# Patient Record
Sex: MALE | Race: WHITE | NOT HISPANIC OR LATINO | Employment: FULL TIME | URBAN - METROPOLITAN AREA
[De-identification: names, ages, dates, MRNs, and addresses within clinical notes are randomized per-mention and may not be internally consistent; named-entity substitution may affect disease eponyms.]

---

## 2017-01-27 RX ORDER — METHYLPHENIDATE HYDROCHLORIDE 20 MG/1
20 TABLET ORAL
Qty: 90 TABLET | Refills: 0 | Status: SHIPPED | OUTPATIENT
Start: 2017-01-27 | End: 2017-03-31 | Stop reason: SDUPTHER

## 2017-01-27 RX ORDER — METHYLPHENIDATE HYDROCHLORIDE 20 MG/1
20 TABLET ORAL
Qty: 90 TABLET | Refills: 0 | Status: SHIPPED | OUTPATIENT
Start: 2017-02-26 | End: 2018-02-07

## 2017-03-27 ENCOUNTER — PATIENT MESSAGE (OUTPATIENT)
Dept: FAMILY MEDICINE | Facility: CLINIC | Age: 37
End: 2017-03-27

## 2017-03-27 DIAGNOSIS — R19.7 DIARRHEA, UNSPECIFIED TYPE: Primary | ICD-10-CM

## 2017-03-27 RX ORDER — CLARITHROMYCIN 500 MG/1
500 TABLET, FILM COATED ORAL 2 TIMES DAILY
Qty: 28 TABLET | Refills: 0 | Status: SHIPPED | OUTPATIENT
Start: 2017-03-27 | End: 2017-04-06

## 2017-03-27 RX ORDER — OMEPRAZOLE 20 MG/1
20 CAPSULE, DELAYED RELEASE ORAL 2 TIMES DAILY
Qty: 28 CAPSULE | Refills: 0 | Status: SHIPPED | OUTPATIENT
Start: 2017-03-27 | End: 2017-04-25 | Stop reason: ALTCHOICE

## 2017-03-27 RX ORDER — AMOXICILLIN 500 MG/1
1000 CAPSULE ORAL EVERY 12 HOURS
Qty: 28 CAPSULE | Refills: 0 | Status: SHIPPED | OUTPATIENT
Start: 2017-03-27 | End: 2017-03-31 | Stop reason: SDUPTHER

## 2017-03-30 ENCOUNTER — TELEPHONE (OUTPATIENT)
Dept: FAMILY MEDICINE | Facility: CLINIC | Age: 37
End: 2017-03-30

## 2017-03-31 ENCOUNTER — OFFICE VISIT (OUTPATIENT)
Dept: FAMILY MEDICINE | Facility: CLINIC | Age: 37
End: 2017-03-31
Payer: COMMERCIAL

## 2017-03-31 VITALS
DIASTOLIC BLOOD PRESSURE: 80 MMHG | HEIGHT: 75 IN | BODY MASS INDEX: 35.91 KG/M2 | HEART RATE: 72 BPM | WEIGHT: 288.81 LBS | RESPIRATION RATE: 17 BRPM | SYSTOLIC BLOOD PRESSURE: 130 MMHG | TEMPERATURE: 98 F | OXYGEN SATURATION: 98 %

## 2017-03-31 DIAGNOSIS — Z00.00 ANNUAL PHYSICAL EXAM: Primary | ICD-10-CM

## 2017-03-31 DIAGNOSIS — R19.7 DIARRHEA, UNSPECIFIED TYPE: ICD-10-CM

## 2017-03-31 PROCEDURE — 99999 PR PBB SHADOW E&M-EST. PATIENT-LVL V: CPT | Mod: PBBFAC,,, | Performed by: NURSE PRACTITIONER

## 2017-03-31 PROCEDURE — 99395 PREV VISIT EST AGE 18-39: CPT | Mod: S$GLB,,, | Performed by: NURSE PRACTITIONER

## 2017-03-31 RX ORDER — AMOXICILLIN 500 MG/1
1000 CAPSULE ORAL EVERY 12 HOURS
Qty: 28 CAPSULE | Refills: 0 | Status: SHIPPED | OUTPATIENT
Start: 2017-03-31 | End: 2017-04-14

## 2017-03-31 RX ORDER — AMOXICILLIN 500 MG/1
1000 CAPSULE ORAL EVERY 12 HOURS
Qty: 28 CAPSULE | Refills: 0 | Status: SHIPPED | OUTPATIENT
Start: 2017-03-31 | End: 2017-03-31 | Stop reason: SDUPTHER

## 2017-03-31 NOTE — PROGRESS NOTES
Subjective:       Patient ID: Jatinder Moreno III is a 36 y.o. male.    Chief Complaint: Abdominal Cramping and Diarrhea    HPI Mr Moreno is here for his annual exam.  He feels well today, he eats well, stays active, and reports med compliance.  He recently had some problems with diarrhea, but that is resolving. His health maintenance is up to date.  Review of Systems   Constitutional: Negative for fever.   Respiratory: Negative.    Cardiovascular: Negative.        Objective:      Physical Exam   Constitutional: He is oriented to person, place, and time. He appears well-developed and well-nourished. He does not appear ill. No distress.   obese   Cardiovascular: Normal rate, regular rhythm and normal heart sounds.  Exam reveals no friction rub.    No murmur heard.  Pulmonary/Chest: Effort normal and breath sounds normal. No respiratory distress. He has no decreased breath sounds. He has no wheezes. He has no rhonchi. He has no rales.   Musculoskeletal: Normal range of motion. He exhibits no edema.   Neurological: He is alert and oriented to person, place, and time.   Skin: Skin is warm and dry. No erythema.   Psychiatric: He has a normal mood and affect. His behavior is normal.   Vitals reviewed.      Assessment:       1. Annual physical exam    2. Diarrhea, unspecified type        Plan:       Annual physical exam  -     CBC auto differential; Future; Expected date: 3/31/17  -     Comprehensive metabolic panel; Future; Expected date: 3/31/17  -     Vitamin D; Future; Expected date: 3/31/17  -     Lipid panel; Future; Expected date: 3/31/17  Will get fasting labs, otherwise all else is UTD    Diarrhea, unspecified type  -     amoxicillin (AMOXIL) 500 MG capsule; Take 2 capsules (1,000 mg total) by mouth every 12 (twelve) hours.  Dispense: 28 capsule; Refill: 0      F/u in 1 year, sooner if necessary    Verbalized understanding.

## 2017-03-31 NOTE — PATIENT INSTRUCTIONS
"Follow up in 1 year, sooner if necessary  Go to ER for new worse or concerning symptoms    Healthy Eating on the Go    Wherever your family goes, healthy eating can still be easy for you and fun for your kids. Pack sliced vegetables, fruits, and nonfat or low-fat dips in plastic bags or containers. Make fun and easy snacks like celery with peanut butter and raisins. Bring plenty of bottled water. Anywhere you go, you'll be ready when you and your child feel hungry.  At a fast-food restaurant  Eating healthy at fast-food restaurants means choosing the right foods.  · Instead of fried foods, try grilled meats like chicken and fish. Look for baked potatoes topped with vegetables or salads. Order low-fat or nonfat milk instead of soda. Get fruit or yogurt instead of milkshakes or cookies.  · If your child isn't ready for you to stop ordering French fries, get one serving for everyone to share. This gives everyone a taste without making French fries the center of the meal.  · Lead by example. Make healthy choices for yourself. Kids watch how and what you eat. They'll be more likely to eat healthy foods that you eat, too.  At the store  Do you shop at corner markets or convenience stores? You can still find healthy foods for your family. Look for:  · Canned vegetables and fruits (packed in water, not heavy syrup). If you have to buy fruit packed in syrup, rinse the fruit with water and throw away the syrup.  · Whole grain products like brown rice, corn tortillas, and whole-wheat breads. Look for the words "whole grain" on the package, not just "wheat."  · Good sources of protein like canned beans, tuna canned in water, eggs, low-fat or nonfat milk, and low-fat or nonfat cheese and yogurt.  · Avoid the snack foods! Don't be drawn in by all the chips, candy, soda, and sugar-filled cereals.  Date Last Reviewed: 6/4/2015  © 7033-5251 [employer]. [address] [place] [address], [place] [address]. All rights reserved. " This information is not intended as a substitute for professional medical care. Always follow your healthcare professional's instructions.

## 2017-03-31 NOTE — MR AVS SNAPSHOT
[facility]  [address]  [place] [address]  Phone: [phone]                  Jatinder Moreno III   3/31/2017 7:20 AM   Office Visit    Description:  Male : 1980   Provider:  Rosetta Zaldivar, NP-C   Department:  [facility]           Reason for Visit     Abdominal Cramping     Diarrhea           Diagnoses this Visit        Comments    Annual physical exam    -  Primary     Diarrhea, unspecified type                To Do List           Goals (5 Years of Data)     None       These Medications        Disp Refills Start End    amoxicillin (AMOXIL) 500 MG capsule 28 capsule 0 3/31/2017 2017    Take 2 capsules (1,000 mg total) by mouth every 12 (twelve) hours. - Oral    Pharmacy: [facility]/pharmacy #[id number] [place], [place]  [address] [facility] #: [phone]         [facility] On Call     [facility] On Call Nurse Care Line -  Assistance  Unless otherwise directed by your provider, please contact [facility], our nurse care line that is available for  assistance.     Registered nurses in the [facility] On Call Center provide: appointment scheduling, clinical advisement, health education, and other advisory services.  Call: [phone] (toll free)               Medications           Message regarding Medications     Verify the changes and/or additions to your medication regime listed below are the same as discussed with your clinician today.  If any of these changes or additions are incorrect, please notify your healthcare provider.        STOP taking these medications     butalbital-acetaminophen-caffeine -40 mg (FIORICET, ESGIC) -40 mg per tablet Take 1 tablet by mouth every 4 (four) hours as needed for Pain.           Verify that the below list of medications is an accurate representation of the medications you are currently taking.  If none reported, the list may be blank. If incorrect, please contact your healthcare provider. Carry this  list with you in case of emergency.           Current Medications     albuterol 90 mcg/actuation inhaler Inhale 2 puffs into the lungs every 6 (six) hours as needed for Wheezing.    amoxicillin (AMOXIL) 500 MG capsule Take 2 capsules (1,000 mg total) by mouth every 12 (twelve) hours.    azelastine-fluticasone 137-50 mcg/spray [place] nassal spray USE 1 SPRAY INTO EACH NOSTRIL TWICE A DAY    busPIRone (BUSPAR) 10 MG tablet Take 1 tablet (10 mg total) by mouth 3 (three) times daily.    clarithromycin (BIAXIN) 500 MG tablet Take 1 tablet (500 mg total) by mouth 2 (two) times daily.    clindamycin (CLEOCIN T) 1 % external solution     diazepam (VALIUM) 5 MG tablet Take 1 tablet (5 mg total) by mouth daily as needed for Anxiety.    fluocinonide (LIDEX) 0.05 % external solution     levocetirizine (XYZAL) 5 MG tablet TAKE 1 TABLET (5 MG TOTAL) BY MOUTH EVERY EVENING.    methylphenidate (RITALIN) 20 MG tablet Take 1 tablet (20 mg total) by mouth 3 (three) times daily with meals.    montelukast (SINGULAIR) 10 mg tablet TAKE 1 TABLET (10 MG TOTAL) BY MOUTH EVERY EVENING.    multivitamin (ONE DAILY MULTIVITAMIN) per tablet Take 1 tablet by mouth once daily.    naratriptan (AMERGE) 2.5 MG tablet 2.5 mg at onset of headache, may repeat in 4 hours if needed    omeprazole (PRILOSEC) 20 MG capsule Take 1 capsule (20 mg total) by mouth 2 (two) times daily.    promethazine (PHENERGAN) 12.5 MG Tab Take 1 tablet (12.5 mg total) by mouth every 6 (six) hours as needed.    saw palmetto 500 MG capsule Take 500 mg by mouth once daily.    tramadol (ULTRAM) 50 mg tablet Take 1 tablet (50 mg total) by mouth every 6 (six) hours as needed (breakthrough pain).    vit A & D3 in cod liver oil (COD LIVER OIL) 4,000 unit-400 unit/5 mL Liqd Take 5 mLs by mouth every evening.    zolmitriptan (ZOMIG) 2.5 mg tablet Take 1 tablet (2.5 mg total) by mouth every 2 (two) hours as needed.    clindamycin-tretinoin (ZIANA) gel Apply topically every evening.     "diclofenac potassium (CAMBIA) 50 mg PwPk Take 1 packet by mouth every 6 (six) hours.    propranolol (INDERAL LA) 120 MG 24 hr capsule Take 1 capsule (120 mg total) by mouth once daily.           Clinical Reference Information           Your Vitals Were     BP Pulse Temp Resp Height Weight    130/80 (BP Location: Right arm, Patient Position: Sitting, BP Method: Manual) 72 98.2 °F (36.8 °C) (Oral) 17 6' 3" (1.905 m) 131 kg (288 lb 12.8 oz)    SpO2 BMI             98% 36.1 kg/m2         Blood Pressure          Most Recent Value    BP  130/80      Allergies as of 3/31/2017     No Known Allergies      Immunizations Administered on Date of Encounter - 3/31/2017     None      Orders Placed During Today's Visit     Future Labs/Procedures Expected by Expires    CBC auto differential  3/31/2017 3/31/2018    Comprehensive metabolic panel  3/31/2017 3/31/2018    Lipid panel  3/31/2017 5/30/2018    Vitamin D  3/31/2017 3/31/2018      Instructions    Follow up in 1 year, sooner if necessary  Go to ER for new worse or concerning symptoms    Healthy Eating on the Go    Wherever your family goes, healthy eating can still be easy for you and fun for your kids. Pack sliced vegetables, fruits, and nonfat or low-fat dips in plastic bags or containers. Make fun and easy snacks like celery with peanut butter and raisins. Bring plenty of bottled water. Anywhere you go, you'll be ready when you and your child feel hungry.  At a fast-food restaurant  Eating healthy at fast-food restaurants means choosing the right foods.  · Instead of fried foods, try grilled meats like chicken and fish. Look for baked potatoes topped with vegetables or salads. Order low-fat or nonfat milk instead of soda. Get fruit or yogurt instead of milkshakes or cookies.  · If your child isn't ready for you to stop ordering French fries, get one serving for everyone to share. This gives everyone a taste without making French fries the center of the meal.  · Lead by " "example. Make healthy choices for yourself. Kids watch how and what you eat. They'll be more likely to eat healthy foods that you eat, too.  At the store  Do you shop at corner markets or convenience stores? You can still find healthy foods for your family. Look for:  · Canned vegetables and fruits (packed in water, not heavy syrup). If you have to buy fruit packed in syrup, rinse the fruit with water and throw away the syrup.  · Whole grain products like brown rice, corn tortillas, and whole-wheat breads. Look for the words "whole grain" on the package, not just "wheat."  · Good sources of protein like canned beans, tuna canned in water, eggs, low-fat or nonfat milk, and low-fat or nonfat cheese and yogurt.  · Avoid the snack foods! Don't be drawn in by all the chips, candy, soda, and sugar-filled cereals.  Date Last Reviewed: 6/4/2015  © 9163-7331 [employer]. [address] [place] [address]. All rights reserved. This information is not intended as a substitute for professional medical care. Always follow your healthcare professional's instructions.             Language Assistance Services     ATTENTION: Language assistance services are available, free of charge. Please call [phone].      ATENCIÓN: Si arcelia francisca, tiene a winchester disposición servicios gratuitos de asistencia lingüística. Llame al [phone].     CARLOS Ý: N?u b?n nói Ti?ng Vi?t, có các d?ch v? h? tr? ngôn ng? mi?n phí dành cho b?n. G?i s? [phone].         [facility] complies with applicable Federal civil rights laws and does not discriminate on the basis of race, color, national origin, age, disability, or sex.        "

## 2017-04-25 ENCOUNTER — OFFICE VISIT (OUTPATIENT)
Dept: FAMILY MEDICINE | Facility: CLINIC | Age: 37
End: 2017-04-25
Payer: COMMERCIAL

## 2017-04-25 VITALS
RESPIRATION RATE: 18 BRPM | HEIGHT: 75 IN | HEART RATE: 78 BPM | BODY MASS INDEX: 36.24 KG/M2 | OXYGEN SATURATION: 98 % | TEMPERATURE: 98 F | DIASTOLIC BLOOD PRESSURE: 72 MMHG | SYSTOLIC BLOOD PRESSURE: 122 MMHG | WEIGHT: 291.44 LBS

## 2017-04-25 DIAGNOSIS — J40 BRONCHITIS: ICD-10-CM

## 2017-04-25 DIAGNOSIS — H65.113 ACUTE ALLERGIC OTITIS MEDIA OF BOTH EARS, RECURRENCE NOT SPECIFIED: ICD-10-CM

## 2017-04-25 DIAGNOSIS — J45.20 REACTIVE AIRWAY DISEASE, MILD INTERMITTENT, UNCOMPLICATED: Primary | ICD-10-CM

## 2017-04-25 PROCEDURE — 96372 THER/PROPH/DIAG INJ SC/IM: CPT | Mod: 59,S$GLB,, | Performed by: NURSE PRACTITIONER

## 2017-04-25 PROCEDURE — 99214 OFFICE O/P EST MOD 30 MIN: CPT | Mod: 25,S$GLB,, | Performed by: NURSE PRACTITIONER

## 2017-04-25 PROCEDURE — 94640 AIRWAY INHALATION TREATMENT: CPT | Mod: 59,S$GLB,, | Performed by: NURSE PRACTITIONER

## 2017-04-25 PROCEDURE — 99999 PR PBB SHADOW E&M-EST. PATIENT-LVL III: CPT | Mod: PBBFAC,,, | Performed by: NURSE PRACTITIONER

## 2017-04-25 RX ORDER — ALBUTEROL SULFATE 90 UG/1
2 AEROSOL, METERED RESPIRATORY (INHALATION) EVERY 6 HOURS PRN
Qty: 18 G | Refills: 0 | Status: SHIPPED | OUTPATIENT
Start: 2017-04-25 | End: 2017-11-22 | Stop reason: SDUPTHER

## 2017-04-25 RX ORDER — AMOXICILLIN AND CLAVULANATE POTASSIUM 875; 125 MG/1; MG/1
1 TABLET, FILM COATED ORAL EVERY 12 HOURS
Qty: 14 TABLET | Refills: 0 | Status: SHIPPED | OUTPATIENT
Start: 2017-04-25 | End: 2017-06-13

## 2017-04-25 RX ORDER — METHYLPREDNISOLONE 4 MG/1
TABLET ORAL
Qty: 21 TABLET | Refills: 0 | Status: SHIPPED | OUTPATIENT
Start: 2017-04-25 | End: 2017-06-13

## 2017-04-25 RX ORDER — HYDROCODONE BITARTRATE AND HOMATROPINE METHYLBROMIDE ORAL SOLUTION 5; 1.5 MG/5ML; MG/5ML
5 LIQUID ORAL EVERY 4 HOURS PRN
Qty: 120 ML | Refills: 0 | Status: SHIPPED | OUTPATIENT
Start: 2017-04-25 | End: 2017-06-13

## 2017-04-25 RX ORDER — ALBUTEROL SULFATE 2.5 MG/.5ML
2.5 SOLUTION RESPIRATORY (INHALATION)
Status: COMPLETED | OUTPATIENT
Start: 2017-04-25 | End: 2017-04-25

## 2017-04-25 RX ADMIN — ALBUTEROL SULFATE 2.5 MG: 2.5 SOLUTION RESPIRATORY (INHALATION) at 12:04

## 2017-04-25 NOTE — MR AVS SNAPSHOT
[facility]  [address] [place] [address]  Phone: [phone]                  Jatinder Moreno III   2017 11:00 AM   Office Visit    Description:  Male : 1980   Provider:  Rosetta Zaldivar, NP-C   Department:  [facility]           Reason for Visit     URI     Conjunctivitis           Diagnoses this Visit        Comments    Reactive airway disease, mild intermittent, uncomplicated    -  Primary     Bronchitis                To Do List           Future Appointments        Provider Department Dept Phone    2017 4:00 PM Flako Reyes MD [facility] - Otorhinolaryngology [phone]      Goals (5 Years of Data)     None       These Medications        Disp Refills Start End    methylPREDNISolone (MEDROL, ANGEL,) 4 mg tablet 21 tablet 0 2017     use as directed    Pharmacy: [facility]/pharmacy #[id number] - [place], [place] [address] [address] #: [phone]       hydrocodone-homatropine 5-1.5 mg/5 ml (HYCODAN) 5-1.5 mg/5 mL Syrp 120 mL 0 2017     Take 5 mLs by mouth every 4 (four) hours as needed. - Oral    Pharmacy: [facility]/pharmacy #[id number] - [place] [address] [address] #: [phone]       amoxicillin-clavulanate 875-125mg (AUGMENTIN) 875-125 mg per tablet 14 tablet 0 2017     Take 1 tablet by mouth every 12 (twelve) hours. - Oral    Pharmacy: [facility]/pharmacy #[id number] - [place] [address] [address] [facility] Ph #: [phone]       albuterol 90 mcg/actuation inhaler 18 g 0 2017    Inhale 2 puffs into the lungs every 6 (six) hours as needed for Wheezing. - Inhalation    Pharmacy: [facility]/pharmacy #[id number] - [place] [address] [address] [facility] Ph #: [phone]         [facility] On Call     [facility] On Call Nurse Care Line - 24/7 Assistance  Unless otherwise directed by your provider, please contact [facility], our nurse care line that is available for 24/7 assistance.     Registered nurses in the [facility] Center provide: appointment  scheduling, clinical advisement, health education, and other advisory services.  Call: [phone] (toll free)               Medications           Message regarding Medications     Verify the changes and/or additions to your medication regime listed below are the same as discussed with your clinician today.  If any of these changes or additions are incorrect, please notify your healthcare provider.        START taking these NEW medications        Refills    methylPREDNISolone (MEDROL, ANGEL,) 4 mg tablet 0    Sig: use as directed    Class: Normal    hydrocodone-homatropine 5-1.5 mg/5 ml (HYCODAN) 5-1.5 mg/5 mL Syrp 0    Sig: Take 5 mLs by mouth every 4 (four) hours as needed.    Class: Normal    Route: Oral    amoxicillin-clavulanate 875-125mg (AUGMENTIN) 875-125 mg per tablet 0    Sig: Take 1 tablet by mouth every 12 (twelve) hours.    Class: Normal    Route: Oral      These medications were administered today        Dose Freq    methylPREDNISolone sod suc(PF) 125 mg/2 mL injection 125 mg 125 mg Clinic/HOD 1 time    Sig: Inject 125 mg into the muscle one time.    Class: Normal    Route: Intramuscular    albuterol sulfate nebulizer solution 2.5 mg 2.5 mg Clinic/HOD 1 time    Sig: Take 2.5 mg by nebulization one time.    Class: Normal    Route: Nebulization      STOP taking these medications     clindamycin-tretinoin (ZIANA) gel Apply topically every evening.    omeprazole (PRILOSEC) 20 MG capsule Take 1 capsule (20 mg total) by mouth 2 (two) times daily.           Verify that the below list of medications is an accurate representation of the medications you are currently taking.  If none reported, the list may be blank. If incorrect, please contact your healthcare provider. Carry this list with you in case of emergency.           Current Medications     albuterol 90 mcg/actuation inhaler Inhale 2 puffs into the lungs every 6 (six) hours as needed for Wheezing.    azelastine-fluticasone 137-50 mcg/spray [place] nassal  spray USE 1 SPRAY INTO EACH NOSTRIL TWICE A DAY    busPIRone (BUSPAR) 10 MG tablet Take 1 tablet (10 mg total) by mouth 3 (three) times daily.    clindamycin (CLEOCIN T) 1 % external solution     diazepam (VALIUM) 5 MG tablet Take 1 tablet (5 mg total) by mouth daily as needed for Anxiety.    diclofenac potassium (CAMBIA) 50 mg PwPk Take 1 packet by mouth every 6 (six) hours.    fluocinonide (LIDEX) 0.05 % external solution     levocetirizine (XYZAL) 5 MG tablet TAKE 1 TABLET (5 MG TOTAL) BY MOUTH EVERY EVENING.    methylphenidate (RITALIN) 20 MG tablet Take 1 tablet (20 mg total) by mouth 3 (three) times daily with meals.    montelukast (SINGULAIR) 10 mg tablet TAKE 1 TABLET (10 MG TOTAL) BY MOUTH EVERY EVENING.    multivitamin (ONE DAILY MULTIVITAMIN) per tablet Take 1 tablet by mouth once daily.    naratriptan (AMERGE) 2.5 MG tablet 2.5 mg at onset of headache, may repeat in 4 hours if needed    promethazine (PHENERGAN) 12.5 MG Tab Take 1 tablet (12.5 mg total) by mouth every 6 (six) hours as needed.    saw palmetto 500 MG capsule Take 500 mg by mouth once daily.    tramadol (ULTRAM) 50 mg tablet Take 1 tablet (50 mg total) by mouth every 6 (six) hours as needed (breakthrough pain).    vit A & D3 in cod liver oil (COD LIVER OIL) 4,000 unit-400 unit/5 mL Liqd Take 5 mLs by mouth every evening.    zolmitriptan (ZOMIG) 2.5 mg tablet Take 1 tablet (2.5 mg total) by mouth every 2 (two) hours as needed.    amoxicillin-clavulanate 875-125mg (AUGMENTIN) 875-125 mg per tablet Take 1 tablet by mouth every 12 (twelve) hours.    hydrocodone-homatropine 5-1.5 mg/5 ml (HYCODAN) 5-1.5 mg/5 mL Syrp Take 5 mLs by mouth every 4 (four) hours as needed.    methylPREDNISolone (MEDROL, ANGEL,) 4 mg tablet use as directed    propranolol (INDERAL LA) 120 MG 24 hr capsule Take 1 capsule (120 mg total) by mouth once daily.           Clinical Reference Information           Your Vitals Were     BP Pulse Temp Resp Height Weight    122/72 (BP  "Location: Left arm, Patient Position: Sitting, BP Method: Manual) 78 98.2 °F (36.8 °C) (Oral) 18 6' 3" (1.905 m) 132.2 kg (291 lb 7.2 oz)    SpO2 BMI             98% 36.43 kg/m2         Blood Pressure          Most Recent Value    BP  122/72      Allergies as of 4/25/2017     No Known Allergies      Immunizations Administered on Date of Encounter - 4/25/2017     None      Instructions    Follow up if not improved  Go to ER for new worse or concerning symptoms  Drink plenty of fluids  Tylenol as needed for fever or pain    What Is Acute Bronchitis?  Acute or short-term bronchitis last for days or weeks. It occurs when the bronchial tubes (airways in the lungs) are irritated by a virus, bacteria, or allergen. This causes a cough that produces yellow or greenish mucus.  Inside healthy lungs    Air travels in and out of the lungs through the airways. The linings of these airways produce sticky mucus. This mucus traps particles that enter the lungs. Tiny structures called cilia then sweep the particles out of the airways.     Healthy airway: Airways are normally open. Air moves in and out easily.      Healthy cilia: Tiny, hairlike cilia sweep mucus and particles up and out of the airways.   Lungs with bronchitis  Bronchitis often occurs with a cold or the flu virus. The airways become inflamed (red and swollen). There is a deep hacking cough from the extra mucus. Other symptoms may include:  · Wheezing  · Chest discomfort  · Shortness of breath  · Mild fever  A second infection, this time due to bacteria, may then occur. And airways irritated by allergens or smoke are more likely to get infected.        Inflamed airway: Inflammation and extra mucus narrow the airway, causing shortness of breath.      Impaired cilia: Extra mucus impairs cilia, causing congestion and wheezing. Smoking makes the problem worse.   Making a diagnosis  A physical exam, health history, and certain tests help your healthcare provider make the " diagnosis.  Health history  Your healthcare provider will ask you about your symptoms.  The exam  Your provider listens to your chest for signs of congestion. He or she may also check your ears, nose, and throat.  Possible tests  · A sputum test for bacteria. This requires a sample of mucus from the lungs.  · A nasal or throat swab for bacterial infection.  · A chest X-ray if your healthcare provider thinks you have pneumonia.  · Tests to check for an underlying condition, such as allergies, asthma, or COPD. You may need to see a specialist for more lung function testing.  Treating a cough  The main treatment for bronchitis is easing symptoms. Avoiding smoke, allergens, and other things that trigger coughing can often help. If the infection is bacterial, you may be given antibiotics. During the illness, it's important to get plenty of sleep. To ease symptoms:  · Dont smoke, and avoid secondhand smoke.  · Use a humidifier, or breathe in steam from a hot shower. This may help loosen mucus.  · Drink a lot of water and juice. They can soothe the throat and may help thin mucus.  · Sit up or use extra pillows when in bed to help lessen coughing and congestion.  · Ask your provider about using cough medicine, pain and fever medicine, or a decongestant.  Antibiotics  Most cases of bronchitis are caused by cold or flu viruses. Antibiotics dont treat viral illness. Taking antibiotics when they are not needed increases your risk of getting an infection later that is antibiotic-resistant. Your provider will prescribe antibiotics if the infection is caused by bacteria. If they are prescribed:  · Take the medicine until it is used up, even if symptoms have improved. If you dont, the bronchitis may come back.  · Take them as directed. For instance, some medicines should be taken with food.  · Ask your provider or pharmacist what side effects are common, and what to do about them.  Follow-up care  You should see your provider  again in 2 to 3 weeks. By this time, symptoms should have improved. An infection that lasts longer may mean you have a more serious problem.  Prevention  · Avoid tobacco smoke. If you smoke, quit. Stay away from smoky places. Ask friends and family not to smoke around you, or in your home or car.  · Get checked for allergies.  · Ask your provider about getting a yearly flu shot, and pneumococcal or pneumonia shots.  · Wash your hands often. This helps reduce the chance of picking up viruses that cause colds and flu.  Call your healthcare provider if:  · Symptoms worsen, or new symptoms develop.  · Breathing problems worsen or  become severe.  · Symptoms dont get better within a week, or within 3 days of taking antibiotics.   Date Last Reviewed: 6/18/2014  © 9611-8472 [employer]. [address] [place] [address], [place] [address]. All rights reserved. This information is not intended as a substitute for professional medical care. Always follow your healthcare professional's instructions.             Language Assistance Services     ATTENTION: Language assistance services are available, free of charge. Please call [phone].      ATENCIÓN: Si habla francisca, tiene a winchester disposición servicios gratuitos de asistencia lingüística. Llame al [phone].     CARLOS Ý: N?u b?n nói Ti?ng Vi?t, có các d?ch v? h? tr? ngôn ng? mi?n phí dành cho b?n. G?i s? [phone].         [facility] complies with applicable Federal civil rights laws and does not discriminate on the basis of race, color, national origin, age, disability, or sex.

## 2017-04-25 NOTE — PATIENT INSTRUCTIONS
Follow up if not improved  Go to ER for new worse or concerning symptoms  Drink plenty of fluids  Tylenol as needed for fever or pain    What Is Acute Bronchitis?  Acute or short-term bronchitis last for days or weeks. It occurs when the bronchial tubes (airways in the lungs) are irritated by a virus, bacteria, or allergen. This causes a cough that produces yellow or greenish mucus.  Inside healthy lungs    Air travels in and out of the lungs through the airways. The linings of these airways produce sticky mucus. This mucus traps particles that enter the lungs. Tiny structures called cilia then sweep the particles out of the airways.     Healthy airway: Airways are normally open. Air moves in and out easily.      Healthy cilia: Tiny, hairlike cilia sweep mucus and particles up and out of the airways.   Lungs with bronchitis  Bronchitis often occurs with a cold or the flu virus. The airways become inflamed (red and swollen). There is a deep hacking cough from the extra mucus. Other symptoms may include:  · Wheezing  · Chest discomfort  · Shortness of breath  · Mild fever  A second infection, this time due to bacteria, may then occur. And airways irritated by allergens or smoke are more likely to get infected.        Inflamed airway: Inflammation and extra mucus narrow the airway, causing shortness of breath.      Impaired cilia: Extra mucus impairs cilia, causing congestion and wheezing. Smoking makes the problem worse.   Making a diagnosis  A physical exam, health history, and certain tests help your healthcare provider make the diagnosis.  Health history  Your healthcare provider will ask you about your symptoms.  The exam  Your provider listens to your chest for signs of congestion. He or she may also check your ears, nose, and throat.  Possible tests  · A sputum test for bacteria. This requires a sample of mucus from the lungs.  · A nasal or throat swab for bacterial infection.  · A chest X-ray if your  healthcare provider thinks you have pneumonia.  · Tests to check for an underlying condition, such as allergies, asthma, or COPD. You may need to see a specialist for more lung function testing.  Treating a cough  The main treatment for bronchitis is easing symptoms. Avoiding smoke, allergens, and other things that trigger coughing can often help. If the infection is bacterial, you may be given antibiotics. During the illness, it's important to get plenty of sleep. To ease symptoms:  · Dont smoke, and avoid secondhand smoke.  · Use a humidifier, or breathe in steam from a hot shower. This may help loosen mucus.  · Drink a lot of water and juice. They can soothe the throat and may help thin mucus.  · Sit up or use extra pillows when in bed to help lessen coughing and congestion.  · Ask your provider about using cough medicine, pain and fever medicine, or a decongestant.  Antibiotics  Most cases of bronchitis are caused by cold or flu viruses. Antibiotics dont treat viral illness. Taking antibiotics when they are not needed increases your risk of getting an infection later that is antibiotic-resistant. Your provider will prescribe antibiotics if the infection is caused by bacteria. If they are prescribed:  · Take the medicine until it is used up, even if symptoms have improved. If you dont, the bronchitis may come back.  · Take them as directed. For instance, some medicines should be taken with food.  · Ask your provider or pharmacist what side effects are common, and what to do about them.  Follow-up care  You should see your provider again in 2 to 3 weeks. By this time, symptoms should have improved. An infection that lasts longer may mean you have a more serious problem.  Prevention  · Avoid tobacco smoke. If you smoke, quit. Stay away from smoky places. Ask friends and family not to smoke around you, or in your home or car.  · Get checked for allergies.  · Ask your provider about getting a yearly flu shot, and  pneumococcal or pneumonia shots.  · Wash your hands often. This helps reduce the chance of picking up viruses that cause colds and flu.  Call your healthcare provider if:  · Symptoms worsen, or new symptoms develop.  · Breathing problems worsen or  become severe.  · Symptoms dont get better within a week, or within 3 days of taking antibiotics.   Date Last Reviewed: 6/18/2014  © 1742-9800 [employer]. [address] [place] [address], [place] [address]. All rights reserved. This information is not intended as a substitute for professional medical care. Always follow your healthcare professional's instructions.

## 2017-04-25 NOTE — PROGRESS NOTES
Upper Respiratory Infection  Patient complains of a 7day history of some URI complaints. Associated symptoms include chest tightness, productive cough, congestion, rhinorrrhea, headache no fever.  He has attempted mucinex OTC.  Sick contacts include none.  He has had a flu shot this season.  He takes xyzal, singulair and dymista every day.    Subjective:       Patient ID: Jatinder Moreno III is a 36 y.o. male.      Review of Systems   HENT: Positive for postnasal drip, rhinorrhea and sore throat.    Respiratory: Positive for cough.    Neurological: Positive for headaches.       Objective:      Physical Exam   Constitutional: He is oriented to person, place, and time. He appears well-developed and well-nourished. He appears ill. No distress.   HENT:   Head: Normocephalic and atraumatic.   Right Ear: Tympanic membrane is erythematous. A middle ear effusion is present.   Left Ear: Tympanic membrane is erythematous. A middle ear effusion is present.   Nose: Mucosal edema and rhinorrhea present. Right sinus exhibits no maxillary sinus tenderness and no frontal sinus tenderness. Left sinus exhibits no maxillary sinus tenderness and no frontal sinus tenderness.   Mouth/Throat: Uvula is midline and mucous membranes are normal. Posterior oropharyngeal edema and posterior oropharyngeal erythema present. No oropharyngeal exudate.   Cardiovascular: Normal rate, regular rhythm and normal heart sounds.  Exam reveals no friction rub.    No murmur heard.  Musculoskeletal: Normal range of motion. He exhibits no edema.   Lymphadenopathy:        Head (right side): No submental, no submandibular and no tonsillar adenopathy present.        Head (left side): No submental, no submandibular and no tonsillar adenopathy present.     He has no cervical adenopathy.   Neurological: He is alert and oriented to person, place, and time.   Skin: Skin is warm and dry. No erythema.   Psychiatric: He has a normal mood and affect. His behavior is  normal.   Vitals reviewed.      Assessment:       1. Reactive airway disease, mild intermittent, uncomplicated    2. Bronchitis        Plan:       Reactive airway disease, mild intermittent, uncomplicated  -     methylPREDNISolone sod suc(PF) 125 mg/2 mL injection 125 mg; Inject 125 mg into the muscle one time.  -     methylPREDNISolone (MEDROL, ANGEL,) 4 mg tablet; use as directed  Dispense: 21 tablet; Refill: 0  -     hydrocodone-homatropine 5-1.5 mg/5 ml (HYCODAN) 5-1.5 mg/5 mL Syrp; Take 5 mLs by mouth every 4 (four) hours as needed.  Dispense: 120 mL; Refill: 0  -     amoxicillin-clavulanate 875-125mg (AUGMENTIN) 875-125 mg per tablet; Take 1 tablet by mouth every 12 (twelve) hours.  Dispense: 14 tablet; Refill: 0  -     albuterol 90 mcg/actuation inhaler; Inhale 2 puffs into the lungs every 6 (six) hours as needed for Wheezing.  Dispense: 18 g; Refill: 0  -     albuterol sulfate nebulizer solution 2.5 mg; Take 2.5 mg by nebulization one time.      Acute allergic otitis media of both ears, recurrence not specified    No red flags    Follow up if not improved  Go to ER for new worse or concerning symptoms  Drink plenty of fluids  Tylenol as needed for fever or pain    Verbalized understanding  Answers for HPI/ROS submitted by the patient on 4/25/2017   Cough  Chronicity: recurrent  Onset: in the past 7 days  Progression since onset: gradually worsening  Frequency: every few minutes  Cough characteristics: productive of sputum  ear congestion: Yes  nasal congestion: Yes  sweats: Yes  Aggravated by: cold air  Risk factors for lung disease: animal exposure  asthma: Yes  bronchitis: Yes  Treatments tried: OTC cough suppressant  Improvement on treatment: no relief

## 2017-04-30 ENCOUNTER — TELEPHONE (OUTPATIENT)
Dept: FAMILY MEDICINE | Facility: CLINIC | Age: 37
End: 2017-04-30

## 2017-05-03 ENCOUNTER — PATIENT MESSAGE (OUTPATIENT)
Dept: NEUROLOGY | Facility: CLINIC | Age: 37
End: 2017-05-03

## 2017-05-04 RX ORDER — LEVOCETIRIZINE DIHYDROCHLORIDE 5 MG/1
5 TABLET, FILM COATED ORAL NIGHTLY
Qty: 90 TABLET | Refills: 1 | Status: SHIPPED | OUTPATIENT
Start: 2017-05-04 | End: 2017-10-16 | Stop reason: SDUPTHER

## 2017-05-04 RX ORDER — MONTELUKAST SODIUM 10 MG/1
10 TABLET ORAL NIGHTLY
Qty: 90 TABLET | Refills: 1 | Status: SHIPPED | OUTPATIENT
Start: 2017-05-04 | End: 2017-10-16 | Stop reason: SDUPTHER

## 2017-05-04 NOTE — TELEPHONE ENCOUNTER
----- Message from Lauren Velazquez sent at 5/4/2017 10:28 AM CDT -----  Contact: Pharmacy  levocetirizine (XYZAL) 5 MG tablet  montelukast (SINGULAIR) 10 mg tablet    Pharmacy calling to request a refill of the above. Please send to [facility]. Thanks

## 2017-05-18 DIAGNOSIS — G43.009 MIGRAINE WITHOUT AURA, WITHOUT MENTION OF INTRACTABLE MIGRAINE WITHOUT MENTION OF STATUS MIGRAINOSUS: ICD-10-CM

## 2017-05-18 RX ORDER — PROPRANOLOL HYDROCHLORIDE 120 MG/1
CAPSULE, EXTENDED RELEASE ORAL
Qty: 30 CAPSULE | Refills: 2 | Status: SHIPPED | OUTPATIENT
Start: 2017-05-18 | End: 2017-08-14 | Stop reason: SDUPTHER

## 2017-06-13 ENCOUNTER — OFFICE VISIT (OUTPATIENT)
Dept: OTOLARYNGOLOGY | Facility: CLINIC | Age: 37
End: 2017-06-13
Payer: COMMERCIAL

## 2017-06-13 ENCOUNTER — PATIENT MESSAGE (OUTPATIENT)
Dept: OPTOMETRY | Facility: CLINIC | Age: 37
End: 2017-06-13

## 2017-06-13 VITALS
BODY MASS INDEX: 36.9 KG/M2 | SYSTOLIC BLOOD PRESSURE: 120 MMHG | HEIGHT: 75 IN | DIASTOLIC BLOOD PRESSURE: 79 MMHG | WEIGHT: 296.75 LBS | HEART RATE: 73 BPM

## 2017-06-13 DIAGNOSIS — J31.0 CHRONIC RHINITIS: Primary | ICD-10-CM

## 2017-06-13 DIAGNOSIS — G47.33 OSA ON CPAP: ICD-10-CM

## 2017-06-13 PROCEDURE — 87075 CULTR BACTERIA EXCEPT BLOOD: CPT

## 2017-06-13 PROCEDURE — 99999 PR PBB SHADOW E&M-EST. PATIENT-LVL IV: CPT | Mod: PBBFAC,,, | Performed by: OTOLARYNGOLOGY

## 2017-06-13 PROCEDURE — 99204 OFFICE O/P NEW MOD 45 MIN: CPT | Mod: 25,S$GLB,, | Performed by: OTOLARYNGOLOGY

## 2017-06-13 PROCEDURE — 87076 CULTURE ANAEROBE IDENT EACH: CPT

## 2017-06-13 PROCEDURE — 31231 NASAL ENDOSCOPY DX: CPT | Mod: S$GLB,,, | Performed by: OTOLARYNGOLOGY

## 2017-06-13 NOTE — PROCEDURES
Nasal/sinus endoscopy  Date/Time: 6/13/2017 4:59 PM  Performed by: RENEE HIGGINS  Authorized by: RENEE HIGGINS     Consent Done?:  Yes (Verbal)  Anesthesia:     Local anesthetic:  Lidocaine 4% and Hasmukh-Synephrine 1/2%    Patient tolerance:  Patient tolerated the procedure well with no immediate complications  Nose:     Procedure Performed:  Nasal Endoscopy  External:      No external nasal deformity  Intranasal:      Mucosa no polyps     Mucosa ulcers not present     No mucosa lesions present     Enlarged turbinates     Septum gross deformity  Nasopharynx:      No mucosa lesions     Adenoids not present     Posterior choanae patent     Eustachian tube patent     Thick mucoid exudate on both middle turbinates.  Generally dry appearing slightly red mucosa.  No polyps.  Mild rightward septal deviation.  Moderate inferior turbinate hypertrophy.

## 2017-06-13 NOTE — PROGRESS NOTES
Subjective:      Jatinder Moreno III is a 37 y.o. male who was self-referred for nasal congestion.    He relates a long history of nasal congestion bilaterally, which is perennial and moderately severe.  He notes associated mucoid discharge, hyposmia and postnasal drip, and rare aural fullness and facial pressure.  He uses CPAP at night but feels this has limited effectiveness due to nasal blockage.  He recalls having 3 sets of allergy tests, all of which were negative.  Nonetheless, he continues to use Xyzal and Dymista, as well as singulair for asthma.  He denies prior nasal trauma or surgery.    SNOT-22 score = 50, NOSE score = 65%    Global QOL = 85%    Days missed = Less than 5    PTC = deferred      Past Medical History  He has a past medical history of ADD (attention deficit disorder with hyperactivity); ADHD (attention deficit hyperactivity disorder); Asthma, well controlled; Bronchitis; Headache; History of migraine headaches; History of psychiatric care; Psychiatric exam; and Therapy.    Past Surgical History  He has no past surgical history on file.    Family History  His family history includes Anxiety disorder in his mother; Bipolar disorder in his brother and cousin; Drug abuse in his brother; Schizophrenia in his brother.    Social History  He reports that he has never smoked. He does not have any smokeless tobacco history on file. He reports that he drinks alcohol. He reports that he does not use drugs.    Allergies  He has No Known Allergies.    Medications   He has a current medication list which includes the following prescription(s): albuterol, azelastine-fluticasone, buspirone, diazepam, diclofenac potassium, levocetirizine, methylphenidate, montelukast, multivitamin, naratriptan, promethazine, propranolol, saw palmetto, tramadol, vit a and d3 in cod liver oil, zolmitriptan, and mirtazapine.    Review of Systems  Review of Systems   Constitutional: Negative for fatigue, fever and unexpected  "weight change.   HENT: Positive for sinus pressure and tinnitus. Negative for congestion, dental problem, ear discharge, ear pain, facial swelling, hearing loss, hoarse voice, nosebleeds, postnasal drip, rhinorrhea, sore throat, trouble swallowing and voice change.    Eyes: Positive for itching. Negative for photophobia, discharge and visual disturbance.   Respiratory: Positive for apnea. Negative for cough, shortness of breath and wheezing.    Cardiovascular: Negative for chest pain and palpitations.   Gastrointestinal: Negative for abdominal pain, nausea and vomiting.   Endocrine: Positive for heat intolerance. Negative for cold intolerance.   Genitourinary: Negative for difficulty urinating.   Musculoskeletal: Negative for arthralgias, back pain, myalgias and neck pain.   Skin: Negative for rash.   Allergic/Immunologic: Negative for environmental allergies and food allergies.   Neurological: Positive for headaches. Negative for dizziness, seizures, syncope and weakness.   Hematological: Negative for adenopathy. Does not bruise/bleed easily.   Psychiatric/Behavioral: Positive for sleep disturbance. Negative for decreased concentration and dysphoric mood. The patient is nervous/anxious.           Objective:     /79 (BP Location: Left arm)   Pulse 73   Ht 6' 3" (1.905 m)   Wt 134.6 kg (296 lb 11.8 oz)   BMI 37.09 kg/m²        Constitutional:   He appears well-developed. He is cooperative. Normal speech.  No hoarse voice.      Head:  Normocephalic. Salivary glands normal.  Facial strength is normal.      Ears:    Right Ear: No drainage or tenderness. Tympanic membrane is not perforated. Tympanic membrane mobility is normal. No middle ear effusion. No decreased hearing is noted.   Left Ear: No drainage or tenderness. Tympanic membrane is not perforated. Tympanic membrane mobility is normal.  No middle ear effusion. No decreased hearing is noted.     Nose:  Mucosal edema present. No rhinorrhea, septal " deviation or polyps. No epistaxis. Turbinates normal, no turbinate masses and no turbinate hypertrophy.  Right sinus exhibits no maxillary sinus tenderness and no frontal sinus tenderness. Left sinus exhibits no maxillary sinus tenderness and no frontal sinus tenderness.     Mouth/Throat  Oropharynx clear and moist without lesions or asymmetry and normal uvula midline. He does not have dentures. Normal dentition. No oral lesions or mucous membrane lesions. No oropharyngeal exudate or posterior oropharyngeal erythema. Tonsils present, +1.  Mirror exam not performed due to patient tolerance.  Mirror exam not performed due to patient tolerance.    MMP 1+      Neck:  Neck normal without thyromegaly masses, asymmetry, normal tracheal structure, crepitus, and tenderness, thyroid normal, trachea normal and no adenopathy. Normal range of motion present.     He has no cervical adenopathy.     Cardiovascular:   Regular rhythm.      Pulmonary/Chest:   Effort normal.     Psychiatric:   He has a normal mood and affect. His speech is normal and behavior is normal.     Neurological:   No cranial nerve deficit.     Skin:   No rash noted.       Procedure    Nasal endoscopy performed.  See procedure note.     Left nasal valve     Left MT with exudate, swabbed for culture     Left septal spur     Right nasal valve     Right MT with exudate     Right nasopharynx        Data Reviewed    WBC (K/uL)   Date Value   04/30/2015 5.70     Eosinophil% (%)   Date Value   04/30/2015 1.9     Eos # (K/uL)   Date Value   04/30/2015 0.1     Platelets (K/uL)   Date Value   04/30/2015 193     Glucose (mg/dL)   Date Value   04/30/2015 99     IgE (IU/ml)   Date Value   04/12/2011 <35 (A)       No sinus imaging available.       Assessment:     1. Chronic rhinitis    2. ALEXX on CPAP         Plan:     I had a long discussion with the patient regarding his condition and the further workup and management options.  I swabbed his nasal exudate for culture and  will use the results to direct antibiotic therapy as indicated.  I suggested that the nasal antihistamines may be overly-drying and should be weaned.  I also suggested that we would consider topical antimicrobials to treat a possible cause of nasal inflammation.  Surgical management with septoplasty and turbinate reduction would be a later consideration.    Return for test results.

## 2017-06-15 ENCOUNTER — OFFICE VISIT (OUTPATIENT)
Dept: OPTOMETRY | Facility: CLINIC | Age: 37
End: 2017-06-15
Payer: COMMERCIAL

## 2017-06-15 DIAGNOSIS — Z46.0 FITTING AND ADJUSTMENT OF SPECTACLES AND CONTACT LENSES: ICD-10-CM

## 2017-06-15 DIAGNOSIS — Z46.0 FITTING AND ADJUSTMENT OF SPECTACLES AND CONTACT LENSES: Primary | ICD-10-CM

## 2017-06-15 DIAGNOSIS — H52.13 MYOPIA, BILATERAL: Primary | ICD-10-CM

## 2017-06-15 DIAGNOSIS — H35.363 MACULAR DRUSEN, BILATERAL: ICD-10-CM

## 2017-06-15 PROCEDURE — 92014 COMPRE OPH EXAM EST PT 1/>: CPT | Mod: S$GLB,,, | Performed by: OPTOMETRIST

## 2017-06-15 PROCEDURE — 99999 PR PBB SHADOW E&M-EST. PATIENT-LVL II: CPT | Mod: PBBFAC,,, | Performed by: OPTOMETRIST

## 2017-06-15 PROCEDURE — 92015 DETERMINE REFRACTIVE STATE: CPT | Mod: S$GLB,,, | Performed by: OPTOMETRIST

## 2017-06-15 PROCEDURE — 92310 CONTACT LENS FITTING OU: CPT | Mod: ,,, | Performed by: OPTOMETRIST

## 2017-06-15 NOTE — PROGRESS NOTES
HPI     Patient's age: 37 y.o.    Approximate date of last eye examination:  2/11/16  Name of last eye doctor seen: Dr. Renae    Pt states that he is here for annual eye exam and contacts, not having any   trouble with eyes    Wears glasses? yes      Wears CLs?:  yes                        Wears full-time or part-time:  Full-time               Sleeps with contact lenses:  no                Headaches?  no  Eye pain/discomfort?  no                                                                                     Flashes?  no  Floaters?  no  Diplopia/Double vision?  no    Patient's Ocular History:         Any eye surgeries? no       Family history of eye disease?  no    Significant patient medical history:         1. Diabetes?  on       If yes, IDDM or NIDDM? no   2. HBP?  no                ! OTC eyedrops currently using:  none   ! Prescription eye meds currently using:  none                          Last edited by Dede Renae, OD on 6/15/2017  8:48 AM. (History)            Assessment /Plan     For exam results, see Encounter Report.    Myopia, bilateral  Fitting and adjustment of spectacles and contact lenses   Rx specs   Dispensed trials today   Remove nightly, replace monthly   OK to order if happy    Macular drusen, bilateral   Stable, compared to 2014 photos   OCT next visit    RTC 1 year DFE, annual, OCT mac

## 2017-06-19 LAB — BACTERIA SPEC ANAEROBE CULT: NORMAL

## 2017-06-20 ENCOUNTER — TELEPHONE (OUTPATIENT)
Dept: OTOLARYNGOLOGY | Facility: CLINIC | Age: 37
End: 2017-06-20

## 2017-06-20 ENCOUNTER — OFFICE VISIT (OUTPATIENT)
Dept: NEUROLOGY | Facility: CLINIC | Age: 37
End: 2017-06-20
Payer: COMMERCIAL

## 2017-06-20 VITALS
SYSTOLIC BLOOD PRESSURE: 132 MMHG | DIASTOLIC BLOOD PRESSURE: 83 MMHG | HEART RATE: 77 BPM | BODY MASS INDEX: 37.28 KG/M2 | HEIGHT: 75 IN | WEIGHT: 299.81 LBS

## 2017-06-20 DIAGNOSIS — G43.009 MIGRAINE WITHOUT AURA, WITHOUT MENTION OF INTRACTABLE MIGRAINE WITHOUT MENTION OF STATUS MIGRAINOSUS: ICD-10-CM

## 2017-06-20 DIAGNOSIS — G43.719 CHRONIC MIGRAINE WITHOUT AURA, WITH INTRACTABLE MIGRAINE, SO STATED, WITHOUT MENTION OF STATUS MIGRAINOSUS: ICD-10-CM

## 2017-06-20 DIAGNOSIS — G47.33 OBSTRUCTIVE SLEEP APNEA: Primary | ICD-10-CM

## 2017-06-20 PROCEDURE — 99214 OFFICE O/P EST MOD 30 MIN: CPT | Mod: S$GLB,,, | Performed by: NURSE PRACTITIONER

## 2017-06-20 PROCEDURE — 99999 PR PBB SHADOW E&M-EST. PATIENT-LVL III: CPT | Mod: PBBFAC,,, | Performed by: NURSE PRACTITIONER

## 2017-06-20 RX ORDER — ZOLMITRIPTAN 5 MG/1
1 SPRAY, METERED NASAL
Qty: 6 EACH | Refills: 3 | Status: SHIPPED | OUTPATIENT
Start: 2017-06-20 | End: 2017-07-20

## 2017-06-20 RX ORDER — NARATRIPTAN 2.5 MG/1
TABLET ORAL
Qty: 9 TABLET | Refills: 5 | Status: SHIPPED | OUTPATIENT
Start: 2017-06-20 | End: 2017-12-29 | Stop reason: SDUPTHER

## 2017-06-20 RX ORDER — DICLOFENAC POTASSIUM 50 MG/1
1 POWDER, FOR SOLUTION ORAL EVERY 6 HOURS PRN
Qty: 9 EACH | Refills: 5 | Status: SHIPPED | OUTPATIENT
Start: 2017-06-20 | End: 2017-12-29 | Stop reason: SDUPTHER

## 2017-06-20 RX ORDER — TRAMADOL HYDROCHLORIDE 50 MG/1
50 TABLET ORAL EVERY 6 HOURS PRN
Qty: 30 TABLET | Refills: 3 | Status: SHIPPED | OUTPATIENT
Start: 2017-06-20 | End: 2017-12-29 | Stop reason: SDUPTHER

## 2017-06-20 NOTE — TELEPHONE ENCOUNTER
The sinus culture showed propionibacterium, so I'm prescribing clindamycin sinus rinse from [facility].  Please call and let him know.

## 2017-06-20 NOTE — PROGRESS NOTES
Mr. Moreno is here today for management of obstructive sleep apnea and headaches. Last seen 12/7/16.     Since last seen, he continues to use cpap 13-16cm nightly. Would like to trial alternative nasal mask. His straps get worn, current one needs replacing. No chin strap.Denies oral drying. Saw Dr. Reyes recently (ENT). HIt-6= 61. Inderal LA helping reduce intensity of headaches,having less severe episodes in interim. Had a bad one/lingering recently. zomig PO costly, using more amerge + cambia. Still taking up to 2hr notice complete relief (feels parasthesias then intense flush/sweat). Having ~ 15 headaches/month, but overall less frequent since last seen monthly. Inquiring about botox. Headache remains same location/nature.     Interrogation- AHI 1.4, avg 7:30h/n. Nasal pillow mask. 0% leak. 90% tile 13.4-14. 30/30d>4h.         HISTORY:  12/11/2013:  He has since been setup (10/14) with aPAP 9-16cm, tolerating pressures well. He uses a nasal mask with a chin strap. He denies mask air leaks, nasal drying, or oral drying. His morning headaches and overall frequency of migraines has significantly improved. He is using therapy nightly, even brings machine to his girlfriend's house. He feels more refreshed in the mornings, has less nocturia, no more snoring,  and is no longer bothered by urge to move his legs during sleep initiation. He no longer awakens with feeling of heart racing.     He reports having total of ~7 migraines since last seen 3-mos ago, 2-3 of them severe. NO missed work in interim due to HA. Tramadol or Cambia/Amerge or Zomig remain effective.  HA starts from the R frontal/retroorbital area, at times radiates to the ipsilateral occipito-cervical area, pressure in quality,  with associated symptoms of R ptosis, nausea, photo/phonophobia, generalized weakness with R eyelid twitching, chills/sweating (prodrome). Triggers remain weather changes, sexual activity and stress while movement can worsen  attacks. He prefers to rest in a dark quiet cold room to feel better.     Focus improved on Daytrana patch ADHD.    Interrogation: New machine condition, heat at 4, 30d avg 5:48h/night, 1d 7:48hr. 90% pressure 30d 10.8cm, 7d 11.8cm, 1d 14cm. 21/30 days>4h. 20min/6cm ramp setup today. Using nasal pillows mask.     ESS= 8   HIT-6 score= 57    INTERVAL HISTORY:  12/7/16: He continues to use cpap 13cm nightly. He has gained <10% wgt since study. Feels horrible if he falls asleep on couch w/o his mask on. Getting regular replacement supplies from [facility]. No chin strap use. Denies oral drying. Not using water system anymore, already congested. Continues to use dymista NS twice daily and take xyzal. ESS=9  Interrogation: 0% leak. AHI 1.9, avg 30d= 6:39h/n. Nuance mask. 0% periodic      Reports continued fairly frequent, just about daily mild headaches a lot on way home from work or after high stress situations. He has begun verapamil 120mg La in interim which he tolerates well w/o s/e. Reports severe headaches ~ 3/month. Still gets same prodromal symptoms prior to headaches and occasional nausea and photophobia. He is out of cambia, amerge and zomig which all remain effective. Unable to take triptan at work due to sleepiness.  Fioricet in interim prescribed was also helpful at times, but prefers prn tramadol 1/2 50mg mod episodes. He has been taking OTC analgesics frequently for his mild headaches. Remains very anxious/worrier. He has gotten  and is expecting 1st baby in summer '17!! 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         PSG : (273#) 7/27/13: AHI was 18.4 with an oxygen magaly of 85.0%. The supine AHI was 22.3 and the REM AHI was 28.3. The patient did not qualify for a split night study due to an insufficient number of events in the first half of the study. Setup with APAP    ROS: Mood stable. Occasional am postural hypotension, +sinus congestion (just saw ENT). 17# weight gain. Otherwise a  "balance review of 10-systems is negative.     Tried(verapamil, lexapro, topamax, gabapentin, metoprolol, treximet, relpax, maxalt, imitrex ?dose)    Employer/Position: [employer]  FH: HA, brother with presumed ALEXX, witnessed apneic pauses     EXAM:   General: W/D,obese, well-groomed  /83 (BP Location: Left arm, Patient Position: Sitting, BP Method: Automatic)   Pulse 77   Ht 6' 3" (1.905 m)   Wt 136 kg (299 lb 13.2 oz)   BMI 37.48 kg/m²         IMPRESSION:   Chronic migraine with and without aura, intractable 15d/month, consider botox  ALEXX, moderate, continued excellent aPAP adherence, with improvement of symptoms. Needs new supplies    PLAN     Preventative therapy:  Continue Inderal LA 120mg qd    Abortive therapy: Continue Zomig 5 mg or Amerge 2.5 mg PO q 2h, up to maximum of 5mg/24h , do not delay treatment. And trial Zomig NS 5mg q2h prn, max 10mg/24h, demo provided (FABRIZIO with coupon).     Continue Phenergan 12.5-25 mg PO q6-8h prn for HA/nausea     Continue Tramadol 50 mg 1/2-1 tabs PO q6-8 h prn for headache     Cambia 50mg powder q6h prn, taken with triptan prn improved effectiveness    Continue to regulate sleep, mealtimes, try to reduce stress and to avoid known headache triggers/aggravating factors. Emphasized importance of keeping headache diaries Provided today     Continue autoPAP 13-16cm. Continue excellent usage. [facility] DME prn supplies, wants to trial alternative nasal mask. Discussed health benefits of continued use, potential ramifications of untreated ALEXX.      RTC 6 months, sooner if needed or if considers Botox. He is an ideal candidate. Discussed adm areas/purpose    "

## 2017-06-21 ENCOUNTER — PATIENT MESSAGE (OUTPATIENT)
Dept: OTOLARYNGOLOGY | Facility: CLINIC | Age: 37
End: 2017-06-21

## 2017-07-18 ENCOUNTER — PATIENT MESSAGE (OUTPATIENT)
Dept: FAMILY MEDICINE | Facility: CLINIC | Age: 37
End: 2017-07-18

## 2017-07-18 DIAGNOSIS — L66.2 FOLLICULITIS DECALVANS: Primary | ICD-10-CM

## 2017-07-18 RX ORDER — FLUOCINONIDE TOPICAL SOLUTION USP, 0.05% 0.5 MG/ML
SOLUTION TOPICAL 2 TIMES DAILY
Qty: 20 ML | Refills: 2 | Status: SHIPPED | OUTPATIENT
Start: 2017-07-18 | End: 2018-02-01 | Stop reason: SDUPTHER

## 2017-07-18 RX ORDER — CLINDAMYCIN PHOSPHATE 11.9 MG/ML
SOLUTION TOPICAL 2 TIMES DAILY
Qty: 30 ML | Refills: 2 | Status: SHIPPED | OUTPATIENT
Start: 2017-07-18 | End: 2018-05-02

## 2017-07-18 RX ORDER — DOXYCYCLINE HYCLATE 100 MG
100 TABLET ORAL 2 TIMES DAILY
Qty: 60 TABLET | Refills: 1 | Status: SHIPPED | OUTPATIENT
Start: 2017-07-18 | End: 2017-07-28

## 2017-08-14 DIAGNOSIS — G43.009 MIGRAINE WITHOUT AURA, WITHOUT MENTION OF INTRACTABLE MIGRAINE WITHOUT MENTION OF STATUS MIGRAINOSUS: ICD-10-CM

## 2017-08-14 RX ORDER — PROPRANOLOL HYDROCHLORIDE 120 MG/1
CAPSULE, EXTENDED RELEASE ORAL
Qty: 30 CAPSULE | Refills: 2 | Status: SHIPPED | OUTPATIENT
Start: 2017-08-14 | End: 2017-11-13 | Stop reason: SDUPTHER

## 2017-10-16 DIAGNOSIS — J40 BRONCHITIS: ICD-10-CM

## 2017-10-17 RX ORDER — MONTELUKAST SODIUM 10 MG/1
10 TABLET ORAL NIGHTLY
Qty: 90 TABLET | Refills: 1 | Status: SHIPPED | OUTPATIENT
Start: 2017-10-17 | End: 2018-04-11 | Stop reason: SDUPTHER

## 2017-10-17 RX ORDER — AZELASTINE HYDROCHLORIDE, FLUTICASONE PROPIONATE 137; 50 UG/1; UG/1
SPRAY, METERED NASAL
Qty: 1 BOTTLE | Refills: 3 | Status: SHIPPED | OUTPATIENT
Start: 2017-10-17 | End: 2019-01-21 | Stop reason: SDUPTHER

## 2017-10-17 RX ORDER — LEVOCETIRIZINE DIHYDROCHLORIDE 5 MG/1
5 TABLET, FILM COATED ORAL NIGHTLY
Qty: 90 TABLET | Refills: 1 | Status: SHIPPED | OUTPATIENT
Start: 2017-10-17 | End: 2019-01-21 | Stop reason: SDUPTHER

## 2017-11-13 DIAGNOSIS — G43.009 MIGRAINE WITHOUT AURA: ICD-10-CM

## 2017-11-13 RX ORDER — PROPRANOLOL HYDROCHLORIDE 120 MG/1
CAPSULE, EXTENDED RELEASE ORAL
Qty: 30 CAPSULE | Refills: 2 | Status: SHIPPED | OUTPATIENT
Start: 2017-11-13 | End: 2017-12-29 | Stop reason: SDUPTHER

## 2017-11-16 ENCOUNTER — PATIENT MESSAGE (OUTPATIENT)
Dept: FAMILY MEDICINE | Facility: CLINIC | Age: 37
End: 2017-11-16

## 2017-11-16 DIAGNOSIS — J45.901 REACTIVE AIRWAY DISEASE WITH ACUTE EXACERBATION, UNSPECIFIED ASTHMA SEVERITY, UNSPECIFIED WHETHER PERSISTENT: Primary | ICD-10-CM

## 2017-11-16 RX ORDER — HYDROCODONE BITARTRATE AND HOMATROPINE METHYLBROMIDE ORAL SOLUTION 5; 1.5 MG/5ML; MG/5ML
5 LIQUID ORAL EVERY 4 HOURS PRN
Qty: 120 ML | Refills: 0 | Status: SHIPPED | OUTPATIENT
Start: 2017-11-16 | End: 2017-11-22 | Stop reason: SDUPTHER

## 2017-11-16 RX ORDER — METHYLPREDNISOLONE 4 MG/1
TABLET ORAL
Qty: 1 PACKAGE | Refills: 0 | Status: SHIPPED | OUTPATIENT
Start: 2017-11-16 | End: 2017-12-07

## 2017-11-20 ENCOUNTER — OFFICE VISIT (OUTPATIENT)
Dept: FAMILY MEDICINE | Facility: CLINIC | Age: 37
End: 2017-11-20
Payer: COMMERCIAL

## 2017-11-20 VITALS
SYSTOLIC BLOOD PRESSURE: 128 MMHG | BODY MASS INDEX: 36.73 KG/M2 | HEIGHT: 75 IN | HEART RATE: 74 BPM | TEMPERATURE: 98 F | WEIGHT: 295.44 LBS | OXYGEN SATURATION: 96 % | DIASTOLIC BLOOD PRESSURE: 88 MMHG | RESPIRATION RATE: 17 BRPM

## 2017-11-20 DIAGNOSIS — J32.0 SINUSITIS, MAXILLARY, CHRONIC: Primary | ICD-10-CM

## 2017-11-20 PROCEDURE — 99999 PR PBB SHADOW E&M-EST. PATIENT-LVL V: CPT | Mod: PBBFAC,,, | Performed by: NURSE PRACTITIONER

## 2017-11-20 PROCEDURE — 99214 OFFICE O/P EST MOD 30 MIN: CPT | Mod: S$GLB,,, | Performed by: NURSE PRACTITIONER

## 2017-11-20 RX ORDER — AZITHROMYCIN 250 MG/1
TABLET, FILM COATED ORAL
Qty: 6 TABLET | Refills: 0 | Status: SHIPPED | OUTPATIENT
Start: 2017-11-20 | End: 2017-12-29

## 2017-11-20 NOTE — LETTER
November 20, 2017      Jatinder Moreno   [address] [facility] [place] [address]             [facility]  [address]  [facility] [address]  Phone: [phone] Jatinder Moreno    Was treated here on 11/20/2017    May Return to work on 11/21/2017                FLORINDA LaguerreC

## 2017-11-20 NOTE — PATIENT INSTRUCTIONS
Follow up if not improved  Go to ER for new, worse or concerning symptoms    Self-Care for Sinusitis     Drinking plenty of water can help sinuses drain.   Sinusitis can often be managed with self-care. Self-care can keep sinuses moist and make you feel more comfortable. Remember to follow your doctor's instructions closely. This can make a big difference in getting your sinus problem under control.  Drink fluids  Drinking extra fluids helps thin your mucus. This lets it drain from your sinuses more easily. Have a glass of water every hour or two. A humidifier helps in much the same way. Fluids can also offset the drying effects of certain medicines. If you use a humidifier, follow the product maker's instructions on how to use it. Clean it on a regular schedule.  Use saltwater rinses  Rinses help keep your sinuses and nose moist. Mix a teaspoon of salt in 8 ounces of fresh, warm water. Use a bulb syringe to gently squirt the water into your nose a few times a day. You can also buy ready-made saline nasal sprays.  Apply hot or cold packs  Applying heat to the area surrounding your sinuses may make you feel more comfortable. Use a hot water bottle or a hand towel dipped in hot water. Some people also find ice packs effective for relieving pain.  Medicines  Your doctor may prescribe medications to help treat your sinusitis. If you have an infection, antibiotics can help clear it up. If you are prescribed antibiotics, take all pills on schedule until they are gone, even if you feel better. Decongestants help relieve swelling. Use decongestant sprays for short periods only under the direction of your doctor. If you have allergies, your doctor may prescribe medications to help relieve them.   Date Last Reviewed: 10/1/2016  © 5135-2337 [employer]. [address] [place] [address], [place] [address]. All rights reserved. This information is not intended as a substitute for professional medical care. Always follow your  healthcare professional's instructions.

## 2017-11-20 NOTE — PROGRESS NOTES
Upper Respiratory Infection  Patient complains of a 6day history of some URI complaints. Associated symptoms include productive cough, rhinorrhea, ear pain congestion.  He has attempted mucinex OTC.  Sick contacts include none.  He has had a flu shot this season.    Subjective:       Patient ID: Jatinder Moreno III is a 37 y.o. male.      Review of Systems   Constitutional: Negative for fever.   HENT: Positive for rhinorrhea.    Respiratory: Positive for cough.    Cardiovascular: Negative.        Objective:      Physical Exam   Constitutional: He is oriented to person, place, and time. He appears well-developed and well-nourished. He does not appear ill. No distress.   HENT:   Head: Normocephalic and atraumatic.   Right Ear: A middle ear effusion is present.   Left Ear: A middle ear effusion is present.   Nose: Mucosal edema and rhinorrhea present. Right sinus exhibits maxillary sinus tenderness. Right sinus exhibits no frontal sinus tenderness. Left sinus exhibits maxillary sinus tenderness. Left sinus exhibits no frontal sinus tenderness.   Mouth/Throat: Uvula is midline and mucous membranes are normal. Posterior oropharyngeal erythema present. No oropharyngeal exudate or posterior oropharyngeal edema.   Cardiovascular: Normal rate, regular rhythm and normal heart sounds.  Exam reveals no friction rub.    No murmur heard.  Pulmonary/Chest: Effort normal and breath sounds normal. No respiratory distress. He has no wheezes.   Lymphadenopathy:        Head (right side): No submental, no submandibular and no tonsillar adenopathy present.        Head (left side): No submental, no submandibular and no tonsillar adenopathy present.   Neurological: He is alert and oriented to person, place, and time.   Skin: Skin is warm and dry.   Vitals reviewed.      Assessment:       1. Sinusitis, maxillary, chronic        Plan:       Sinusitis, maxillary, chronic  -     azithromycin (Z-ANGEL) 250 MG tablet; Use as directed  Dispense: 6  tablet; Refill: 0    Will treat with abx, he's already on dymista, xyzal, singulair, prednisone and hycodan.    Follow up if not improved  Go to ER for new, worse or concerning symptoms  Drink plenty of fluids  Tylenol or ibuprofen as needed for fever or pain

## 2017-11-22 ENCOUNTER — PATIENT MESSAGE (OUTPATIENT)
Dept: FAMILY MEDICINE | Facility: CLINIC | Age: 37
End: 2017-11-22

## 2017-11-22 DIAGNOSIS — J45.20 REACTIVE AIRWAY DISEASE, MILD INTERMITTENT, UNCOMPLICATED: ICD-10-CM

## 2017-11-22 DIAGNOSIS — J45.901 REACTIVE AIRWAY DISEASE WITH ACUTE EXACERBATION, UNSPECIFIED ASTHMA SEVERITY, UNSPECIFIED WHETHER PERSISTENT: ICD-10-CM

## 2017-11-22 RX ORDER — HYDROCODONE BITARTRATE AND HOMATROPINE METHYLBROMIDE ORAL SOLUTION 5; 1.5 MG/5ML; MG/5ML
5 LIQUID ORAL EVERY 4 HOURS PRN
Qty: 120 ML | Refills: 0 | Status: SHIPPED | OUTPATIENT
Start: 2017-11-22 | End: 2017-12-29

## 2017-11-22 RX ORDER — ALBUTEROL SULFATE 90 UG/1
2 AEROSOL, METERED RESPIRATORY (INHALATION) EVERY 6 HOURS PRN
Qty: 18 G | Refills: 0 | Status: SHIPPED | OUTPATIENT
Start: 2017-11-22 | End: 2018-04-03 | Stop reason: SDUPTHER

## 2017-12-27 ENCOUNTER — TELEPHONE (OUTPATIENT)
Dept: SLEEP MEDICINE | Facility: CLINIC | Age: 37
End: 2017-12-27

## 2017-12-27 DIAGNOSIS — G43.009 MIGRAINE WITHOUT AURA AND WITHOUT STATUS MIGRAINOSUS, NOT INTRACTABLE: Primary | ICD-10-CM

## 2017-12-27 RX ORDER — ZOLMITRIPTAN 5 MG/1
1 SPRAY NASAL
Qty: 6 EACH | Refills: 3 | Status: SHIPPED | OUTPATIENT
Start: 2017-12-27 | End: 2017-12-29

## 2017-12-29 ENCOUNTER — OFFICE VISIT (OUTPATIENT)
Dept: NEUROLOGY | Facility: CLINIC | Age: 37
End: 2017-12-29
Payer: COMMERCIAL

## 2017-12-29 VITALS
HEIGHT: 75 IN | WEIGHT: 297.63 LBS | SYSTOLIC BLOOD PRESSURE: 132 MMHG | HEART RATE: 78 BPM | BODY MASS INDEX: 37.01 KG/M2 | DIASTOLIC BLOOD PRESSURE: 92 MMHG

## 2017-12-29 DIAGNOSIS — G47.33 OBSTRUCTIVE SLEEP APNEA: Primary | ICD-10-CM

## 2017-12-29 DIAGNOSIS — G43.711 INTRACTABLE CHRONIC MIGRAINE WITHOUT AURA AND WITH STATUS MIGRAINOSUS: ICD-10-CM

## 2017-12-29 DIAGNOSIS — F41.9 ANXIETY: ICD-10-CM

## 2017-12-29 DIAGNOSIS — G43.719 INTRACTABLE CHRONIC MIGRAINE WITHOUT AURA AND WITHOUT STATUS MIGRAINOSUS: ICD-10-CM

## 2017-12-29 PROCEDURE — 99999 PR PBB SHADOW E&M-EST. PATIENT-LVL IV: CPT | Mod: PBBFAC,,, | Performed by: NURSE PRACTITIONER

## 2017-12-29 PROCEDURE — 99214 OFFICE O/P EST MOD 30 MIN: CPT | Mod: S$GLB,,, | Performed by: NURSE PRACTITIONER

## 2017-12-29 RX ORDER — ZOLMITRIPTAN 5 MG/1
1 SPRAY NASAL
Qty: 6 EACH | Refills: 3 | Status: SHIPPED | OUTPATIENT
Start: 2017-12-29 | End: 2018-11-14

## 2017-12-29 RX ORDER — ZOLMITRIPTAN 2.5 MG/1
2.5 TABLET, FILM COATED ORAL
Qty: 9 TABLET | Refills: 5 | Status: SHIPPED | OUTPATIENT
Start: 2017-12-29 | End: 2019-05-15 | Stop reason: SDUPTHER

## 2017-12-29 RX ORDER — DICLOFENAC POTASSIUM 50 MG/1
1 POWDER, FOR SOLUTION ORAL EVERY 6 HOURS PRN
Qty: 9 EACH | Refills: 5 | Status: SHIPPED | OUTPATIENT
Start: 2017-12-29 | End: 2018-06-07

## 2017-12-29 RX ORDER — SUMATRIPTAN SUCCINATE 11 MG/1
22 CAPSULE NASAL
Qty: 16 EACH | Refills: 3 | Status: SHIPPED | OUTPATIENT
Start: 2017-12-29 | End: 2018-01-28

## 2017-12-29 RX ORDER — PROPRANOLOL HYDROCHLORIDE 120 MG/1
CAPSULE, EXTENDED RELEASE ORAL
Qty: 30 CAPSULE | Refills: 5 | Status: SHIPPED | OUTPATIENT
Start: 2017-12-29 | End: 2018-09-14 | Stop reason: SDUPTHER

## 2017-12-29 RX ORDER — TRAMADOL HYDROCHLORIDE 50 MG/1
50 TABLET ORAL EVERY 6 HOURS PRN
Qty: 30 TABLET | Refills: 3 | Status: SHIPPED | OUTPATIENT
Start: 2017-12-29 | End: 2018-05-02 | Stop reason: SDUPTHER

## 2017-12-29 RX ORDER — NARATRIPTAN 2.5 MG/1
TABLET ORAL
Qty: 9 TABLET | Refills: 5 | Status: SHIPPED | OUTPATIENT
Start: 2017-12-29 | End: 2020-05-20 | Stop reason: SDUPTHER

## 2017-12-29 NOTE — PROGRESS NOTES
Mr. Moreno is here today for management of obstructive sleep apnea and headaches.    Since last seen he continues to have very frequent headaches, >15day/month. AMerge/cambia typically helps but having frequent abrupt onset episodes and pills take longer to work. Zomig NS was not covered. Lost 2#. Had new baby girl. Interested in trialing botox. Had first aura recently, peripheral bilateral kaleidoscope. HA remains same location and nature and triggers.     Continues to use apap nightly, faithfully.   Interrogation- 90% tile 14cm, avg 7:18h/n. AHI 2.1        HISTORY:  12/11/2013:  He has since been setup (10/14) with aPAP 9-16cm, tolerating pressures well. He uses a nasal mask with a chin strap. He denies mask air leaks, nasal drying, or oral drying. His morning headaches and overall frequency of migraines has significantly improved. He is using therapy nightly, even brings machine to his girlfriend's house. He feels more refreshed in the mornings, has less nocturia, no more snoring,  and is no longer bothered by urge to move his legs during sleep initiation. He no longer awakens with feeling of heart racing.     He reports having total of ~7 migraines since last seen 3-mos ago, 2-3 of them severe. NO missed work in interim due to HA. Tramadol or Cambia/Amerge or Zomig remain effective.  HA starts from the R frontal/retroorbital area, at times radiates to the ipsilateral occipito-cervical area, pressure in quality,  with associated symptoms of R ptosis, nausea, photo/phonophobia, generalized weakness with R eyelid twitching, chills/sweating (prodrome). Triggers remain weather changes, sexual activity and stress while movement can worsen attacks. He prefers to rest in a dark quiet cold room to feel better.     Focus improved on Daytrana patch ADHD.    Interrogation: New machine condition, heat at 4, 30d avg 5:48h/night, 1d 7:48hr. 90% pressure 30d 10.8cm, 7d 11.8cm, 1d 14cm. 21/30 days>4h. 20min/6cm ramp setup  today. Using nasal pillows mask.     ESS= 8   HIT-6 score= 57    INTERVAL HISTORY:  12/7/16: He continues to use cpap 13cm nightly. He has gained <10% wgt since study. Feels horrible if he falls asleep on couch w/o his mask on. Getting regular replacement supplies from [facility]. No chin strap use. Denies oral drying. Not using water system anymore, already congested. Continues to use dymista NS twice daily and take xyzal. ESS=9  Interrogation: 0% leak. AHI 1.9, avg 30d= 6:39h/n. Nuance mask. 0% periodic      Reports continued fairly frequent, just about daily mild headaches a lot on way home from work or after high stress situations. He has begun verapamil 120mg La in interim which he tolerates well w/o s/e. Reports severe headaches ~ 3/month. Still gets same prodromal symptoms prior to headaches and occasional nausea and photophobia. He is out of cambia, amerge and zomig which all remain effective. Unable to take triptan at work due to sleepiness.  Fioricet in interim prescribed was also helpful at times, but prefers prn tramadol 1/2 50mg mod episodes. He has been taking OTC analgesics frequently for his mild headaches. Remains very anxious/worrier. He has gotten  and is expecting 1st baby in summer '17!! 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     6/20/17:  Since last seen, he continues to use cpap 13-16cm nightly. Would like to trial alternative nasal mask. His straps get worn, current one needs replacing. No chin strap.Denies oral drying. Saw Dr. Reyes recently (ENT). HIt-6= 61. Inderal LA helping reduce intensity of headaches,having less severe episodes in interim. Had a bad one/lingering recently. zomig PO costly, using more amerge + cambia. Still taking up to 2hr notice complete relief (feels parasthesias then intense flush/sweat). Having ~ 15 headaches/month, but overall less frequent since last seen monthly. Inquiring about botox. Headache remains same location/nature.  "    Interrogation- AHI 1.4, avg 7:30h/n. Nasal pillow mask. 0% leak. 90% tile 13.4-14. 30/30d>4h.       PSG : (273#) 7/27/13: AHI was 18.4 with an oxygen magaly of 85.0%. The supine AHI was 22.3 and the REM AHI was 28.3. The patient did not qualify for a split night study due to an insufficient number of events in the first half of the study. Setup with APAP    ROS: Mood stable. Occasional am postural hypotension, +sinus congestion. Otherwise a balance review of 10-systems is negative.     Tried(verapamil, lexapro, topamax, gabapentin, metoprolol, treximet, relpax, maxalt, imitrex ?dose)    Employer/Position: [employer]  FH: HA, brother with presumed ALEXX, witnessed apneic pauses     EXAM:   General: W/D,obese, well-groomed  BP (!) 132/92   Pulse 78   Ht 6' 3" (1.905 m)   Wt 135 kg (297 lb 9.9 oz)   BMI 37.20 kg/m²         IMPRESSION:   Chronic migraine with and without aura, intractable 15d/month,request botox aurh  ALEXX, moderate, continued excellent aPAP adherence, with improvement of symptoms. AHI<5  Anxiety    PLAN     Preventative therapy:  Continue Inderal LA 120mg qd    Abortive therapy: Continue Zomig 5 mg or Amerge 2.5 mg PO q 2h, up to maximum of 5mg/24h , do not delay treatment. And trial Zomig NS 5mg q2h prn (sent to [facility] for [place]), max 10mg/24h, demo provided/coupon also for onzetra 22mg q2h prn abrupt o nset    Continue Phenergan 12.5-25 mg PO q6-8h prn for HA/nausea     Continue Tramadol 50 mg 1/2-1 tabs PO q6-8 h prn for headache     Cambia 50mg powder q6h prn, taken with triptan prn improved effectiveness    Continue to regulate sleep, mealtimes, try to reduce stress and to avoid known headache triggers/aggravating factors. Emphasized importance of keeping headache diaries    Continue autoPAP 13-16cm. Continue excellent usage. [facility] prn supplies. Discussed health benefits of continued use, potential ramifications of untreated ALEXX.      RTC 2 wks botox. Continue buspar tid for anxiety " and reduce stressors.

## 2018-01-12 ENCOUNTER — TELEPHONE (OUTPATIENT)
Dept: PHARMACY | Facility: CLINIC | Age: 38
End: 2018-01-12

## 2018-01-12 NOTE — TELEPHONE ENCOUNTER
Good Morning.    The prior authorization for Mr. Moreno's Zomig prescription has been approved through 1/10/2019. The patient has been notified and is aware.    If there are any additional questions, please contact the pharmacy at, [phone].    Thank You.    Julia Macdonald CpT.  Patient Care Advocate  [facility]  [email or web address]

## 2018-01-15 ENCOUNTER — PATIENT MESSAGE (OUTPATIENT)
Dept: FAMILY MEDICINE | Facility: CLINIC | Age: 38
End: 2018-01-15

## 2018-01-15 DIAGNOSIS — J34.89 NASAL SORE: Primary | ICD-10-CM

## 2018-01-15 RX ORDER — MUPIROCIN 20 MG/G
OINTMENT TOPICAL 3 TIMES DAILY
Qty: 15 G | Refills: 1 | Status: SHIPPED | OUTPATIENT
Start: 2018-01-15 | End: 2018-05-02

## 2018-01-19 ENCOUNTER — CLINICAL SUPPORT (OUTPATIENT)
Dept: NEUROLOGY | Facility: CLINIC | Age: 38
End: 2018-01-19
Payer: COMMERCIAL

## 2018-01-19 VITALS
WEIGHT: 295.44 LBS | SYSTOLIC BLOOD PRESSURE: 140 MMHG | HEART RATE: 76 BPM | BODY MASS INDEX: 36.73 KG/M2 | HEIGHT: 75 IN | DIASTOLIC BLOOD PRESSURE: 70 MMHG

## 2018-01-19 DIAGNOSIS — G43.719 INTRACTABLE CHRONIC MIGRAINE WITHOUT AURA AND WITHOUT STATUS MIGRAINOSUS: Primary | ICD-10-CM

## 2018-01-19 PROCEDURE — 99999 PR PBB SHADOW E&M-EST. PATIENT-LVL IV: CPT | Mod: PBBFAC,,, | Performed by: NURSE PRACTITIONER

## 2018-01-19 PROCEDURE — 64615 CHEMODENERV MUSC MIGRAINE: CPT | Mod: S$GLB,,, | Performed by: NURSE PRACTITIONER

## 2018-01-19 PROCEDURE — 99499 UNLISTED E&M SERVICE: CPT | Mod: S$GLB,,, | Performed by: NURSE PRACTITIONER

## 2018-01-19 NOTE — PROGRESS NOTES
PROCEDURE NOTE:     After informed consent signed and dated, and risks and benefits of procedure was discussed, BOTOX injection was performed     Frontalis- 15 units on each side   - 5 units on each side   Procerus- 10 units   Occipitalis- 20 units on each side   Temporalis - 25 units on each side   Trapezius- 15 units on each side   Cervical paraspinal muscles- 15 units on each side     Patient tolerated procedure well w/o any complications.

## 2018-02-01 DIAGNOSIS — L66.2 FOLLICULITIS DECALVANS: ICD-10-CM

## 2018-02-01 RX ORDER — FLUOCINONIDE TOPICAL SOLUTION USP, 0.05% 0.5 MG/ML
SOLUTION TOPICAL 2 TIMES DAILY
Qty: 20 ML | Refills: 2 | Status: SHIPPED | OUTPATIENT
Start: 2018-02-01 | End: 2018-05-02

## 2018-02-01 NOTE — TELEPHONE ENCOUNTER
----- Message from Ruby Mcgee sent at 2018  3:44 PM CST -----  Contact: Self  Refill : fluocinonide (LIDEX) 0.05 % external solution ()      Pharmacy     [id number] [place] - [id number]

## 2018-02-03 ENCOUNTER — OFFICE VISIT (OUTPATIENT)
Dept: URGENT CARE | Facility: CLINIC | Age: 38
End: 2018-02-03
Payer: COMMERCIAL

## 2018-02-03 VITALS
WEIGHT: 295 LBS | RESPIRATION RATE: 18 BRPM | HEIGHT: 75 IN | BODY MASS INDEX: 36.68 KG/M2 | OXYGEN SATURATION: 100 % | TEMPERATURE: 98 F | HEART RATE: 77 BPM | DIASTOLIC BLOOD PRESSURE: 92 MMHG | SYSTOLIC BLOOD PRESSURE: 139 MMHG

## 2018-02-03 DIAGNOSIS — L73.9 FOLLICULITIS: Primary | ICD-10-CM

## 2018-02-03 PROCEDURE — 99214 OFFICE O/P EST MOD 30 MIN: CPT | Mod: 25,S$GLB,, | Performed by: PHYSICIAN ASSISTANT

## 2018-02-03 PROCEDURE — 3008F BODY MASS INDEX DOCD: CPT | Mod: S$GLB,,, | Performed by: PHYSICIAN ASSISTANT

## 2018-02-03 PROCEDURE — 96372 THER/PROPH/DIAG INJ SC/IM: CPT | Mod: S$GLB,,, | Performed by: FAMILY MEDICINE

## 2018-02-03 RX ORDER — SULFAMETHOXAZOLE AND TRIMETHOPRIM 800; 160 MG/1; MG/1
1 TABLET ORAL 2 TIMES DAILY
Qty: 20 TABLET | Refills: 0 | Status: SHIPPED | OUTPATIENT
Start: 2018-02-03 | End: 2018-02-13

## 2018-02-03 RX ORDER — MUPIROCIN 20 MG/G
OINTMENT TOPICAL
Qty: 22 G | Refills: 0 | Status: SHIPPED | OUTPATIENT
Start: 2018-02-03 | End: 2018-05-02

## 2018-02-03 RX ORDER — BETAMETHASONE SODIUM PHOSPHATE AND BETAMETHASONE ACETATE 3; 3 MG/ML; MG/ML
6 INJECTION, SUSPENSION INTRA-ARTICULAR; INTRALESIONAL; INTRAMUSCULAR; SOFT TISSUE
Status: COMPLETED | OUTPATIENT
Start: 2018-02-03 | End: 2018-02-03

## 2018-02-03 RX ADMIN — BETAMETHASONE SODIUM PHOSPHATE AND BETAMETHASONE ACETATE 6 MG: 3; 3 INJECTION, SUSPENSION INTRA-ARTICULAR; INTRALESIONAL; INTRAMUSCULAR; SOFT TISSUE at 11:02

## 2018-02-03 NOTE — PATIENT INSTRUCTIONS
Please return here or go to the Emergency Department for any concerns or worsening of condition.  If you were prescribed antibiotics, please take them to completion.  If you were prescribed a narcotic medication, do not drive or operate heavy equipment or machinery while taking these medications.  Please follow up with your primary care doctor or specialist as needed.    If you  smoke, please stop smoking.      Folliculitis  Folliculitis is an inflammation of a hair follicle. A hair follicle is the little pocket where a hair grows out of the skin. Bacteria normally live on the skin. But sometimes bacteria can get trapped in a follicle and cause infection. This causes a bumpy rash. The area over the follicles is red and raised. It may itch or be painful. The bumps may have fluid (pus) inside. The pus may leak and then form crusts. Sores can spread to other areas of the body. Once it goes away, folliculitis can come back at any time. Severe cases may cause permanent hair loss and scarring.  Folliculitis can happen anywhere on the body where hair grows. It can be caused by rubbing from tight clothing. Ingrown hairs can cause it. Soaking in a hot tub or swimming pool that has bacteria in the water can cause it. It may also occur if a hair follicle is blocked by a bandage.  Sores often go away in a few days with no treatment. In some cases, medicine may be given. A small piece of skin or pus may be taken to find the type of bacteria causing the infection.  Home care  The healthcare provider may prescribe an antibiotic cream or ointment.  Oral antibiotics may also be prescribed. Or you may be told to use an over-the-counter antibiotic cream. Follow all instructions when using any of these medicines.  General care:  · Apply warm, moist compresses to the sores for 20 minutes up to 3 times a day. You can make a compress by soaking a cloth in warm water. Squeeze out excess water.  · Dont cut, poke, or squeeze the sores. This  can be painful and spread infection.  · Dont scratch the affected area. Scratching can delay healing.  · Dont shave the areas affected by folliculitis.  · If the sores leak fluid, cover the area with a nonstick gauze bandage. Use as little tape as possible. Carefully discard all soiled bandages.  · Dress in loose cotton clothing.  · Change sheets and blankets if they are soiled by pus. Wash all clothes, towels, sheets, and cloth diapers in soap and hot water. Do not share clothes, towels, or sheets with other family members.  · Do not soak the sores in bath water. This can spread infection. Instead, keep the area clean by gently washing sores with soap and warm water.  · Wash your hands or use antibacterial gels often to prevent spreading the bacteria.  Follow-up care  Follow up with your healthcare provider, or as advised.  When to seek medical advice  Call your healthcare provider right away if any of these occur:  · Fever of 100.4°F (38°C) or higher  · Spreading of the rash  · Rash does not get better with treatment  · Redness or swelling that gets worse  · Rash becomes more painful  · Foul-smelling fluid leaking from the skin  · Rash improves, but then comes back   Date Last Reviewed: 11/1/2016  © 2031-4747 [employer]. [address] [place] [address], [place] [address]. All rights reserved. This information is not intended as a substitute for professional medical care. Always follow your healthcare professional's instructions.

## 2018-02-03 NOTE — PROGRESS NOTES
"Subjective:       Patient ID: Jatinder Moreno III is a 37 y.o. male.    Vitals:  height is 6' 3" (1.905 m) and weight is 133.8 kg (295 lb). His temperature is 98.3 °F (36.8 °C). His blood pressure is 139/92 (abnormal) and his pulse is 77. His respiration is 18 and oxygen saturation is 100%.     Chief Complaint: Folliculitis    Pt has a history of of folliculitis, states that he is having a flare up on his scalpe that started last week       Rash   This is a new problem. The current episode started in the past 7 days. The problem has been gradually worsening since onset. The affected locations include the scalp. The rash is characterized by itchiness, pain, redness and scaling. He was exposed to nothing. Pertinent negatives include no diarrhea, fever, joint pain, shortness of breath, sore throat or vomiting.     Review of Systems   Constitution: Negative for chills and fever.   HENT: Negative for sore throat.    Eyes: Negative for blurred vision.   Cardiovascular: Negative for chest pain.   Respiratory: Negative for shortness of breath.    Skin: Positive for itching and rash.   Musculoskeletal: Negative for back pain and joint pain.   Gastrointestinal: Negative for abdominal pain, diarrhea, nausea and vomiting.   Neurological: Negative for headaches.   Psychiatric/Behavioral: The patient is not nervous/anxious.        Objective:      Physical Exam   Constitutional: He is oriented to person, place, and time. He appears well-developed and well-nourished.   HENT:   Head: Normocephalic and atraumatic. Head is without abrasion, without contusion and without laceration.   Right Ear: External ear normal.   Left Ear: External ear normal.   Nose: Nose normal.   Mouth/Throat: Oropharynx is clear and moist.   Eyes: Conjunctivae, EOM and lids are normal. Pupils are equal, round, and reactive to light.   Neck: Trachea normal, full passive range of motion without pain and phonation normal. Neck supple.   Cardiovascular: Normal " rate, regular rhythm and normal heart sounds.    Pulmonary/Chest: Effort normal and breath sounds normal. No stridor. No respiratory distress.   Musculoskeletal: Normal range of motion.   Neurological: He is alert and oriented to person, place, and time.   Skin: Skin is warm, dry and intact. Capillary refill takes less than 2 seconds. Rash noted. No abrasion, no bruising, no burn, no ecchymosis, no laceration and no lesion noted. Rash is pustular. No erythema.        Psychiatric: He has a normal mood and affect. His speech is normal and behavior is normal. Judgment and thought content normal. Cognition and memory are normal.   Nursing note and vitals reviewed.      Assessment:       1. Folliculitis        Plan:         Folliculitis  -     sulfamethoxazole-trimethoprim 800-160mg (BACTRIM DS) 800-160 mg Tab; Take 1 tablet by mouth 2 (two) times daily.  Dispense: 20 tablet; Refill: 0  -     mupirocin (BACTROBAN) 2 % ointment; Apply to affected area 3 times daily  Dispense: 22 g; Refill: 0  -     betamethasone acetate-betamethasone sodium phosphate injection 6 mg; Inject 1 mL (6 mg total) into the muscle one time.          Patient Instructions   Please return here or go to the Emergency Department for any concerns or worsening of condition.  If you were prescribed antibiotics, please take them to completion.  If you were prescribed a narcotic medication, do not drive or operate heavy equipment or machinery while taking these medications.  Please follow up with your primary care doctor or specialist as needed.    If you  smoke, please stop smoking.      Folliculitis  Folliculitis is an inflammation of a hair follicle. A hair follicle is the little pocket where a hair grows out of the skin. Bacteria normally live on the skin. But sometimes bacteria can get trapped in a follicle and cause infection. This causes a bumpy rash. The area over the follicles is red and raised. It may itch or be painful. The bumps may have fluid  (pus) inside. The pus may leak and then form crusts. Sores can spread to other areas of the body. Once it goes away, folliculitis can come back at any time. Severe cases may cause permanent hair loss and scarring.  Folliculitis can happen anywhere on the body where hair grows. It can be caused by rubbing from tight clothing. Ingrown hairs can cause it. Soaking in a hot tub or swimming pool that has bacteria in the water can cause it. It may also occur if a hair follicle is blocked by a bandage.  Sores often go away in a few days with no treatment. In some cases, medicine may be given. A small piece of skin or pus may be taken to find the type of bacteria causing the infection.  Home care  The healthcare provider may prescribe an antibiotic cream or ointment.  Oral antibiotics may also be prescribed. Or you may be told to use an over-the-counter antibiotic cream. Follow all instructions when using any of these medicines.  General care:  · Apply warm, moist compresses to the sores for 20 minutes up to 3 times a day. You can make a compress by soaking a cloth in warm water. Squeeze out excess water.  · Dont cut, poke, or squeeze the sores. This can be painful and spread infection.  · Dont scratch the affected area. Scratching can delay healing.  · Dont shave the areas affected by folliculitis.  · If the sores leak fluid, cover the area with a nonstick gauze bandage. Use as little tape as possible. Carefully discard all soiled bandages.  · Dress in loose cotton clothing.  · Change sheets and blankets if they are soiled by pus. Wash all clothes, towels, sheets, and cloth diapers in soap and hot water. Do not share clothes, towels, or sheets with other family members.  · Do not soak the sores in bath water. This can spread infection. Instead, keep the area clean by gently washing sores with soap and warm water.  · Wash your hands or use antibacterial gels often to prevent spreading the bacteria.  Follow-up care  Follow  up with your healthcare provider, or as advised.  When to seek medical advice  Call your healthcare provider right away if any of these occur:  · Fever of 100.4°F (38°C) or higher  · Spreading of the rash  · Rash does not get better with treatment  · Redness or swelling that gets worse  · Rash becomes more painful  · Foul-smelling fluid leaking from the skin  · Rash improves, but then comes back   Date Last Reviewed: 11/1/2016  © 2448-1863 [employer]. [address] [place] [address], [place] [address]. All rights reserved. This information is not intended as a substitute for professional medical care. Always follow your healthcare professional's instructions.

## 2018-02-07 ENCOUNTER — OFFICE VISIT (OUTPATIENT)
Dept: PSYCHIATRY | Facility: CLINIC | Age: 38
End: 2018-02-07
Payer: COMMERCIAL

## 2018-02-07 VITALS
HEART RATE: 76 BPM | DIASTOLIC BLOOD PRESSURE: 80 MMHG | HEIGHT: 75 IN | BODY MASS INDEX: 36.63 KG/M2 | WEIGHT: 294.63 LBS | SYSTOLIC BLOOD PRESSURE: 124 MMHG

## 2018-02-07 DIAGNOSIS — F98.8 ADULT ATTENTION DEFICIT DISORDER: Primary | ICD-10-CM

## 2018-02-07 DIAGNOSIS — F41.1 GENERALIZED ANXIETY DISORDER: ICD-10-CM

## 2018-02-07 PROCEDURE — 3008F BODY MASS INDEX DOCD: CPT | Mod: S$GLB,,, | Performed by: PSYCHIATRY & NEUROLOGY

## 2018-02-07 PROCEDURE — 99214 OFFICE O/P EST MOD 30 MIN: CPT | Mod: S$GLB,,, | Performed by: PSYCHIATRY & NEUROLOGY

## 2018-02-07 PROCEDURE — 99999 PR PBB SHADOW E&M-EST. PATIENT-LVL III: CPT | Mod: PBBFAC,,, | Performed by: PSYCHIATRY & NEUROLOGY

## 2018-02-07 RX ORDER — METHYLPHENIDATE HYDROCHLORIDE 20 MG/1
20 TABLET ORAL
Qty: 90 TABLET | Refills: 0 | Status: SHIPPED | OUTPATIENT
Start: 2018-03-09 | End: 2018-05-02 | Stop reason: SDUPTHER

## 2018-02-07 RX ORDER — METHYLPHENIDATE HYDROCHLORIDE 20 MG/1
20 TABLET ORAL
Qty: 90 TABLET | Refills: 0 | Status: SHIPPED | OUTPATIENT
Start: 2018-02-07 | End: 2018-05-02 | Stop reason: SDUPTHER

## 2018-02-07 RX ORDER — METHYLPHENIDATE HYDROCHLORIDE 20 MG/1
20 TABLET ORAL
Qty: 90 TABLET | Refills: 0 | Status: SHIPPED | OUTPATIENT
Start: 2018-02-07 | End: 2018-02-07 | Stop reason: SDUPTHER

## 2018-02-07 RX ORDER — BUSPIRONE HYDROCHLORIDE 10 MG/1
10 TABLET ORAL 3 TIMES DAILY
Qty: 270 TABLET | Refills: 3 | Status: SHIPPED | OUTPATIENT
Start: 2018-02-07 | End: 2019-03-27 | Stop reason: SDUPTHER

## 2018-02-07 RX ORDER — DIAZEPAM 5 MG/1
5 TABLET ORAL EVERY 6 HOURS PRN
Qty: 60 TABLET | Refills: 0 | Status: SHIPPED | OUTPATIENT
Start: 2018-02-07 | End: 2019-03-27 | Stop reason: SDUPTHER

## 2018-02-07 RX ORDER — METHYLPHENIDATE HYDROCHLORIDE 20 MG/1
20 TABLET ORAL
Qty: 90 TABLET | Refills: 0 | Status: SHIPPED | OUTPATIENT
Start: 2018-04-08 | End: 2019-01-21

## 2018-02-07 NOTE — PATIENT INSTRUCTIONS
"        You have been provided with a certain amount of medication with a specified number of refills.  Please follow up within an adequate time before you run out of medications.    REFILLS FOR CONTROLLED SUBSTANCES WILL NOT BE GIVEN WITHOUT AN APPOINTMENT.  I will not honor or fill automated refill requests from pharmacies.  You must come in for an appointment to get refills.    Please book your next appointment for myself or therapist by phone: [phone].        PLEASE BE AT LEAST 15 MINUTES EARLY FOR YOUR NEXT APPOINTMENT.  PLEASE, DO NOT BE LATE OR YOU WILL BE TURNED AWAY AND ASKED TO RESCHEDULE.  YOU MUST ALLOW TIME FOR CHECK-IN AS WELL AS GET YOUR VITAL SIGNS AND GO OVER YOUR MEDICATIONS.  Tardiness can affect and is not fair to the patients who present after you and are on time for their appointments.  It causes a delay in the appointments for patients and staff.  THERE IS A POSSIBILITY THAT YOU WILL BE CHARGED FOR THE APPOINTMENT TIME PERSONALLY AND IT WILL NOT GO TO YOUR INSURANCE.  YOU MAY ALSO BE DISCHARGED FROM CLINIC with multiple "No Show" appointments.       -----------------------------------------------------------------------------------------------------------------  IF YOU FEEL SUICIDAL OR HAVING THOUGHTS OR PLANS TO HURT YOURSELF OR OTHERS, CALL 911 OR REPORT TO THE NEAREST EMERGENCY ROOM.  YOU CAN ALSO ACCESS ONE OF THE FOLLOWING HOTLINES:    [facility]  [facility]  [phone]    [place]   [facility]   [phone]     [facility]  24 hours / 7 days   [phone]       "

## 2018-02-16 ENCOUNTER — OFFICE VISIT (OUTPATIENT)
Dept: FAMILY MEDICINE | Facility: CLINIC | Age: 38
End: 2018-02-16
Payer: COMMERCIAL

## 2018-02-16 VITALS
TEMPERATURE: 98 F | SYSTOLIC BLOOD PRESSURE: 142 MMHG | HEIGHT: 75 IN | OXYGEN SATURATION: 96 % | WEIGHT: 295.19 LBS | BODY MASS INDEX: 36.7 KG/M2 | DIASTOLIC BLOOD PRESSURE: 78 MMHG | HEART RATE: 86 BPM | RESPIRATION RATE: 19 BRPM

## 2018-02-16 DIAGNOSIS — L20.89 OTHER ATOPIC DERMATITIS: Primary | ICD-10-CM

## 2018-02-16 PROCEDURE — 99999 PR PBB SHADOW E&M-EST. PATIENT-LVL V: CPT | Mod: PBBFAC,,, | Performed by: NURSE PRACTITIONER

## 2018-02-16 PROCEDURE — 99214 OFFICE O/P EST MOD 30 MIN: CPT | Mod: S$GLB,,, | Performed by: NURSE PRACTITIONER

## 2018-02-16 PROCEDURE — 3008F BODY MASS INDEX DOCD: CPT | Mod: S$GLB,,, | Performed by: NURSE PRACTITIONER

## 2018-02-16 RX ORDER — KETOCONAZOLE 20 MG/ML
SHAMPOO, SUSPENSION TOPICAL DAILY
Qty: 120 ML | Refills: 2 | Status: SHIPPED | OUTPATIENT
Start: 2018-02-16 | End: 2018-06-07

## 2018-02-16 RX ORDER — PREDNISONE 20 MG/1
40 TABLET ORAL DAILY
Qty: 8 TABLET | Refills: 0 | Status: SHIPPED | OUTPATIENT
Start: 2018-02-16 | End: 2018-02-20

## 2018-02-16 NOTE — PATIENT INSTRUCTIONS
Follow up with primary care provider if not improved  Go to ER for new, worse or concerning symptoms    Nonspecific Dermatitis (Child)  Dermatitis is a skin rash caused by something that makes the skin irritated and inflamed. Your childs skin may be red, swollen, dry, and cracked. Blisters may form and ooze. The rash will itch.  Dermatitis can form on the face and neck, backs of hands, forearms, genitals, and lower legs. Dermatitis is not passed from person to person.  Talk with your healthcare provider about what may be causing your childs rash. This will help to rule out any serious causes of a skin rash. In some cases, the cause of the dermatitis may not be found.  Treatment is done to relieve itching and prevent the rash from coming back. The rash should go away in a few days to a few weeks.  Home care  The healthcare provider may prescribe medicines to relieve swelling and itching. Follow all instructions when using these medicines on your child.  · Follow your healthcare providers instructions on how to care for your childs rash.  · Bathe your child in warm, but not hot, water with mild soap. Ask your childs healthcare provider if you should apply petroleum jelly or cream after bathing.  · Expose the affected skin to the air so that it dries completely. Don't use a hair dryer on the skin.  · Dress your child in loose cotton clothing.  · Make sure your child does not scratch the affected area. This can delay healing. It can also cause a bacterial infection. You may need to use soft scratch mittens that cover your childs hands.  · Apply cold compresses to your childs sores to help soothe symptoms. Do this for 30 minutes 3 to 4 times a day. You can make a cold compress by soaking a cloth in cold water. Squeeze out excess water. You can add colloidal oatmeal to the water to help reduce itching.  · You can also help relieve large areas of itching by giving your child a lukewarm bath with colloidal oatmeal  added to the water.  Follow-up care  Follow up with your childs healthcare provider, or as advised. Call your childs healthcare provider if the rash is not better in 2 weeks.  Special note to parents  Wash your hands well with soap and warm water before and after caring for your child.  When to seek medical advice  Call your child's healthcare provider right away if any of these occur:  · Fever of 100.4°F (38°C) or higher, or as directed by your child's healthcare provider  · Redness or swelling that gets worse  · Pain that gets worse. Babies may show pain with fussiness that cant be soothed.  · Foul-smelling fluid leaking from the skin  · Blisters  Date Last Reviewed: 1/1/2017  © 2465-1003 [employer] [address] [place] [address], [place] [address]. All rights reserved. This information is not intended as a substitute for professional medical care. Always follow your healthcare professional's instructions.

## 2018-02-19 NOTE — PROGRESS NOTES
Subjective:       Patient ID: Jatinder Moreno III is a 37 y.o. male.    Chief Complaint: No chief complaint on file.    HPI Mr Moreno is here for a rash to his head.  It's been there for several weeks.  He has been to  and treated with Bactrim, he is still having the rash.  It itches, he still takes antihistamines every day.  He's been seen by derm in the past, treated with keflex and rifampin.  Review of Systems   Constitutional: Negative for fever.   Respiratory: Negative.    Cardiovascular: Negative.    Skin: Positive for rash.       Objective:      Physical Exam   Constitutional: He is oriented to person, place, and time. He appears well-developed and well-nourished. No distress.   HENT:   Head: Normocephalic and atraumatic.       Cardiovascular: Normal rate.    No murmur heard.  Pulmonary/Chest: Effort normal. No respiratory distress.   Musculoskeletal: Normal range of motion.   Neurological: He is alert and oriented to person, place, and time.   Skin: Skin is warm and dry. Rash noted.   Vitals reviewed.      Assessment:       1. Other atopic dermatitis        Plan:       Other atopic dermatitis  -     ketoconazole (NIZORAL) 2 % shampoo; Apply topically once daily.  Dispense: 120 mL; Refill: 2  -     predniSONE (DELTASONE) 20 MG tablet; Take 2 tablets (40 mg total) by mouth once daily.  Dispense: 8 tablet; Refill: 0    Will treat as atopic, consider sending back to derm if no improvement.    Follow up with primary care provider if not improved  Go to ER for new, worse or concerning symptoms

## 2018-02-27 ENCOUNTER — PATIENT MESSAGE (OUTPATIENT)
Dept: FAMILY MEDICINE | Facility: CLINIC | Age: 38
End: 2018-02-27

## 2018-02-27 DIAGNOSIS — J45.20 MILD INTERMITTENT REACTIVE AIRWAY DISEASE WITHOUT COMPLICATION: Primary | ICD-10-CM

## 2018-02-28 ENCOUNTER — PATIENT MESSAGE (OUTPATIENT)
Dept: FAMILY MEDICINE | Facility: CLINIC | Age: 38
End: 2018-02-28

## 2018-02-28 DIAGNOSIS — J01.40 ACUTE PANSINUSITIS, RECURRENCE NOT SPECIFIED: Primary | ICD-10-CM

## 2018-02-28 RX ORDER — HYDROCODONE BITARTRATE AND HOMATROPINE METHYLBROMIDE ORAL SOLUTION 5; 1.5 MG/5ML; MG/5ML
5 LIQUID ORAL EVERY 4 HOURS PRN
Qty: 120 ML | Refills: 0 | Status: SHIPPED | OUTPATIENT
Start: 2018-02-28 | End: 2018-03-16 | Stop reason: SDUPTHER

## 2018-02-28 RX ORDER — AMOXICILLIN AND CLAVULANATE POTASSIUM 875; 125 MG/1; MG/1
1 TABLET, FILM COATED ORAL EVERY 12 HOURS
Qty: 14 TABLET | Refills: 0 | Status: SHIPPED | OUTPATIENT
Start: 2018-02-28 | End: 2018-05-02

## 2018-02-28 RX ORDER — FLUTICASONE PROPIONATE AND SALMETEROL 250; 50 UG/1; UG/1
1 POWDER RESPIRATORY (INHALATION) 2 TIMES DAILY
Qty: 180 EACH | Refills: 1 | Status: SHIPPED | OUTPATIENT
Start: 2018-02-28 | End: 2018-06-07

## 2018-03-14 ENCOUNTER — PATIENT MESSAGE (OUTPATIENT)
Dept: FAMILY MEDICINE | Facility: CLINIC | Age: 38
End: 2018-03-14

## 2018-03-14 DIAGNOSIS — J45.20 MILD INTERMITTENT REACTIVE AIRWAY DISEASE WITHOUT COMPLICATION: ICD-10-CM

## 2018-03-15 ENCOUNTER — PATIENT MESSAGE (OUTPATIENT)
Dept: SLEEP MEDICINE | Facility: CLINIC | Age: 38
End: 2018-03-15

## 2018-03-16 RX ORDER — BENZONATATE 200 MG/1
200 CAPSULE ORAL 3 TIMES DAILY PRN
Qty: 30 CAPSULE | Refills: 0 | Status: SHIPPED | OUTPATIENT
Start: 2018-03-16 | End: 2018-03-26

## 2018-03-16 RX ORDER — HYDROCODONE BITARTRATE AND HOMATROPINE METHYLBROMIDE ORAL SOLUTION 5; 1.5 MG/5ML; MG/5ML
5 LIQUID ORAL EVERY 4 HOURS PRN
Qty: 120 ML | Refills: 0 | Status: SHIPPED | OUTPATIENT
Start: 2018-03-16 | End: 2018-04-03 | Stop reason: SDUPTHER

## 2018-04-03 ENCOUNTER — OFFICE VISIT (OUTPATIENT)
Dept: FAMILY MEDICINE | Facility: CLINIC | Age: 38
End: 2018-04-03
Payer: COMMERCIAL

## 2018-04-03 VITALS
HEART RATE: 85 BPM | BODY MASS INDEX: 36.57 KG/M2 | SYSTOLIC BLOOD PRESSURE: 128 MMHG | OXYGEN SATURATION: 98 % | WEIGHT: 294.13 LBS | DIASTOLIC BLOOD PRESSURE: 72 MMHG | RESPIRATION RATE: 20 BRPM | HEIGHT: 75 IN | TEMPERATURE: 98 F

## 2018-04-03 DIAGNOSIS — R05.9 COUGH: Primary | ICD-10-CM

## 2018-04-03 DIAGNOSIS — J45.20 REACTIVE AIRWAY DISEASE, MILD INTERMITTENT, UNCOMPLICATED: ICD-10-CM

## 2018-04-03 DIAGNOSIS — J45.20 MILD INTERMITTENT REACTIVE AIRWAY DISEASE WITHOUT COMPLICATION: ICD-10-CM

## 2018-04-03 PROCEDURE — 96372 THER/PROPH/DIAG INJ SC/IM: CPT | Mod: S$GLB,,, | Performed by: INTERNAL MEDICINE

## 2018-04-03 PROCEDURE — 3078F DIAST BP <80 MM HG: CPT | Mod: CPTII,S$GLB,, | Performed by: NURSE PRACTITIONER

## 2018-04-03 PROCEDURE — 99999 PR PBB SHADOW E&M-EST. PATIENT-LVL V: CPT | Mod: PBBFAC,,, | Performed by: NURSE PRACTITIONER

## 2018-04-03 PROCEDURE — 99214 OFFICE O/P EST MOD 30 MIN: CPT | Mod: 25,S$GLB,, | Performed by: NURSE PRACTITIONER

## 2018-04-03 PROCEDURE — 3074F SYST BP LT 130 MM HG: CPT | Mod: CPTII,S$GLB,, | Performed by: NURSE PRACTITIONER

## 2018-04-03 RX ORDER — HYDROCODONE BITARTRATE AND HOMATROPINE METHYLBROMIDE ORAL SOLUTION 5; 1.5 MG/5ML; MG/5ML
5 LIQUID ORAL EVERY 4 HOURS PRN
Qty: 120 ML | Refills: 0 | Status: SHIPPED | OUTPATIENT
Start: 2018-04-03 | End: 2018-05-02

## 2018-04-03 RX ORDER — ALBUTEROL SULFATE 90 UG/1
2 AEROSOL, METERED RESPIRATORY (INHALATION) EVERY 6 HOURS PRN
Qty: 18 G | Refills: 0 | Status: SHIPPED | OUTPATIENT
Start: 2018-04-03 | End: 2019-05-15

## 2018-04-03 RX ORDER — PREDNISONE 20 MG/1
TABLET ORAL
Qty: 30 TABLET | Refills: 0 | Status: SHIPPED | OUTPATIENT
Start: 2018-04-03 | End: 2018-05-02

## 2018-04-03 RX ORDER — PANTOPRAZOLE SODIUM 40 MG/1
40 TABLET, DELAYED RELEASE ORAL DAILY
Qty: 30 TABLET | Refills: 11 | Status: SHIPPED | OUTPATIENT
Start: 2018-04-03 | End: 2018-06-07

## 2018-04-03 RX ORDER — TRIAMCINOLONE ACETONIDE 40 MG/ML
80 INJECTION, SUSPENSION INTRA-ARTICULAR; INTRAMUSCULAR
Status: COMPLETED | OUTPATIENT
Start: 2018-04-03 | End: 2018-04-03

## 2018-04-03 RX ADMIN — TRIAMCINOLONE ACETONIDE 80 MG: 40 INJECTION, SUSPENSION INTRA-ARTICULAR; INTRAMUSCULAR at 04:04

## 2018-04-03 NOTE — PROGRESS NOTES
Subjective:       Patient ID: Jatinder Moreno III is a 37 y.o. male.    Chief Complaint: Cough (worsened in 1 week)    HPI Mr Moreno is here for a 1.5 week h/o cough, mostly unproductive.  This has been a chronic problem, he's been treated with steroids, nasal sprays, antihistamines, leukotriene inhibitors, bronchodilators, cough syrup.  He's been seen by pulmonary and allergy, his PFT and skin testing has been WNL.  He's also had a round of antibiotics about 1 month ago.  Review of Systems   Constitutional: Negative for fever.   Respiratory: Positive for cough.    Cardiovascular: Negative.        Objective:      Physical Exam   Constitutional: He is oriented to person, place, and time. He appears well-developed and well-nourished. He does not appear ill. No distress.   HENT:   Head: Normocephalic and atraumatic.   Right Ear: A middle ear effusion is present.   Left Ear: A middle ear effusion is present.   Nose: Nose normal. Right sinus exhibits no maxillary sinus tenderness and no frontal sinus tenderness. Left sinus exhibits no maxillary sinus tenderness and no frontal sinus tenderness.   Mouth/Throat: Uvula is midline and mucous membranes are normal. Posterior oropharyngeal erythema present. No oropharyngeal exudate or posterior oropharyngeal edema.   Cardiovascular: Normal rate, regular rhythm and normal heart sounds.  Exam reveals no friction rub.    No murmur heard.  Pulmonary/Chest: Effort normal and breath sounds normal. No respiratory distress. He has no decreased breath sounds. He has no wheezes. He has no rhonchi. He has no rales.   Musculoskeletal: Normal range of motion.   Neurological: He is alert and oriented to person, place, and time.   Skin: Skin is warm and dry. He is not diaphoretic.   Vitals reviewed.      Assessment:       1. Cough    2. Reactive airway disease, mild intermittent, uncomplicated    3. Mild intermittent reactive airway disease without complication        Plan:       Cough  -      pantoprazole (PROTONIX) 40 MG tablet; Take 1 tablet (40 mg total) by mouth once daily.  Dispense: 30 tablet; Refill: 11    Reactive airway disease, mild intermittent, uncomplicated  -     albuterol 90 mcg/actuation inhaler; Inhale 2 puffs into the lungs every 6 (six) hours as needed for Wheezing.  Dispense: 18 g; Refill: 0  -     predniSONE (DELTASONE) 20 MG tablet; Take 3 tablets daily for 5 days, then 2 tablets daily for 5 days, then 1 tablet daily for 5 days.  Dispense: 30 tablet; Refill: 0  -     triamcinolone acetonide injection 80 mg; Inject 2 mLs (80 mg total) into the muscle one time.    Mild intermittent reactive airway disease without complication  -     hydrocodone-homatropine 5-1.5 mg/5 ml (HYCODAN) 5-1.5 mg/5 mL Syrp; Take 5 mLs by mouth every 4 (four) hours as needed.  Dispense: 120 mL; Refill: 0    I will treat for GERD, he's already been treated for all other causes of chronic cough, f/u prn    Follow up with primary care provider if not improved  Go to ER for new, worse or concerning symptoms

## 2018-04-03 NOTE — MEDICAL/APP STUDENT
"Jatinder Moreno III is a 37 y.o. male patient of Roberto Newberry MD who presents to the clinic today for   Chief Complaint   Patient presents with    Cough     worsened in 1 week   .    HPI    Pt, who is not known to me, reports having chronic cough starting in February.  States the cough usually happens twice a year and has been occurring for "awhile."  Patient was seen in February for same problem and received prescription cough medicine, antibiotics, and an inhaler.  Reports symptoms improved but started worsening approximately one and half weeks ago.  Patient also reports having intermittent nose bleeds and states he is using saline nasal spray.  Cough is productive with small amount of white sputum.  Reports difficulty sleeping at night due to coughing.    Aggravating factors include: worse at night and with laying down.    Relieving factors include none .    OTC Medications tried are none.    Smoking status:  none    ROS    Constitutional:   Denies  fever,  fatigue, or change in appetite.    Head:   Reports headache with coughing episodes.   Ears:   Denies pain.  Eyes:  Reports mild clear drainage from eyes.  Nose:   Mild sinus pain, congestion.  Yellow rhinorrhea with post nasal drip.  Throat:  Denies ST pain, exudate, difficulty swallowing.    Heart:  No palpitations, chest pain.    Lungs:  Intermittent difficulty breathing with coughing episodes. Reports coughing with white sputum production and wheezing.              Symptoms are community acquired.    GI/:  No sxs    MS:  No new bone, joint or muscle problems.    Skin:  No rashes, itching.      PAST MEDICAL HISTORY:  Past Medical History:   Diagnosis Date    ADD (attention deficit disorder with hyperactivity) 7/5/2012    ADHD (attention deficit hyperactivity disorder)     Asthma, well controlled     Bronchitis     Headache(784.0)     History of migraine headaches     History of psychiatric care     Psychiatric exam     Therapy        PAST " SURGICAL HISTORY:  History reviewed. No pertinent surgical history.    SOCIAL HISTORY:  Social History     Social History    Marital status: Single     Spouse name: N/A    Number of children: 0    Years of education: N/A     Occupational History    ICU Nurse [employer]     Social History Main Topics    Smoking status: Never Smoker    Smokeless tobacco: Never Used    Alcohol use 0.0 oz/week     1 Standard drinks or equivalent per week      Comment: rare.    Drug use: No    Sexual activity: Yes     Partners: Female     Other Topics Concern    Caffeine Use Yes    Legal: Involved In Criminal Litigation No    Financial Status: Employed Yes    Leisure: Exercise Yes    Childhood History: Raised By Parents Yes    Home Situation: Lives Alone No    Education: High School Graduate Yes    Home Situation: Lives With Family Yes    Spirituality: Active Participation No    Spirituality: Private Participation Yes    Patient Feels They Ought To Cut Down On Drinking/Drug Use No    Education: Bachelor's Degree Yes    Legal Consequences Of Chemical Use No    Patient Annoyed By Others Criticizing Their Drinking/Drug Use No    Legal: Arrest History No    Patient Has Felt Bad Or Guilty About Drinking/Drug Use No    Legal: Involved In Civil Litigation No    Patient Has Had A Drink/Used Drugs As An Eye Opener In The [place] No     Social History Narrative    No narrative on file       FAMILY HISTORY:  Family History   Problem Relation Age of Onset    Anxiety disorder Mother     Bipolar disorder Brother     Schizophrenia Brother     Drug abuse Brother     Bipolar disorder Cousin        ALLERGIES AND MEDICATIONS: updated and reviewed.  Review of patient's allergies indicates:  No Known Allergies  Current Outpatient Prescriptions   Medication Sig Dispense Refill    albuterol 90 mcg/actuation inhaler Inhale 2 puffs into the lungs every 6 (six) hours as needed for Wheezing. 18 g 0     azelastine-fluticasone 137-50 mcg/spray [place] nassal spray USE 1 SPRAY INTO EACH NOSTRIL TWICE A DAY 1 Bottle 3    busPIRone (BUSPAR) 10 MG tablet Take 1 tablet (10 mg total) by mouth 3 (three) times daily. 270 tablet 3    diazePAM (VALIUM) 5 MG tablet Take 1 tablet (5 mg total) by mouth every 6 (six) hours as needed for Anxiety. 60 tablet 0    diclofenac potassium (CAMBIA) 50 mg PwPk Take 1 packet by mouth every 6 (six) hours as needed. 9 each 5    fluocinonide (LIDEX) 0.05 % external solution Apply topically 2 (two) times daily. 20 mL 2    fluticasone-salmeterol 250-50 mcg/dose (ADVAIR) 250-50 mcg/dose diskus inhaler Inhale 1 puff into the lungs 2 (two) times daily. Controller 180 each 1    hydrocodone-homatropine 5-1.5 mg/5 ml (HYCODAN) 5-1.5 mg/5 mL Syrp Take 5 mLs by mouth every 4 (four) hours as needed. 120 mL 0    ketoconazole (NIZORAL) 2 % shampoo Apply topically once daily. 120 mL 2    levocetirizine (XYZAL) 5 MG tablet Take 1 tablet (5 mg total) by mouth every evening. 90 tablet 1    methylphenidate HCl (RITALIN) 20 MG tablet Take 1 tablet (20 mg total) by mouth 3 (three) times daily with meals. 90 tablet 0    methylphenidate HCl (RITALIN) 20 MG tablet Take 1 tablet (20 mg total) by mouth 3 (three) times daily with meals. 90 tablet 0    [START ON 4/8/2018] methylphenidate HCl (RITALIN) 20 MG tablet Take 1 tablet (20 mg total) by mouth 3 (three) times daily with meals. 90 tablet 0    montelukast (SINGULAIR) 10 mg tablet Take 1 tablet (10 mg total) by mouth every evening. 90 tablet 1    multivitamin (ONE DAILY MULTIVITAMIN) per tablet Take 1 tablet by mouth once daily.      mupirocin (BACTROBAN) 2 % ointment Apply topically 3 (three) times daily. 15 g 1    mupirocin (BACTROBAN) 2 % ointment Apply to affected area 3 times daily 22 g 0    naratriptan (AMERGE) 2.5 MG tablet 2.5 mg at onset of headache, may repeat in 4 hours if needed 9 tablet 5    promethazine (PHENERGAN) 12.5 MG Tab Take 1  tablet (12.5 mg total) by mouth every 6 (six) hours as needed. 30 tablet 3    propranolol (INDERAL LA) 120 MG 24 hr capsule TAKE 1 CAPSULE (120 MG TOTAL) BY MOUTH ONCE DAILY. 30 capsule 5    saw palmetto 500 MG capsule Take 500 mg by mouth once daily.      traMADol (ULTRAM) 50 mg tablet Take 1 tablet (50 mg total) by mouth every 6 (six) hours as needed (breakthrough pain). 30 tablet 3    vit A & D3 in cod liver oil (COD LIVER OIL) 4,000 unit-400 unit/5 mL Liqd Take 5 mLs by mouth every evening.      ZOLMitriptan (ZOMIG) 2.5 mg tablet Take 1 tablet (2.5 mg total) by mouth every 2 (two) hours as needed. 9 tablet 5    ZOLMitriptan (ZOMIG) 5 mg Spry 1 spray by Nasal route every 2 (two) hours as needed. Needs a PA, already taking zomig and amerge PO, tried in past imitrex/relpax and maxalt ineffective 6 each 3    amoxicillin-clavulanate 875-125mg (AUGMENTIN) 875-125 mg per tablet Take 1 tablet by mouth every 12 (twelve) hours. 14 tablet 0    clindamycin (CLEOCIN T) 1 % external solution Apply topically 2 (two) times daily. 30 mL 2     No current facility-administered medications for this visit.              PHYSICAL EXAM    Alert, coop 37 y.o. male patient in no acute distress but had several coughing spells during exam.  He is dressed appropriately, friendly, and easily engaged.    Vitals:    04/03/18 1454   BP: 128/72   Pulse: 85   Resp: 20   Temp: 98.3 °F (36.8 °C)     VS reviewed.  VS WNL.  CC, nursing note, medications & PMH all reviewed today.    Head:  Normocephalic, atraumatic.    EENT:  TMs without erythema, Left and Right TM without effusions.  No TM perforation noted.                              Eye lids normal, no discharge present.       No Sinus tenderness noted with palp.               Nares--Mild edema, without d/c present.    Pharynx not injected.                Tonsils present absent, non erythematous , not enlarged, and without exudate present.    No anterior, posterior cervical lymph nodes  palpable.    No submental, submandibular or supraclavicular lymph nodes palp.             Resp:  Respirations even and unlabored.   Lungs clear to auscultation bilat.  No wheezing or crackles noted.  Good air movement noted to bases bilat.    Heart:  RRR, no MRG.              No pedal edema.    MS:  Ambulates well without assistance.             NEURO:  Alert and oriented x 4.  Responds appropriately during interaction.    Skin:  Warm, dry, color good.    Assessment  1. Cough  2. Reactive airway disease, mild intermittent, uncomplicated  3. Mild intermittent reactive airway disease without complication.    Plan  Cough  -     pantoprazole (PROTONIX) 40 MG tablet; Take 1 tablet (40 mg total) by mouth once daily.  Dispense: 30 tablet; Refill: 11    Reactive airway disease, mild intermittent, uncomplicated  -     albuterol 90 mcg/actuation inhaler; Inhale 2 puffs into the lungs every 6 (six) hours as needed for Wheezing.  Dispense: 18 g; Refill: 0  -     predniSONE (DELTASONE) 20 MG tablet; Take 3 tablets daily for 5 days, then 2 tablets daily for 5 days, then 1 tablet daily for 5 days.  Dispense: 30 tablet; Refill: 0  -     triamcinolone acetonide injection 80 mg; Inject 2 mLs (80 mg total) into the muscle one time.    Mild intermittent reactive airway disease without complication  -     hydrocodone-homatropine 5-1.5 mg/5 ml (HYCODAN) 5-1.5 mg/5 mL Syrp; Take 5 mLs by mouth every 4 (four) hours as needed.  Dispense: 120 mL; Refill: 0    Follow with PCP as needed or if symptoms worsen

## 2018-04-03 NOTE — PATIENT INSTRUCTIONS
Follow up with primary care provider if not improved  Go to ER for new, worse or concerning symptoms    Tips to Control Acid Reflux    To control acid reflux, youll need to make some basic diet and lifestyle changes. The simple steps outlined below may be all youll need to ease discomfort.  Watch what you eat  · Avoid fatty foods and spicy foods.  · Eat fewer acidic foods, such as citrus and tomato-based foods. These can increase symptoms.  · Limit drinking alcohol, caffeine, and fizzy beverages. All increase acid reflux.  · Try limiting chocolate, peppermint, and spearmint. These can worsen acid reflux in some people.  Watch when you eat  · Avoid lying down for 3 hours after eating.  · Do not snack before going to bed.  Raise your head  Raising your head and upper body by 4 to 6 inches helps limit reflux when youre lying down. Put blocks under the head of your bed frame to raise it.  Other changes  · Lose weight, if you need to  · Dont exercise near bedtime  · Avoid tight-fitting clothes  · Limit aspirin and ibuprofen  · Stop smoking   Date Last Reviewed: 7/1/2016  © 4114-2434 [employer]. [address] [place] [address], [place] [address]. All rights reserved. This information is not intended as a substitute for professional medical care. Always follow your healthcare professional's instructions.

## 2018-04-03 NOTE — PROGRESS NOTES
Kenalog was given IM in the left gluteus yrn. Tolerated well. Pt was instructed to remain in the clinic for 15 mins after injection

## 2018-04-11 RX ORDER — MONTELUKAST SODIUM 10 MG/1
10 TABLET ORAL NIGHTLY
Qty: 90 TABLET | Refills: 1 | Status: SHIPPED | OUTPATIENT
Start: 2018-04-11 | End: 2019-01-21 | Stop reason: SDUPTHER

## 2018-04-19 ENCOUNTER — PATIENT MESSAGE (OUTPATIENT)
Dept: FAMILY MEDICINE | Facility: CLINIC | Age: 38
End: 2018-04-19

## 2018-04-19 RX ORDER — DOXYCYCLINE HYCLATE 100 MG
100 TABLET ORAL 2 TIMES DAILY
Qty: 20 TABLET | Refills: 0 | Status: SHIPPED | OUTPATIENT
Start: 2018-04-19 | End: 2018-04-29

## 2018-04-19 NOTE — TELEPHONE ENCOUNTER
MyChart message from patient. Asking if need for antibiotics and if should schedule office visit.  Please advise.

## 2018-05-02 ENCOUNTER — OFFICE VISIT (OUTPATIENT)
Dept: NEUROLOGY | Facility: CLINIC | Age: 38
End: 2018-05-02
Payer: COMMERCIAL

## 2018-05-02 VITALS
SYSTOLIC BLOOD PRESSURE: 130 MMHG | HEART RATE: 76 BPM | HEIGHT: 75 IN | DIASTOLIC BLOOD PRESSURE: 84 MMHG | WEIGHT: 289.88 LBS | BODY MASS INDEX: 36.04 KG/M2

## 2018-05-02 DIAGNOSIS — G43.719 INTRACTABLE CHRONIC MIGRAINE WITHOUT AURA AND WITHOUT STATUS MIGRAINOSUS: ICD-10-CM

## 2018-05-02 DIAGNOSIS — R11.0 NAUSEA: ICD-10-CM

## 2018-05-02 DIAGNOSIS — G43.711 INTRACTABLE CHRONIC MIGRAINE WITHOUT AURA AND WITH STATUS MIGRAINOSUS: ICD-10-CM

## 2018-05-02 PROCEDURE — 99999 PR PBB SHADOW E&M-EST. PATIENT-LVL III: CPT | Mod: PBBFAC,,, | Performed by: NURSE PRACTITIONER

## 2018-05-02 PROCEDURE — 99499 UNLISTED E&M SERVICE: CPT | Mod: S$GLB,,, | Performed by: NURSE PRACTITIONER

## 2018-05-02 PROCEDURE — 64615 CHEMODENERV MUSC MIGRAINE: CPT | Mod: S$GLB,,, | Performed by: NURSE PRACTITIONER

## 2018-05-02 RX ORDER — CEFUROXIME AXETIL 250 MG/1
6 TABLET ORAL
Qty: 3 ML | Refills: 3 | Status: SHIPPED | OUTPATIENT
Start: 2018-05-02 | End: 2020-05-20 | Stop reason: CLARIF

## 2018-05-02 RX ORDER — DIHYDROERGOTAMINE MESYLATE 4 MG/ML
1 SPRAY NASAL
Qty: 2 ML | Refills: 2 | Status: SHIPPED | OUTPATIENT
Start: 2018-05-02 | End: 2018-11-14

## 2018-05-02 RX ORDER — TRAMADOL HYDROCHLORIDE 50 MG/1
50 TABLET ORAL EVERY 6 HOURS PRN
Qty: 30 TABLET | Refills: 3 | Status: SHIPPED | OUTPATIENT
Start: 2018-05-02 | End: 2018-11-14 | Stop reason: SDUPTHER

## 2018-05-02 RX ORDER — PROMETHAZINE HYDROCHLORIDE 12.5 MG/1
12.5 TABLET ORAL EVERY 6 HOURS PRN
Qty: 30 TABLET | Refills: 3 | Status: SHIPPED | OUTPATIENT
Start: 2018-05-02 | End: 2020-12-23 | Stop reason: SDUPTHER

## 2018-05-02 NOTE — PROGRESS NOTES
"Mr. Moreno is here today for management of  headaches.    Since last seen he denies s/e from his 1stBotox (1/19th) and reports overall"better", less headaches Feb and April, bit more recently since overdue for botox. HA diary reveals ~ total 14 severe ones in interim, others mild-mod. Zomig costly and Cambia costly.  Had new baby girl. HX recent aura,  peripheral bilateral kaleidoscope. HA remains same location and nature and triggers. HA today    Continues to use apap nightly, faithfully. AHI<5        HISTORY:  12/11/2013:  He has since been setup (10/14) with aPAP 9-16cm, tolerating pressures well. He uses a nasal mask with a chin strap. He denies mask air leaks, nasal drying, or oral drying. His morning headaches and overall frequency of migraines has significantly improved. He is using therapy nightly, even brings machine to his girlfriend's house. He feels more refreshed in the mornings, has less nocturia, no more snoring,  and is no longer bothered by urge to move his legs during sleep initiation. He no longer awakens with feeling of heart racing.     He reports having total of ~7 migraines since last seen 3-mos ago, 2-3 of them severe. NO missed work in interim due to HA. Tramadol or Cambia/Amerge or Zomig remain effective.  HA starts from the R frontal/retroorbital area, at times radiates to the ipsilateral occipito-cervical area, pressure in quality,  with associated symptoms of R ptosis, nausea, photo/phonophobia, generalized weakness with R eyelid twitching, chills/sweating (prodrome). Triggers remain weather changes, sexual activity and stress while movement can worsen attacks. He prefers to rest in a dark quiet cold room to feel better.     Focus improved on Daytrana patch ADHD.    Interrogation: New machine condition, heat at 4, 30d avg 5:48h/night, 1d 7:48hr. 90% pressure 30d 10.8cm, 7d 11.8cm, 1d 14cm. 21/30 days>4h. 20min/6cm ramp setup today. Using nasal pillows mask.     ESS= 8   HIT-6 score= " 57    INTERVAL HISTORY:  12/7/16: He continues to use cpap 13cm nightly. He has gained <10% wgt since study. Feels horrible if he falls asleep on couch w/o his mask on. Getting regular replacement supplies from [facility]. No chin strap use. Denies oral drying. Not using water system anymore, already congested. Continues to use dymista NS twice daily and take xyzal. ESS=9  Interrogation: 0% leak. AHI 1.9, avg 30d= 6:39h/n. Nuance mask. 0% periodic      Reports continued fairly frequent, just about daily mild headaches a lot on way home from work or after high stress situations. He has begun verapamil 120mg La in interim which he tolerates well w/o s/e. Reports severe headaches ~ 3/month. Still gets same prodromal symptoms prior to headaches and occasional nausea and photophobia. He is out of cambia, amerge and zomig which all remain effective. Unable to take triptan at work due to sleepiness.  Fioricet in interim prescribed was also helpful at times, but prefers prn tramadol 1/2 50mg mod episodes. He has been taking OTC analgesics frequently for his mild headaches. Remains very anxious/worrier. He has gotten  and is expecting 1st baby in summer '17!! 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     6/20/17:  Since last seen, he continues to use cpap 13-16cm nightly. Would like to trial alternative nasal mask. His straps get worn, current one needs replacing. No chin strap.Denies oral drying. Saw Dr. Reyes recently (ENT). HIt-6= 61. Inderal LA helping reduce intensity of headaches,having less severe episodes in interim. Had a bad one/lingering recently. zomig PO costly, using more amerge + cambia. Still taking up to 2hr notice complete relief (feels parasthesias then intense flush/sweat). Having ~ 15 headaches/month, but overall less frequent since last seen monthly. Inquiring about botox. Headache remains same location/nature.     Interrogation- AHI 1.4, avg 7:30h/n. Nasal pillow mask. 0% leak. 90%  "tile 13.4-14. 30/30d>4h.     12/29/17:   Since last seen he continues to have very frequent headaches, >15day/month. AMerge/cambia typically helps but having frequent abrupt onset episodes and pills take longer to work. Zomig NS was not covered. Lost 2#. Had new baby girl. Interested in trialing botox. Had first aura recently, peripheral bilateral kaleidoscope. HA remains same location and nature and triggers.     Continues to use apap nightly, faithfully.   Interrogation- 90% tile 14cm, avg 7:18h/n. AHI 2.1        PSG : (273#) 7/27/13: AHI was 18.4 with an oxygen magaly of 85.0%. The supine AHI was 22.3 and the REM AHI was 28.3. The patient did not qualify for a split night study due to an insufficient number of events in the first half of the study. Setup with APAP    ROS: Mood stable, otherwise a balance review of 10-systems is negative.     Tried(verapamil, lexapro, topamax, gabapentin, metoprolol, treximet, relpax, maxalt, imitrex ?dose)    Employer/Position: [employer]  FH: HA, brother with presumed ALEXX, witnessed apneic pauses     EXAM:   General: W/D,obese, well-groomed  /84   Pulse 76   Ht 6' 3" (1.905 m)   Wt 131.5 kg (289 lb 14.5 oz)   BMI 36.24 kg/m²         IMPRESSION:   Chronic migraine with and without aura, intractable 15d/month,request botox auth 5/2/18: Improvement in frequency and severity of headaches  ALEXX, moderate, continued excellent aPAP adherence, with improvement of symptoms. AHI<5  Anxiety    PLAN     Preventative therapy:  Continue Inderal LA 120mg qd    Abortive therapy: Continue Zomig 5 mg or Amerge 2.5 mg PO q 2h, up to maximum of 5mg/24h , do not delay treatment. If affordable continue Zomig NS 5mg q2h prn, max 10mg/24h OR migranal NS prn (coupon generic). OR onzetra, he will call special pharmacy again about this one OR imitrex 6mg SC q2h prn    Continue Phenergan 12.5-25 mg PO q6-8h prn for HA/nausea     Continue Tramadol 50 mg 1/2-1 tabs PO q6-8 h prn for headache     Stop " cambia(cost). Begin toradol 10mg PO q8hr prn + triptan    Continue to regulate sleep, mealtimes, try to reduce stress and to avoid known headache triggers/aggravating factors. Emphasized importance of keeping headache diaries    Continue autoPAP 13-16cm. Continue excellent usage. [facility] D[facility] prn supplies    RTC 3-mos botox

## 2018-06-07 ENCOUNTER — OFFICE VISIT (OUTPATIENT)
Dept: INTERNAL MEDICINE | Facility: CLINIC | Age: 38
End: 2018-06-07
Payer: COMMERCIAL

## 2018-06-07 VITALS
BODY MASS INDEX: 35.47 KG/M2 | WEIGHT: 285.25 LBS | SYSTOLIC BLOOD PRESSURE: 132 MMHG | HEIGHT: 75 IN | HEART RATE: 86 BPM | DIASTOLIC BLOOD PRESSURE: 80 MMHG

## 2018-06-07 DIAGNOSIS — G43.719 INTRACTABLE CHRONIC MIGRAINE WITHOUT AURA AND WITHOUT STATUS MIGRAINOSUS: ICD-10-CM

## 2018-06-07 DIAGNOSIS — F90.0 ATTENTION DEFICIT HYPERACTIVITY DISORDER (ADHD), PREDOMINANTLY INATTENTIVE TYPE: ICD-10-CM

## 2018-06-07 DIAGNOSIS — R21 RASH: ICD-10-CM

## 2018-06-07 DIAGNOSIS — R68.82 DECREASED LIBIDO: ICD-10-CM

## 2018-06-07 DIAGNOSIS — G47.33 OBSTRUCTIVE SLEEP APNEA (ADULT) (PEDIATRIC): ICD-10-CM

## 2018-06-07 DIAGNOSIS — Z00.00 WELLNESS EXAMINATION: Primary | ICD-10-CM

## 2018-06-07 DIAGNOSIS — F41.1 GENERALIZED ANXIETY DISORDER: ICD-10-CM

## 2018-06-07 PROCEDURE — 3075F SYST BP GE 130 - 139MM HG: CPT | Mod: CPTII,S$GLB,, | Performed by: INTERNAL MEDICINE

## 2018-06-07 PROCEDURE — 99395 PREV VISIT EST AGE 18-39: CPT | Mod: S$GLB,,, | Performed by: INTERNAL MEDICINE

## 2018-06-07 PROCEDURE — 99999 PR PBB SHADOW E&M-EST. PATIENT-LVL III: CPT | Mod: PBBFAC,,, | Performed by: INTERNAL MEDICINE

## 2018-06-07 PROCEDURE — 3079F DIAST BP 80-89 MM HG: CPT | Mod: CPTII,S$GLB,, | Performed by: INTERNAL MEDICINE

## 2018-06-07 RX ORDER — CLINDAMYCIN PHOSPHATE 11.9 MG/ML
SOLUTION TOPICAL 2 TIMES DAILY
Qty: 60 ML | Refills: 0 | Status: SHIPPED | OUTPATIENT
Start: 2018-06-07 | End: 2019-05-15

## 2018-06-07 RX ORDER — CLINDAMYCIN PHOSPHATE 11.9 MG/ML
SOLUTION TOPICAL 2 TIMES DAILY
COMMUNITY
End: 2018-06-07 | Stop reason: SDUPTHER

## 2018-06-07 RX ORDER — FLUOCINONIDE TOPICAL SOLUTION USP, 0.05% 0.5 MG/ML
SOLUTION TOPICAL 2 TIMES DAILY
COMMUNITY
End: 2018-06-07 | Stop reason: SDUPTHER

## 2018-06-07 RX ORDER — FLUOCINONIDE TOPICAL SOLUTION USP, 0.05% 0.5 MG/ML
SOLUTION TOPICAL 2 TIMES DAILY
Qty: 60 ML | Refills: 0 | Status: SHIPPED | OUTPATIENT
Start: 2018-06-07 | End: 2019-07-19

## 2018-06-07 NOTE — PROGRESS NOTES
Subjective:       Patient ID: Jatinder Moreno III is a 37 y.o. male.    Chief Complaint: Annual Exam (Est care)    Pt here for annual exam. Counseled on exercise goals.     He has ALEXX and uses cpap successfully. However, he does have fatigue still during day. He has had regular f/u with sleep med. Libido is also decreased. No ED but does feel erections are not as potent.     He has chronic migraines and sees neuro regularly. Gets botox which helps. Also has host of meds for abortive purposes.     He sees psychiatrist for ADHD and anxiety. These are currently well controlled on meds. No SI/HI.    He would like referral to derm for chronic and recurrent rash on scalp. It has responded to topical clindamycin and steroid solutions. Told it was folliculitis but diagnosis has been unclear.       Review of Systems   Constitutional: Negative for fatigue, fever and unexpected weight change.   HENT: Negative for congestion, rhinorrhea and sore throat.    Eyes: Negative for visual disturbance.   Respiratory: Negative for cough and shortness of breath.    Cardiovascular: Negative for chest pain, palpitations and leg swelling.   Gastrointestinal: Negative for abdominal pain, blood in stool, constipation and diarrhea.   Genitourinary: Negative for difficulty urinating and dysuria.   Skin: Negative for color change and rash.   Neurological: Negative for dizziness and syncope.   Hematological: Negative for adenopathy.   Psychiatric/Behavioral: Negative for dysphoric mood.       Objective:      Physical Exam   Constitutional: He is oriented to person, place, and time. He appears well-developed and well-nourished.   HENT:   Head: Normocephalic and atraumatic.   Right Ear: External ear normal.   Left Ear: External ear normal.   Mouth/Throat: Oropharynx is clear and moist. No oropharyngeal exudate.   Eyes: Conjunctivae and EOM are normal. Pupils are equal, round, and reactive to light.   Neck: Neck supple. Carotid bruit is not present.  No thyromegaly present.   Cardiovascular: Normal rate, regular rhythm, S1 normal, S2 normal, normal heart sounds and intact distal pulses.    Pulmonary/Chest: Effort normal and breath sounds normal.   Abdominal: Soft. Bowel sounds are normal. He exhibits no mass. There is no hepatosplenomegaly. There is no tenderness.   Musculoskeletal: He exhibits no edema.   Lymphadenopathy:     He has no cervical adenopathy.   Neurological: He is alert and oriented to person, place, and time. No cranial nerve deficit.   Skin:   Pustular lesions scattered throughout scalp   Psychiatric: He has a normal mood and affect. His behavior is normal.       Assessment:       1. Wellness examination    2. Obstructive sleep apnea (adult) (pediatric)    3. Generalized anxiety disorder    4. Attention deficit hyperactivity disorder (ADHD), predominantly inattentive type    5. Intractable chronic migraine without aura and without status migrainosus    6. Rash    7. Decreased libido        Plan:       1. Appropriate labs  2. Derm referral

## 2018-06-09 ENCOUNTER — LAB VISIT (OUTPATIENT)
Dept: LAB | Facility: HOSPITAL | Age: 38
End: 2018-06-09
Attending: INTERNAL MEDICINE
Payer: COMMERCIAL

## 2018-06-09 DIAGNOSIS — Z00.00 WELLNESS EXAMINATION: ICD-10-CM

## 2018-06-09 DIAGNOSIS — R68.82 DECREASED LIBIDO: ICD-10-CM

## 2018-06-09 LAB
ALBUMIN SERPL BCP-MCNC: 4.3 G/DL
ALP SERPL-CCNC: 67 U/L
ALT SERPL W/O P-5'-P-CCNC: 23 U/L
ANION GAP SERPL CALC-SCNC: 10 MMOL/L
AST SERPL-CCNC: 24 U/L
BASOPHILS # BLD AUTO: 0.02 K/UL
BASOPHILS NFR BLD: 0.4 %
BILIRUB SERPL-MCNC: 1.4 MG/DL
BUN SERPL-MCNC: 12 MG/DL
CALCIUM SERPL-MCNC: 9.2 MG/DL
CHLORIDE SERPL-SCNC: 100 MMOL/L
CHOLEST SERPL-MCNC: 164 MG/DL
CHOLEST/HDLC SERPL: 9.1 {RATIO}
CO2 SERPL-SCNC: 25 MMOL/L
CREAT SERPL-MCNC: 1.2 MG/DL
DIFFERENTIAL METHOD: ABNORMAL
EOSINOPHIL # BLD AUTO: 0.2 K/UL
EOSINOPHIL NFR BLD: 2.7 %
ERYTHROCYTE [DISTWIDTH] IN BLOOD BY AUTOMATED COUNT: 14.6 %
EST. GFR  (AFRICAN AMERICAN): >60 ML/MIN/1.73 M^2
EST. GFR  (NON AFRICAN AMERICAN): >60 ML/MIN/1.73 M^2
GLUCOSE SERPL-MCNC: 91 MG/DL
HCT VFR BLD AUTO: 37.4 %
HDLC SERPL-MCNC: 18 MG/DL
HDLC SERPL: 11 %
HGB BLD-MCNC: 13 G/DL
IMM GRANULOCYTES # BLD AUTO: 0.03 K/UL
IMM GRANULOCYTES NFR BLD AUTO: 0.5 %
LDLC SERPL CALC-MCNC: ABNORMAL MG/DL
LYMPHOCYTES # BLD AUTO: 2.2 K/UL
LYMPHOCYTES NFR BLD: 40.8 %
MCH RBC QN AUTO: 30.1 PG
MCHC RBC AUTO-ENTMCNC: 34.8 G/DL
MCV RBC AUTO: 87 FL
MONOCYTES # BLD AUTO: 0.6 K/UL
MONOCYTES NFR BLD: 11.5 %
NEUTROPHILS # BLD AUTO: 2.4 K/UL
NEUTROPHILS NFR BLD: 44.1 %
NONHDLC SERPL-MCNC: 146 MG/DL
NRBC BLD-RTO: 0 /100 WBC
PLATELET # BLD AUTO: 207 K/UL
PMV BLD AUTO: 9.2 FL
POTASSIUM SERPL-SCNC: 3.6 MMOL/L
PROT SERPL-MCNC: 8 G/DL
RBC # BLD AUTO: 4.32 M/UL
SODIUM SERPL-SCNC: 135 MMOL/L
TESTOST SERPL-MCNC: 419 NG/DL
TRIGL SERPL-MCNC: 773 MG/DL
TSH SERPL DL<=0.005 MIU/L-ACNC: 2.54 UIU/ML
WBC # BLD AUTO: 5.47 K/UL

## 2018-06-09 PROCEDURE — 80053 COMPREHEN METABOLIC PANEL: CPT

## 2018-06-09 PROCEDURE — 84403 ASSAY OF TOTAL TESTOSTERONE: CPT

## 2018-06-09 PROCEDURE — 84443 ASSAY THYROID STIM HORMONE: CPT

## 2018-06-09 PROCEDURE — 83540 ASSAY OF IRON: CPT

## 2018-06-09 PROCEDURE — 85025 COMPLETE CBC W/AUTO DIFF WBC: CPT

## 2018-06-09 PROCEDURE — 36415 COLL VENOUS BLD VENIPUNCTURE: CPT | Mod: PO

## 2018-06-09 PROCEDURE — 82728 ASSAY OF FERRITIN: CPT

## 2018-06-09 PROCEDURE — 80061 LIPID PANEL: CPT

## 2018-06-10 ENCOUNTER — PATIENT MESSAGE (OUTPATIENT)
Dept: INTERNAL MEDICINE | Facility: CLINIC | Age: 38
End: 2018-06-10

## 2018-06-10 DIAGNOSIS — E78.1 HYPERTRIGLYCERIDEMIA: ICD-10-CM

## 2018-06-10 DIAGNOSIS — D64.9 NORMOCYTIC ANEMIA: Primary | ICD-10-CM

## 2018-06-10 DIAGNOSIS — Z51.81 MEDICATION MONITORING ENCOUNTER: Primary | ICD-10-CM

## 2018-06-10 DIAGNOSIS — R68.82 DECREASED LIBIDO: ICD-10-CM

## 2018-06-10 RX ORDER — FENOFIBRATE 160 MG/1
160 TABLET ORAL DAILY
Qty: 90 TABLET | Refills: 3 | Status: SHIPPED | OUTPATIENT
Start: 2018-06-10 | End: 2019-07-19

## 2018-06-11 LAB
FERRITIN SERPL-MCNC: 56 NG/ML
IRON SERPL-MCNC: 86 UG/DL
SATURATED IRON: 28 %
TOTAL IRON BINDING CAPACITY: 311 UG/DL
TRANSFERRIN SERPL-MCNC: 210 MG/DL

## 2018-06-11 NOTE — TELEPHONE ENCOUNTER
Adri in lab services states the request lab order CAN BE added. Adri states she will call the office if there are any questions or concerns.

## 2018-06-12 ENCOUNTER — APPOINTMENT (RX ONLY)
Dept: URBAN - METROPOLITAN AREA CLINIC 98 | Facility: CLINIC | Age: 38
Setting detail: DERMATOLOGY
End: 2018-06-12

## 2018-06-12 DIAGNOSIS — D485 NEOPLASM OF UNCERTAIN BEHAVIOR OF SKIN: ICD-10-CM

## 2018-06-12 DIAGNOSIS — L21.8 OTHER SEBORRHEIC DERMATITIS: ICD-10-CM

## 2018-06-12 DIAGNOSIS — D18.0 HEMANGIOMA: ICD-10-CM

## 2018-06-12 PROBLEM — J45.909 UNSPECIFIED ASTHMA, UNCOMPLICATED: Status: ACTIVE | Noted: 2018-06-12

## 2018-06-12 PROBLEM — F41.9 ANXIETY DISORDER, UNSPECIFIED: Status: ACTIVE | Noted: 2018-06-12

## 2018-06-12 PROBLEM — L85.3 XEROSIS CUTIS: Status: ACTIVE | Noted: 2018-06-12

## 2018-06-12 PROBLEM — D48.5 NEOPLASM OF UNCERTAIN BEHAVIOR OF SKIN: Status: ACTIVE | Noted: 2018-06-12

## 2018-06-12 PROBLEM — D18.01 HEMANGIOMA OF SKIN AND SUBCUTANEOUS TISSUE: Status: ACTIVE | Noted: 2018-06-12

## 2018-06-12 PROCEDURE — ? COUNSELING

## 2018-06-12 PROCEDURE — ? PRESCRIPTION

## 2018-06-12 PROCEDURE — ? DEFER

## 2018-06-12 PROCEDURE — ? TREATMENT REGIMEN

## 2018-06-12 PROCEDURE — 99203 OFFICE O/P NEW LOW 30 MIN: CPT

## 2018-06-12 RX ORDER — MOMETASONE FUROATE 1 MG/ML
SOLUTION TOPICAL
Qty: 1 | Refills: 3 | Status: ERX | COMMUNITY
Start: 2018-06-12

## 2018-06-12 RX ORDER — MINOCYCLINE HYDROCHLORIDE 100 MG/1
CAPSULE ORAL
Qty: 28 | Refills: 0 | Status: ERX | COMMUNITY
Start: 2018-06-12

## 2018-06-12 RX ORDER — KETOCONAZOLE 20.5 MG/ML
SHAMPOO, SUSPENSION TOPICAL
Qty: 1 | Refills: 5 | Status: ERX | COMMUNITY
Start: 2018-06-12

## 2018-06-12 RX ORDER — HYDROCORTISONE 25 MG/G
CREAM TOPICAL
Qty: 1 | Refills: 3 | Status: ERX | COMMUNITY
Start: 2018-06-12

## 2018-06-12 RX ADMIN — MINOCYCLINE HYDROCHLORIDE: 100 CAPSULE ORAL at 13:48

## 2018-06-12 RX ADMIN — KETOCONAZOLE: 20.5 SHAMPOO, SUSPENSION TOPICAL at 13:48

## 2018-06-12 RX ADMIN — HYDROCORTISONE: 25 CREAM TOPICAL at 13:51

## 2018-06-12 RX ADMIN — MOMETASONE FUROATE: 1 SOLUTION TOPICAL at 13:47

## 2018-06-12 ASSESSMENT — LOCATION SIMPLE DESCRIPTION DERM
LOCATION SIMPLE: RIGHT EYEBROW
LOCATION SIMPLE: LEFT EYEBROW
LOCATION SIMPLE: LEFT UPPER BACK
LOCATION SIMPLE: HAIR
LOCATION SIMPLE: NOSE
LOCATION SIMPLE: LEFT CHEEK
LOCATION SIMPLE: CHEST
LOCATION SIMPLE: RIGHT CHEEK

## 2018-06-12 ASSESSMENT — LOCATION ZONE DERM
LOCATION ZONE: TRUNK
LOCATION ZONE: FACE
LOCATION ZONE: SCALP
LOCATION ZONE: NOSE

## 2018-06-12 ASSESSMENT — LOCATION DETAILED DESCRIPTION DERM
LOCATION DETAILED: LEFT MEDIAL SUPERIOR CHEST
LOCATION DETAILED: RIGHT INFERIOR MEDIAL MALAR CHEEK
LOCATION DETAILED: NASAL DORSUM
LOCATION DETAILED: LEFT CENTRAL EYEBROW
LOCATION DETAILED: STERNUM
LOCATION DETAILED: LEFT MEDIAL INFERIOR CHEST
LOCATION DETAILED: LEFT MEDIAL MALAR CHEEK
LOCATION DETAILED: LEFT MEDIAL UPPER BACK
LOCATION DETAILED: LEFT MEDIAL EYEBROW
LOCATION DETAILED: RIGHT LATERAL SUPERIOR CHEST
LOCATION DETAILED: RIGHT MEDIAL SUPERIOR CHEST
LOCATION DETAILED: LEFT LATERAL SUPERIOR CHEST
LOCATION DETAILED: HAIR
LOCATION DETAILED: RIGHT CENTRAL EYEBROW

## 2018-06-12 NOTE — PROCEDURE: TREATMENT REGIMEN
Discontinue Regimen: Clindamycin sol -for now
Initiate Treatment: Mometasone solution QD to flares on affected scalp/ chest, avoid face\\nAlternate QD OTC TGel shampoo and Ketoconazole 2% shampoo, leave shampoo on >5min\\nMinocycline 100mg BID x2wks\\nHydrocortisone 2.5% cream BID to affected areas of face
Detail Level: Zone

## 2018-06-12 NOTE — PROCEDURE: REASSURANCE
Additional Note: Pt quoted $50 for elective cosmetic destruction with hyfrecator.
Detail Level: Zone
Include Location In Plan?: No

## 2018-07-03 ENCOUNTER — PATIENT MESSAGE (OUTPATIENT)
Dept: INTERNAL MEDICINE | Facility: CLINIC | Age: 38
End: 2018-07-03

## 2018-07-06 ENCOUNTER — TELEPHONE (OUTPATIENT)
Dept: INTERNAL MEDICINE | Facility: CLINIC | Age: 38
End: 2018-07-06

## 2018-07-06 NOTE — TELEPHONE ENCOUNTER
----- Message from Jaydapalak Golden sent at 7/6/2018  1:11 PM CDT -----  Contact: SKYLA NORTON III [[id number]]            Name of Who is Calling: SKYLA NORTON III [[id number]]    What is the request in detail: Patient called about his labs and the date he would like it to be scheduled, please call him.       Can the clinic reply by [facility]: no      What Number to Call Back if not in [facility]: [phone]

## 2018-07-11 ENCOUNTER — OFFICE VISIT (OUTPATIENT)
Dept: URGENT CARE | Facility: CLINIC | Age: 38
End: 2018-07-11
Payer: COMMERCIAL

## 2018-07-11 VITALS
OXYGEN SATURATION: 99 % | TEMPERATURE: 99 F | WEIGHT: 285 LBS | HEART RATE: 85 BPM | SYSTOLIC BLOOD PRESSURE: 136 MMHG | BODY MASS INDEX: 35.43 KG/M2 | HEIGHT: 75 IN | DIASTOLIC BLOOD PRESSURE: 96 MMHG

## 2018-07-11 DIAGNOSIS — L01.00 IMPETIGO: Primary | ICD-10-CM

## 2018-07-11 DIAGNOSIS — T65.91XA ALLERGIC REACTION TO CHEMICAL SUBSTANCE, ACCIDENTAL OR UNINTENTIONAL, INITIAL ENCOUNTER: ICD-10-CM

## 2018-07-11 DIAGNOSIS — L03.811 CELLULITIS OF SCALP: ICD-10-CM

## 2018-07-11 PROCEDURE — 3080F DIAST BP >= 90 MM HG: CPT | Mod: CPTII,S$GLB,, | Performed by: INTERNAL MEDICINE

## 2018-07-11 PROCEDURE — 99214 OFFICE O/P EST MOD 30 MIN: CPT | Mod: 25,S$GLB,, | Performed by: INTERNAL MEDICINE

## 2018-07-11 PROCEDURE — 3008F BODY MASS INDEX DOCD: CPT | Mod: CPTII,S$GLB,, | Performed by: INTERNAL MEDICINE

## 2018-07-11 PROCEDURE — 96372 THER/PROPH/DIAG INJ SC/IM: CPT | Mod: S$GLB,,, | Performed by: INTERNAL MEDICINE

## 2018-07-11 PROCEDURE — 3075F SYST BP GE 130 - 139MM HG: CPT | Mod: CPTII,S$GLB,, | Performed by: INTERNAL MEDICINE

## 2018-07-11 RX ORDER — SULFAMETHOXAZOLE AND TRIMETHOPRIM 800; 160 MG/1; MG/1
1 TABLET ORAL 2 TIMES DAILY
Qty: 14 TABLET | Refills: 0 | Status: SHIPPED | OUTPATIENT
Start: 2018-07-11 | End: 2019-01-21

## 2018-07-11 RX ORDER — MOMETASONE FUROATE 1 MG/ML
SOLUTION TOPICAL DAILY
COMMUNITY
End: 2019-01-21

## 2018-07-11 RX ORDER — BETAMETHASONE SODIUM PHOSPHATE AND BETAMETHASONE ACETATE 3; 3 MG/ML; MG/ML
9 INJECTION, SUSPENSION INTRA-ARTICULAR; INTRALESIONAL; INTRAMUSCULAR; SOFT TISSUE ONCE
Status: COMPLETED | OUTPATIENT
Start: 2018-07-11 | End: 2018-07-11

## 2018-07-11 RX ORDER — HYDROCORTISONE 25 MG/G
CREAM TOPICAL 2 TIMES DAILY
COMMUNITY
End: 2019-01-21

## 2018-07-11 RX ORDER — MUPIROCIN 20 MG/G
OINTMENT TOPICAL
Qty: 22 G | Refills: 1 | Status: SHIPPED | OUTPATIENT
Start: 2018-07-11 | End: 2019-01-21

## 2018-07-11 RX ORDER — KETOCONAZOLE 20 MG/ML
SHAMPOO, SUSPENSION TOPICAL
COMMUNITY
End: 2019-01-21

## 2018-07-11 RX ADMIN — BETAMETHASONE SODIUM PHOSPHATE AND BETAMETHASONE ACETATE 9 MG: 3; 3 INJECTION, SUSPENSION INTRA-ARTICULAR; INTRALESIONAL; INTRAMUSCULAR; SOFT TISSUE at 06:07

## 2018-07-11 NOTE — PROGRESS NOTES
"Subjective:       Patient ID: Jatinder Moreno III is a 38 y.o. male.    Vitals:  height is 6' 3" (1.905 m) and weight is 129.3 kg (285 lb). His temperature is 98.5 °F (36.9 °C). His blood pressure is 136/96 (abnormal) and his pulse is 85. His oxygen saturation is 99%.     Chief Complaint: Seborrheic Dermatitis (Pt. is having a flare up, face and scalp is inflamed, painful and itching)    Pt. is having a flare up, face and scalp is inflamed, painful and itching. Flare up has been going on for 5 days and getting progressively worse.  PCP isn't giving him any other options, All he has is topical meds that are not effective.      Rash   This is a recurrent problem. The current episode started in the past 7 days. The problem has been gradually worsening since onset. The affected locations include the scalp, head and face. The rash is characterized by burning, dryness, itchiness, pain and redness. He was exposed to nothing. Pertinent negatives include no diarrhea, fever, joint pain, shortness of breath, sore throat or vomiting. Past treatments include antihistamine, anti-itch cream, antibiotic cream and topical steroids. The treatment provided no relief.     Review of Systems   Constitution: Negative for chills and fever.   HENT: Negative for sore throat.    Eyes: Negative for blurred vision.   Cardiovascular: Negative for chest pain.   Respiratory: Negative for shortness of breath.    Skin: Positive for color change, flushing, itching and rash.   Musculoskeletal: Negative for back pain and joint pain.   Gastrointestinal: Negative for abdominal pain, diarrhea, nausea and vomiting.   Neurological: Negative for headaches.   Psychiatric/Behavioral: The patient is not nervous/anxious.        Objective:      Physical Exam   Constitutional: He appears well-developed and well-nourished.   HENT:   Head: Normocephalic and atraumatic.   Eyes: Conjunctivae and EOM are normal. Pupils are equal, round, and reactive to light.   Neck: " Normal range of motion.   Cardiovascular: Normal rate and regular rhythm.    Pulmonary/Chest: Effort normal and breath sounds normal.   Skin:   Crusting infalmed skin around nares; swollen erythematous bilat malar areas; tender 4-5 cm area R scalp with erythema and warmth   Nursing note and vitals reviewed.      Assessment:       1. Impetigo    2. Cellulitis of scalp    3. Allergic reaction to chemical substance, accidental or unintentional, initial encounter        Plan:         Impetigo  -     mupirocin (BACTROBAN) 2 % ointment; Apply to affected area 2 times daily  Dispense: 22 g; Refill: 1    Cellulitis of scalp  -     sulfamethoxazole-trimethoprim 800-160mg (BACTRIM DS) 800-160 mg Tab; Take 1 tablet by mouth 2 (two) times daily.  Dispense: 14 tablet; Refill: 0    Allergic reaction to chemical substance, accidental or unintentional, initial encounter  -     betamethasone acetate-betamethasone sodium phosphate injection 9 mg; Inject 1.5 mLs (9 mg total) into the muscle once.

## 2018-07-27 ENCOUNTER — APPOINTMENT (RX ONLY)
Dept: URBAN - METROPOLITAN AREA CLINIC 98 | Facility: CLINIC | Age: 38
Setting detail: DERMATOLOGY
End: 2018-07-27

## 2018-07-27 DIAGNOSIS — D485 NEOPLASM OF UNCERTAIN BEHAVIOR OF SKIN: ICD-10-CM

## 2018-07-27 PROBLEM — D48.5 NEOPLASM OF UNCERTAIN BEHAVIOR OF SKIN: Status: ACTIVE | Noted: 2018-07-27

## 2018-07-27 PROCEDURE — 11406 EXC TR-EXT B9+MARG >4.0 CM: CPT

## 2018-07-27 PROCEDURE — ? EXCISION

## 2018-07-27 PROCEDURE — ? PRESCRIPTION

## 2018-07-27 PROCEDURE — 12032 INTMD RPR S/A/T/EXT 2.6-7.5: CPT

## 2018-07-27 RX ORDER — MINOCYCLINE HYDROCHLORIDE 100 MG/1
CAPSULE ORAL BID
Qty: 20 | Refills: 0 | Status: ERX

## 2018-07-27 ASSESSMENT — LOCATION ZONE DERM: LOCATION ZONE: TRUNK

## 2018-07-27 ASSESSMENT — LOCATION DETAILED DESCRIPTION DERM: LOCATION DETAILED: LEFT SUPERIOR UPPER BACK

## 2018-07-27 ASSESSMENT — LOCATION SIMPLE DESCRIPTION DERM: LOCATION SIMPLE: LEFT UPPER BACK

## 2018-07-27 NOTE — PROCEDURE: EXCISION
Bill For Surgical Tray: no
Cartilage Graft Text: The defect edges were debeveled with a #15 scalpel blade.  Given the location of the defect, shape of the defect, the fact the defect involved a full thickness cartilage defect a cartilage graft was deemed most appropriate.  An appropriate donor site was identified, cleansed, and anesthetized. The cartilage graft was then harvested and transferred to the recipient site, oriented appropriately and then sutured into place.  The secondary defect was then repaired using a primary closure.
Excision Method: Slit
Estimated Blood Loss (Cc): minimal
Muscle Hinge Flap Text: The defect edges were debeveled with a #15 scalpel blade.  Given the size, depth and location of the defect and the proximity to free margins a muscle hinge flap was deemed most appropriate.  Using a sterile surgical marker, an appropriate hinge flap was drawn incorporating the defect. The area thus outlined was incised with a #15 scalpel blade.  The skin margins were undermined to an appropriate distance in all directions utilizing iris scissors.
Helical Rim Advancement Flap Text: The defect edges were debeveled with a #15 blade scalpel.  Given the location of the defect and the proximity to free margins (helical rim) a double helical rim advancement flap was deemed most appropriate.  Using a sterile surgical marker, the appropriate advancement flaps were drawn incorporating the defect and placing the expected incisions between the helical rim and antihelix where possible.  The area thus outlined was incised through and through with a #15 scalpel blade.  With a skin hook and iris scissors, the flaps were gently and sharply undermined and freed up.
Cheek-To-Nose Interpolation Flap Text: A decision was made to reconstruct the defect utilizing an interpolation axial flap and a staged reconstruction.  A telfa template was made of the defect.  This telfa template was then used to outline the Cheek-To-Nose Interpolation flap.  The donor area for the pedicle flap was then injected with anesthesia.  The flap was excised through the skin and subcutaneous tissue down to the layer of the underlying musculature.  The interpolation flap was carefully excised within this deep plane to maintain its blood supply.  The edges of the donor site were undermined.   The donor site was closed in a primary fashion.  The pedicle was then rotated into position and sutured.  Once the tube was sutured into place, adequate blood supply was confirmed with blanching and refill.  The pedicle was then wrapped with xeroform gauze and dressed appropriately with a telfa and gauze bandage to ensure continued blood supply and protect the attached pedicle.
Skin Substitute Units (Will Override Primary Defect Units If Greater Than 0): 0
Complex Repair And Rhombic Flap Text: The defect edges were debeveled with a #15 scalpel blade.  The primary defect was closed partially with a complex linear closure.  Given the location of the remaining defect, shape of the defect and the proximity to free margins a rhombic flap was deemed most appropriate for complete closure of the defect.  Using a sterile surgical marker, an appropriate advancement flap was drawn incorporating the defect and placing the expected incisions within the relaxed skin tension lines where possible.    The area thus outlined was incised deep to adipose tissue with a #15 scalpel blade.  The skin margins were undermined to an appropriate distance in all directions utilizing iris scissors.
Deep Sutures: 2-0 Vicryl
Saucerization Excision Additional Text (Leave Blank If You Do Not Want): The margin was drawn around the clinically apparent lesion.  Incisions were then made along these lines, in a tangential fashion, to the appropriate tissue plane and the lesion was extirpated.
Dorsal Nasal Flap Text: The defect edges were debeveled with a #15 scalpel blade.  Given the location of the defect and the proximity to free margins a dorsal nasal flap was deemed most appropriate.  Using a sterile surgical marker, an appropriate dorsal nasal flap was drawn around the defect.    The area thus outlined was incised deep to adipose tissue with a #15 scalpel blade.  The skin margins were undermined to an appropriate distance in all directions utilizing iris scissors.
Complex Repair And W Plasty Text: The defect edges were debeveled with a #15 scalpel blade.  The primary defect was closed partially with a complex linear closure.  Given the location of the remaining defect, shape of the defect and the proximity to free margins a W plasty was deemed most appropriate for complete closure of the defect.  Using a sterile surgical marker, an appropriate advancement flap was drawn incorporating the defect and placing the expected incisions within the relaxed skin tension lines where possible.    The area thus outlined was incised deep to adipose tissue with a #15 scalpel blade.  The skin margins were undermined to an appropriate distance in all directions utilizing iris scissors.
Mercedes Flap Text: The defect edges were debeveled with a #15 scalpel blade.  Given the location of the defect, shape of the defect and the proximity to free margins a Mercedes flap was deemed most appropriate.  Using a sterile surgical marker, an appropriate advancement flap was drawn incorporating the defect and placing the expected incisions within the relaxed skin tension lines where possible. The area thus outlined was incised deep to adipose tissue with a #15 scalpel blade.  The skin margins were undermined to an appropriate distance in all directions utilizing iris scissors.
O-T Plasty Text: The defect edges were debeveled with a #15 scalpel blade.  Given the location of the defect, shape of the defect and the proximity to free margins an O-T plasty was deemed most appropriate.  Using a sterile surgical marker, an appropriate O-T plasty was drawn incorporating the defect and placing the expected incisions within the relaxed skin tension lines where possible.    The area thus outlined was incised deep to adipose tissue with a #15 scalpel blade.  The skin margins were undermined to an appropriate distance in all directions utilizing iris scissors.
Complex Repair And O-L Flap Text: The defect edges were debeveled with a #15 scalpel blade.  The primary defect was closed partially with a complex linear closure.  Given the location of the remaining defect, shape of the defect and the proximity to free margins an O-L flap was deemed most appropriate for complete closure of the defect.  Using a sterile surgical marker, an appropriate flap was drawn incorporating the defect and placing the expected incisions within the relaxed skin tension lines where possible.    The area thus outlined was incised deep to adipose tissue with a #15 scalpel blade.  The skin margins were undermined to an appropriate distance in all directions utilizing iris scissors.
Mastoid Interpolation Flap Text: A decision was made to reconstruct the defect utilizing an interpolation axial flap and a staged reconstruction.  A telfa template was made of the defect.  This telfa template was then used to outline the mastoid interpolation flap.  The donor area for the pedicle flap was then injected with anesthesia.  The flap was excised through the skin and subcutaneous tissue down to the layer of the underlying musculature.  The pedicle flap was carefully excised within this deep plane to maintain its blood supply.  The edges of the donor site were undermined.   The donor site was closed in a primary fashion.  The pedicle was then rotated into position and sutured.  Once the tube was sutured into place, adequate blood supply was confirmed with blanching and refill.  The pedicle was then wrapped with xeroform gauze and dressed appropriately with a telfa and gauze bandage to ensure continued blood supply and protect the attached pedicle.
Bilobed Transposition Flap Text: The defect edges were debeveled with a #15 scalpel blade.  Given the location of the defect and the proximity to free margins a bilobed transposition flap was deemed most appropriate.  Using a sterile surgical marker, an appropriate bilobe flap drawn around the defect.    The area thus outlined was incised deep to adipose tissue with a #15 scalpel blade.  The skin margins were undermined to an appropriate distance in all directions utilizing iris scissors.
Complex Repair And Double M Plasty Text: The defect edges were debeveled with a #15 scalpel blade.  The primary defect was closed partially with a complex linear closure.  Given the location of the remaining defect, shape of the defect and the proximity to free margins a double M plasty was deemed most appropriate for complete closure of the defect.  Using a sterile surgical marker, an appropriate advancement flap was drawn incorporating the defect and placing the expected incisions within the relaxed skin tension lines where possible.    The area thus outlined was incised deep to adipose tissue with a #15 scalpel blade.  The skin margins were undermined to an appropriate distance in all directions utilizing iris scissors.
H Plasty Text: Given the location of the defect, shape of the defect and the proximity to free margins a H-plasty was deemed most appropriate for repair.  Using a sterile surgical marker, the appropriate advancement arms of the H-plasty were drawn incorporating the defect and placing the expected incisions within the relaxed skin tension lines where possible. The area thus outlined was incised deep to adipose tissue with a #15 scalpel blade. The skin margins were undermined to an appropriate distance in all directions utilizing iris scissors.  The opposing advancement arms were then advanced into place in opposite direction and anchored with interrupted buried subcutaneous sutures.
Complex Repair And Skin Substitute Graft Text: The defect edges were debeveled with a #15 scalpel blade.  The primary defect was closed partially with a complex linear closure.  Given the location of the remaining defect, shape of the defect and the proximity to free margins a skin substitute graft was deemed most appropriate to repair the remaining defect.  The graft was trimmed to fit the size of the remaining defect.  The graft was then placed in the primary defect, oriented appropriately, and sutured into place.
Anesthesia Volume In Cc: 18
Show Primary And Secondary Defect Sizes Variable (Do Not Hide If You Perform Flaps Or Graft Closures): Yes
Billing Type: Third-Party Bill
Excision Depth: muscle
Split-Thickness Skin Graft Text: The defect edges were debeveled with a #15 scalpel blade.  Given the location of the defect, shape of the defect and the proximity to free margins a split thickness skin graft was deemed most appropriate.  Using a sterile surgical marker, the primary defect shape was transferred to the donor site. The split thickness graft was then harvested.  The skin graft was then placed in the primary defect and oriented appropriately.
Trilobed Flap Text: The defect edges were debeveled with a #15 scalpel blade.  Given the location of the defect and the proximity to free margins a trilobed flap was deemed most appropriate.  Using a sterile surgical marker, an appropriate trilobed flap drawn around the defect.    The area thus outlined was incised deep to adipose tissue with a #15 scalpel blade.  The skin margins were undermined to an appropriate distance in all directions utilizing iris scissors.
Complex Repair And Double Advancement Flap Text: The defect edges were debeveled with a #15 scalpel blade.  The primary defect was closed partially with a complex linear closure.  Given the location of the remaining defect, shape of the defect and the proximity to free margins a double advancement flap was deemed most appropriate for complete closure of the defect.  Using a sterile surgical marker, an appropriate advancement flap was drawn incorporating the defect and placing the expected incisions within the relaxed skin tension lines where possible.    The area thus outlined was incised deep to adipose tissue with a #15 scalpel blade.  The skin margins were undermined to an appropriate distance in all directions utilizing iris scissors.
Wound Check: 14 days
Skin Substitute Text: The defect edges were debeveled with a #15 scalpel blade.  Given the location of the defect, shape of the defect and the proximity to free margins a skin substitute graft was deemed most appropriate.  The graft material was trimmed to fit the size of the defect. The graft was then placed in the primary defect and oriented appropriately.
O-Z Plasty Text: The defect edges were debeveled with a #15 scalpel blade.  Given the location of the defect, shape of the defect and the proximity to free margins an O-Z plasty (double transposition flap) was deemed most appropriate.  Using a sterile surgical marker, the appropriate transposition flaps were drawn incorporating the defect and placing the expected incisions within the relaxed skin tension lines where possible.    The area thus outlined was incised deep to adipose tissue with a #15 scalpel blade.  The skin margins were undermined to an appropriate distance in all directions utilizing iris scissors.  Hemostasis was achieved with electrocautery.  The flaps were then transposed into place, one clockwise and the other counterclockwise, and anchored with interrupted buried subcutaneous sutures.
Epidermal Closure: vertical mattress
Keystone Flap Text: The defect edges were debeveled with a #15 scalpel blade.  Given the location of the defect, shape of the defect a keystone flap was deemed most appropriate.  Using a sterile surgical marker, an appropriate keystone flap was drawn incorporating the defect, outlining the appropriate donor tissue and placing the expected incisions within the relaxed skin tension lines where possible. The area thus outlined was incised deep to adipose tissue with a #15 scalpel blade.  The skin margins were undermined to an appropriate distance in all directions around the primary defect and laterally outward around the flap utilizing iris scissors.
Graft Donor Site Bandage (Optional-Leave Blank If You Don't Want In Note): Steri-strips and a pressure bandage were applied to the donor site.
Advancement Flap (Double) Text: The defect edges were debeveled with a #15 scalpel blade.  Given the location of the defect and the proximity to free margins a double advancement flap was deemed most appropriate.  Using a sterile surgical marker, the appropriate advancement flaps were drawn incorporating the defect and placing the expected incisions within the relaxed skin tension lines where possible.    The area thus outlined was incised deep to adipose tissue with a #15 scalpel blade.  The skin margins were undermined to an appropriate distance in all directions utilizing iris scissors.
Intermediate / Complex Repair - Final Wound Length In Cm: 3
Lazy S Intermediate Repair Preamble Text (Leave Blank If You Do Not Want): Undermining was performed with blunt dissection.
Modified Advancement Flap Text: The defect edges were debeveled with a #15 scalpel blade.  Given the location of the defect, shape of the defect and the proximity to free margins a modified advancement flap was deemed most appropriate.  Using a sterile surgical marker, an appropriate advancement flap was drawn incorporating the defect and placing the expected incisions within the relaxed skin tension lines where possible.    The area thus outlined was incised deep to adipose tissue with a #15 scalpel blade.  The skin margins were undermined to an appropriate distance in all directions utilizing iris scissors.
O-T Advancement Flap Text: The defect edges were debeveled with a #15 scalpel blade.  Given the location of the defect, shape of the defect and the proximity to free margins an O-T advancement flap was deemed most appropriate.  Using a sterile surgical marker, an appropriate advancement flap was drawn incorporating the defect and placing the expected incisions within the relaxed skin tension lines where possible.    The area thus outlined was incised deep to adipose tissue with a #15 scalpel blade.  The skin margins were undermined to an appropriate distance in all directions utilizing iris scissors.
Mucosal Advancement Flap Text: Given the location of the defect, shape of the defect and the proximity to free margins a mucosal advancement flap was deemed most appropriate. Incisions were made with a 15 blade scalpel in the appropriate fashion along the cutaneous vermillion border and the mucosal lip. The remaining actinically damaged mucosal tissue was excised.  The mucosal advancement flap was then elevated to the gingival sulcus with care taken to preserve the neurovascular structures and advanced into the primary defect. Care was taken to ensure that precise realignment of the vermilion border was achieved.
Rhombic Flap Text: The defect edges were debeveled with a #15 scalpel blade.  Given the location of the defect and the proximity to free margins a rhombic flap was deemed most appropriate.  Using a sterile surgical marker, an appropriate rhombic flap was drawn incorporating the defect.    The area thus outlined was incised deep to adipose tissue with a #15 scalpel blade.  The skin margins were undermined to an appropriate distance in all directions utilizing iris scissors.
Bi-Rhombic Flap Text: The defect edges were debeveled with a #15 scalpel blade.  Given the location of the defect and the proximity to free margins a bi-rhombic flap was deemed most appropriate.  Using a sterile surgical marker, an appropriate rhombic flap was drawn incorporating the defect. The area thus outlined was incised deep to adipose tissue with a #15 scalpel blade.  The skin margins were undermined to an appropriate distance in all directions utilizing iris scissors.
Post-Care Instructions: I reviewed with the patient in detail post-care instructions. Patient is not to engage in any heavy lifting, exercise, or swimming for the next 14 days. Should the patient develop any fevers, chills, bleeding, severe pain patient will contact the office immediately.
Eliptical Excision Additional Text (Leave Blank If You Do Not Want): The margin was drawn around the clinically apparent lesion.  An elliptical shape was then drawn on the skin incorporating the lesion and margins.  Incisions were then made along these lines to the appropriate tissue plane and the lesion was extirpated.
Slit Excision Additional Text (Leave Blank If You Do Not Want): A linear line was drawn on the skin overlying the lesion. An incision was made slowly until the lesion was visualized.  Once visualized, the lesion was removed with blunt dissection.
Melolabial Interpolation Flap Text: A decision was made to reconstruct the defect utilizing an interpolation axial flap and a staged reconstruction.  A telfa template was made of the defect.  This telfa template was then used to outline the melolabial interpolation flap.  The donor area for the pedicle flap was then injected with anesthesia.  The flap was excised through the skin and subcutaneous tissue down to the layer of the underlying musculature.  The pedicle flap was carefully excised within this deep plane to maintain its blood supply.  The edges of the donor site were undermined.   The donor site was closed in a primary fashion.  The pedicle was then rotated into position and sutured.  Once the tube was sutured into place, adequate blood supply was confirmed with blanching and refill.  The pedicle was then wrapped with xeroform gauze and dressed appropriately with a telfa and gauze bandage to ensure continued blood supply and protect the attached pedicle.
V-Y Plasty Text: The defect edges were debeveled with a #15 scalpel blade.  Given the location of the defect, shape of the defect and the proximity to free margins an V-Y advancement flap was deemed most appropriate.  Using a sterile surgical marker, an appropriate advancement flap was drawn incorporating the defect and placing the expected incisions within the relaxed skin tension lines where possible.    The area thus outlined was incised deep to adipose tissue with a #15 scalpel blade.  The skin margins were undermined to an appropriate distance in all directions utilizing iris scissors.
Purse String (Simple) Text: Given the location of the defect and the characteristics of the surrounding skin a purse string simple closure was deemed most appropriate.  Undermining was performed circumferentially around the surgical defect.  A purse string suture was then placed and tightened.
Island Pedicle Flap Text: The defect edges were debeveled with a #15 scalpel blade.  Given the location of the defect, shape of the defect and the proximity to free margins an island pedicle advancement flap was deemed most appropriate.  Using a sterile surgical marker, an appropriate advancement flap was drawn incorporating the defect, outlining the appropriate donor tissue and placing the expected incisions within the relaxed skin tension lines where possible.    The area thus outlined was incised deep to adipose tissue with a #15 scalpel blade.  The skin margins were undermined to an appropriate distance in all directions around the primary defect and laterally outward around the island pedicle utilizing iris scissors.  There was minimal undermining beneath the pedicle flap.
Detail Level: Detailed
Complex Repair And Single Advancement Flap Text: The defect edges were debeveled with a #15 scalpel blade.  The primary defect was closed partially with a complex linear closure.  Given the location of the remaining defect, shape of the defect and the proximity to free margins a single advancement flap was deemed most appropriate for complete closure of the defect.  Using a sterile surgical marker, an appropriate advancement flap was drawn incorporating the defect and placing the expected incisions within the relaxed skin tension lines where possible.    The area thus outlined was incised deep to adipose tissue with a #15 scalpel blade.  The skin margins were undermined to an appropriate distance in all directions utilizing iris scissors.
Number Of Deep Sutures (Optional): 4
O-L Flap Text: The defect edges were debeveled with a #15 scalpel blade.  Given the location of the defect, shape of the defect and the proximity to free margins an O-L flap was deemed most appropriate.  Using a sterile surgical marker, an appropriate advancement flap was drawn incorporating the defect and placing the expected incisions within the relaxed skin tension lines where possible.    The area thus outlined was incised deep to adipose tissue with a #15 scalpel blade.  The skin margins were undermined to an appropriate distance in all directions utilizing iris scissors.
Lab Facility: [id number]
Path Notes (To The Dermatopathologist): Please check margins.
Posterior Auricular Interpolation Flap Text: A decision was made to reconstruct the defect utilizing an interpolation axial flap and a staged reconstruction.  A telfa template was made of the defect.  This telfa template was then used to outline the posterior auricular interpolation flap.  The donor area for the pedicle flap was then injected with anesthesia.  The flap was excised through the skin and subcutaneous tissue down to the layer of the underlying musculature.  The pedicle flap was carefully excised within this deep plane to maintain its blood supply.  The edges of the donor site were undermined.   The donor site was closed in a primary fashion.  The pedicle was then rotated into position and sutured.  Once the tube was sutured into place, adequate blood supply was confirmed with blanching and refill.  The pedicle was then wrapped with xeroform gauze and dressed appropriately with a telfa and gauze bandage to ensure continued blood supply and protect the attached pedicle.
Home Suture Removal Text: Patient was provided a home suture removal kit and will remove their sutures at home.  If they have any questions or difficulties they will call the office.
Interpolation Flap Text: A decision was made to reconstruct the defect utilizing an interpolation axial flap and a staged reconstruction.  A telfa template was made of the defect.  This telfa template was then used to outline the interpolation flap.  The donor area for the pedicle flap was then injected with anesthesia.  The flap was excised through the skin and subcutaneous tissue down to the layer of the underlying musculature.  The interpolation flap was carefully excised within this deep plane to maintain its blood supply.  The edges of the donor site were undermined.   The donor site was closed in a primary fashion.  The pedicle was then rotated into position and sutured.  Once the tube was sutured into place, adequate blood supply was confirmed with blanching and refill.  The pedicle was then wrapped with xeroform gauze and dressed appropriately with a telfa and gauze bandage to ensure continued blood supply and protect the attached pedicle.
Fusiform Excision Additional Text (Leave Blank If You Do Not Want): The margin was drawn around the clinically apparent lesion.  A fusiform shape was then drawn on the skin incorporating the lesion and margins.  Incisions were then made along these lines to the appropriate tissue plane and the lesion was extirpated.
Anesthesia Type: 1% lidocaine with epinephrine
Burow's Advancement Flap Text: The defect edges were debeveled with a #15 scalpel blade.  Given the location of the defect and the proximity to free margins a Burow's advancement flap was deemed most appropriate.  Using a sterile surgical marker, the appropriate advancement flap was drawn incorporating the defect and placing the expected incisions within the relaxed skin tension lines where possible.    The area thus outlined was incised deep to adipose tissue with a #15 scalpel blade.  The skin margins were undermined to an appropriate distance in all directions utilizing iris scissors.
Dermal Autograft Text: The defect edges were debeveled with a #15 scalpel blade.  Given the location of the defect, shape of the defect and the proximity to free margins a dermal autograft was deemed most appropriate.  Using a sterile surgical marker, the primary defect shape was transferred to the donor site. The area thus outlined was incised deep to adipose tissue with a #15 scalpel blade.  The harvested graft was then trimmed of adipose and epidermal tissue until only dermis was left.  The skin graft was then placed in the primary defect and oriented appropriately.
Hemostasis: Pressure
Primary Defect Length (In Cm): 4.3
Advancement Flap (Single) Text: The defect edges were debeveled with a #15 scalpel blade.  Given the location of the defect and the proximity to free margins a single advancement flap was deemed most appropriate.  Using a sterile surgical marker, an appropriate advancement flap was drawn incorporating the defect and placing the expected incisions within the relaxed skin tension lines where possible.    The area thus outlined was incised deep to adipose tissue with a #15 scalpel blade.  The skin margins were undermined to an appropriate distance in all directions utilizing iris scissors.
Tissue Cultured Epidermal Autograft Text: The defect edges were debeveled with a #15 scalpel blade.  Given the location of the defect, shape of the defect and the proximity to free margins a tissue cultured epidermal autograft was deemed most appropriate.  The graft was then trimmed to fit the size of the defect.  The graft was then placed in the primary defect and oriented appropriately.
Bilobed Flap Text: The defect edges were debeveled with a #15 scalpel blade.  Given the location of the defect and the proximity to free margins a bilobe flap was deemed most appropriate.  Using a sterile surgical marker, an appropriate bilobe flap drawn around the defect.    The area thus outlined was incised deep to adipose tissue with a #15 scalpel blade.  The skin margins were undermined to an appropriate distance in all directions utilizing iris scissors.
Complex Repair And Bilobe Flap Text: The defect edges were debeveled with a #15 scalpel blade.  The primary defect was closed partially with a complex linear closure.  Given the location of the remaining defect, shape of the defect and the proximity to free margins a bilobe flap was deemed most appropriate for complete closure of the defect.  Using a sterile surgical marker, an appropriate advancement flap was drawn incorporating the defect and placing the expected incisions within the relaxed skin tension lines where possible.    The area thus outlined was incised deep to adipose tissue with a #15 scalpel blade.  The skin margins were undermined to an appropriate distance in all directions utilizing iris scissors.
Island Pedicle Flap-Requiring Vessel Identification Text: The defect edges were debeveled with a #15 scalpel blade.  Given the location of the defect, shape of the defect and the proximity to free margins an island pedicle advancement flap was deemed most appropriate.  Using a sterile surgical marker, an appropriate advancement flap was drawn, based on the axial vessel mentioned above, incorporating the defect, outlining the appropriate donor tissue and placing the expected incisions within the relaxed skin tension lines where possible.    The area thus outlined was incised deep to adipose tissue with a #15 scalpel blade.  The skin margins were undermined to an appropriate distance in all directions around the primary defect and laterally outward around the island pedicle utilizing iris scissors.  There was minimal undermining beneath the pedicle flap.
Crescentic Complex Repair Preamble Text (Leave Blank If You Do Not Want): Extensive wide undermining was performed.
S Plasty Text: Given the location and shape of the defect, and the orientation of relaxed skin tension lines, an S-plasty was deemed most appropriate for repair.  Using a sterile surgical marker, the appropriate outline of the S-plasty was drawn, incorporating the defect and placing the expected incisions within the relaxed skin tension lines where possible.  The area thus outlined was incised deep to adipose tissue with a #15 scalpel blade.  The skin margins were undermined to an appropriate distance in all directions utilizing iris scissors. The skin flaps were advanced over the defect.  The opposing margins were then approximated with interrupted buried subcutaneous sutures.
Medical Necessity Clause: This procedure was medically necessary because the lesion that was treated was:
Complex Repair And Melolabial Flap Text: The defect edges were debeveled with a #15 scalpel blade.  The primary defect was closed partially with a complex linear closure.  Given the location of the remaining defect, shape of the defect and the proximity to free margins a melolabial flap was deemed most appropriate for complete closure of the defect.  Using a sterile surgical marker, an appropriate advancement flap was drawn incorporating the defect and placing the expected incisions within the relaxed skin tension lines where possible.    The area thus outlined was incised deep to adipose tissue with a #15 scalpel blade.  The skin margins were undermined to an appropriate distance in all directions utilizing iris scissors.
Complex Repair And Xenograft Text: The defect edges were debeveled with a #15 scalpel blade.  The primary defect was closed partially with a complex linear closure.  Given the location of the defect, shape of the defect and the proximity to free margins a xenograft was deemed most appropriate to repair the remaining defect.  The graft was trimmed to fit the size of the remaining defect.  The graft was then placed in the primary defect, oriented appropriately, and sutured into place.
Complex Repair And Split-Thickness Skin Graft Text: The defect edges were debeveled with a #15 scalpel blade.  The primary defect was closed partially with a complex linear closure.  Given the location of the defect, shape of the defect and the proximity to free margins a split thickness skin graft was deemed most appropriate to repair the remaining defect.  The graft was trimmed to fit the size of the remaining defect.  The graft was then placed in the primary defect, oriented appropriately, and sutured into place.
Ftsg Text: The defect edges were debeveled with a #15 scalpel blade.  Given the location of the defect, shape of the defect and the proximity to free margins a full thickness skin graft was deemed most appropriate.  Using a sterile surgical marker, the primary defect shape was transferred to the donor site. The area thus outlined was incised deep to adipose tissue with a #15 scalpel blade.  The harvested graft was then trimmed of adipose tissue until only dermis and epidermis was left.  The skin margins of the secondary defect were undermined to an appropriate distance in all directions utilizing iris scissors.  The secondary defect was closed with interrupted buried subcutaneous sutures.  The skin edges were then re-apposed with running  sutures.  The skin graft was then placed in the primary defect and oriented appropriately.
Paramedian Forehead Flap Text: A decision was made to reconstruct the defect utilizing an interpolation axial flap and a staged reconstruction.  A telfa template was made of the defect.  This telfa template was then used to outline the paramedian forehead pedicle flap.  The donor area for the pedicle flap was then injected with anesthesia.  The flap was excised through the skin and subcutaneous tissue down to the layer of the underlying musculature.  The pedicle flap was carefully excised within this deep plane to maintain its blood supply.  The edges of the donor site were undermined.   The donor site was closed in a primary fashion.  The pedicle was then rotated into position and sutured.  Once the tube was sutured into place, adequate blood supply was confirmed with blanching and refill.  The pedicle was then wrapped with xeroform gauze and dressed appropriately with a telfa and gauze bandage to ensure continued blood supply and protect the attached pedicle.
Epidermal Autograft Text: The defect edges were debeveled with a #15 scalpel blade.  Given the location of the defect, shape of the defect and the proximity to free margins an epidermal autograft was deemed most appropriate.  Using a sterile surgical marker, the primary defect shape was transferred to the donor site. The epidermal graft was then harvested.  The skin graft was then placed in the primary defect and oriented appropriately.
Excisional Biopsy Additional Text (Leave Blank If You Do Not Want): The margin was drawn around the clinically apparent lesion. An elliptical shape was then drawn on the skin incorporating the lesion and margins.  Incisions were then made along these lines to the appropriate tissue plane and the lesion was extirpated.
Complex Repair And Transposition Flap Text: The defect edges were debeveled with a #15 scalpel blade.  The primary defect was closed partially with a complex linear closure.  Given the location of the remaining defect, shape of the defect and the proximity to free margins a transposition flap was deemed most appropriate for complete closure of the defect.  Using a sterile surgical marker, an appropriate advancement flap was drawn incorporating the defect and placing the expected incisions within the relaxed skin tension lines where possible.    The area thus outlined was incised deep to adipose tissue with a #15 scalpel blade.  The skin margins were undermined to an appropriate distance in all directions utilizing iris scissors.
Dressing: pressure dressing with telfa
Hatchet Flap Text: The defect edges were debeveled with a #15 scalpel blade.  Given the location of the defect, shape of the defect and the proximity to free margins a hatchet flap was deemed most appropriate.  Using a sterile surgical marker, an appropriate hatchet flap was drawn incorporating the defect and placing the expected incisions within the relaxed skin tension lines where possible.    The area thus outlined was incised deep to adipose tissue with a #15 scalpel blade.  The skin margins were undermined to an appropriate distance in all directions utilizing iris scissors.
Star Wedge Flap Text: The defect edges were debeveled with a #15 scalpel blade.  Given the location of the defect, shape of the defect and the proximity to free margins a star wedge flap was deemed most appropriate.  Using a sterile surgical marker, an appropriate rotation flap was drawn incorporating the defect and placing the expected incisions within the relaxed skin tension lines where possible. The area thus outlined was incised deep to adipose tissue with a #15 scalpel blade.  The skin margins were undermined to an appropriate distance in all directions utilizing iris scissors.
Bilateral Helical Rim Advancement Flap Text: The defect edges were debeveled with a #15 blade scalpel.  Given the location of the defect and the proximity to free margins (helical rim) a bilateral helical rim advancement flap was deemed most appropriate.  Using a sterile surgical marker, the appropriate advancement flaps were drawn incorporating the defect and placing the expected incisions between the helical rim and antihelix where possible.  The area thus outlined was incised through and through with a #15 scalpel blade.  With a skin hook and iris scissors, the flaps were gently and sharply undermined and freed up.
Undermining Location (Optional): in the fascial plane
Rotation Flap Text: The defect edges were debeveled with a #15 scalpel blade.  Given the location of the defect, shape of the defect and the proximity to free margins a rotation flap was deemed most appropriate.  Using a sterile surgical marker, an appropriate rotation flap was drawn incorporating the defect and placing the expected incisions within the relaxed skin tension lines where possible.    The area thus outlined was incised deep to adipose tissue with a #15 scalpel blade.  The skin margins were undermined to an appropriate distance in all directions utilizing iris scissors.
Double Island Pedicle Flap Text: The defect edges were debeveled with a #15 scalpel blade.  Given the location of the defect, shape of the defect and the proximity to free margins a double island pedicle advancement flap was deemed most appropriate.  Using a sterile surgical marker, an appropriate advancement flap was drawn incorporating the defect, outlining the appropriate donor tissue and placing the expected incisions within the relaxed skin tension lines where possible.    The area thus outlined was incised deep to adipose tissue with a #15 scalpel blade.  The skin margins were undermined to an appropriate distance in all directions around the primary defect and laterally outward around the island pedicle utilizing iris scissors.  There was minimal undermining beneath the pedicle flap.
Island Pedicle Flap With Canthal Suspension Text: The defect edges were debeveled with a #15 scalpel blade.  Given the location of the defect, shape of the defect and the proximity to free margins an island pedicle advancement flap was deemed most appropriate.  Using a sterile surgical marker, an appropriate advancement flap was drawn incorporating the defect, outlining the appropriate donor tissue and placing the expected incisions within the relaxed skin tension lines where possible. The area thus outlined was incised deep to adipose tissue with a #15 scalpel blade.  The skin margins were undermined to an appropriate distance in all directions around the primary defect and laterally outward around the island pedicle utilizing iris scissors.  There was minimal undermining beneath the pedicle flap. A suspension suture was placed in the canthal tendon to prevent tension and prevent ectropion.
Xenograft Text: The defect edges were debeveled with a #15 scalpel blade.  Given the location of the defect, shape of the defect and the proximity to free margins a xenograft was deemed most appropriate.  The graft was then trimmed to fit the size of the defect.  The graft was then placed in the primary defect and oriented appropriately.
Epidermal Sutures: 3-0 Ethilon
Scalpel Size: 15 blade
No Repair - Repaired With Adjacent Surgical Defect Text (Leave Blank If You Do Not Want): After the excision the defect was repaired concurrently with another surgical defect which was in close approximation.
Complex Repair And Ftsg Text: The defect edges were debeveled with a #15 scalpel blade.  The primary defect was closed partially with a complex linear closure.  Given the location of the defect, shape of the defect and the proximity to free margins a full thickness skin graft was deemed most appropriate to repair the remaining defect.  The graft was trimmed to fit the size of the remaining defect.  The graft was then placed in the primary defect, oriented appropriately, and sutured into place.
Spiral Flap Text: The defect edges were debeveled with a #15 scalpel blade.  Given the location of the defect, shape of the defect and the proximity to free margins a spiral flap was deemed most appropriate.  Using a sterile surgical marker, an appropriate rotation flap was drawn incorporating the defect and placing the expected incisions within the relaxed skin tension lines where possible. The area thus outlined was incised deep to adipose tissue with a #15 scalpel blade.  The skin margins were undermined to an appropriate distance in all directions utilizing iris scissors.
Complex Repair And Rotation Flap Text: The defect edges were debeveled with a #15 scalpel blade.  The primary defect was closed partially with a complex linear closure.  Given the location of the remaining defect, shape of the defect and the proximity to free margins a rotation flap was deemed most appropriate for complete closure of the defect.  Using a sterile surgical marker, an appropriate advancement flap was drawn incorporating the defect and placing the expected incisions within the relaxed skin tension lines where possible.    The area thus outlined was incised deep to adipose tissue with a #15 scalpel blade.  The skin margins were undermined to an appropriate distance in all directions utilizing iris scissors.
Lip Wedge Excision Repair Text: Given the location of the defect and the proximity to free margins a full thickness wedge repair was deemed most appropriate.  Using a sterile surgical marker, the appropriate repair was drawn incorporating the defect and placing the expected incisions perpendicular to the vermilion border.  The vermilion border was also meticulously outlined to ensure appropriate reapproximation during the repair.  The area thus outlined was incised through and through with a #15 scalpel blade.  The muscularis and dermis were reaproximated with deep sutures following hemostasis. Care was taken to realign the vermilion border before proceeding with the superficial closure.  Once the vermilion was realigned the superfical and mucosal closure was finished.
Complex Repair And A-T Advancement Flap Text: The defect edges were debeveled with a #15 scalpel blade.  The primary defect was closed partially with a complex linear closure.  Given the location of the remaining defect, shape of the defect and the proximity to free margins an A-T advancement flap was deemed most appropriate for complete closure of the defect.  Using a sterile surgical marker, an appropriate advancement flap was drawn incorporating the defect and placing the expected incisions within the relaxed skin tension lines where possible.    The area thus outlined was incised deep to adipose tissue with a #15 scalpel blade.  The skin margins were undermined to an appropriate distance in all directions utilizing iris scissors.
A-T Advancement Flap Text: The defect edges were debeveled with a #15 scalpel blade.  Given the location of the defect, shape of the defect and the proximity to free margins an A-T advancement flap was deemed most appropriate.  Using a sterile surgical marker, an appropriate advancement flap was drawn incorporating the defect and placing the expected incisions within the relaxed skin tension lines where possible.    The area thus outlined was incised deep to adipose tissue with a #15 scalpel blade.  The skin margins were undermined to an appropriate distance in all directions utilizing iris scissors.
Melolabial Transposition Flap Text: The defect edges were debeveled with a #15 scalpel blade.  Given the location of the defect and the proximity to free margins a melolabial flap was deemed most appropriate.  Using a sterile surgical marker, an appropriate melolabial transposition flap was drawn incorporating the defect.    The area thus outlined was incised deep to adipose tissue with a #15 scalpel blade.  The skin margins were undermined to an appropriate distance in all directions utilizing iris scissors.
Complex Repair And Dermal Autograft Text: The defect edges were debeveled with a #15 scalpel blade.  The primary defect was closed partially with a complex linear closure.  Given the location of the defect, shape of the defect and the proximity to free margins an dermal autograft was deemed most appropriate to repair the remaining defect.  The graft was trimmed to fit the size of the remaining defect.  The graft was then placed in the primary defect, oriented appropriately, and sutured into place.
Ear Star Wedge Flap Text: The defect edges were debeveled with a #15 blade scalpel.  Given the location of the defect and the proximity to free margins (helical rim) an ear star wedge flap was deemed most appropriate.  Using a sterile surgical marker, the appropriate flap was drawn incorporating the defect and placing the expected incisions between the helical rim and antihelix where possible.  The area thus outlined was incised through and through with a #15 scalpel blade.
Complex Repair And M Plasty Text: The defect edges were debeveled with a #15 scalpel blade.  The primary defect was closed partially with a complex linear closure.  Given the location of the remaining defect, shape of the defect and the proximity to free margins an M plasty was deemed most appropriate for complete closure of the defect.  Using a sterile surgical marker, an appropriate advancement flap was drawn incorporating the defect and placing the expected incisions within the relaxed skin tension lines where possible.    The area thus outlined was incised deep to adipose tissue with a #15 scalpel blade.  The skin margins were undermined to an appropriate distance in all directions utilizing iris scissors.
Consent was obtained from the patient. The risks and benefits to therapy were discussed in detail. Specifically, the risks of infection, scarring, bleeding, prolonged wound healing, incomplete removal, allergy to anesthesia, nerve injury and recurrence were addressed. Prior to the procedure, the treatment site was clearly identified and confirmed by the patient. All components of Universal Protocol/PAUSE Rule completed.
Complex Repair And Tissue Cultured Epidermal Autograft Text: The defect edges were debeveled with a #15 scalpel blade.  The primary defect was closed partially with a complex linear closure.  Given the location of the defect, shape of the defect and the proximity to free margins an tissue cultured epidermal autograft was deemed most appropriate to repair the remaining defect.  The graft was trimmed to fit the size of the remaining defect.  The graft was then placed in the primary defect, oriented appropriately, and sutured into place.
Complex Repair And V-Y Plasty Text: The defect edges were debeveled with a #15 scalpel blade.  The primary defect was closed partially with a complex linear closure.  Given the location of the remaining defect, shape of the defect and the proximity to free margins a V-Y plasty was deemed most appropriate for complete closure of the defect.  Using a sterile surgical marker, an appropriate advancement flap was drawn incorporating the defect and placing the expected incisions within the relaxed skin tension lines where possible.    The area thus outlined was incised deep to adipose tissue with a #15 scalpel blade.  The skin margins were undermined to an appropriate distance in all directions utilizing iris scissors.
V-Y Flap Text: The defect edges were debeveled with a #15 scalpel blade.  Given the location of the defect, shape of the defect and the proximity to free margins a V-Y flap was deemed most appropriate.  Using a sterile surgical marker, an appropriate advancement flap was drawn incorporating the defect and placing the expected incisions within the relaxed skin tension lines where possible.    The area thus outlined was incised deep to adipose tissue with a #15 scalpel blade.  The skin margins were undermined to an appropriate distance in all directions utilizing iris scissors.
Crescentic Advancement Flap Text: The defect edges were debeveled with a #15 scalpel blade.  Given the location of the defect and the proximity to free margins a crescentic advancement flap was deemed most appropriate.  Using a sterile surgical marker, the appropriate advancement flap was drawn incorporating the defect and placing the expected incisions within the relaxed skin tension lines where possible.    The area thus outlined was incised deep to adipose tissue with a #15 scalpel blade.  The skin margins were undermined to an appropriate distance in all directions utilizing iris scissors.
Perilesional Excision Additional Text (Leave Blank If You Do Not Want): The margin was drawn around the clinically apparent lesion. Incisions were then made along these lines to the appropriate tissue plane and the lesion was extirpated.
Z Plasty Text: The lesion was extirpated to the level of the fat with a #15 scalpel blade.  Given the location of the defect, shape of the defect and the proximity to free margins a Z-plasty was deemed most appropriate for repair.  Using a sterile surgical marker, the appropriate transposition arms of the Z-plasty were drawn incorporating the defect and placing the expected incisions within the relaxed skin tension lines where possible.    The area thus outlined was incised deep to adipose tissue with a #15 scalpel blade.  The skin margins were undermined to an appropriate distance in all directions utilizing iris scissors.  The opposing transposition arms were then transposed into place in opposite direction and anchored with interrupted buried subcutaneous sutures.
Repair Performed By Another Provider Text (Leave Blank If You Do Not Want): After the tissue was excised the defect was repaired by another provider.
W Plasty Text: The lesion was extirpated to the level of the fat with a #15 scalpel blade.  Given the location of the defect, shape of the defect and the proximity to free margins a W-plasty was deemed most appropriate for repair.  Using a sterile surgical marker, the appropriate transposition arms of the W-plasty were drawn incorporating the defect and placing the expected incisions within the relaxed skin tension lines where possible.    The area thus outlined was incised deep to adipose tissue with a #15 scalpel blade.  The skin margins were undermined to an appropriate distance in all directions utilizing iris scissors.  The opposing transposition arms were then transposed into place in opposite direction and anchored with interrupted buried subcutaneous sutures.
Complex Repair And Dorsal Nasal Flap Text: The defect edges were debeveled with a #15 scalpel blade.  The primary defect was closed partially with a complex linear closure.  Given the location of the remaining defect, shape of the defect and the proximity to free margins a dorsal nasal flap was deemed most appropriate for complete closure of the defect.  Using a sterile surgical marker, an appropriate flap was drawn incorporating the defect and placing the expected incisions within the relaxed skin tension lines where possible.    The area thus outlined was incised deep to adipose tissue with a #15 scalpel blade.  The skin margins were undermined to an appropriate distance in all directions utilizing iris scissors.
Purse String (Intermediate) Text: Given the location of the defect and the characteristics of the surrounding skin a purse string intermediate closure was deemed most appropriate.  Undermining was performed circumferentially around the surgical defect.  A purse string suture was then placed and tightened.
Banner Transposition Flap Text: The defect edges were debeveled with a #15 scalpel blade.  Given the location of the defect and the proximity to free margins a Banner transposition flap was deemed most appropriate.  Using a sterile surgical marker, an appropriate flap drawn around the defect. The area thus outlined was incised deep to adipose tissue with a #15 scalpel blade.  The skin margins were undermined to an appropriate distance in all directions utilizing iris scissors.
Size Of Lesion In Cm: 4.5
Lab: [id number]
Number Of Epidermal Sutures (Optional): 6
Alar Island Pedicle Flap Text: The defect edges were debeveled with a #15 scalpel blade.  Given the location of the defect, shape of the defect and the proximity to the alar rim an island pedicle advancement flap was deemed most appropriate.  Using a sterile surgical marker, an appropriate advancement flap was drawn incorporating the defect, outlining the appropriate donor tissue and placing the expected incisions within the nasal ala running parallel to the alar rim. The area thus outlined was incised with a #15 scalpel blade.  The skin margins were undermined minimally to an appropriate distance in all directions around the primary defect and laterally outward around the island pedicle utilizing iris scissors.  There was minimal undermining beneath the pedicle flap.
Complex Repair And Z Plasty Text: The defect edges were debeveled with a #15 scalpel blade.  The primary defect was closed partially with a complex linear closure.  Given the location of the remaining defect, shape of the defect and the proximity to free margins a Z plasty was deemed most appropriate for complete closure of the defect.  Using a sterile surgical marker, an appropriate advancement flap was drawn incorporating the defect and placing the expected incisions within the relaxed skin tension lines where possible.    The area thus outlined was incised deep to adipose tissue with a #15 scalpel blade.  The skin margins were undermined to an appropriate distance in all directions utilizing iris scissors.
Dermal Closure: buried vertical mattress
Complex Repair And O-T Advancement Flap Text: The defect edges were debeveled with a #15 scalpel blade.  The primary defect was closed partially with a complex linear closure.  Given the location of the remaining defect, shape of the defect and the proximity to free margins an O-T advancement flap was deemed most appropriate for complete closure of the defect.  Using a sterile surgical marker, an appropriate advancement flap was drawn incorporating the defect and placing the expected incisions within the relaxed skin tension lines where possible.    The area thus outlined was incised deep to adipose tissue with a #15 scalpel blade.  The skin margins were undermined to an appropriate distance in all directions utilizing iris scissors.
Complex Repair And Modified Advancement Flap Text: The defect edges were debeveled with a #15 scalpel blade.  The primary defect was closed partially with a complex linear closure.  Given the location of the remaining defect, shape of the defect and the proximity to free margins a modified advancement flap was deemed most appropriate for complete closure of the defect.  Using a sterile surgical marker, an appropriate advancement flap was drawn incorporating the defect and placing the expected incisions within the relaxed skin tension lines where possible.    The area thus outlined was incised deep to adipose tissue with a #15 scalpel blade.  The skin margins were undermined to an appropriate distance in all directions utilizing iris scissors.
Wound Care: Petrolatum
Medical Necessity Information: It is in your best interest to select a reason for this procedure from the list below. All of these items fulfill various CMS LCD requirements except lesion extends to a margin.
Epidermal Closure Graft Donor Site (Optional): simple interrupted
Cheek Interpolation Flap Text: A decision was made to reconstruct the defect utilizing an interpolation axial flap and a staged reconstruction.  A telfa template was made of the defect.  This telfa template was then used to outline the Cheek Interpolation flap.  The donor area for the pedicle flap was then injected with anesthesia.  The flap was excised through the skin and subcutaneous tissue down to the layer of the underlying musculature.  The interpolation flap was carefully excised within this deep plane to maintain its blood supply.  The edges of the donor site were undermined.   The donor site was closed in a primary fashion.  The pedicle was then rotated into position and sutured.  Once the tube was sutured into place, adequate blood supply was confirmed with blanching and refill.  The pedicle was then wrapped with xeroform gauze and dressed appropriately with a telfa and gauze bandage to ensure continued blood supply and protect the attached pedicle.
Additional Epidermal Sutures: 4-0 Ethilon
Composite Graft Text: The defect edges were debeveled with a #15 scalpel blade.  Given the location of the defect, shape of the defect, the proximity to free margins and the fact the defect was full thickness a composite graft was deemed most appropriate.  The defect was outline and then transferred to the donor site.  A full thickness graft was then excised from the donor site. The graft was then placed in the primary defect, oriented appropriately and then sutured into place.  The secondary defect was then repaired using a primary closure.
Transposition Flap Text: The defect edges were debeveled with a #15 scalpel blade.  Given the location of the defect and the proximity to free margins a transposition flap was deemed most appropriate.  Using a sterile surgical marker, an appropriate transposition flap was drawn incorporating the defect.    The area thus outlined was incised deep to adipose tissue with a #15 scalpel blade.  The skin margins were undermined to an appropriate distance in all directions utilizing iris scissors.
Complex Repair And Epidermal Autograft Text: The defect edges were debeveled with a #15 scalpel blade.  The primary defect was closed partially with a complex linear closure.  Given the location of the defect, shape of the defect and the proximity to free margins an epidermal autograft was deemed most appropriate to repair the remaining defect.  The graft was trimmed to fit the size of the remaining defect.  The graft was then placed in the primary defect, oriented appropriately, and sutured into place.
Repair Type: Intermediate

## 2018-08-04 ENCOUNTER — LAB VISIT (OUTPATIENT)
Dept: LAB | Facility: HOSPITAL | Age: 38
End: 2018-08-04
Attending: INTERNAL MEDICINE
Payer: COMMERCIAL

## 2018-08-04 DIAGNOSIS — Z51.81 MEDICATION MONITORING ENCOUNTER: ICD-10-CM

## 2018-08-04 DIAGNOSIS — D64.9 NORMOCYTIC ANEMIA: ICD-10-CM

## 2018-08-04 DIAGNOSIS — E78.1 HYPERTRIGLYCERIDEMIA: ICD-10-CM

## 2018-08-04 DIAGNOSIS — R68.82 DECREASED LIBIDO: ICD-10-CM

## 2018-08-04 LAB
ALBUMIN SERPL BCP-MCNC: 4.6 G/DL
ALP SERPL-CCNC: 51 U/L
ALT SERPL W/O P-5'-P-CCNC: 28 U/L
AST SERPL-CCNC: 23 U/L
BILIRUB DIRECT SERPL-MCNC: 0.4 MG/DL
BILIRUB SERPL-MCNC: 1.2 MG/DL
CHOLEST SERPL-MCNC: 149 MG/DL
CHOLEST/HDLC SERPL: 4.8 {RATIO}
FERRITIN SERPL-MCNC: 86 NG/ML
HDLC SERPL-MCNC: 31 MG/DL
HDLC SERPL: 20.8 %
IRON SERPL-MCNC: 101 UG/DL
LDLC SERPL CALC-MCNC: 69.2 MG/DL
NONHDLC SERPL-MCNC: 118 MG/DL
PROT SERPL-MCNC: 7.6 G/DL
SATURATED IRON: 26 %
TOTAL IRON BINDING CAPACITY: 386 UG/DL
TRANSFERRIN SERPL-MCNC: 261 MG/DL
TRIGL SERPL-MCNC: 244 MG/DL

## 2018-08-04 PROCEDURE — 83540 ASSAY OF IRON: CPT

## 2018-08-04 PROCEDURE — 84270 ASSAY OF SEX HORMONE GLOBUL: CPT

## 2018-08-04 PROCEDURE — 36415 COLL VENOUS BLD VENIPUNCTURE: CPT | Mod: PO

## 2018-08-04 PROCEDURE — 80076 HEPATIC FUNCTION PANEL: CPT

## 2018-08-04 PROCEDURE — 80061 LIPID PANEL: CPT

## 2018-08-04 PROCEDURE — 82040 ASSAY OF SERUM ALBUMIN: CPT

## 2018-08-04 PROCEDURE — 82728 ASSAY OF FERRITIN: CPT

## 2018-08-09 LAB
ALBUMIN SERPL-MCNC: 4.6 G/DL (ref 3.6–5.1)
SHBG SERPL-SCNC: 19 NMOL/L (ref 10–50)
TESTOST FREE SERPL-MCNC: 83.3 PG/ML (ref 46–224)
TESTOST SERPL-MCNC: 421 NG/DL (ref 250–1100)
TESTOSTERONE.FREE+WB SERPL-MCNC: 175 NG/DL (ref 110–575)

## 2018-08-10 ENCOUNTER — APPOINTMENT (RX ONLY)
Dept: URBAN - METROPOLITAN AREA CLINIC 98 | Facility: CLINIC | Age: 38
Setting detail: DERMATOLOGY
End: 2018-08-10

## 2018-08-10 DIAGNOSIS — Z48.02 ENCOUNTER FOR REMOVAL OF SUTURES: ICD-10-CM

## 2018-08-10 PROCEDURE — 99212 OFFICE O/P EST SF 10 MIN: CPT

## 2018-08-10 PROCEDURE — ? SUTURE REMOVAL (NO GLOBAL PERIOD)

## 2018-08-10 ASSESSMENT — LOCATION SIMPLE DESCRIPTION DERM
LOCATION SIMPLE: LEFT UPPER BACK
LOCATION SIMPLE: LEFT UPPER BACK

## 2018-08-10 ASSESSMENT — LOCATION DETAILED DESCRIPTION DERM
LOCATION DETAILED: LEFT MID-UPPER BACK
LOCATION DETAILED: LEFT MID-UPPER BACK

## 2018-08-10 ASSESSMENT — LOCATION ZONE DERM
LOCATION ZONE: TRUNK
LOCATION ZONE: TRUNK

## 2018-08-14 ENCOUNTER — OFFICE VISIT (OUTPATIENT)
Dept: SLEEP MEDICINE | Facility: CLINIC | Age: 38
End: 2018-08-14
Payer: COMMERCIAL

## 2018-08-14 VITALS
WEIGHT: 290.13 LBS | SYSTOLIC BLOOD PRESSURE: 104 MMHG | HEART RATE: 74 BPM | BODY MASS INDEX: 36.07 KG/M2 | DIASTOLIC BLOOD PRESSURE: 70 MMHG | HEIGHT: 75 IN

## 2018-08-14 DIAGNOSIS — G43.719 INTRACTABLE CHRONIC MIGRAINE WITHOUT AURA AND WITHOUT STATUS MIGRAINOSUS: ICD-10-CM

## 2018-08-14 DIAGNOSIS — G47.33 OBSTRUCTIVE SLEEP APNEA (ADULT) (PEDIATRIC): Primary | ICD-10-CM

## 2018-08-14 PROCEDURE — 99499 UNLISTED E&M SERVICE: CPT | Mod: S$GLB,,, | Performed by: NURSE PRACTITIONER

## 2018-08-14 PROCEDURE — 99999 PR PBB SHADOW E&M-EST. PATIENT-LVL IV: CPT | Mod: PBBFAC,,, | Performed by: NURSE PRACTITIONER

## 2018-08-14 PROCEDURE — 64615 CHEMODENERV MUSC MIGRAINE: CPT | Mod: S$GLB,,, | Performed by: NURSE PRACTITIONER

## 2018-08-14 RX ORDER — KETOROLAC TROMETHAMINE 10 MG/1
10 TABLET, FILM COATED ORAL EVERY 8 HOURS PRN
Qty: 30 TABLET | Refills: 1 | Status: SHIPPED | OUTPATIENT
Start: 2018-08-14 | End: 2018-09-13

## 2018-08-14 NOTE — PROGRESS NOTES
Mr. Moreno is here today for management of  headaches.    Since last seen he denies s/e from his 2nd tBotox , HIT-6= 42. HA 2-3x/week mild relieved with ultram or NSAID otc but has not really had any bad headaches in a long while. No recent aura,  peripheral bilateral kaleidoscope. HA remains same location and nature and triggers. Remains on inderal.     Continues to use apap nightly, faithfully    PROCEDURE NOTE:     After informed consent signed and dated, and risks and benefits of procedure was discussed, BOTOX injection was performed     Frontalis- 15 units on each side   - 5 units on each side   Procerus- 10 units   Occipitalis- 20 units on each side   Temporalis - 25 units on each side   Trapezius- 15 units on each side   Cervical paraspinal muscles- 15 units on each side     Patient tolerated procedure well w/o any complications.       HISTORY:  12/11/2013:  He has since been setup (10/14) with aPAP 9-16cm, tolerating pressures well. He uses a nasal mask with a chin strap. He denies mask air leaks, nasal drying, or oral drying. His morning headaches and overall frequency of migraines has significantly improved. He is using therapy nightly, even brings machine to his girlfriend's house. He feels more refreshed in the mornings, has less nocturia, no more snoring,  and is no longer bothered by urge to move his legs during sleep initiation. He no longer awakens with feeling of heart racing.     He reports having total of ~7 migraines since last seen 3-mos ago, 2-3 of them severe. NO missed work in interim due to HA. Tramadol or Cambia/Amerge or Zomig remain effective.  HA starts from the R frontal/retroorbital area, at times radiates to the ipsilateral occipito-cervical area, pressure in quality,  with associated symptoms of R ptosis, nausea, photo/phonophobia, generalized weakness with R eyelid twitching, chills/sweating (prodrome). Triggers remain weather changes, sexual activity and stress while  movement can worsen attacks. He prefers to rest in a dark quiet cold room to feel better.     Focus improved on Daytrana patch ADHD.    Interrogation: New machine condition, heat at 4, 30d avg 5:48h/night, 1d 7:48hr. 90% pressure 30d 10.8cm, 7d 11.8cm, 1d 14cm. 21/30 days>4h. 20min/6cm ramp setup today. Using nasal pillows mask.     ESS= 8   HIT-6 score= 57    12/7/16: He continues to use cpap 13cm nightly. He has gained <10% wgt since study. Feels horrible if he falls asleep on couch w/o his mask on. Getting regular replacement supplies from [facility]. No chin strap use. Denies oral drying. Not using water system anymore, already congested. Continues to use dymista NS twice daily and take xyzal. ESS=9  Interrogation: 0% leak. AHI 1.9, avg 30d= 6:39h/n. Nuance mask. 0% periodic      Reports continued fairly frequent, just about daily mild headaches a lot on way home from work or after high stress situations. He has begun verapamil 120mg La in interim which he tolerates well w/o s/e. Reports severe headaches ~ 3/month. Still gets same prodromal symptoms prior to headaches and occasional nausea and photophobia. He is out of cambia, amerge and zomig which all remain effective. Unable to take triptan at work due to sleepiness.  Fioricet in interim prescribed was also helpful at times, but prefers prn tramadol 1/2 50mg mod episodes. He has been taking OTC analgesics frequently for his mild headaches. Remains very anxious/worrier. He has gotten  and is expecting 1st baby in summer '17!! 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     6/20/17:  Since last seen, he continues to use cpap 13-16cm nightly. Would like to trial alternative nasal mask. His straps get worn, current one needs replacing. No chin strap.Denies oral drying. Saw Dr. Reyes recently (ENT). HIt-6= 61. Inderal LA helping reduce intensity of headaches,having less severe episodes in interim. Had a bad one/lingering recently. zomig PO costly,  "using more amerge + cambia. Still taking up to 2hr notice complete relief (feels parasthesias then intense flush/sweat). Having ~ 15 headaches/month, but overall less frequent since last seen monthly. Inquiring about botox. Headache remains same location/nature.     Interrogation- AHI 1.4, avg 7:30h/n. Nasal pillow mask. 0% leak. 90% tile 13.4-14. 30/30d>4h.     12/29/17:   Since last seen he continues to have very frequent headaches, >15day/month. AMerge/cambia typically helps but having frequent abrupt onset episodes and pills take longer to work. Zomig NS was not covered. Lost 2#. Had new baby girl. Interested in trialing botox. Had first aura recently, peripheral bilateral kaleidoscope. HA remains same location and nature and triggers.     Continues to use apap nightly, faithfully.   Interrogation- 90% tile 14cm, avg 7:18h/n. AHI 2.1    5/2/18:   Since last seen he denies s/e from his 1stBotox (1/19th) and reports overall"better", less headaches Feb and April, bit more recently since overdue for botox. HA diary reveals ~ total 14 severe ones in interim, others mild-mod. Zomig costly and Cambia costly.  Had new baby girl. HX recent aura,  peripheral bilateral kaleidosc    PSG : (273#) 7/27/13: AHI was 18.4 with an oxygen magaly of 85.0%. The supine AHI was 22.3 and the REM AHI was 28.3. The patient did not qualify for a split night study due to an insufficient number of events in the first half of the study. Setup with APAP    ROS: Mood stable, wgt stable, otherwise a balance review of 10-systems is negative.     Tried(verapamil, lexapro, topamax, gabapentin, metoprolol, treximet, relpax, maxalt, imitrex ?dose)    Employer/Position: [employer]  FH: HA, brother with presumed ALEXX, witnessed apneic pauses     EXAM:   General: W/D,obese, well-groomed  /70   Pulse 74   Ht 6' 3" (1.905 m)   Wt 131.6 kg (290 lb 2 oz)   BMI 36.26 kg/m²         IMPRESSION:   Chronic migraine with and without aura, intractable " 15d/month,request botox auth 5/2/18 and 8/14/18 Improvement in frequency and severity of headaches  ALEXX, moderate, continued excellent aPAP adherence, with improvement of symptoms. AHI<5  Anxiety    PLAN     Preventative therapy:  Continue Inderal LA 120mg qd    Abortive therapy: Continue Zomig 5 mg or Amerge 2.5 mg PO q 2h, up to maximum of 5mg/24h , do not delay treatment. If affordable continue Zomig NS 5mg q2h prn, max 10mg/24h OR migranal NS prn (coupon generic). OR onzetra, he will call special pharmacy again about this one OR imitrex 6mg SC q2h prn    Continue Phenergan 12.5-25 mg PO q6-8h prn for HA/nausea     Continue Tramadol 50 mg 1/2-1 tabs PO q6-8 h prn for headache     Stop cambia(cost). Begin toradol 10mg PO q8hr prn + triptan    Continue to regulate sleep, mealtimes, try to reduce stress and to avoid known headache triggers/aggravating factors. Emphasized importance of keeping headache diaries if they worsen    Continue autoPAP 13-16cm. Continue excellent usage. [facility] D[facility] prn supplies    RTC 3-mos botox

## 2018-09-14 DIAGNOSIS — G43.711 INTRACTABLE CHRONIC MIGRAINE WITHOUT AURA AND WITH STATUS MIGRAINOSUS: ICD-10-CM

## 2018-09-14 RX ORDER — PROPRANOLOL HYDROCHLORIDE 120 MG/1
CAPSULE, EXTENDED RELEASE ORAL
Qty: 30 CAPSULE | Refills: 5 | Status: SHIPPED | OUTPATIENT
Start: 2018-09-14 | End: 2019-05-19 | Stop reason: SDUPTHER

## 2018-11-14 ENCOUNTER — OFFICE VISIT (OUTPATIENT)
Dept: NEUROLOGY | Facility: CLINIC | Age: 38
End: 2018-11-14
Payer: COMMERCIAL

## 2018-11-14 VITALS
BODY MASS INDEX: 36.29 KG/M2 | SYSTOLIC BLOOD PRESSURE: 130 MMHG | HEART RATE: 84 BPM | WEIGHT: 291.88 LBS | HEIGHT: 75 IN | DIASTOLIC BLOOD PRESSURE: 80 MMHG

## 2018-11-14 DIAGNOSIS — G47.33 OBSTRUCTIVE SLEEP APNEA: ICD-10-CM

## 2018-11-14 DIAGNOSIS — G43.719 INTRACTABLE CHRONIC MIGRAINE WITHOUT AURA AND WITHOUT STATUS MIGRAINOSUS: Primary | ICD-10-CM

## 2018-11-14 PROCEDURE — 99499 UNLISTED E&M SERVICE: CPT | Mod: S$GLB,,, | Performed by: NURSE PRACTITIONER

## 2018-11-14 PROCEDURE — 64615 CHEMODENERV MUSC MIGRAINE: CPT | Mod: S$GLB,,, | Performed by: NURSE PRACTITIONER

## 2018-11-14 PROCEDURE — 99999 PR PBB SHADOW E&M-EST. PATIENT-LVL IV: CPT | Mod: PBBFAC,,, | Performed by: NURSE PRACTITIONER

## 2018-11-14 RX ORDER — TRAMADOL HYDROCHLORIDE 50 MG/1
50 TABLET ORAL EVERY 6 HOURS PRN
Qty: 30 TABLET | Refills: 3 | Status: SHIPPED | OUTPATIENT
Start: 2018-11-14 | End: 2019-05-15 | Stop reason: SDUPTHER

## 2018-11-14 NOTE — PROGRESS NOTES
"Mr. Moreno is here today for management of  headaches.    Since last seen he denies s/e from his 3rd botox , HIT-6= 57. 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 Only 1 bad headache had to miss work, still had some pain upon awakening after taking abortive meds at bedtime. Reluctant to take Imitrex 6mg SC due to potential s/e with young daughter in house. No recent aura,  peripheral bilateral kaleidoscope. HA remains same location and nature and triggers. Remains on inderal.     Continues to use apap nightly, faithfully    PROCEDURE NOTE:     After informed consent signed and dated, and risks and benefits of procedure was discussed, BOTOX injection was performed     Frontalis- 15 units on each side   - 5 units on each side   Procerus- 10 units   Occipitalis- 20 units on each side   Temporalis - 25 units on each side   Trapezius- 15 units on each side   Cervical paraspinal muscles- 15 units on each side     Patient tolerated procedure well w/o any complications.       HISTORY:  12/11/2013:  He has since been setup (10/14) with aPAP 9-16cm, tolerating pressures well. He uses a nasal mask with a chin strap. He denies mask air leaks, nasal drying, or oral drying. His morning headaches and overall frequency of migraines has significantly improved. He is using therapy nightly, even brings machine to his girlfriend's house. He feels more refreshed in the mornings, has less nocturia, no more snoring,  and is no longer bothered by urge to move his legs during sleep initiation. He no longer awakens with feeling of heart racing.     He reports having total of ~7 migraines since last seen 3-mos ago, 2-3 of them severe. NO missed work in interim due to HA. Tramadol or Cambia/Amerge or Zomig remain effective.  HA starts from the R frontal/retroorbital area, at times " radiates to the ipsilateral occipito-cervical area, pressure in quality,  with associated symptoms of R ptosis, nausea, photo/phonophobia, generalized weakness with R eyelid twitching, chills/sweating (prodrome). Triggers remain weather changes, sexual activity and stress while movement can worsen attacks. He prefers to rest in a dark quiet cold room to feel better.     Focus improved on Daytrana patch ADHD.    Interrogation: New machine condition, heat at 4, 30d avg 5:48h/night, 1d 7:48hr. 90% pressure 30d 10.8cm, 7d 11.8cm, 1d 14cm. 21/30 days>4h. 20min/6cm ramp setup today. Using nasal pillows mask.     ESS= 8   HIT-6 score= 57    12/7/16: He continues to use cpap 13cm nightly. He has gained <10% wgt since study. Feels horrible if he falls asleep on couch w/o his mask on. Getting regular replacement supplies from [facility]. No chin strap use. Denies oral drying. Not using water system anymore, already congested. Continues to use dymista NS twice daily and take xyzal. ESS=9  Interrogation: 0% leak. AHI 1.9, avg 30d= 6:39h/n. Nuance mask. 0% periodic      Reports continued fairly frequent, just about daily mild headaches a lot on way home from work or after high stress situations. He has begun verapamil 120mg La in interim which he tolerates well w/o s/e. Reports severe headaches ~ 3/month. Still gets same prodromal symptoms prior to headaches and occasional nausea and photophobia. He is out of cambia, amerge and zomig which all remain effective. Unable to take triptan at work due to sleepiness.  Fioricet in interim prescribed was also helpful at times, but prefers prn tramadol 1/2 50mg mod episodes. He has been taking OTC analgesics frequently for his mild headaches. Remains very anxious/worrier. He has gotten  and is expecting 1st baby in summer '17!! 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     6/20/17:  Since last seen, he continues to use cpap 13-16cm nightly. Would like to trial alternative  "nasal mask. His straps get worn, current one needs replacing. No chin strap.Denies oral drying. Saw Dr. Reyes recently (ENT). HIt-6= 61. Inderal LA helping reduce intensity of headaches,having less severe episodes in interim. Had a bad one/lingering recently. zomig PO costly, using more amerge + cambia. Still taking up to 2hr notice complete relief (feels parasthesias then intense flush/sweat). Having ~ 15 headaches/month, but overall less frequent since last seen monthly. Inquiring about botox. Headache remains same location/nature.     Interrogation- AHI 1.4, avg 7:30h/n. Nasal pillow mask. 0% leak. 90% tile 13.4-14. 30/30d>4h.     12/29/17:   Since last seen he continues to have very frequent headaches, >15day/month. AMerge/cambia typically helps but having frequent abrupt onset episodes and pills take longer to work. Zomig NS was not covered. Lost 2#. Had new baby girl. Interested in trialing botox. Had first aura recently, peripheral bilateral kaleidoscope. HA remains same location and nature and triggers.     Continues to use apap nightly, faithfully.   Interrogation- 90% tile 14cm, avg 7:18h/n. AHI 2.1    5/2/18:   Since last seen he denies s/e from his 1stBotox (1/19th) and reports overall"better", less headaches Feb and April, bit more recently since overdue for botox. HA diary reveals ~ total 14 severe ones in interim, others mild-mod. Zomig costly and Cambia costly.  Had new baby girl. HX recent aura,  peripheral bilateral kaleidosc    PSG : (273#) 7/27/13: AHI was 18.4 with an oxygen magaly of 85.0%. The supine AHI was 22.3 and the REM AHI was 28.3. The patient did not qualify for a split night study due to an insufficient number of events in the first half of the study. Setup with APAP    ROS: Mood stable, wgt stable, otherwise a balance review of 10-systems is negative.     Tried(verapamil, lexapro, topamax, gabapentin, metoprolol, treximet, relpax, maxalt, imitrex ?dose)    Employer/Position: [employer]  FH: HA, brother with presumed ALEXX, witnessed apneic pauses     EXAM:   General: W/D,obese, well-groomed  /80   Pulse 84   Ht 6' 3" (1.905 m)   Wt 132.4 kg (291 lb 14.2 oz)   BMI 36.48 kg/m²         IMPRESSION:   Chronic migraine with and without aura, intractable 15d/month,request botox auth 5/2/18 and 8/14/18 and 11/14/18 Improvement in frequency and severity of headaches  ALEXX, moderate, continued excellent aPAP adherence, with improvement of symptoms. AHI<5  Anxiety    PLAN     Preventative therapy:  Continue Inderal LA 120mg qd. Consider AJOVY     Abortive therapy: Continue Zomig 5 mg or Amerge 2.5 mg PO q 2h, up to maximum of 5mg/24h , do not delay treatment. Continue imitrex 6mg SC q2h prn    Continue Phenergan 12.5-25 mg PO q6-8h prn for HA/nausea     Continue Tramadol 50 mg 1/2-1 tabs PO q6-8 h prn for headache  Refilled today    Continue toradol 10mg PO q8hr prn + triptan    Continue to regulate sleep, mealtimes, try to reduce stress and to avoid known headache triggers/aggravating factors    Continue autoPAP 13-16cm. Continue excellent usage. [facility] D[facility] prn supplies    RTC 3-mos botox  "

## 2018-11-20 ENCOUNTER — PATIENT MESSAGE (OUTPATIENT)
Dept: INTERNAL MEDICINE | Facility: CLINIC | Age: 38
End: 2018-11-20

## 2018-11-21 ENCOUNTER — OFFICE VISIT (OUTPATIENT)
Dept: INTERNAL MEDICINE | Facility: CLINIC | Age: 38
End: 2018-11-21
Payer: COMMERCIAL

## 2018-11-21 VITALS
SYSTOLIC BLOOD PRESSURE: 128 MMHG | DIASTOLIC BLOOD PRESSURE: 70 MMHG | TEMPERATURE: 99 F | HEART RATE: 86 BPM | WEIGHT: 294.75 LBS | OXYGEN SATURATION: 98 % | HEIGHT: 75 IN | BODY MASS INDEX: 36.65 KG/M2

## 2018-11-21 DIAGNOSIS — J06.9 VIRAL URI WITH COUGH: Primary | ICD-10-CM

## 2018-11-21 PROCEDURE — 3078F DIAST BP <80 MM HG: CPT | Mod: CPTII,S$GLB,, | Performed by: FAMILY MEDICINE

## 2018-11-21 PROCEDURE — 3074F SYST BP LT 130 MM HG: CPT | Mod: CPTII,S$GLB,, | Performed by: FAMILY MEDICINE

## 2018-11-21 PROCEDURE — 96372 THER/PROPH/DIAG INJ SC/IM: CPT | Mod: S$GLB,,, | Performed by: FAMILY MEDICINE

## 2018-11-21 PROCEDURE — 99213 OFFICE O/P EST LOW 20 MIN: CPT | Mod: 25,S$GLB,, | Performed by: FAMILY MEDICINE

## 2018-11-21 PROCEDURE — 3008F BODY MASS INDEX DOCD: CPT | Mod: CPTII,S$GLB,, | Performed by: FAMILY MEDICINE

## 2018-11-21 PROCEDURE — 99999 PR PBB SHADOW E&M-EST. PATIENT-LVL III: CPT | Mod: PBBFAC,,, | Performed by: FAMILY MEDICINE

## 2018-11-21 RX ORDER — TRIAMCINOLONE ACETONIDE 40 MG/ML
40 INJECTION, SUSPENSION INTRA-ARTICULAR; INTRAMUSCULAR
Status: COMPLETED | OUTPATIENT
Start: 2018-11-21 | End: 2018-11-21

## 2018-11-21 RX ORDER — HYDROCODONE BITARTRATE AND HOMATROPINE METHYLBROMIDE ORAL SOLUTION 5; 1.5 MG/5ML; MG/5ML
5 LIQUID ORAL EVERY 6 HOURS PRN
Qty: 120 ML | Refills: 0 | Status: SHIPPED | OUTPATIENT
Start: 2018-11-21 | End: 2019-07-19

## 2018-11-21 RX ORDER — METHYLPREDNISOLONE 4 MG/1
TABLET ORAL
Qty: 1 PACKAGE | Refills: 0 | Status: SHIPPED | OUTPATIENT
Start: 2018-11-21 | End: 2018-12-12

## 2018-11-21 RX ADMIN — TRIAMCINOLONE ACETONIDE 40 MG: 40 INJECTION, SUSPENSION INTRA-ARTICULAR; INTRAMUSCULAR at 03:11

## 2018-11-21 NOTE — PROGRESS NOTES
"Subjective:       Patient ID: Jatinder Moreno III is a 38 y.o. male.    Chief Complaint: Establish Care and Cough    HPI  This patient is new to me.   Jatinder Moreno III is a 38 y.o. year old male who presents today complaining of cough and rhinorrhea.     Gets cold symptoms with weather changes (when it gets cold) and during the spring. Has runny nose, cough.  Has had PFT and allergy testing in the past. Had asthma as a child, but PFT negative for asthma. Allergy testing negative.   Symptoms began 1 week ago.  Takes Singulair and Zyzal daily. Mucinex every 4 hrs. Sudafed, Benadryl hasn't helped.   Started using Advair for a few days.  Not using albuterol in past year.    Steroid shot and steroid taper helped in the past.   He also requests a hydrocortisone-homatropine cough syrup that is the only cough suppressant that works.     I personally reviewed Past Medical History, Social History    Review of Systems   Constitutional: Negative for appetite change, chills, fatigue, fever and unexpected weight change.   HENT: Positive for congestion, postnasal drip, rhinorrhea and sneezing. Negative for hearing loss and sore throat.    Eyes: Negative for pain and visual disturbance.   Respiratory: Positive for cough. Negative for shortness of breath and wheezing.    Cardiovascular: Negative for chest pain, palpitations and leg swelling.   Gastrointestinal: Negative for abdominal pain, constipation, diarrhea, nausea and vomiting.   Genitourinary: Negative for dysuria, frequency, hematuria and urgency.   Musculoskeletal: Negative for arthralgias and myalgias.   Skin: Negative for rash.   Neurological: Negative for dizziness, syncope and headaches.   Psychiatric/Behavioral: Negative for dysphoric mood and sleep disturbance. The patient is not nervous/anxious.        Objective:      Vitals:    11/21/18 1436   BP: 128/70   Pulse: 86   Temp: 98.6 °F (37 °C)   SpO2: 98%   Weight: 133.7 kg (294 lb 12.1 oz)   Height: 6' 3" (1.905 m) "     Physical Exam   Constitutional: He is oriented to person, place, and time. He appears well-developed and well-nourished. No distress.   HENT:   Head: Normocephalic and atraumatic.   Right Ear: Hearing, tympanic membrane, external ear and ear canal normal.   Left Ear: Hearing, tympanic membrane, external ear and ear canal normal.   Nose: Nose normal.   Mouth/Throat: Oropharynx is clear and moist and mucous membranes are normal. Normal dentition. No oropharyngeal exudate.   Eyes: Conjunctivae and lids are normal. Pupils are equal, round, and reactive to light.   Neck: Normal range of motion. No thyroid mass and no thyromegaly present.   Cardiovascular: Normal rate, regular rhythm, S1 normal, S2 normal and intact distal pulses.   No murmur heard.  No lower extremity edema   Pulmonary/Chest: Effort normal and breath sounds normal. No respiratory distress.   Abdominal: Soft. Bowel sounds are normal. There is no tenderness.   Lymphadenopathy:     He has no cervical adenopathy.        Right: No supraclavicular adenopathy present.        Left: No supraclavicular adenopathy present.   Neurological: He is alert and oriented to person, place, and time. He is not disoriented.   Skin: Skin is warm and dry. No rash noted.   Psychiatric: He has a normal mood and affect. His behavior is normal. Thought content normal.   Nursing note and vitals reviewed.      Assessment:       1. Viral URI with cough        Plan:   Diagnoses and all orders for this visit:    Viral URI with cough        - Steroid injection given today. Medrol dose pack Rx sent in if symptoms do not improve in next few days. Discussed risks of using hydrocodone cough syrup. He understands risks and will use sparingly for cough.   -     triamcinolone acetonide injection 40 mg  -     methylPREDNISolone (MEDROL DOSEPACK) 4 mg tablet; use as directed  -     hydrocodone-homatropine 5-1.5 mg/5 ml (HYCODAN) 5-1.5 mg/5 mL Syrp; Take 5 mLs by mouth every 6 (six) hours as  needed (cough).  - RTC if worsening or not improving.

## 2018-12-21 ENCOUNTER — OFFICE VISIT (OUTPATIENT)
Dept: OPTOMETRY | Facility: CLINIC | Age: 38
End: 2018-12-21
Payer: COMMERCIAL

## 2018-12-21 DIAGNOSIS — H52.13 MYOPIA, BILATERAL: Primary | ICD-10-CM

## 2018-12-21 DIAGNOSIS — Z46.0 FITTING AND ADJUSTMENT OF SPECTACLES AND CONTACT LENSES: Primary | ICD-10-CM

## 2018-12-21 PROCEDURE — 99999 PR PBB SHADOW E&M-EST. PATIENT-LVL II: CPT | Mod: PBBFAC,,, | Performed by: OPTOMETRIST

## 2018-12-21 PROCEDURE — 92015 DETERMINE REFRACTIVE STATE: CPT | Mod: S$GLB,,, | Performed by: OPTOMETRIST

## 2018-12-21 PROCEDURE — 92014 COMPRE OPH EXAM EST PT 1/>: CPT | Mod: S$GLB,,, | Performed by: OPTOMETRIST

## 2018-12-21 PROCEDURE — 92310 CONTACT LENS FITTING OU: CPT | Mod: S$GLB,,, | Performed by: OPTOMETRIST

## 2018-12-21 NOTE — PROGRESS NOTES
HPI     DLS:6/15/17  Pt states no Va changes, but is out of contacts. Wants to talk about eye   drops for itchy allergy eye. HX of migraines. Wants to get Rx for glasses.     No f/f         Last edited by Linda Edge MA on 12/21/2018  2:23 PM. (History)          ROS     Negative for: Constitutional, Gastrointestinal, Neurological, Skin,   Genitourinary, Musculoskeletal, HENT, Endocrine, Cardiovascular, Eyes,   Respiratory, Psychiatric, Allergic/Imm, Heme/Lymph    Last edited by Hector Sanabria, OD on 12/21/2018  2:27 PM. (History)        Assessment /Plan     For exam results, see Encounter Report.    Myopia, bilateral      1. Good fit w BIOFINITY CLs (TORIC OS).  Needs slight power adjustment OD  2. Few tiny pinpoint drusen OU--discussed sunglasses/vitamins.  Advised yearly exam    PLAN:    1. DISP trial CLs  2. Continue Daily Wear schedule.  NO SLEEPING IN CONTACT LENSES. Clean and disinfect nightly.  exchange MONTHLY  3. If no problems will call for CLRx, rtc 1 yr

## 2018-12-24 ENCOUNTER — OFFICE VISIT (OUTPATIENT)
Dept: URGENT CARE | Facility: CLINIC | Age: 38
End: 2018-12-24
Payer: COMMERCIAL

## 2018-12-24 VITALS
TEMPERATURE: 99 F | WEIGHT: 295 LBS | OXYGEN SATURATION: 98 % | HEIGHT: 75 IN | SYSTOLIC BLOOD PRESSURE: 144 MMHG | DIASTOLIC BLOOD PRESSURE: 96 MMHG | HEART RATE: 85 BPM | BODY MASS INDEX: 36.68 KG/M2 | RESPIRATION RATE: 19 BRPM

## 2018-12-24 DIAGNOSIS — L73.9 FOLLICULITIS: Primary | ICD-10-CM

## 2018-12-24 PROCEDURE — 3077F SYST BP >= 140 MM HG: CPT | Mod: CPTII,S$GLB,, | Performed by: FAMILY MEDICINE

## 2018-12-24 PROCEDURE — 3080F DIAST BP >= 90 MM HG: CPT | Mod: CPTII,S$GLB,, | Performed by: FAMILY MEDICINE

## 2018-12-24 PROCEDURE — 99214 OFFICE O/P EST MOD 30 MIN: CPT | Mod: S$GLB,,, | Performed by: FAMILY MEDICINE

## 2018-12-24 PROCEDURE — 3008F BODY MASS INDEX DOCD: CPT | Mod: CPTII,S$GLB,, | Performed by: FAMILY MEDICINE

## 2018-12-24 RX ORDER — DOXYCYCLINE 100 MG/1
100 CAPSULE ORAL 2 TIMES DAILY
Qty: 20 CAPSULE | Refills: 0 | Status: SHIPPED | OUTPATIENT
Start: 2018-12-24 | End: 2019-01-21

## 2018-12-24 RX ORDER — MUPIROCIN 20 MG/G
OINTMENT TOPICAL 3 TIMES DAILY
Qty: 1 TUBE | Refills: 0 | Status: SHIPPED | OUTPATIENT
Start: 2018-12-24 | End: 2019-01-03

## 2018-12-24 NOTE — PATIENT INSTRUCTIONS
Folliculitis  Folliculitis is an inflammation of a hair follicle. A hair follicle is the little pocket where a hair grows out of the skin. Bacteria normally live on the skin. But sometimes bacteria can get trapped in a follicle and cause infection. This causes a bumpy rash. The area over the follicles is red and raised. It may itch or be painful. The bumps may have fluid (pus) inside. The pus may leak and then form crusts. Sores can spread to other areas of the body. Once it goes away, folliculitis can come back at any time. Severe cases may cause permanent hair loss and scarring.  Folliculitis can happen anywhere on the body where hair grows. It can be caused by rubbing from tight clothing. Ingrown hairs can cause it. Soaking in a hot tub or swimming pool that has bacteria in the water can cause it. It may also occur if a hair follicle is blocked by a bandage.  Sores often go away in a few days with no treatment. In some cases, medicine may be given. A small piece of skin or pus may be taken to find the type of bacteria causing the infection.  Home care  The healthcare provider may prescribe an antibiotic cream or ointment.  Oral antibiotics may also be prescribed. Or you may be told to use an over-the-counter antibiotic cream. Follow all instructions when using any of these medicines.  General care:  · Apply warm, moist compresses to the sores for 20 minutes up to 3 times a day. You can make a compress by soaking a cloth in warm water. Squeeze out excess water.  · Dont cut, poke, or squeeze the sores. This can be painful and spread infection.  · Dont scratch the affected area. Scratching can delay healing.  · Dont shave the areas affected by folliculitis.  · If the sores leak fluid, cover the area with a nonstick gauze bandage. Use as little tape as possible. Carefully discard all soiled bandages.  · Dress in loose cotton clothing.  · Change sheets and blankets if they are soiled by pus. Wash all clothes,  towels, sheets, and cloth diapers in soap and hot water. Do not share clothes, towels, or sheets with other family members.  · Do not soak the sores in bath water. This can spread infection. Instead, keep the area clean by gently washing sores with soap and warm water.  · Wash your hands or use antibacterial gels often to prevent spreading the bacteria.  Follow-up care  Follow up with your healthcare provider, or as advised.  When to seek medical advice  Call your healthcare provider right away if any of these occur:  · Fever of 100.4°F (38°C) or higher  · Spreading of the rash  · Rash does not get better with treatment  · Redness or swelling that gets worse  · Rash becomes more painful  · Foul-smelling fluid leaking from the skin  · Rash improves, but then comes back   Date Last Reviewed: 11/1/2016  © 6706-9070 [employer]. [address] [place] [address], [place] [address]. All rights reserved. This information is not intended as a substitute for professional medical care. Always follow your healthcare professional's instructions.    Follow up with your doctor in a few days as needed.  Return to the urgent care or go to the ER if symptoms get worse.    Henrik Jackson MD

## 2018-12-24 NOTE — PROGRESS NOTES
"Subjective:       Patient ID: Jatinder Moreno III is a 38 y.o. male.    Vitals:  height is 6' 3" (1.905 m) and weight is 133.8 kg (295 lb). His oral temperature is 98.5 °F (36.9 °C). His blood pressure is 144/96 (abnormal) and his pulse is 85. His respiration is 19 and oxygen saturation is 98%.     Chief Complaint: Rash    Rash   This is a new problem. The current episode started in the past 7 days. The problem has been gradually worsening since onset. The affected locations include the scalp. The rash is characterized by redness, itchiness and blistering. Pertinent negatives include no congestion, cough, diarrhea, fatigue, fever, shortness of breath, sore throat or vomiting. Treatments tried: Mometasone 1% cream. The treatment provided mild relief.       Constitution: Negative for chills, fatigue and fever.   HENT: Negative for congestion and sore throat.    Neck: Negative for painful lymph nodes.   Cardiovascular: Negative for chest pain and leg swelling.   Eyes: Negative for double vision and blurred vision.   Respiratory: Negative for cough and shortness of breath.    Gastrointestinal: Negative for nausea, vomiting and diarrhea.   Genitourinary: Negative for dysuria, frequency and urgency.   Musculoskeletal: Negative for joint pain, joint swelling, muscle cramps and muscle ache.   Skin: Positive for rash. Negative for color change, pale and erythema.   Allergic/Immunologic: Negative for seasonal allergies.   Neurological: Negative for dizziness, history of vertigo, light-headedness, passing out and headaches.   Hematologic/Lymphatic: Negative for swollen lymph nodes, easy bruising/bleeding and history of blood clots. Does not bruise/bleed easily.   Psychiatric/Behavioral: Negative for nervous/anxious, sleep disturbance and depression. The patient is not nervous/anxious.        Objective:      Physical Exam   Constitutional: He is oriented to person, place, and time. He appears well-developed and well-nourished. "   HENT:   Head: Normocephalic and atraumatic.       Right Ear: External ear normal.   Left Ear: External ear normal.   Multiple folliculitis lesion on the scalp in the back of the head.  The lesions are red, slightly raise, no tenderness, no drainage.   Eyes: EOM are normal. Pupils are equal, round, and reactive to light.   Neck: Normal range of motion. Neck supple. No JVD present. No tracheal deviation present. No thyromegaly present.   Cardiovascular: Normal rate, regular rhythm and normal heart sounds. Exam reveals no gallop and no friction rub.   No murmur heard.  Pulmonary/Chest: Breath sounds normal. No respiratory distress. He has no wheezes. He has no rales. He exhibits no tenderness.   Abdominal: Soft. Bowel sounds are normal. He exhibits no distension and no mass. There is no tenderness. There is no rebound and no guarding. No hernia.   Musculoskeletal: Normal range of motion. He exhibits no edema, tenderness or deformity.   Lymphadenopathy:     He has no cervical adenopathy.   Neurological: He is alert and oriented to person, place, and time. He displays normal reflexes. No cranial nerve deficit. He exhibits normal muscle tone. Coordination normal.   Skin: Skin is warm. Capillary refill takes less than 2 seconds. No rash noted. No erythema. No pallor.   Psychiatric: He has a normal mood and affect. His behavior is normal. Judgment and thought content normal.   Vitals reviewed.      Assessment:       1. Folliculitis        Plan:         Folliculitis  -     doxycycline (VIBRAMYCIN) 100 MG Cap; Take 1 capsule (100 mg total) by mouth 2 (two) times daily.  Dispense: 20 capsule; Refill: 0  -     mupirocin (BACTROBAN) 2 % ointment; Apply topically 3 (three) times daily. Apply to the affected area for 10 days  Dispense: 1 Tube; Refill: 0          Patient Instructions     Folliculitis  Folliculitis is an inflammation of a hair follicle. A hair follicle is the little pocket where a hair grows out of the skin.  Bacteria normally live on the skin. But sometimes bacteria can get trapped in a follicle and cause infection. This causes a bumpy rash. The area over the follicles is red and raised. It may itch or be painful. The bumps may have fluid (pus) inside. The pus may leak and then form crusts. Sores can spread to other areas of the body. Once it goes away, folliculitis can come back at any time. Severe cases may cause permanent hair loss and scarring.  Folliculitis can happen anywhere on the body where hair grows. It can be caused by rubbing from tight clothing. Ingrown hairs can cause it. Soaking in a hot tub or swimming pool that has bacteria in the water can cause it. It may also occur if a hair follicle is blocked by a bandage.  Sores often go away in a few days with no treatment. In some cases, medicine may be given. A small piece of skin or pus may be taken to find the type of bacteria causing the infection.  Home care  The healthcare provider may prescribe an antibiotic cream or ointment.  Oral antibiotics may also be prescribed. Or you may be told to use an over-the-counter antibiotic cream. Follow all instructions when using any of these medicines.  General care:  · Apply warm, moist compresses to the sores for 20 minutes up to 3 times a day. You can make a compress by soaking a cloth in warm water. Squeeze out excess water.  · Dont cut, poke, or squeeze the sores. This can be painful and spread infection.  · Dont scratch the affected area. Scratching can delay healing.  · Dont shave the areas affected by folliculitis.  · If the sores leak fluid, cover the area with a nonstick gauze bandage. Use as little tape as possible. Carefully discard all soiled bandages.  · Dress in loose cotton clothing.  · Change sheets and blankets if they are soiled by pus. Wash all clothes, towels, sheets, and cloth diapers in soap and hot water. Do not share clothes, towels, or sheets with other family members.  · Do not soak the  sores in bath water. This can spread infection. Instead, keep the area clean by gently washing sores with soap and warm water.  · Wash your hands or use antibacterial gels often to prevent spreading the bacteria.  Follow-up care  Follow up with your healthcare provider, or as advised.  When to seek medical advice  Call your healthcare provider right away if any of these occur:  · Fever of 100.4°F (38°C) or higher  · Spreading of the rash  · Rash does not get better with treatment  · Redness or swelling that gets worse  · Rash becomes more painful  · Foul-smelling fluid leaking from the skin  · Rash improves, but then comes back   Date Last Reviewed: 11/1/2016  © 5141-7947 [employer]. [address] [place] [address], [place] [address]. All rights reserved. This information is not intended as a substitute for professional medical care. Always follow your healthcare professional's instructions.    Follow up with your doctor in a few days as needed.  Return to the urgent care or go to the ER if symptoms get worse.    Henrik Jackson MD

## 2019-01-11 ENCOUNTER — TELEPHONE (OUTPATIENT)
Dept: OBSTETRICS AND GYNECOLOGY | Facility: CLINIC | Age: 39
End: 2019-01-11

## 2019-01-11 DIAGNOSIS — Z13.228 CYSTIC FIBROSIS SCREENING: Primary | ICD-10-CM

## 2019-01-11 NOTE — TELEPHONE ENCOUNTER
Pt wife is pregnant and tested positive as a CF carrier. Consent given to access chart and place order for CF testing.

## 2019-01-16 ENCOUNTER — PATIENT MESSAGE (OUTPATIENT)
Dept: OPTOMETRY | Facility: CLINIC | Age: 39
End: 2019-01-16

## 2019-01-18 ENCOUNTER — OFFICE VISIT (OUTPATIENT)
Dept: URGENT CARE | Facility: CLINIC | Age: 39
End: 2019-01-18
Payer: COMMERCIAL

## 2019-01-18 VITALS
RESPIRATION RATE: 19 BRPM | BODY MASS INDEX: 35.43 KG/M2 | OXYGEN SATURATION: 97 % | SYSTOLIC BLOOD PRESSURE: 140 MMHG | DIASTOLIC BLOOD PRESSURE: 97 MMHG | HEART RATE: 91 BPM | HEIGHT: 75 IN | TEMPERATURE: 99 F | WEIGHT: 285 LBS

## 2019-01-18 DIAGNOSIS — L73.9 FOLLICULITIS: Primary | ICD-10-CM

## 2019-01-18 PROCEDURE — 99214 OFFICE O/P EST MOD 30 MIN: CPT | Mod: S$GLB,,, | Performed by: FAMILY MEDICINE

## 2019-01-18 PROCEDURE — 99214 PR OFFICE/OUTPT VISIT, EST, LEVL IV, 30-39 MIN: ICD-10-PCS | Mod: S$GLB,,, | Performed by: FAMILY MEDICINE

## 2019-01-18 PROCEDURE — 3077F SYST BP >= 140 MM HG: CPT | Mod: CPTII,S$GLB,, | Performed by: FAMILY MEDICINE

## 2019-01-18 PROCEDURE — 3008F BODY MASS INDEX DOCD: CPT | Mod: CPTII,S$GLB,, | Performed by: FAMILY MEDICINE

## 2019-01-18 PROCEDURE — 3080F PR MOST RECENT DIASTOLIC BLOOD PRESSURE >= 90 MM HG: ICD-10-PCS | Mod: CPTII,S$GLB,, | Performed by: FAMILY MEDICINE

## 2019-01-18 PROCEDURE — 3080F DIAST BP >= 90 MM HG: CPT | Mod: CPTII,S$GLB,, | Performed by: FAMILY MEDICINE

## 2019-01-18 PROCEDURE — 3008F PR BODY MASS INDEX (BMI) DOCUMENTED: ICD-10-PCS | Mod: CPTII,S$GLB,, | Performed by: FAMILY MEDICINE

## 2019-01-18 PROCEDURE — 3077F PR MOST RECENT SYSTOLIC BLOOD PRESSURE >= 140 MM HG: ICD-10-PCS | Mod: CPTII,S$GLB,, | Performed by: FAMILY MEDICINE

## 2019-01-18 RX ORDER — CLINDAMYCIN HYDROCHLORIDE 300 MG/1
300 CAPSULE ORAL 3 TIMES DAILY
Qty: 30 CAPSULE | Refills: 0 | Status: SHIPPED | OUTPATIENT
Start: 2019-01-18 | End: 2019-01-28

## 2019-01-18 RX ORDER — MUPIROCIN 20 MG/G
OINTMENT TOPICAL 3 TIMES DAILY
Qty: 1 TUBE | Refills: 0 | Status: SHIPPED | OUTPATIENT
Start: 2019-01-18 | End: 2019-01-28

## 2019-01-19 NOTE — PATIENT INSTRUCTIONS
Folliculitis  Folliculitis is an inflammation of a hair follicle. A hair follicle is the little pocket where a hair grows out of the skin. Bacteria normally live on the skin. But sometimes bacteria can get trapped in a follicle and cause infection. This causes a bumpy rash. The area over the follicles is red and raised. It may itch or be painful. The bumps may have fluid (pus) inside. The pus may leak and then form crusts. Sores can spread to other areas of the body. Once it goes away, folliculitis can come back at any time. Severe cases may cause permanent hair loss and scarring.  Folliculitis can happen anywhere on the body where hair grows. It can be caused by rubbing from tight clothing. Ingrown hairs can cause it. Soaking in a hot tub or swimming pool that has bacteria in the water can cause it. It may also occur if a hair follicle is blocked by a bandage.  Sores often go away in a few days with no treatment. In some cases, medicine may be given. A small piece of skin or pus may be taken to find the type of bacteria causing the infection.  Home care  The healthcare provider may prescribe an antibiotic cream or ointment.  Oral antibiotics may also be prescribed. Or you may be told to use an over-the-counter antibiotic cream. Follow all instructions when using any of these medicines.  General care:  · Apply warm, moist compresses to the sores for 20 minutes up to 3 times a day. You can make a compress by soaking a cloth in warm water. Squeeze out excess water.  · Dont cut, poke, or squeeze the sores. This can be painful and spread infection.  · Dont scratch the affected area. Scratching can delay healing.  · Dont shave the areas affected by folliculitis.  · If the sores leak fluid, cover the area with a nonstick gauze bandage. Use as little tape as possible. Carefully discard all soiled bandages.  · Dress in loose cotton clothing.  · Change sheets and blankets if they are soiled by pus. Wash all clothes,  towels, sheets, and cloth diapers in soap and hot water. Do not share clothes, towels, or sheets with other family members.  · Do not soak the sores in bath water. This can spread infection. Instead, keep the area clean by gently washing sores with soap and warm water.  · Wash your hands or use antibacterial gels often to prevent spreading the bacteria.  Follow-up care  Follow up with your healthcare provider, or as advised.  When to seek medical advice  Call your healthcare provider right away if any of these occur:  · Fever of 100.4°F (38°C) or higher  · Spreading of the rash  · Rash does not get better with treatment  · Redness or swelling that gets worse  · Rash becomes more painful  · Foul-smelling fluid leaking from the skin  · Rash improves, but then comes back   Date Last Reviewed: 11/1/2016  © 7588-4362 "[employer]". [address] [place] [address], [place] [address]. All rights reserved. This information is not intended as a substitute for professional medical care. Always follow your healthcare professional's instructions.      Follow up with your doctor in a few days as needed.  Return to the urgent care or go to the ER if symptoms get worse.    Henrik Jackson MD

## 2019-01-19 NOTE — PROGRESS NOTES
"Subjective:       Patient ID: Jatinder Moreno III is a 38 y.o. male.    Vitals:  height is 6' 3" (1.905 m) and weight is 129.3 kg (285 lb). His oral temperature is 98.5 °F (36.9 °C). His blood pressure is 140/97 (abnormal) and his pulse is 91. His respiration is 19 and oxygen saturation is 97%.     Chief Complaint: Folliculitis    Folliculitis   This is a new problem. The current episode started 1 to 4 weeks ago (12/24/2018). The problem occurs constantly. The problem has been gradually worsening. Pertinent negatives include no abdominal pain, anorexia, arthralgias, change in bowel habit, chest pain, chills, congestion, coughing, diaphoresis, fatigue, fever, headaches, joint swelling, myalgias, nausea, neck pain, numbness, rash, sore throat, swollen glands, urinary symptoms, vertigo, visual change, vomiting or weakness. Nothing aggravates the symptoms. Treatments tried: aintbiotics. The treatment provided no relief.       Constitution: Negative for chills, sweating, fatigue and fever.   HENT: Negative for congestion and sore throat.    Neck: Negative for neck pain and painful lymph nodes.   Cardiovascular: Negative for chest pain and leg swelling.   Eyes: Negative for double vision and blurred vision.   Respiratory: Negative for cough and shortness of breath.    Gastrointestinal: Negative for abdominal pain, nausea, vomiting and diarrhea.   Genitourinary: Negative for dysuria, frequency and urgency.   Musculoskeletal: Negative for joint pain, joint swelling, muscle cramps and muscle ache.   Skin: Negative for color change, pale, rash and erythema.   Allergic/Immunologic: Negative for seasonal allergies.   Neurological: Negative for dizziness, history of vertigo, light-headedness, passing out, headaches and numbness.   Hematologic/Lymphatic: Negative for swollen lymph nodes, easy bruising/bleeding and history of blood clots. Does not bruise/bleed easily.   Psychiatric/Behavioral: Negative for nervous/anxious, sleep " disturbance and depression. The patient is not nervous/anxious.        Objective:      Physical Exam   Constitutional: He is oriented to person, place, and time. He appears well-developed and well-nourished.   HENT:   Head: Normocephalic and atraumatic.   Right Ear: External ear normal.   Left Ear: External ear normal.   Multiple folliculitis lesions on the back of the head.   Eyes: EOM are normal. Pupils are equal, round, and reactive to light.   Neck: Normal range of motion. Neck supple. No JVD present. No tracheal deviation present. No thyromegaly present.   Cardiovascular: Normal rate, regular rhythm and normal heart sounds. Exam reveals no gallop and no friction rub.   No murmur heard.  Pulmonary/Chest: Breath sounds normal. No respiratory distress. He has no wheezes. He has no rales. He exhibits no tenderness.   Abdominal: Soft. Bowel sounds are normal. He exhibits no distension and no mass. There is no tenderness. There is no rebound and no guarding. No hernia.   Musculoskeletal: Normal range of motion. He exhibits no edema, tenderness or deformity.   Lymphadenopathy:     He has no cervical adenopathy.   Neurological: He is alert and oriented to person, place, and time. He displays normal reflexes. No cranial nerve deficit. He exhibits normal muscle tone. Coordination normal.   Skin: Skin is warm. Capillary refill takes less than 2 seconds. No rash noted. No erythema. No pallor.   Psychiatric: He has a normal mood and affect. His behavior is normal. Judgment and thought content normal.   Vitals reviewed.      Assessment:       1. Folliculitis        Plan:         Folliculitis  -     clindamycin (CLEOCIN) 300 MG capsule; Take 1 capsule (300 mg total) by mouth 3 (three) times daily. for 10 days  Dispense: 30 capsule; Refill: 0  -     mupirocin (BACTROBAN) 2 % ointment; Apply topically 3 (three) times daily. Apply to the affected area for 10 days  Dispense: 1 Tube; Refill: 0          Patient Instructions      Folliculitis  Folliculitis is an inflammation of a hair follicle. A hair follicle is the little pocket where a hair grows out of the skin. Bacteria normally live on the skin. But sometimes bacteria can get trapped in a follicle and cause infection. This causes a bumpy rash. The area over the follicles is red and raised. It may itch or be painful. The bumps may have fluid (pus) inside. The pus may leak and then form crusts. Sores can spread to other areas of the body. Once it goes away, folliculitis can come back at any time. Severe cases may cause permanent hair loss and scarring.  Folliculitis can happen anywhere on the body where hair grows. It can be caused by rubbing from tight clothing. Ingrown hairs can cause it. Soaking in a hot tub or swimming pool that has bacteria in the water can cause it. It may also occur if a hair follicle is blocked by a bandage.  Sores often go away in a few days with no treatment. In some cases, medicine may be given. A small piece of skin or pus may be taken to find the type of bacteria causing the infection.  Home care  The healthcare provider may prescribe an antibiotic cream or ointment.  Oral antibiotics may also be prescribed. Or you may be told to use an over-the-counter antibiotic cream. Follow all instructions when using any of these medicines.  General care:  · Apply warm, moist compresses to the sores for 20 minutes up to 3 times a day. You can make a compress by soaking a cloth in warm water. Squeeze out excess water.  · Dont cut, poke, or squeeze the sores. This can be painful and spread infection.  · Dont scratch the affected area. Scratching can delay healing.  · Dont shave the areas affected by folliculitis.  · If the sores leak fluid, cover the area with a nonstick gauze bandage. Use as little tape as possible. Carefully discard all soiled bandages.  · Dress in loose cotton clothing.  · Change sheets and blankets if they are soiled by pus. Wash all clothes,  towels, sheets, and cloth diapers in soap and hot water. Do not share clothes, towels, or sheets with other family members.  · Do not soak the sores in bath water. This can spread infection. Instead, keep the area clean by gently washing sores with soap and warm water.  · Wash your hands or use antibacterial gels often to prevent spreading the bacteria.  Follow-up care  Follow up with your healthcare provider, or as advised.  When to seek medical advice  Call your healthcare provider right away if any of these occur:  · Fever of 100.4°F (38°C) or higher  · Spreading of the rash  · Rash does not get better with treatment  · Redness or swelling that gets worse  · Rash becomes more painful  · Foul-smelling fluid leaking from the skin  · Rash improves, but then comes back   Date Last Reviewed: 11/1/2016  © 5014-0752 [employer]. [address] [place] [address], [place] [address]. All rights reserved. This information is not intended as a substitute for professional medical care. Always follow your healthcare professional's instructions.      Follow up with your doctor in a few days as needed.  Return to the urgent care or go to the ER if symptoms get worse.    Henrik Jackson MD

## 2019-01-21 ENCOUNTER — LAB VISIT (OUTPATIENT)
Dept: LAB | Facility: OTHER | Age: 39
End: 2019-01-21
Payer: COMMERCIAL

## 2019-01-21 ENCOUNTER — OFFICE VISIT (OUTPATIENT)
Dept: INTERNAL MEDICINE | Facility: CLINIC | Age: 39
End: 2019-01-21
Payer: COMMERCIAL

## 2019-01-21 VITALS
WEIGHT: 295.44 LBS | BODY MASS INDEX: 36.73 KG/M2 | HEART RATE: 74 BPM | SYSTOLIC BLOOD PRESSURE: 124 MMHG | DIASTOLIC BLOOD PRESSURE: 90 MMHG | OXYGEN SATURATION: 100 % | HEIGHT: 75 IN

## 2019-01-21 DIAGNOSIS — Z13.228 CYSTIC FIBROSIS SCREENING: ICD-10-CM

## 2019-01-21 DIAGNOSIS — L73.9 FOLLICULITIS: Primary | ICD-10-CM

## 2019-01-21 PROBLEM — H65.113 ACUTE ALLERGIC OTITIS MEDIA OF BOTH EARS: Status: RESOLVED | Noted: 2017-04-25 | Resolved: 2019-01-21

## 2019-01-21 PROCEDURE — 81220 CFTR GENE COM VARIANTS: CPT

## 2019-01-21 PROCEDURE — 99213 OFFICE O/P EST LOW 20 MIN: CPT | Mod: S$GLB,,, | Performed by: FAMILY MEDICINE

## 2019-01-21 PROCEDURE — 3074F SYST BP LT 130 MM HG: CPT | Mod: CPTII,S$GLB,, | Performed by: FAMILY MEDICINE

## 2019-01-21 PROCEDURE — 99999 PR PBB SHADOW E&M-EST. PATIENT-LVL V: CPT | Mod: PBBFAC,,, | Performed by: FAMILY MEDICINE

## 2019-01-21 PROCEDURE — 3074F PR MOST RECENT SYSTOLIC BLOOD PRESSURE < 130 MM HG: ICD-10-PCS | Mod: CPTII,S$GLB,, | Performed by: FAMILY MEDICINE

## 2019-01-21 PROCEDURE — 99999 PR PBB SHADOW E&M-EST. PATIENT-LVL V: ICD-10-PCS | Mod: PBBFAC,,, | Performed by: FAMILY MEDICINE

## 2019-01-21 PROCEDURE — 3008F PR BODY MASS INDEX (BMI) DOCUMENTED: ICD-10-PCS | Mod: CPTII,S$GLB,, | Performed by: FAMILY MEDICINE

## 2019-01-21 PROCEDURE — 3008F BODY MASS INDEX DOCD: CPT | Mod: CPTII,S$GLB,, | Performed by: FAMILY MEDICINE

## 2019-01-21 PROCEDURE — 3080F DIAST BP >= 90 MM HG: CPT | Mod: CPTII,S$GLB,, | Performed by: FAMILY MEDICINE

## 2019-01-21 PROCEDURE — 36415 COLL VENOUS BLD VENIPUNCTURE: CPT

## 2019-01-21 PROCEDURE — 99213 PR OFFICE/OUTPT VISIT, EST, LEVL III, 20-29 MIN: ICD-10-PCS | Mod: S$GLB,,, | Performed by: FAMILY MEDICINE

## 2019-01-21 PROCEDURE — 3080F PR MOST RECENT DIASTOLIC BLOOD PRESSURE >= 90 MM HG: ICD-10-PCS | Mod: CPTII,S$GLB,, | Performed by: FAMILY MEDICINE

## 2019-01-21 RX ORDER — MONTELUKAST SODIUM 10 MG/1
10 TABLET ORAL NIGHTLY
Qty: 90 TABLET | Refills: 1 | Status: SHIPPED | OUTPATIENT
Start: 2019-01-21 | End: 2019-07-18 | Stop reason: SDUPTHER

## 2019-01-21 RX ORDER — AZELASTINE HYDROCHLORIDE, FLUTICASONE PROPIONATE 137; 50 UG/1; UG/1
SPRAY, METERED NASAL
Qty: 1 BOTTLE | Refills: 3 | Status: SHIPPED | OUTPATIENT
Start: 2019-01-21 | End: 2019-11-20

## 2019-01-21 RX ORDER — BUSPIRONE HYDROCHLORIDE 10 MG/1
10 TABLET ORAL 3 TIMES DAILY
Qty: 270 TABLET | Refills: 3 | OUTPATIENT
Start: 2019-01-21

## 2019-01-21 RX ORDER — LEVOCETIRIZINE DIHYDROCHLORIDE 5 MG/1
5 TABLET, FILM COATED ORAL NIGHTLY
Qty: 90 TABLET | Refills: 1 | Status: SHIPPED | OUTPATIENT
Start: 2019-01-21 | End: 2019-07-18 | Stop reason: SDUPTHER

## 2019-01-21 RX ORDER — FLUOCINOLONE ACETONIDE 0.11 MG/ML
OIL TOPICAL
Qty: 1 BOTTLE | Refills: 0 | Status: SHIPPED | OUTPATIENT
Start: 2019-01-21 | End: 2019-07-19

## 2019-01-21 NOTE — PROGRESS NOTES
Patient ID: Jatinder Moreno III is a 38 y.o. male.    Chief Complaint: Hair/Scalp Problem    HPI  Jatinder Moreno III is a 38 y.o. year old male with ADHD, depression, anxiety, ALEXX, migraines, allergies who presents today complaining of a scalp rash.     Since 2002 - develops red bumps on scalp. Occasionally pop and have serous drainage. Located minly along hairline. No flakes. It is itchy. Bumps are sore. Typically flares every few months or so. Getting worse, used to flare once a year. Saw urgent care on 1/18/19 for current flare - was diagnosed with scalp folliculitis and prescribed oral clindamycin and topical mupirocin, which he has been taking. Has not noticed much improvement.   States this problem has been evaluated and worked-up by many doctors in the past, both derm and primary care. Has had culture of the lesion done that grew staph. Recently saw Dr. Bhatt (derm) who had patient take minocycline and use topical ketoconazole and Tgel. Has been diagnosed with both seborrheic dermatitis and folliculitis decalvans. Patient states nothing consistently works. He did use topical fluocinolone in the past that helped.   He denies wearing hats regularly. He does use CPAP mask nightly.       I personally reviewed Past Medical History.     Review of Systems   Constitutional: Negative for chills, fatigue and fever.   HENT: Negative for congestion, hearing loss, rhinorrhea and sore throat.    Eyes: Negative for visual disturbance.   Respiratory: Negative for cough, shortness of breath and wheezing.    Cardiovascular: Negative for chest pain and palpitations.   Gastrointestinal: Negative for abdominal pain, constipation, diarrhea, nausea and vomiting.   Skin: Positive for rash.   Neurological: Negative for dizziness, syncope and headaches.   Psychiatric/Behavioral: Negative for dysphoric mood and sleep disturbance. The patient is not nervous/anxious.        Objective:      Vitals:    01/21/19 0821   BP: (!) 124/90  "  Pulse: 74   SpO2: 100%   Weight: 134 kg (295 lb 6.7 oz)   Height: 6' 3" (1.905 m)     Physical Exam   Constitutional: He is oriented to person, place, and time. He appears well-developed and well-nourished. No distress.   HENT:   Head: Normocephalic and atraumatic.   Nose: Nose normal.   Mouth/Throat: Oropharynx is clear and moist.   Eyes: Conjunctivae and EOM are normal.   Neck: Normal range of motion. Neck supple.   Cardiovascular: Normal rate, regular rhythm and normal heart sounds.   No murmur heard.  Pulmonary/Chest: Effort normal and breath sounds normal. No respiratory distress.   Neurological: He is alert and oriented to person, place, and time.   Skin: Skin is warm and dry.        Psychiatric: He has a normal mood and affect. His behavior is normal. Thought content normal.   Nursing note and vitals reviewed.      Assessment:       1. Folliculitis        Plan:   Jatinder was seen today for hair/scalp problem.    Diagnoses and all orders for this visit:    Folliculitis        - Patient has had recurrent folliculitis of the scalp. Appears to be bacterial based on some response to antibiotics and culture growing staph in the past. Patient would like to see another dermatologist for another opinion. Will refer. Advised patient to finish course of clindamycin and use topical mupirocin on the lesions. Try using topical fluocinolone at night.   -     Ambulatory Referral to Dermatology  -     fluocinolone (DERMA-SMOOTHE) 0.01 % external oil; Massage into wet or dampened scalp; cover with shower cap. Leave on overnight and wash out in the morning    Other orders - refills   -     montelukast (SINGULAIR) 10 mg tablet; Take 1 tablet (10 mg total) by mouth every evening.  -     levocetirizine (XYZAL) 5 MG tablet; Take 1 tablet (5 mg total) by mouth every evening.  -     azelastine-fluticasone (DYMISTA) 137-50 mcg/spray Spry nassal spray; USE 1 SPRAY INTO EACH NOSTRIL TWICE A DAY             "

## 2019-01-27 LAB — CFTR MUT ANL BLD/T: NORMAL

## 2019-02-13 ENCOUNTER — OFFICE VISIT (OUTPATIENT)
Dept: NEUROLOGY | Facility: CLINIC | Age: 39
End: 2019-02-13
Payer: COMMERCIAL

## 2019-02-13 VITALS
DIASTOLIC BLOOD PRESSURE: 74 MMHG | HEIGHT: 75 IN | BODY MASS INDEX: 36.81 KG/M2 | HEART RATE: 82 BPM | WEIGHT: 296.06 LBS | SYSTOLIC BLOOD PRESSURE: 130 MMHG

## 2019-02-13 DIAGNOSIS — G43.719 INTRACTABLE CHRONIC MIGRAINE WITHOUT AURA AND WITHOUT STATUS MIGRAINOSUS: Primary | ICD-10-CM

## 2019-02-13 PROCEDURE — 99999 PR PBB SHADOW E&M-EST. PATIENT-LVL III: ICD-10-PCS | Mod: PBBFAC,,, | Performed by: NURSE PRACTITIONER

## 2019-02-13 PROCEDURE — 99499 UNLISTED E&M SERVICE: CPT | Mod: S$GLB,,, | Performed by: NURSE PRACTITIONER

## 2019-02-13 PROCEDURE — 99999 PR PBB SHADOW E&M-EST. PATIENT-LVL III: CPT | Mod: PBBFAC,,, | Performed by: NURSE PRACTITIONER

## 2019-02-13 PROCEDURE — 64615 PR CHEMODENERVATION OF MUSCLE FOR CHRONIC MIGRAINE: ICD-10-PCS | Mod: S$GLB,,, | Performed by: NURSE PRACTITIONER

## 2019-02-13 PROCEDURE — 64615 CHEMODENERV MUSC MIGRAINE: CPT | Mod: S$GLB,,, | Performed by: NURSE PRACTITIONER

## 2019-02-13 PROCEDURE — 99499 NO LOS: ICD-10-PCS | Mod: S$GLB,,, | Performed by: NURSE PRACTITIONER

## 2019-02-13 NOTE — PROGRESS NOTES
Mr. Moreno is here today for management of  headaches.    Since last seen he denies s/e from his 4th botox , HIT-6= 55. HA have been less frequenet past few weeks, maybe since done with school and holidays over. Were lot of family events to attend. 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 No recent aura,  peripheral bilateral kaleidoscope. HA remains same location and nature and triggers. Remains on inderal.     Continues to use apap nightly, faithfully    PROCEDURE NOTE:     After informed consent signed and dated, and risks and benefits of procedure was discussed, BOTOX injection was performed     Frontalis- 15 units on each side   - 5 units on each side   Procerus- 10 units   Occipitalis- 20 units on each side   Temporalis - 25 units on each side   Trapezius- 15 units on each side   Cervical paraspinal muscles- 15 units on each side     Patient tolerated procedure well w/o any complications.       HISTORY:  12/11/2013:  He has since been setup (10/14) with aPAP 9-16cm, tolerating pressures well. He uses a nasal mask with a chin strap. He denies mask air leaks, nasal drying, or oral drying. His morning headaches and overall frequency of migraines has significantly improved. He is using therapy nightly, even brings machine to his girlfriend's house. He feels more refreshed in the mornings, has less nocturia, no more snoring,  and is no longer bothered by urge to move his legs during sleep initiation. He no longer awakens with feeling of heart racing.     He reports having total of ~7 migraines since last seen 3-mos ago, 2-3 of them severe. NO missed work in interim due to HA. Tramadol or Cambia/Amerge or Zomig remain effective.  HA starts from the R frontal/retroorbital area, at times radiates to the ipsilateral occipito-cervical area, pressure in quality,  with associated symptoms of R ptosis, nausea, photo/phonophobia, generalized weakness with R eyelid twitching, chills/sweating (prodrome). Triggers  remain weather changes, sexual activity and stress while movement can worsen attacks. He prefers to rest in a dark quiet cold room to feel better.     Focus improved on Daytrana patch ADHD.    Interrogation: New machine condition, heat at 4, 30d avg 5:48h/night, 1d 7:48hr. 90% pressure 30d 10.8cm, 7d 11.8cm, 1d 14cm. 21/30 days>4h. 20min/6cm ramp setup today. Using nasal pillows mask.     ESS= 8   HIT-6 score= 57    12/7/16: He continues to use cpap 13cm nightly. He has gained <10% wgt since study. Feels horrible if he falls asleep on couch w/o his mask on. [place] replacement [place] [facility] No chin strap use. Denies oral drying. Not using water system anymore, already congested. Continues to use dymista NS twice daily and take xyzal. ESS=9  Interrogation: 0% leak. AHI 1.9, avg 30d= 6:39h/n. Nuance mask. 0% periodic      Reports continued fairly frequent, just about daily mild headaches a lot on way home from work or after high stress situations. He has begun verapamil 120mg [place] in interim which he tolerates well w/o s/e. Reports severe headaches ~ 3/month. Still gets same prodromal symptoms prior to headaches and occasional nausea and photophobia. He is out of cambia, amerge and zomig which all remain effective. Unable to take triptan at work due to sleepiness.  Fioricet in interim prescribed was also helpful at times, but prefers prn tramadol 1/2 50mg mod episodes. He has been taking OTC analgesics frequently for his mild headaches. Remains very anxious/worrier. He has gotten  and is expecting 1st baby in summer '17!! 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     6/20/17:  Since last seen, he continues to use cpap 13-16cm nightly. Would like to trial alternative nasal mask. His straps get worn, current one needs replacing. No chin strap.Denies oral drying. Saw Dr. Reyes recently (ENT). HIt-6= 61. Inderal LA helping reduce intensity of headaches,having less severe episodes in  "interim. Had a bad one/lingering recently. zomig PO costly, using more amerge + cambia. Still taking up to 2hr notice complete relief (feels parasthesias then intense flush/sweat). Having ~ 15 headaches/month, but overall less frequent since last seen monthly. Inquiring about botox. Headache remains same location/nature.     Interrogation- AHI 1.4, avg 7:30h/n. Nasal pillow mask. 0% leak. 90% tile 13.4-14. 30/30d>4h.     12/29/17:   Since last seen he continues to have very frequent headaches, >15day/month. AMerge/cambia typically helps but having frequent abrupt onset episodes and pills take longer to work. Zomig NS was not covered. Lost 2#. Had new baby girl. Interested in trialing botox. Had first aura recently, peripheral bilateral kaleidoscope. HA remains same location and nature and triggers.     Continues to use apap nightly, faithfully.   Interrogation- 90% tile 14cm, avg 7:18h/n. AHI 2.1    5/2/18:   Since last seen he denies s/e from his 1stBotox (1/19th) and reports overall"better", less headaches Feb and April, bit more recently since overdue for botox. HA diary reveals ~ total 14 severe ones in interim, others mild-mod. Zomig costly and Cambia costly.  Had new baby girl. HX recent aura,  peripheral bilateral kaleidosc    11/14/18:  Since last seen he denies s/e from his 3rd botox , HIT-6= 57. 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 Only 1 bad headache had to miss work, still had some pain upon awakening after taking abortive meds at bedtime. Reluctant to take Imitrex 6mg SC due to potential s/e with young daughter in house. No recent aura,  peripheral bilateral kaleidoscope. HA remains same location and nature and triggers. Remains on inderal.     Continues to use apap nightly, faithfully    PROCEDURE NOTE:     After informed consent signed and dated, " "and risks and benefits of procedure was discussed, BOTOX injection was performed     Frontalis- 15 units on each side   - 5 units on each side   Procerus- 10 units   Occipitalis- 20 units on each side   Temporalis - 25 units on each side   Trapezius- 15 units on each side   Cervical paraspinal muscles- 15 units on each side     PSG : (273#) 7/27/13: AHI was 18.4 with an oxygen magaly of 85.0%. The supine AHI was 22.3 and the REM AHI was 28.3. The patient did not qualify for a split night study due to an insufficient number of events in the first half of the study. Setup with APAP    ROS: Mood stable, wgt stable, otherwise a balance review of 10-systems is negative.     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 AHI<5  Anxiety    PLAN     Preventative therapy:  Continue Inderal LA 120mg qd    Abortive therapy: Continue Zomig 5 mg or Amerge 2.5 mg PO q 2h, up to maximum of 5mg/24h , do not delay treatment. Continue imitrex 6mg SC q2h prn    Continue Phenergan 12.5-25 mg PO q6-8h prn for HA/nausea     Continue Tramadol 50 mg 1/2-1 tabs PO q6-8 h prn for headache  Refilled today    Continue toradol 10mg PO q8hr prn + triptan    Continue to regulate sleep, mealtimes, try to reduce stress and to avoid known headache triggers/aggravating factors    Continue autoPAP 13-16cm. Continue excellent usage. [facility] DME prn supplies, bring machine next time for " interrogation    RTC 3-mos botox

## 2019-03-25 ENCOUNTER — INITIAL CONSULT (OUTPATIENT)
Dept: DERMATOLOGY | Facility: CLINIC | Age: 39
End: 2019-03-25
Payer: COMMERCIAL

## 2019-03-25 DIAGNOSIS — L73.9 FOLLICULITIS: Primary | ICD-10-CM

## 2019-03-25 DIAGNOSIS — D48.5 NEOPLASM OF UNCERTAIN BEHAVIOR OF SKIN: ICD-10-CM

## 2019-03-25 PROCEDURE — 11102 TANGNTL BX SKIN SINGLE LES: CPT | Mod: S$GLB,,, | Performed by: DERMATOLOGY

## 2019-03-25 PROCEDURE — 88305 TISSUE SPECIMEN TO PATHOLOGY, DERMATOLOGY: ICD-10-PCS | Mod: 26,,, | Performed by: PATHOLOGY

## 2019-03-25 PROCEDURE — 99999 PR PBB SHADOW E&M-EST. PATIENT-LVL II: ICD-10-PCS | Mod: PBBFAC,,, | Performed by: DERMATOLOGY

## 2019-03-25 PROCEDURE — 87070 CULTURE OTHR SPECIMN AEROBIC: CPT

## 2019-03-25 PROCEDURE — 88305 TISSUE EXAM BY PATHOLOGIST: CPT | Performed by: PATHOLOGY

## 2019-03-25 PROCEDURE — 99202 PR OFFICE/OUTPT VISIT, NEW, LEVL II, 15-29 MIN: ICD-10-PCS | Mod: 25,S$GLB,, | Performed by: DERMATOLOGY

## 2019-03-25 PROCEDURE — 99999 PR PBB SHADOW E&M-EST. PATIENT-LVL II: CPT | Mod: PBBFAC,,, | Performed by: DERMATOLOGY

## 2019-03-25 PROCEDURE — 11102 PR TANGENTIAL BIOPSY, SKIN, SINGLE LESION: ICD-10-PCS | Mod: S$GLB,,, | Performed by: DERMATOLOGY

## 2019-03-25 PROCEDURE — 88305 TISSUE EXAM BY PATHOLOGIST: CPT | Mod: 26,,, | Performed by: PATHOLOGY

## 2019-03-25 PROCEDURE — 99202 OFFICE O/P NEW SF 15 MIN: CPT | Mod: 25,S$GLB,, | Performed by: DERMATOLOGY

## 2019-03-25 RX ORDER — SODIUM SULFACETAMIDE 100 MG/ML
SHAMPOO TOPICAL
Qty: 237 ML | Refills: 6 | Status: SHIPPED | OUTPATIENT
Start: 2019-03-25 | End: 2019-07-19

## 2019-03-25 RX ORDER — DOXYCYCLINE 40 MG/1
40 CAPSULE ORAL DAILY
Qty: 30 CAPSULE | Refills: 10 | Status: SHIPPED | OUTPATIENT
Start: 2019-03-25 | End: 2019-04-24

## 2019-03-25 NOTE — PROGRESS NOTES
Subjective:       Patient ID:  Jatinder Moreno III is a 38 y.o. male who presents for   Chief Complaint   Patient presents with    Folliculitis     Pt c/o folliculitis on scalp x many years. Tx with lidex solution and mometasone solution. Topical clindamycin solution has helped as well.  T gel shampoo and ketoconazole shampoo exacerbated the condition.  Does not recall using sulfur based shampoos.  No selenium sulfide based products as well.  Has also taken keflex and rifampin orally but recurred.  Also has been on bactrim and mupirocen, doxycyline, minocycline, clindamycin- which caused GI distress.  Keeping hair short helps.  Rarely gets lesions on eyebrows, facial hair, chest.  Rarely gets scaly.    Pt has seen several dermatologists and PCP in the past but they come and go and that frustrates him.       Has lesion on right nose. Red and inflamed .  Not bleeding. No tx.  Getting larger.  Present for 1 year .         Review of Systems   Constitutional: Negative for fever and chills.   Skin: Positive for rash. Negative for tendency to form keloidal scars.   Hematologic/Lymphatic: Does not bruise/bleed easily.        Objective:    Physical Exam   Constitutional: He appears well-developed and well-nourished. No distress.   Neurological: He is alert and oriented to person, place, and time. He is not disoriented.   Psychiatric: He has a normal mood and affect.   Skin:   Areas Examined (abnormalities noted in diagram):   Scalp / Hair Palpated and Inspected  Head / Face Inspection Performed  Neck Inspection Performed  Chest / Axilla Inspection Performed              Diagram Legend     Erythematous scaling macule/papule c/w actinic keratosis       Vascular papule c/w angioma      Pigmented verrucoid papule/plaque c/w seborrheic keratosis      Yellow umbilicated papule c/w sebaceous hyperplasia      Irregularly shaped tan macule c/w lentigo     1-2 mm smooth white papules consistent with Milia      Movable subcutaneous  cyst with punctum c/w epidermal inclusion cyst      Subcutaneous movable cyst c/w pilar cyst      Firm pink to brown papule c/w dermatofibroma      Pedunculated fleshy papule(s) c/w skin tag(s)      Evenly pigmented macule c/w junctional nevus     Mildly variegated pigmented, slightly irregular-bordered macule c/w mildly atypical nevus      Flesh colored to evenly pigmented papule c/w intradermal nevus       Pink pearly papule/plaque c/w basal cell carcinoma      Erythematous hyperkeratotic cursted plaque c/w SCC      Surgical scar with no sign of skin cancer recurrence      Open and closed comedones      Inflammatory papules and pustules      Verrucoid papule consistent consistent with wart     Erythematous eczematous patches and plaques     Dystrophic onycholytic nail with subungual debris c/w onychomycosis     Umbilicated papule    Erythematous-base heme-crusted tan verrucoid plaque consistent with inflamed seborrheic keratosis     Erythematous Silvery Scaling Plaque c/w Psoriasis     See annotation      Assessment / Plan:      Pathology Orders:     Normal Orders This Visit    Tissue Specimen To Pathology, Dermatology     Questions:    Directional Terms:  Other(comment)    Clinical Information:  r/o fibrous papule    Specific Site:  right nose        Folliculitis  -     sulfacetamide sodium (OVACE PLUS SHAMPOO) 10 % Sham; Apply to scalp and let sit 5 minutes then rinse  2-3 x per weeks  Dispense: 237 mL; Refill: 6  -     doxycycline (ORACEA) 40 mg capsule; Take 1 capsule (40 mg total) by mouth once daily.  Dispense: 30 capsule; Refill: 10  Discussed benefits and risks of doxycyline therapy including but not limited to GI discomfort, esophageal irritation/ulceration, and increased sun sensitivity. Patient was counseled to take medicine with meals and at least 1 hour before lying down.     Lidex solution   -     Aerobic culture    Neoplasm of uncertain behavior of skin  Shave biopsy procedure note:    Shave biopsy  performed after verbal consent including risk of infection, scar, recurrence, need for additional treatment of site. Area prepped with alcohol, anesthetized with approximately 1.0cc of 1% lidocaine with epinephrine. Lesional tissue shaved with razor blade. Hemostasis achieved with application of aluminum chloride followed by hyfrecation. No complications. Dressing applied. Wound care explained.      -     Tissue Specimen To Pathology, Dermatology             Follow up in about 2 months (around 5/25/2019) for prn bx report.

## 2019-03-25 NOTE — LETTER
March 25, 2019      Julienne Levine MD  [address]  [address]  [place] [address]           [place] [facility]  [address]  [place] [address]  Phone: [phone]  Fax: [phone]          Patient: Jatinder Moreno III   MR Number: [id number]   YOB: 1980   Date of Visit: 3/25/2019       Dear Dr. Julienne Levine:    Thank you for referring Jatinder Moreno to me for evaluation. Attached you will find relevant portions of my assessment and plan of care.    If you have questions, please do not hesitate to call me. I look forward to following Jatinder Moreno along with you.    Sincerely,    Cinthya Matthews MD    Enclosure  CC:  No Recipients    If you would like to receive this communication electronically, please contact [email or web address] or [phone] to request more information on [email or web address] Link access.    For providers and/or their staff who would like to refer a patient to [facility], please contact us through our one-stop-shop provider referral line, [facility], at [phone].    If you feel you have received this communication in error or would no longer like to receive these types of communications, please e-mail [email or web address]

## 2019-03-27 ENCOUNTER — OFFICE VISIT (OUTPATIENT)
Dept: PSYCHIATRY | Facility: CLINIC | Age: 39
End: 2019-03-27
Payer: COMMERCIAL

## 2019-03-27 VITALS
DIASTOLIC BLOOD PRESSURE: 82 MMHG | HEIGHT: 75 IN | BODY MASS INDEX: 36.48 KG/M2 | HEART RATE: 79 BPM | WEIGHT: 293.44 LBS | SYSTOLIC BLOOD PRESSURE: 134 MMHG

## 2019-03-27 DIAGNOSIS — F98.8 ADULT ATTENTION DEFICIT DISORDER: Primary | ICD-10-CM

## 2019-03-27 DIAGNOSIS — F41.1 GENERALIZED ANXIETY DISORDER: ICD-10-CM

## 2019-03-27 PROCEDURE — 3008F PR BODY MASS INDEX (BMI) DOCUMENTED: ICD-10-PCS | Mod: CPTII,S$GLB,, | Performed by: PSYCHIATRY & NEUROLOGY

## 2019-03-27 PROCEDURE — 3079F PR MOST RECENT DIASTOLIC BLOOD PRESSURE 80-89 MM HG: ICD-10-PCS | Mod: CPTII,S$GLB,, | Performed by: PSYCHIATRY & NEUROLOGY

## 2019-03-27 PROCEDURE — 99214 OFFICE O/P EST MOD 30 MIN: CPT | Mod: S$GLB,,, | Performed by: PSYCHIATRY & NEUROLOGY

## 2019-03-27 PROCEDURE — 99999 PR PBB SHADOW E&M-EST. PATIENT-LVL III: CPT | Mod: PBBFAC,,, | Performed by: PSYCHIATRY & NEUROLOGY

## 2019-03-27 PROCEDURE — 3075F PR MOST RECENT SYSTOLIC BLOOD PRESS GE 130-139MM HG: ICD-10-PCS | Mod: CPTII,S$GLB,, | Performed by: PSYCHIATRY & NEUROLOGY

## 2019-03-27 PROCEDURE — 99214 PR OFFICE/OUTPT VISIT, EST, LEVL IV, 30-39 MIN: ICD-10-PCS | Mod: S$GLB,,, | Performed by: PSYCHIATRY & NEUROLOGY

## 2019-03-27 PROCEDURE — 3079F DIAST BP 80-89 MM HG: CPT | Mod: CPTII,S$GLB,, | Performed by: PSYCHIATRY & NEUROLOGY

## 2019-03-27 PROCEDURE — 3075F SYST BP GE 130 - 139MM HG: CPT | Mod: CPTII,S$GLB,, | Performed by: PSYCHIATRY & NEUROLOGY

## 2019-03-27 PROCEDURE — 3008F BODY MASS INDEX DOCD: CPT | Mod: CPTII,S$GLB,, | Performed by: PSYCHIATRY & NEUROLOGY

## 2019-03-27 PROCEDURE — 99999 PR PBB SHADOW E&M-EST. PATIENT-LVL III: ICD-10-PCS | Mod: PBBFAC,,, | Performed by: PSYCHIATRY & NEUROLOGY

## 2019-03-27 RX ORDER — BUSPIRONE HYDROCHLORIDE 10 MG/1
10 TABLET ORAL 3 TIMES DAILY
Qty: 270 TABLET | Refills: 3 | Status: SHIPPED | OUTPATIENT
Start: 2019-03-27 | End: 2020-04-17 | Stop reason: SDUPTHER

## 2019-03-27 RX ORDER — METHYLPHENIDATE HYDROCHLORIDE 20 MG/1
20 TABLET ORAL
Qty: 90 TABLET | Refills: 0 | Status: SHIPPED | OUTPATIENT
Start: 2019-03-27 | End: 2020-11-17

## 2019-03-27 RX ORDER — DIAZEPAM 10 MG/1
10 TABLET ORAL EVERY 6 HOURS PRN
Qty: 60 TABLET | Refills: 1 | Status: SHIPPED | OUTPATIENT
Start: 2019-03-27 | End: 2020-04-17 | Stop reason: SDUPTHER

## 2019-03-27 NOTE — PROGRESS NOTES
Outpatient Psychiatry Follow-Up Visit (MD/NP)    3/27/2019    Clinical Status of Patient:  Outpatient (Ambulatory)    Chief Complaint:  Jatinder Moreno III is a 38 y.o. male who presents today for follow-up of anxiety and attention problems.  Met with patient.      Interval History and Content of Current Session:  Interim Events/Subjective Report/Content of Current Session: Fermin Moreno presents to clinic after a long hiatus.  New job with the [employer] and doing well, still with stressors.  Marriage going well.  Wife pregnant for second child.  He has graduated from school and is doing well overall.  He still procrastinates and this is his biggest worry.  Likes the way BuSpar helps with his anxiety.  Takes Valium rarely and 1 prescription as last an entire year.  Also takes Ritalin when he has to focus and/or take a test at work.  Asking for refills of all of his medications.    Psychotherapy:  · Target symptoms: distractability, lack of focus, anxiety , work stress  · Why chosen therapy is appropriate versus another modality: relevant to diagnosis, patient responds to this modality  · Outcome monitoring methods: self-report, observation  · Therapeutic intervention type: supportive psychotherapy  · Topics discussed/themes: work stress, building skills sets for symptom management, symptom recognition  · The patient's response to the intervention is accepting. The patient's progress toward treatment goals is fair.   · Duration of intervention: 15 minutes.    Review of Systems   · PSYCHIATRIC: Pertinant items are noted in the narrative.  · CONSTITUTIONAL: No weight gain or loss.   · MUSCULOSKELETAL: No pain or stiffness of the joints.  · NEUROLOGIC: No weakness, sensory changes, seizures, confusion, memory loss, tremor or other abnormal movements.  · RESPIRATORY: No shortness of breath.  · CARDIOVASCULAR: No tachycardia or chest pain.  · GASTROINTESTINAL: No nausea, vomiting, pain, constipation or diarrhea.    Past  "Medical, Family and Social History: The patient's past medical, family and social history have been reviewed and updated as appropriate within the electronic medical record - see encounter notes.    Compliance: yes    Side effects: None    Risk Parameters:  Patient reports no suicidal ideation  Patient reports no homicidal ideation  Patient reports no self-injurious behavior  Patient reports no violent behavior    Exam (detailed: at least 9 elements; comprehensive: all 15 elements)   Constitutional  Vitals:  Most recent vital signs, dated less than 90 days prior to this appointment, were reviewed.   Vitals:    03/27/19 0848   BP: 134/82   Pulse: 79   Weight: 133.1 kg (293 lb 6.9 oz)   Height: 6' 3" (1.905 m)        General:  age appropriate, overweight     Musculoskeletal  Muscle Strength/Tone:  no tremor, no tic   Gait & Station:  non-ataxic     Psychiatric  Speech:  no latency; no press   Mood & Affect:  anxious  congruent and appropriate   Thought Process:  normal and logical   Associations:  intact   Thought Content:  normal, no suicidality, no homicidality, delusions, or paranoia   Insight:  has awareness of illness   Judgement: behavior is adequate to circumstances   Orientation:  person, place, situation, time/date   Memory: intact for content of interview   Language: grossly intact, able to name, able to repeat   Attention Span & Concentration:  distracted   Fund of Knowledge:  intact and appropriate to age and level of education     Assessment and Diagnosis   Status/Progress: Based on the examination today, the patient's problem(s) is/are inadequately controlled.  New problems have been presented today.   Co-morbidities are complicating management of the primary condition.  There are no active rule-out diagnoses for this patient at this time.     General Impression: We will continue pharmacological intervention and adjunctive therapy.       ICD-10-CM ICD-9-CM   1. Adult attention deficit disorder F98.8 " 314.00   2. Generalized anxiety disorder F41.1 300.02       Intervention/Counseling/Treatment Plan   · Medication Management: Continue current medications. The risks and benefits of medication were discussed with the patient.  · Counseling provided with patient as follows: importance of compliance with chosen treatment options was emphasized, risks and benefits of treatment options, including medications, were discussed with the patient, risk factor reduction, prognosis, patient education, instructions for  management, treatment and follow-up were reviewed  1.  Continue buspirone 10mg TID targeting anxiety and depression.  Warned of risk of suicidality, roberth, and serotonin syndrome.  2.  Continue Ritalin 20 mg by mouth 3 times a day targeting focus and concentration.  Warned of risk of roberth, anxiety, addiction potential, HTN, withdrawals.  Also will need to be seen in office once every 3 months to continue prescriptions for this class of medications.  3.  Increase diazepam to 10 mg by mouth daily as needed for anxiety.  Warned of risk of oversedation, falls, and not to drink or drive until the effects of the medication are known.  Warned of risk of addictive and withdrawal potential.       Return to Clinic: 3 months, as needed

## 2019-03-28 LAB — BACTERIA SPEC AEROBE CULT: NORMAL

## 2019-04-04 ENCOUNTER — PATIENT MESSAGE (OUTPATIENT)
Dept: DERMATOLOGY | Facility: CLINIC | Age: 39
End: 2019-04-04

## 2019-04-24 ENCOUNTER — PATIENT MESSAGE (OUTPATIENT)
Dept: PSYCHIATRY | Facility: CLINIC | Age: 39
End: 2019-04-24

## 2019-04-24 RX ORDER — DOXEPIN HYDROCHLORIDE 10 MG/1
10-20 CAPSULE ORAL NIGHTLY
Qty: 60 CAPSULE | Refills: 0 | Status: SHIPPED | OUTPATIENT
Start: 2019-04-24 | End: 2019-07-19

## 2019-04-26 ENCOUNTER — PATIENT MESSAGE (OUTPATIENT)
Dept: DERMATOLOGY | Facility: CLINIC | Age: 39
End: 2019-04-26

## 2019-05-01 ENCOUNTER — PATIENT MESSAGE (OUTPATIENT)
Dept: DERMATOLOGY | Facility: CLINIC | Age: 39
End: 2019-05-01

## 2019-05-01 DIAGNOSIS — L73.9 FOLLICULITIS: Primary | ICD-10-CM

## 2019-05-01 RX ORDER — MOMETASONE FUROATE 1 MG/ML
SOLUTION TOPICAL
Qty: 60 ML | Refills: 6 | Status: SHIPPED | OUTPATIENT
Start: 2019-05-01 | End: 2020-01-23 | Stop reason: SDUPTHER

## 2019-05-15 ENCOUNTER — OFFICE VISIT (OUTPATIENT)
Dept: NEUROLOGY | Facility: CLINIC | Age: 39
End: 2019-05-15
Payer: COMMERCIAL

## 2019-05-15 VITALS
HEIGHT: 75 IN | DIASTOLIC BLOOD PRESSURE: 85 MMHG | SYSTOLIC BLOOD PRESSURE: 124 MMHG | BODY MASS INDEX: 36.9 KG/M2 | HEART RATE: 74 BPM | WEIGHT: 296.75 LBS

## 2019-05-15 DIAGNOSIS — G47.33 OBSTRUCTIVE SLEEP APNEA (ADULT) (PEDIATRIC): ICD-10-CM

## 2019-05-15 DIAGNOSIS — G43.711 INTRACTABLE CHRONIC MIGRAINE WITHOUT AURA AND WITH STATUS MIGRAINOSUS: ICD-10-CM

## 2019-05-15 DIAGNOSIS — G43.719 INTRACTABLE CHRONIC MIGRAINE WITHOUT AURA AND WITHOUT STATUS MIGRAINOSUS: Primary | ICD-10-CM

## 2019-05-15 PROCEDURE — 99999 PR PBB SHADOW E&M-EST. PATIENT-LVL IV: CPT | Mod: PBBFAC,,, | Performed by: NURSE PRACTITIONER

## 2019-05-15 PROCEDURE — 64615 PR CHEMODENERVATION OF MUSCLE FOR CHRONIC MIGRAINE: ICD-10-PCS | Mod: S$GLB,,, | Performed by: NURSE PRACTITIONER

## 2019-05-15 PROCEDURE — 64615 CHEMODENERV MUSC MIGRAINE: CPT | Mod: S$GLB,,, | Performed by: NURSE PRACTITIONER

## 2019-05-15 PROCEDURE — 99999 PR PBB SHADOW E&M-EST. PATIENT-LVL IV: ICD-10-PCS | Mod: PBBFAC,,, | Performed by: NURSE PRACTITIONER

## 2019-05-15 PROCEDURE — 99499 UNLISTED E&M SERVICE: CPT | Mod: S$GLB,,, | Performed by: NURSE PRACTITIONER

## 2019-05-15 PROCEDURE — 99499 NO LOS: ICD-10-PCS | Mod: S$GLB,,, | Performed by: NURSE PRACTITIONER

## 2019-05-15 RX ORDER — ZOLMITRIPTAN 2.5 MG/1
2.5 TABLET, FILM COATED ORAL
Qty: 9 TABLET | Refills: 5 | Status: SHIPPED | OUTPATIENT
Start: 2019-05-15 | End: 2020-05-20 | Stop reason: SDUPTHER

## 2019-05-15 RX ORDER — TRAMADOL HYDROCHLORIDE 50 MG/1
50 TABLET ORAL EVERY 6 HOURS PRN
Qty: 30 TABLET | Refills: 3 | Status: SHIPPED | OUTPATIENT
Start: 2019-05-15 | End: 2019-11-20 | Stop reason: SDUPTHER

## 2019-05-15 RX ORDER — DICLOFENAC POTASSIUM 50 MG/1
1 POWDER, FOR SOLUTION ORAL EVERY 6 HOURS PRN
Qty: 9 EACH | Refills: 5 | Status: SHIPPED | OUTPATIENT
Start: 2019-05-15 | End: 2019-11-20 | Stop reason: SDUPTHER

## 2019-05-15 NOTE — PROGRESS NOTES
Mr. Moreno is here today for management of  headaches.    Since last seen he denies s/e from his 5th botox , HIT-6= 62. HA have been less frequent since botox but close to repeat they return, past 2-3 wks more frequent. Still inc'd stress. 2nd baby due August. No recent aura,  peripheral bilateral kaleidoscope. HA remains same location and nature and triggers. Remains on inderal.     Continues to use apap nightly, faithfully  Interrogation- 7h/night. AHI 1.2, nuance mask. 90% tile 14cm    PROCEDURE NOTE:     After informed consent signed and dated, and risks and benefits of procedure was discussed, BOTOX injection was performed     Frontalis- 15 units on each side   - 5 units on each side   Procerus- 10 units   Occipitalis- 20 units on each side   Temporalis - 25 units on each side   Trapezius- 15 units on each side   Cervical paraspinal muscles- 15 units on each side     Patient tolerated procedure well w/o any complications.       HISTORY:  12/11/2013:  He has since been setup (10/14) with aPAP 9-16cm, tolerating pressures well. He uses a nasal mask with a chin strap. He denies mask air leaks, nasal drying, or oral drying. His morning headaches and overall frequency of migraines has significantly improved. He is using therapy nightly, even brings machine to his girlfriend's house. He feels more refreshed in the mornings, has less nocturia, no more snoring,  and is no longer bothered by urge to move his legs during sleep initiation. He no longer awakens with feeling of heart racing.     He reports having total of ~7 migraines since last seen 3-mos ago, 2-3 of them severe. NO missed work in interim due to HA. Tramadol or Cambia/Amerge or Zomig remain effective.  HA starts from the R frontal/retroorbital area, at times radiates to the ipsilateral occipito-cervical area, pressure in quality,  with associated symptoms of R ptosis, nausea, photo/phonophobia, generalized weakness with R eyelid twitching,  chills/sweating (prodrome). Triggers remain weather changes, sexual activity and stress while movement can worsen attacks. He prefers to rest in a dark quiet cold room to feel better.     Focus improved on Daytrana patch ADHD.    Interrogation: New machine condition, heat at 4, 30d avg 5:48h/night, 1d 7:48hr. 90% pressure 30d 10.8cm, 7d 11.8cm, 1d 14cm. 21/30 days>4h. 20min/6cm ramp setup today. Using nasal pillows mask.     ESS= 8   HIT-6 score= 57    12/7/16: He continues to use cpap 13cm nightly. He has gained <10% wgt since study. Feels horrible if he falls asleep on couch w/o his mask on. Getting regular replacement supplies from [facility]. No chin strap use. Denies oral drying. Not using water system anymore, already congested. Continues to use dymista NS twice daily and take xyzal. ESS=9  Interrogation: 0% leak. AHI 1.9, avg 30d= 6:39h/n. Nuance mask. 0% periodic      Reports continued fairly frequent, just about daily mild headaches a lot on way home from work or after high stress situations. He has begun verapamil 120mg La in interim which he tolerates well w/o s/e. Reports severe headaches ~ 3/month. Still gets same prodromal symptoms prior to headaches and occasional nausea and photophobia. He is out of cambia, amerge and zomig which all remain effective. Unable to take triptan at work due to sleepiness.  Fioricet in interim prescribed was also helpful at times, but prefers prn tramadol 1/2 50mg mod episodes. He has been taking OTC analgesics frequently for his mild headaches. Remains very anxious/worrier. He has gotten  and is expecting 1st baby in summer '17!! 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     6/20/17:  Since last seen, he continues to use cpap 13-16cm nightly. Would like to trial alternative nasal mask. His straps get worn, current one needs replacing. No chin strap.Denies oral drying. Saw Dr. Reyes recently (ENT). HIt-6= 61. Inderal LA helping reduce intensity of  "headaches,having less severe episodes in interim. Had a bad one/lingering recently. zomig PO costly, using more amerge + cambia. Still taking up to 2hr notice complete relief (feels parasthesias then intense flush/sweat). Having ~ 15 headaches/month, but overall less frequent since last seen monthly. Inquiring about botox. Headache remains same location/nature.     Interrogation- AHI 1.4, avg 7:30h/n. Nasal pillow mask. 0% leak. 90% tile 13.4-14. 30/30d>4h.     12/29/17:   Since last seen he continues to have very frequent headaches, >15day/month. AMerge/cambia typically helps but having frequent abrupt onset episodes and pills take longer to work. Zomig NS was not covered. Lost 2#. Had new baby girl. Interested in trialing botox. Had first aura recently, peripheral bilateral kaleidoscope. HA remains same location and nature and triggers.     Continues to use apap nightly, faithfully.   Interrogation- 90% tile 14cm, avg 7:18h/n. AHI 2.1    5/2/18:   Since last seen he denies s/e from his 1stBotox (1/19th) and reports overall"better", less headaches Feb and April, bit more recently since overdue for botox. HA diary reveals ~ total 14 severe ones in interim, others mild-mod. Zomig costly and Cambia costly.  Had new baby girl. HX recent aura,  peripheral bilateral kaleidosc    11/14/18:  Since last seen he denies s/e from his 3rd botox , HIT-6= 57. 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 Only 1 bad headache had to miss work, still had some pain upon awakening after taking abortive meds at bedtime. Reluctant to take Imitrex 6mg SC due to potential s/e with young daughter in house. No recent aura,  peripheral bilateral kaleidoscope. HA remains same location and nature and triggers. Remains on inderal.     Continues to use apap nightly, faithfully    PROCEDURE NOTE: " "    After informed consent signed and dated, and risks and benefits of procedure was discussed, BOTOX injection was performed     Frontalis- 15 units on each side   - 5 units on each side   Procerus- 10 units   Occipitalis- 20 units on each side   Temporalis - 25 units on each side   Trapezius- 15 units on each side   Cervical paraspinal muscles- 15 units on each side     PSG : (273#) 7/27/13: AHI was 18.4 with an oxygen magaly of 85.0%. The supine AHI was 22.3 and the REM AHI was 28.3. The patient did not qualify for a split night study due to an insufficient number of events in the first half of the study. Setup with APAP    ROS: Mood stable, wgt stable, otherwise a balance review of 10-systems is negative.     Tried(verapamil, lexapro, topamax, gabapentin, metoprolol, treximet, relpax, maxalt, imitrex ?dose)    Employer/Position: [employer] now [employer]  FH: HA, brother with presumed ALEXX, witnessed apneic pauses     EXAM:   General: W/D,obese, well-groomed  /85   Pulse 74   Ht 6' 3" (1.905 m)   Wt 134.6 kg (296 lb 11.8 oz)   BMI 37.09 kg/m²         IMPRESSION:   Chronic migraine with and without aura, intractable 15d/month,request botox auth 5/2/18 and 8/14/18 and 11/14/18 and 2/13/19 Improvement in frequency and severity of headaches, some wearing off 2-3 wks prior to repeat botox  ALEXX, moderate, continued excellent aPAP adherence, with improvement of symptoms. AHI<5  Anxiety    PLAN     Preventative therapy:  Continue Inderal LA 120mg qd and BEGIN AJOVY 225mg SC q28, next visit if doing well consider quarterly injections, discussed s/e and purpose    Abortive therapy: Continue Zomig 5 mg or Amerge 2.5 mg PO q 2h, up to maximum of 5mg/24h , do not delay treatment. Continue imitrex 6mg SC q2h prn    Continue Phenergan 12.5-25 mg PO q6-8h prn for HA/nausea     Continue Tramadol 50 mg 1/2-1 tabs PO q6-8 h prn for headache  Refilled today    Resume Cambia 50mg powder q6h prn ([facility] " pharmacy)    Continue to regulate sleep, mealtimes, try to reduce stress and to avoid known headache triggers/aggravating factors    Continue autoPAP 13-16cm. Continue excellent usage. [facility] DME prn supplies    RTC 3-mos botox

## 2019-05-17 ENCOUNTER — TELEPHONE (OUTPATIENT)
Dept: PHARMACY | Facility: CLINIC | Age: 39
End: 2019-05-17

## 2019-05-19 DIAGNOSIS — G43.711 INTRACTABLE CHRONIC MIGRAINE WITHOUT AURA AND WITH STATUS MIGRAINOSUS: ICD-10-CM

## 2019-05-19 RX ORDER — PROPRANOLOL HYDROCHLORIDE 120 MG/1
CAPSULE, EXTENDED RELEASE ORAL
Qty: 30 CAPSULE | Refills: 5 | Status: SHIPPED | OUTPATIENT
Start: 2019-05-19 | End: 2019-09-18 | Stop reason: SDUPTHER

## 2019-05-21 RX ORDER — DOXEPIN HYDROCHLORIDE 10 MG/1
10-20 CAPSULE ORAL NIGHTLY
Qty: 60 CAPSULE | Refills: 0 | OUTPATIENT
Start: 2019-05-21

## 2019-05-22 DIAGNOSIS — G43.719 INTRACTABLE CHRONIC MIGRAINE WITHOUT AURA AND WITHOUT STATUS MIGRAINOSUS: Primary | ICD-10-CM

## 2019-05-22 NOTE — TELEPHONE ENCOUNTER
Ajovy is plan exclusion on patient's insurance. Preferred alternatives are Aimovig or Emgality. inBasket sent to provider.

## 2019-05-23 ENCOUNTER — TELEPHONE (OUTPATIENT)
Dept: PHARMACY | Facility: CLINIC | Age: 39
End: 2019-05-23

## 2019-05-23 NOTE — TELEPHONE ENCOUNTER
LVM to notify the patient we received the prescription for Aimovig, and it will require authorization from the insurance company. We will continue to follow up.

## 2019-06-05 ENCOUNTER — PATIENT MESSAGE (OUTPATIENT)
Dept: DERMATOLOGY | Facility: CLINIC | Age: 39
End: 2019-06-05

## 2019-06-07 ENCOUNTER — TELEPHONE (OUTPATIENT)
Dept: PHARMACY | Facility: CLINIC | Age: 39
End: 2019-06-07

## 2019-06-07 NOTE — TELEPHONE ENCOUNTER
Initial Aimovig consult DECLINED on 19. Aimovig 140mg will be shipped on  to arrive at patient's home on  via [employer]. $ 5 copay and permission to charge CC on file. Patient intends to start Aimovig on  . Address confirmed. Confirmed 2 patient identifiers - name and . Therapy Appropriate.    RPh to touch base with patient in 7 days and [facility] to contact patient in 3 weeks for refills.

## 2019-06-15 ENCOUNTER — OFFICE VISIT (OUTPATIENT)
Dept: URGENT CARE | Facility: CLINIC | Age: 39
End: 2019-06-15
Payer: COMMERCIAL

## 2019-06-15 VITALS
RESPIRATION RATE: 16 BRPM | TEMPERATURE: 98 F | WEIGHT: 285 LBS | HEART RATE: 69 BPM | BODY MASS INDEX: 35.43 KG/M2 | SYSTOLIC BLOOD PRESSURE: 129 MMHG | HEIGHT: 75 IN | OXYGEN SATURATION: 99 % | DIASTOLIC BLOOD PRESSURE: 87 MMHG

## 2019-06-15 DIAGNOSIS — L73.9 FOLLICULITIS: Primary | ICD-10-CM

## 2019-06-15 DIAGNOSIS — L21.9 SEBORRHEIC DERMATITIS OF SCALP: ICD-10-CM

## 2019-06-15 PROCEDURE — 3008F PR BODY MASS INDEX (BMI) DOCUMENTED: ICD-10-PCS | Mod: CPTII,S$GLB,, | Performed by: NURSE PRACTITIONER

## 2019-06-15 PROCEDURE — 99214 OFFICE O/P EST MOD 30 MIN: CPT | Mod: S$GLB,,, | Performed by: NURSE PRACTITIONER

## 2019-06-15 PROCEDURE — 3079F DIAST BP 80-89 MM HG: CPT | Mod: CPTII,S$GLB,, | Performed by: NURSE PRACTITIONER

## 2019-06-15 PROCEDURE — 3008F BODY MASS INDEX DOCD: CPT | Mod: CPTII,S$GLB,, | Performed by: NURSE PRACTITIONER

## 2019-06-15 PROCEDURE — 3079F PR MOST RECENT DIASTOLIC BLOOD PRESSURE 80-89 MM HG: ICD-10-PCS | Mod: CPTII,S$GLB,, | Performed by: NURSE PRACTITIONER

## 2019-06-15 PROCEDURE — 99214 PR OFFICE/OUTPT VISIT, EST, LEVL IV, 30-39 MIN: ICD-10-PCS | Mod: S$GLB,,, | Performed by: NURSE PRACTITIONER

## 2019-06-15 PROCEDURE — 3074F PR MOST RECENT SYSTOLIC BLOOD PRESSURE < 130 MM HG: ICD-10-PCS | Mod: CPTII,S$GLB,, | Performed by: NURSE PRACTITIONER

## 2019-06-15 PROCEDURE — 3074F SYST BP LT 130 MM HG: CPT | Mod: CPTII,S$GLB,, | Performed by: NURSE PRACTITIONER

## 2019-06-15 RX ORDER — CLINDAMYCIN HYDROCHLORIDE 300 MG/1
300 CAPSULE ORAL 3 TIMES DAILY
Qty: 21 CAPSULE | Refills: 0 | Status: SHIPPED | OUTPATIENT
Start: 2019-06-15 | End: 2019-06-22

## 2019-06-15 RX ORDER — CICLOPIROX 1 G/100ML
SHAMPOO TOPICAL
Qty: 120 ML | Refills: 0 | Status: SHIPPED | OUTPATIENT
Start: 2019-06-15 | End: 2019-08-01 | Stop reason: SDUPTHER

## 2019-06-15 NOTE — PATIENT INSTRUCTIONS
Shampoo directions: Apply ~5 mL to wet hair; lather, and leave on hair and scalp for ~3 minutes; rinse. May use up to 10 mL for longer hair. Repeat twice weekly for 4 weeks; allow a minimum of 3 days between applications; if no improvement after 4 weeks of treatment, re-evaluate     You must understand that you've received an Urgent Care treatment only and that you may be released before all your medical problems are known or treated. You, the patient, will arrange for follow up care as instructed.  If your condition worsens we recommend that you receive another evaluation at the emergency room immediately or contact your primary medical clinics after hours call service to discuss your concerns.  Please return here or go to the Emergency Department for any concerns or worsening of condition.      Understanding Seborrheic Dermatitis    Seborrheic dermatitis is a common type of rash that causes red, scaly, greasy skin. It occurs on skin that has oil glands, such as the face, scalp, and upper chest. It tends to last a long time, or go away and come back. Seborrheicdermatitis is not spread from person to person.  How to say it  [id number]   What causes seborrheic dermatitis?  The cause is not yet known. It may be partly caused by a type of yeast that grows on skin, along with excess oil production. Experts are still learning more. Its more common in men than women, and it can occur at any age. It happens more often in people with HIV/AIDS, Parkinson disease, alcoholic pancreatitis, hepatitis, or cancer. It can also get worse during times of stress.  Symptoms of seborrheic dermatitis  Symptoms can include skin that is:  · Bumpy  · Covered with yellow scales or crusts  · Cracked  · Greasy  · Itchy  · Leaking fluid  · Painful  · Red or orange  These symptoms can occur on skin:  · Around the nose  · Behind the ears  · In the beard  · In the eyebrows  · On the scalp, also known as dandruff  · On the upper  chest  You may also have acne, inflamed eyelids (blepharitis), or other skin conditions at the same time.  Treatment for seborrheic dermatitis  Treatment can reduce symptoms for a period of time. The types of treatments most often used include:  · Antifungal shampoo, body wash, or cream. These contain medicines such as ketoconazole, fluconazole, selenium sulfide, ciclopirox, or tea tree oil.  · Corticosteroid cream or ointment. These containmedicines such ashydrocortisone or fluocinolone acetonide.  · Calcineurin inhibitorcream or ointment. These contain medicines such as pimecrolimus or tacrolimus.  · Shampoo or cream with other medicines. These contain medicines such as coal tar, salicylic acid, or zinc pyrithione.  · Sodium sulfacetemide creams and washes. These may also help.  Wash your skin gently. You can remove scales with oil and gentle rubbing or a brush.  Living with seborrheic dermatitis  Seborrheic dermatitis is an ongoing (chronic) condition. It can go away and then come back. You will likely need to use shampoo, cream, or ointment with medicine once or twice a week. This can help to keep symptoms from coming back or getting worse.  When to call your healthcare provider  Call your healthcare provider right away if you have any of these:  · Symptoms that dont get better, or get worse  · New symptoms   Date Last Reviewed: 5/1/2016  © 7840-4605 [employer]. [address] [place] [address]. All rights reserved. This information is not intended as a substitute for professional medical care. Always follow your healthcare professional's instructions.        Understanding Folliculitis  Folliculitis is when hair follicles become inflamed. Follicles are the tiny holes from which hair grows out of your skin. This skin condition can occur any place on the body where hair grows. But its often found on the neck, face, and scalp.  How to say it  [id number]   What causes  folliculitis?  An infection or irritation can cause this skin condition. It may be from bacteria or a fungus. The condition can also happen from a wound or irritation of the skin. Shaving is a common cause. Some cases may come from taking certain medicines, such as those that treat acne.  Symptoms of folliculitis  This skin condition tends to develop quickly. It looks like little pimples on a base of a red, inflamed hair follicles. These bumps may ooze pus. They may also be:  · Itchy  · Painful  · Red  · Swollen  Treatment for folliculitis  Treatment depends on the cause of the inflammation. In some cases, this skin condition will go away on its own in a few days. But it may return. Treatment options include:  · Warm compress. Putting a warm, wet washcloth on the inflamed skin may help.  · Medicine. Many skin (topical) and oral medicines are available. Antibiotics are used for bacterial infections. Antifungal medicines are best for fungal infections.  · Good hygiene. Keeping the skin clean can help. Use a clean razor when shaving. Prevent ingrown hairs after shaving. This can reduce folliculitis in the beard area. Avoid any substances that bother your skin.  When to call your healthcare provider  Call your healthcare provider right away if you have any of these:  · Fever of 100.4°F (38°C) or higher, or as directed  · Pain that gets worse  · Symptoms that dont get better, or get worse  · New symptoms   Date Last Reviewed: 5/1/2016  © 5241-2761 [employer]. [address] [place] [address], [place] [address]. All rights reserved. This information is not intended as a substitute for professional medical care. Always follow your healthcare professional's instructions.

## 2019-06-15 NOTE — PROGRESS NOTES
"Subjective:       Patient ID: Jatinder Moreno III is a 39 y.o. male.    Vitals:  height is 6' 3" (1.905 m) and weight is 129.3 kg (285 lb). His oral temperature is 98.4 °F (36.9 °C). His blood pressure is 129/87 and his pulse is 69. His respiration is 16 and oxygen saturation is 99%.     Chief Complaint: Folliculitis and Seborrheic Dermatitis    This is a 39 year old male who presents today with complaints of pain and drainage to his scalp. He has a hx of folliculitis and seborrheic dermatitis .   He is under the care of a dermatologist and have tried multiple therapies. He has been on doxy, tetracycline shampoo. Has not tried tea tree oil shampoo or selsym blue.   Patient states he responds well to clinda when it is painful and draining. Does not have an appt with his dermatologist for another couple of months as she does not have availability.  Patient states he was not able to sleep last night due to the pain.        Seborrheic Dermatitis   This is a new problem. The current episode started more than 1 month ago. The problem occurs constantly. The problem has been gradually worsening. Nothing aggravates the symptoms. Treatments tried: Doxycycline, tetracycline. The treatment provided no relief.       HENT: Negative for facial swelling.    Neck: Negative for painful lymph nodes.   Eyes: Negative for eye itching and eyelid swelling.   Skin: Positive for erythema. Negative for color change, pale, wound, abrasion, laceration, lesion, skin thickening/induration, puncture wound, abscess, avulsion and hives.   Allergic/Immunologic: Negative for environmental allergies, immunocompromised state and hives.   Hematologic/Lymphatic: Negative for swollen lymph nodes.       Objective:      Physical Exam   Constitutional: He is oriented to person, place, and time. He appears well-developed and well-nourished.   HENT:   Head: Normocephalic and atraumatic. Head is without abrasion, without contusion and without laceration. "       Right Ear: External ear normal.   Left Ear: External ear normal.   Nose: Nose normal.   Mouth/Throat: Oropharynx is clear and moist.   Eyes: Pupils are equal, round, and reactive to light. Conjunctivae, EOM and lids are normal.   Neck: Trachea normal, full passive range of motion without pain and phonation normal. Neck supple.   Cardiovascular: Normal rate, regular rhythm and normal heart sounds.   Pulmonary/Chest: Effort normal and breath sounds normal. No stridor. No respiratory distress.   Musculoskeletal: Normal range of motion.   Neurological: He is alert and oriented to person, place, and time.   Skin: Skin is warm, dry and intact. Capillary refill takes less than 2 seconds. No abrasion, no bruising, no burn, no ecchymosis, no laceration, no lesion and no rash noted. There is erythema.        Psychiatric: He has a normal mood and affect. His speech is normal and behavior is normal. Judgment and thought content normal. Cognition and memory are normal.   Nursing note and vitals reviewed.      Assessment:       1. Folliculitis    2. Seborrheic dermatitis of scalp        Plan:         Also recommended patient try T-gel or Selsym Blue. Patient will follow up with his dermatologist as scheduled.     Folliculitis  -     clindamycin (CLEOCIN) 300 MG capsule; Take 1 capsule (300 mg total) by mouth 3 (three) times daily. for 7 days  Dispense: 21 capsule; Refill: 0    Seborrheic dermatitis of scalp    Other orders  -     ciclopirox 1 % shampoo; TWICE WEEKLY   USE FOR FOUR WEEKS  Dispense: 120 mL; Refill: 0            Patient Instructions     Shampoo directions: Apply ~5 mL to wet hair; lather, and leave on hair and scalp for ~3 minutes; rinse. May use up to 10 mL for longer hair. Repeat twice weekly for 4 weeks; allow a minimum of 3 days between applications; if no improvement after 4 weeks of treatment, re-evaluate     You must understand that you've received an Urgent Care treatment only and that you may be  released before all your medical problems are known or treated. You, the patient, will arrange for follow up care as instructed.  If your condition worsens we recommend that you receive another evaluation at the emergency room immediately or contact your primary medical clinics after hours call service to discuss your concerns.  Please return here or go to the Emergency Department for any concerns or worsening of condition.      Understanding Seborrheic Dermatitis    Seborrheic dermatitis is a common type of rash that causes red, scaly, greasy skin. It occurs on skin that has oil glands, such as the face, scalp, and upper chest. It tends to last a long time, or go away and come back. Seborrheicdermatitis is not spread from person to person.  How to say it  [id number]   What causes seborrheic dermatitis?  The cause is not yet known. It may be partly caused by a type of yeast that grows on skin, along with excess oil production. Experts are still learning more. Its more common in men than women, and it can occur at any age. It happens more often in people with HIV/AIDS, Parkinson disease, alcoholic pancreatitis, hepatitis, or cancer. It can also get worse during times of stress.  Symptoms of seborrheic dermatitis  Symptoms can include skin that is:  · Bumpy  · Covered with yellow scales or crusts  · Cracked  · Greasy  · Itchy  · Leaking fluid  · Painful  · Red or orange  These symptoms can occur on skin:  · Around the nose  · Behind the ears  · In the beard  · In the eyebrows  · On the scalp, also known as dandruff  · On the upper chest  You may also have acne, inflamed eyelids (blepharitis), or other skin conditions at the same time.  Treatment for seborrheic dermatitis  Treatment can reduce symptoms for a period of time. The types of treatments most often used include:  · Antifungal shampoo, body wash, or cream. These contain medicines such as ketoconazole, fluconazole, selenium sulfide,  ciclopirox, or tea tree oil.  · Corticosteroid cream or ointment. These containmedicines such ashydrocortisone or fluocinolone acetonide.  · Calcineurin inhibitorcream or ointment. These contain medicines such as pimecrolimus or tacrolimus.  · Shampoo or cream with other medicines. These contain medicines such as coal tar, salicylic acid, or zinc pyrithione.  · Sodium sulfacetemide creams and washes. These may also help.  Wash your skin gently. You can remove scales with oil and gentle rubbing or a brush.  Living with seborrheic dermatitis  Seborrheic dermatitis is an ongoing (chronic) condition. It can go away and then come back. You will likely need to use shampoo, cream, or ointment with medicine once or twice a week. This can help to keep symptoms from coming back or getting worse.  When to call your healthcare provider  Call your healthcare provider right away if you have any of these:  · Symptoms that dont get better, or get worse  · New symptoms   Date Last Reviewed: 5/1/2016  © 3845-6774 [employer]. [address] [place] [address]. All rights reserved. This information is not intended as a substitute for professional medical care. Always follow your healthcare professional's instructions.        Understanding Folliculitis  Folliculitis is when hair follicles become inflamed. Follicles are the tiny holes from which hair grows out of your skin. This skin condition can occur any place on the body where hair grows. But its often found on the neck, face, and scalp.  How to say it  [id number]   What causes folliculitis?  An infection or irritation can cause this skin condition. It may be from bacteria or a fungus. The condition can also happen from a wound or irritation of the skin. Shaving is a common cause. Some cases may come from taking certain medicines, such as those that treat acne.  Symptoms of folliculitis  This skin condition tends to develop quickly. It looks like  little pimples on a base of a red, inflamed hair follicles. These bumps may ooze pus. They may also be:  · Itchy  · Painful  · Red  · Swollen  Treatment for folliculitis  Treatment depends on the cause of the inflammation. In some cases, this skin condition will go away on its own in a few days. But it may return. Treatment options include:  · Warm compress. Putting a warm, wet washcloth on the inflamed skin may help.  · Medicine. Many skin (topical) and oral medicines are available. Antibiotics are used for bacterial infections. Antifungal medicines are best for fungal infections.  · Good hygiene. Keeping the skin clean can help. Use a clean razor when shaving. Prevent ingrown hairs after shaving. This can reduce folliculitis in the beard area. Avoid any substances that bother your skin.  When to call your healthcare provider  Call your healthcare provider right away if you have any of these:  · Fever of 100.4°F (38°C) or higher, or as directed  · Pain that gets worse  · Symptoms that dont get better, or get worse  · New symptoms   Date Last Reviewed: 5/1/2016  © 0417-7943 [employer]. [address] [place] [address], [place] [address]. All rights reserved. This information is not intended as a substitute for professional medical care. Always follow your healthcare professional's instructions.

## 2019-06-26 ENCOUNTER — PATIENT MESSAGE (OUTPATIENT)
Dept: DERMATOLOGY | Facility: CLINIC | Age: 39
End: 2019-06-26

## 2019-07-02 ENCOUNTER — PATIENT MESSAGE (OUTPATIENT)
Dept: DERMATOLOGY | Facility: CLINIC | Age: 39
End: 2019-07-02

## 2019-07-03 ENCOUNTER — OFFICE VISIT (OUTPATIENT)
Dept: DERMATOLOGY | Facility: CLINIC | Age: 39
End: 2019-07-03
Payer: COMMERCIAL

## 2019-07-03 ENCOUNTER — LAB VISIT (OUTPATIENT)
Dept: LAB | Facility: HOSPITAL | Age: 39
End: 2019-07-03
Attending: DERMATOLOGY
Payer: COMMERCIAL

## 2019-07-03 DIAGNOSIS — Z79.899 LONG-TERM USE OF HIGH-RISK MEDICATION: Primary | ICD-10-CM

## 2019-07-03 DIAGNOSIS — L73.9 FOLLICULITIS: ICD-10-CM

## 2019-07-03 DIAGNOSIS — Z79.899 LONG-TERM USE OF HIGH-RISK MEDICATION: ICD-10-CM

## 2019-07-03 LAB
ALBUMIN SERPL BCP-MCNC: 4.6 G/DL (ref 3.5–5.2)
ALP SERPL-CCNC: 72 U/L (ref 55–135)
ALT SERPL W/O P-5'-P-CCNC: 38 U/L (ref 10–44)
ANION GAP SERPL CALC-SCNC: 11 MMOL/L (ref 8–16)
AST SERPL-CCNC: 29 U/L (ref 10–40)
BASOPHILS # BLD AUTO: 0.03 K/UL (ref 0–0.2)
BASOPHILS NFR BLD: 0.4 % (ref 0–1.9)
BILIRUB SERPL-MCNC: 1.5 MG/DL (ref 0.1–1)
BUN SERPL-MCNC: 17 MG/DL (ref 6–20)
CALCIUM SERPL-MCNC: 9.9 MG/DL (ref 8.7–10.5)
CHLORIDE SERPL-SCNC: 104 MMOL/L (ref 95–110)
CO2 SERPL-SCNC: 26 MMOL/L (ref 23–29)
CREAT SERPL-MCNC: 1.5 MG/DL (ref 0.5–1.4)
DIFFERENTIAL METHOD: ABNORMAL
EOSINOPHIL # BLD AUTO: 0.1 K/UL (ref 0–0.5)
EOSINOPHIL NFR BLD: 1.9 % (ref 0–8)
ERYTHROCYTE [DISTWIDTH] IN BLOOD BY AUTOMATED COUNT: 13.6 % (ref 11.5–14.5)
EST. GFR  (AFRICAN AMERICAN): >60 ML/MIN/1.73 M^2
EST. GFR  (NON AFRICAN AMERICAN): 58.6 ML/MIN/1.73 M^2
GLUCOSE SERPL-MCNC: 88 MG/DL (ref 70–110)
HCT VFR BLD AUTO: 40.2 % (ref 40–54)
HGB BLD-MCNC: 13.8 G/DL (ref 14–18)
IMM GRANULOCYTES # BLD AUTO: 0.04 K/UL (ref 0–0.04)
IMM GRANULOCYTES NFR BLD AUTO: 0.5 % (ref 0–0.5)
LYMPHOCYTES # BLD AUTO: 1.9 K/UL (ref 1–4.8)
LYMPHOCYTES NFR BLD: 25.9 % (ref 18–48)
MCH RBC QN AUTO: 29.9 PG (ref 27–31)
MCHC RBC AUTO-ENTMCNC: 34.3 G/DL (ref 32–36)
MCV RBC AUTO: 87 FL (ref 82–98)
MONOCYTES # BLD AUTO: 0.6 K/UL (ref 0.3–1)
MONOCYTES NFR BLD: 8 % (ref 4–15)
NEUTROPHILS # BLD AUTO: 4.7 K/UL (ref 1.8–7.7)
NEUTROPHILS NFR BLD: 63.3 % (ref 38–73)
NRBC BLD-RTO: 0 /100 WBC
PLATELET # BLD AUTO: 200 K/UL (ref 150–350)
PMV BLD AUTO: 9.9 FL (ref 9.2–12.9)
POTASSIUM SERPL-SCNC: 4.3 MMOL/L (ref 3.5–5.1)
PROT SERPL-MCNC: 8 G/DL (ref 6–8.4)
RBC # BLD AUTO: 4.61 M/UL (ref 4.6–6.2)
SODIUM SERPL-SCNC: 141 MMOL/L (ref 136–145)
WBC # BLD AUTO: 7.37 K/UL (ref 3.9–12.7)

## 2019-07-03 PROCEDURE — 82955 ASSAY OF G6PD ENZYME: CPT

## 2019-07-03 PROCEDURE — 85025 COMPLETE CBC W/AUTO DIFF WBC: CPT

## 2019-07-03 PROCEDURE — 80053 COMPREHEN METABOLIC PANEL: CPT

## 2019-07-03 PROCEDURE — 99213 OFFICE O/P EST LOW 20 MIN: CPT | Mod: S$GLB,,, | Performed by: DERMATOLOGY

## 2019-07-03 PROCEDURE — 99999 PR PBB SHADOW E&M-EST. PATIENT-LVL II: CPT | Mod: PBBFAC,,, | Performed by: DERMATOLOGY

## 2019-07-03 PROCEDURE — 99213 PR OFFICE/OUTPT VISIT, EST, LEVL III, 20-29 MIN: ICD-10-PCS | Mod: S$GLB,,, | Performed by: DERMATOLOGY

## 2019-07-03 PROCEDURE — 99999 PR PBB SHADOW E&M-EST. PATIENT-LVL II: ICD-10-PCS | Mod: PBBFAC,,, | Performed by: DERMATOLOGY

## 2019-07-03 PROCEDURE — 36415 COLL VENOUS BLD VENIPUNCTURE: CPT | Mod: PO

## 2019-07-03 NOTE — PROGRESS NOTES
Subjective:       Patient ID:  Jatinder Moreno III is a 39 y.o. male who presents for No chief complaint on file.    Pt here today for a follow up on folliculitis tx with seysara, ovace shampoo, oral clindamycin and mometasone lotion. Tx did not help;     Was seen at urgent care and put on clindamycin for 1 week and ciclopirox shampoo.  Pt is flaring even on the clindamycin  .  Was improved but not clear.       Review of Systems   Constitutional: Negative for fever and chills.   Skin: Positive for itching and rash.        Objective:    Physical Exam   Constitutional: He appears well-developed and well-nourished. No distress.   Neurological: He is alert and oriented to person, place, and time. He is not disoriented.   Psychiatric: He has a normal mood and affect.   Skin:   Areas Examined (abnormalities noted in diagram):   Scalp / Hair Palpated and Inspected  Head / Face Inspection Performed  Chest / Axilla Inspection Performed  Back Inspection Performed              Diagram Legend     Erythematous scaling macule/papule c/w actinic keratosis       Vascular papule c/w angioma      Pigmented verrucoid papule/plaque c/w seborrheic keratosis      Yellow umbilicated papule c/w sebaceous hyperplasia      Irregularly shaped tan macule c/w lentigo     1-2 mm smooth white papules consistent with Milia      Movable subcutaneous cyst with punctum c/w epidermal inclusion cyst      Subcutaneous movable cyst c/w pilar cyst      Firm pink to brown papule c/w dermatofibroma      Pedunculated fleshy papule(s) c/w skin tag(s)      Evenly pigmented macule c/w junctional nevus     Mildly variegated pigmented, slightly irregular-bordered macule c/w mildly atypical nevus      Flesh colored to evenly pigmented papule c/w intradermal nevus       Pink pearly papule/plaque c/w basal cell carcinoma      Erythematous hyperkeratotic cursted plaque c/w SCC      Surgical scar with no sign of skin cancer recurrence      Open and closed comedones       Inflammatory papules and pustules      Verrucoid papule consistent consistent with wart     Erythematous eczematous patches and plaques     Dystrophic onycholytic nail with subungual debris c/w onychomycosis     Umbilicated papule    Erythematous-base heme-crusted tan verrucoid plaque consistent with inflamed seborrheic keratosis     Erythematous Silvery Scaling Plaque c/w Psoriasis     See annotation      Assessment / Plan:       Folliculitis  Long-term use of high-risk medication    SEE PROB LIST FOR ALL OTHER TX THAT HAVE FAILED.     -     CBC auto differential; Future; Expected date: 07/03/2019  -     Comprehensive metabolic panel; Future; Expected date: 07/03/2019  -     G6PD,Quantitative; Future; Expected date: 07/03/2019  Dapsone side effects:   methemaglobunemia which would cause fatigue and headache, hemolytic anemia, decreased blood counts, hepatitis, hypersensitivity syndrome, drug rash, nausea, neuropathy with motor effects  Avoid use of sulfa antibiotics while on this medication  Antacid medications can decrease absorption of dapsone  Take Vit C 1000mg daily and Vit E 800iu daily for first 1-2 months     Continue ciclopirox             Follow up in about 6 weeks (around 8/14/2019).

## 2019-07-03 NOTE — PATIENT INSTRUCTIONS
Dapsone side effects:   methemaglobunemia which would cause fatigue and headache, hemolytic anemia, decreased blood counts, hepatitis, hypersensitivity syndrome, drug rash, nausea, neuropathy with motor effects  Avoid use of sulfa antibiotics while on this medication  Antacid medications can decrease absorption of dapsone

## 2019-07-08 LAB — G6PD RBC-CCNT: 12.2 U/G HGB (ref 7–20.5)

## 2019-07-10 ENCOUNTER — PATIENT MESSAGE (OUTPATIENT)
Dept: DERMATOLOGY | Facility: CLINIC | Age: 39
End: 2019-07-10

## 2019-07-10 DIAGNOSIS — L73.9 FOLLICULITIS: Primary | ICD-10-CM

## 2019-07-10 RX ORDER — DAPSONE 25 MG/1
25 TABLET ORAL DAILY
Qty: 30 TABLET | Refills: 0 | Status: SHIPPED | OUTPATIENT
Start: 2019-07-10 | End: 2019-08-01 | Stop reason: SDUPTHER

## 2019-07-18 RX ORDER — LEVOCETIRIZINE DIHYDROCHLORIDE 5 MG/1
TABLET, FILM COATED ORAL
Qty: 90 TABLET | Refills: 0 | Status: SHIPPED | OUTPATIENT
Start: 2019-07-18 | End: 2019-10-22 | Stop reason: SDUPTHER

## 2019-07-18 RX ORDER — MONTELUKAST SODIUM 10 MG/1
TABLET ORAL
Qty: 90 TABLET | Refills: 0 | Status: SHIPPED | OUTPATIENT
Start: 2019-07-18 | End: 2019-10-22 | Stop reason: SDUPTHER

## 2019-07-18 NOTE — TELEPHONE ENCOUNTER
Sent message to inform pt that script was sent to the pharmacy.   Needs appt for future refills. I need day and time to schedule your appt with Dr. Matthews.

## 2019-07-19 ENCOUNTER — OFFICE VISIT (OUTPATIENT)
Dept: INTERNAL MEDICINE | Facility: CLINIC | Age: 39
End: 2019-07-19
Payer: COMMERCIAL

## 2019-07-19 VITALS
DIASTOLIC BLOOD PRESSURE: 86 MMHG | SYSTOLIC BLOOD PRESSURE: 130 MMHG | OXYGEN SATURATION: 99 % | HEIGHT: 75 IN | BODY MASS INDEX: 35.71 KG/M2 | WEIGHT: 287.25 LBS | HEART RATE: 62 BPM

## 2019-07-19 DIAGNOSIS — Z00.00 WELLNESS EXAMINATION: Primary | ICD-10-CM

## 2019-07-19 DIAGNOSIS — G47.33 OBSTRUCTIVE SLEEP APNEA (ADULT) (PEDIATRIC): ICD-10-CM

## 2019-07-19 DIAGNOSIS — F32.0 CURRENT MILD EPISODE OF MAJOR DEPRESSIVE DISORDER WITHOUT PRIOR EPISODE: ICD-10-CM

## 2019-07-19 DIAGNOSIS — F41.1 GENERALIZED ANXIETY DISORDER: ICD-10-CM

## 2019-07-19 DIAGNOSIS — G43.719 INTRACTABLE CHRONIC MIGRAINE WITHOUT AURA AND WITHOUT STATUS MIGRAINOSUS: ICD-10-CM

## 2019-07-19 DIAGNOSIS — F90.0 ATTENTION DEFICIT HYPERACTIVITY DISORDER (ADHD), PREDOMINANTLY INATTENTIVE TYPE: ICD-10-CM

## 2019-07-19 PROCEDURE — 3079F PR MOST RECENT DIASTOLIC BLOOD PRESSURE 80-89 MM HG: ICD-10-PCS | Mod: CPTII,S$GLB,, | Performed by: INTERNAL MEDICINE

## 2019-07-19 PROCEDURE — 99395 PREV VISIT EST AGE 18-39: CPT | Mod: S$GLB,,, | Performed by: INTERNAL MEDICINE

## 2019-07-19 PROCEDURE — 3075F PR MOST RECENT SYSTOLIC BLOOD PRESS GE 130-139MM HG: ICD-10-PCS | Mod: CPTII,S$GLB,, | Performed by: INTERNAL MEDICINE

## 2019-07-19 PROCEDURE — 99999 PR PBB SHADOW E&M-EST. PATIENT-LVL III: ICD-10-PCS | Mod: PBBFAC,,, | Performed by: INTERNAL MEDICINE

## 2019-07-19 PROCEDURE — 3079F DIAST BP 80-89 MM HG: CPT | Mod: CPTII,S$GLB,, | Performed by: INTERNAL MEDICINE

## 2019-07-19 PROCEDURE — 99999 PR PBB SHADOW E&M-EST. PATIENT-LVL III: CPT | Mod: PBBFAC,,, | Performed by: INTERNAL MEDICINE

## 2019-07-19 PROCEDURE — 3075F SYST BP GE 130 - 139MM HG: CPT | Mod: CPTII,S$GLB,, | Performed by: INTERNAL MEDICINE

## 2019-07-19 PROCEDURE — 99395 PR PREVENTIVE VISIT,EST,18-39: ICD-10-PCS | Mod: S$GLB,,, | Performed by: INTERNAL MEDICINE

## 2019-07-19 NOTE — PROGRESS NOTES
Subjective:       Patient ID: Jatinder Moreno III is a 39 y.o. male.    Chief Complaint: Annual Exam    Pt here for annual exam. Counseled on exercise goals.     He has ALEXX and uses cpap successfully. He may want to visit sleep med and ENT in future to consider possible surgery if appropriate but will let me know.     He has chronic migraines and sees neuro regularly. Has host of meds for abortive purposes and was recently placed on aimovig which has helped.     He sees psychiatrist for ADHD and anxiety. These are currently well controlled on meds. No SI/HI.    He is seeing derm for chronic and recurrent rash on scalp and folliculitis. He has tried several different oral and topical meds without much lasting relief. He most recently was rx dapsone. He has regular f/u with derm.     He has an episode of reactive airway disease at least annually that usually only responds to steroids. This is usually characterized by exacerbation of rhinorrhea, nasal congestion and cough. The cough is usually only managed with hycodan cough syrup as other meds have previously been ineffective. He has done well currently but does stay on allergy meds which help.     Review of Systems   Constitutional: Negative for fatigue, fever and unexpected weight change.   HENT: Negative for congestion, rhinorrhea and sore throat.    Eyes: Negative for visual disturbance.   Respiratory: Negative for cough and shortness of breath.    Cardiovascular: Negative for chest pain, palpitations and leg swelling.   Gastrointestinal: Negative for abdominal pain, blood in stool, constipation and diarrhea.   Genitourinary: Negative for difficulty urinating and dysuria.   Skin: Negative for color change and rash.   Neurological: Negative for dizziness and syncope.   Hematological: Negative for adenopathy.   Psychiatric/Behavioral: Negative for dysphoric mood.       Objective:      Physical Exam   Constitutional: He is oriented to person, place, and time. He  appears well-developed and well-nourished.   HENT:   Head: Normocephalic and atraumatic.   Right Ear: External ear normal.   Left Ear: External ear normal.   Mouth/Throat: Oropharynx is clear and moist. No oropharyngeal exudate.   Eyes: Pupils are equal, round, and reactive to light. Conjunctivae and EOM are normal.   Neck: Neck supple. Carotid bruit is not present. No thyromegaly present.   Cardiovascular: Normal rate, regular rhythm, S1 normal, S2 normal, normal heart sounds and intact distal pulses.   Pulmonary/Chest: Effort normal and breath sounds normal.   Abdominal: Soft. Bowel sounds are normal. He exhibits no mass. There is no hepatosplenomegaly. There is no tenderness.   Musculoskeletal: He exhibits no edema.   Lymphadenopathy:     He has no cervical adenopathy.   Neurological: He is alert and oriented to person, place, and time. No cranial nerve deficit.   Skin:   Pustular lesions scattered throughout scalp   Psychiatric: He has a normal mood and affect. His behavior is normal.       Assessment:       1. Wellness examination    2. Obstructive sleep apnea (adult) (pediatric)    3. Generalized anxiety disorder    4. Current mild episode of major depressive disorder without prior episode    5. Attention deficit hyperactivity disorder (ADHD), predominantly inattentive type    6. Intractable chronic migraine without aura and without status migrainosus        Plan:       1. Recent labs reviewed  2. Keep f/u with specialists

## 2019-07-20 ENCOUNTER — LAB VISIT (OUTPATIENT)
Dept: LAB | Facility: HOSPITAL | Age: 39
End: 2019-07-20
Attending: DERMATOLOGY
Payer: COMMERCIAL

## 2019-07-20 DIAGNOSIS — L73.9 FOLLICULITIS: ICD-10-CM

## 2019-07-20 LAB
ALBUMIN SERPL BCP-MCNC: 4.6 G/DL (ref 3.5–5.2)
ALP SERPL-CCNC: 69 U/L (ref 55–135)
ALT SERPL W/O P-5'-P-CCNC: 17 U/L (ref 10–44)
ANION GAP SERPL CALC-SCNC: 14 MMOL/L (ref 8–16)
AST SERPL-CCNC: 21 U/L (ref 10–40)
BASOPHILS # BLD AUTO: 0.04 K/UL (ref 0–0.2)
BASOPHILS NFR BLD: 0.7 % (ref 0–1.9)
BILIRUB SERPL-MCNC: 0.9 MG/DL (ref 0.1–1)
BUN SERPL-MCNC: 17 MG/DL (ref 6–20)
CALCIUM SERPL-MCNC: 9.8 MG/DL (ref 8.7–10.5)
CHLORIDE SERPL-SCNC: 104 MMOL/L (ref 95–110)
CO2 SERPL-SCNC: 20 MMOL/L (ref 23–29)
CREAT SERPL-MCNC: 1.2 MG/DL (ref 0.5–1.4)
DIFFERENTIAL METHOD: ABNORMAL
EOSINOPHIL # BLD AUTO: 0.1 K/UL (ref 0–0.5)
EOSINOPHIL NFR BLD: 2 % (ref 0–8)
ERYTHROCYTE [DISTWIDTH] IN BLOOD BY AUTOMATED COUNT: 13.7 % (ref 11.5–14.5)
EST. GFR  (AFRICAN AMERICAN): >60 ML/MIN/1.73 M^2
EST. GFR  (NON AFRICAN AMERICAN): >60 ML/MIN/1.73 M^2
GLUCOSE SERPL-MCNC: 86 MG/DL (ref 70–110)
HCT VFR BLD AUTO: 41.6 % (ref 40–54)
HGB BLD-MCNC: 13.9 G/DL (ref 14–18)
IMM GRANULOCYTES # BLD AUTO: 0.03 K/UL (ref 0–0.04)
IMM GRANULOCYTES NFR BLD AUTO: 0.5 % (ref 0–0.5)
LYMPHOCYTES # BLD AUTO: 2.1 K/UL (ref 1–4.8)
LYMPHOCYTES NFR BLD: 34.2 % (ref 18–48)
MCH RBC QN AUTO: 29.8 PG (ref 27–31)
MCHC RBC AUTO-ENTMCNC: 33.4 G/DL (ref 32–36)
MCV RBC AUTO: 89 FL (ref 82–98)
MONOCYTES # BLD AUTO: 0.6 K/UL (ref 0.3–1)
MONOCYTES NFR BLD: 10.2 % (ref 4–15)
NEUTROPHILS # BLD AUTO: 3.1 K/UL (ref 1.8–7.7)
NEUTROPHILS NFR BLD: 52.4 % (ref 38–73)
NRBC BLD-RTO: 0 /100 WBC
PLATELET # BLD AUTO: 236 K/UL (ref 150–350)
PMV BLD AUTO: 9.5 FL (ref 9.2–12.9)
POTASSIUM SERPL-SCNC: 3.8 MMOL/L (ref 3.5–5.1)
PROT SERPL-MCNC: 7.6 G/DL (ref 6–8.4)
RBC # BLD AUTO: 4.66 M/UL (ref 4.6–6.2)
SODIUM SERPL-SCNC: 138 MMOL/L (ref 136–145)
WBC # BLD AUTO: 5.99 K/UL (ref 3.9–12.7)

## 2019-07-20 PROCEDURE — 85025 COMPLETE CBC W/AUTO DIFF WBC: CPT

## 2019-07-20 PROCEDURE — 36415 COLL VENOUS BLD VENIPUNCTURE: CPT | Mod: PO

## 2019-07-20 PROCEDURE — 80053 COMPREHEN METABOLIC PANEL: CPT

## 2019-07-29 ENCOUNTER — PATIENT MESSAGE (OUTPATIENT)
Dept: DERMATOLOGY | Facility: CLINIC | Age: 39
End: 2019-07-29

## 2019-07-29 DIAGNOSIS — Z79.899 LONG-TERM USE OF HIGH-RISK MEDICATION: ICD-10-CM

## 2019-07-29 DIAGNOSIS — L73.9 FOLLICULITIS: Primary | ICD-10-CM

## 2019-07-31 ENCOUNTER — OFFICE VISIT (OUTPATIENT)
Dept: SLEEP MEDICINE | Facility: CLINIC | Age: 39
End: 2019-07-31
Payer: COMMERCIAL

## 2019-07-31 DIAGNOSIS — G43.719 INTRACTABLE CHRONIC MIGRAINE WITHOUT AURA AND WITHOUT STATUS MIGRAINOSUS: Primary | ICD-10-CM

## 2019-07-31 DIAGNOSIS — G47.33 OBSTRUCTIVE SLEEP APNEA (ADULT) (PEDIATRIC): ICD-10-CM

## 2019-07-31 PROCEDURE — 64615 CHEMODENERV MUSC MIGRAINE: CPT | Mod: S$GLB,,, | Performed by: NURSE PRACTITIONER

## 2019-07-31 PROCEDURE — 99499 UNLISTED E&M SERVICE: CPT | Mod: S$GLB,,, | Performed by: NURSE PRACTITIONER

## 2019-07-31 PROCEDURE — 99499 NO LOS: ICD-10-PCS | Mod: S$GLB,,, | Performed by: NURSE PRACTITIONER

## 2019-07-31 PROCEDURE — 64615 PR CHEMODENERVATION OF MUSCLE FOR CHRONIC MIGRAINE: ICD-10-PCS | Mod: S$GLB,,, | Performed by: NURSE PRACTITIONER

## 2019-07-31 PROCEDURE — 99999 PR PBB SHADOW E&M-EST. PATIENT-LVL I: ICD-10-PCS | Mod: PBBFAC,,, | Performed by: NURSE PRACTITIONER

## 2019-07-31 PROCEDURE — 99999 PR PBB SHADOW E&M-EST. PATIENT-LVL I: CPT | Mod: PBBFAC,,, | Performed by: NURSE PRACTITIONER

## 2019-07-31 RX ORDER — DAPSONE 25 MG/1
25 TABLET ORAL DAILY
Qty: 30 TABLET | Refills: 1 | Status: SHIPPED | OUTPATIENT
Start: 2019-07-31 | End: 2020-11-17

## 2019-07-31 NOTE — PROGRESS NOTES
Mr. Moreno is here today for management of  headaches.    Since last seen he denies s/e from his 6th botox , HIT-6= 59 (betteR). HA have been less frequent since botox and Aimovig 140mg, less severe. Recalls 3 severe ones interim. Denies constipation or inj site redness. 2nd baby due August. No recent aura,  peripheral bilateral kaleidoscope. HA remains same location and nature and triggers. Remains on inderal.     Continues to use apap nightly, faithfully    PROCEDURE NOTE:     After informed consent signed and dated, and risks and benefits of procedure was discussed, BOTOX injection was performed     Frontalis- 15 units on each side   - 5 units on each side   Procerus- 10 units   Occipitalis- 20 units on each side   Temporalis - 25 units on each side   Trapezius- 15 units on each side   Cervical paraspinal muscles- 15 units on each side     Patient tolerated procedure well w/o any complications.       HISTORY:  12/11/2013:  He has since been setup (10/14) with aPAP 9-16cm, tolerating pressures well. He uses a nasal mask with a chin strap. He denies mask air leaks, nasal drying, or oral drying. His morning headaches and overall frequency of migraines has significantly improved. He is using therapy nightly, even brings machine to his girlfriend's house. He feels more refreshed in the mornings, has less nocturia, no more snoring,  and is no longer bothered by urge to move his legs during sleep initiation. He no longer awakens with feeling of heart racing.     He reports having total of ~7 migraines since last seen 3-mos ago, 2-3 of them severe. NO missed work in interim due to HA. Tramadol or Cambia/Amerge or Zomig remain effective.  HA starts from the R frontal/retroorbital area, at times radiates to the ipsilateral occipito-cervical area, pressure in quality,  with associated symptoms of R ptosis, nausea, photo/phonophobia, generalized weakness with R eyelid twitching, chills/sweating (prodrome).  Triggers remain weather changes, sexual activity and stress while movement can worsen attacks. He prefers to rest in a dark quiet cold room to feel better.     Focus improved on Daytrana patch ADHD.    Interrogation: New machine condition, heat at 4, 30d avg 5:48h/night, 1d 7:48hr. 90% pressure 30d 10.8cm, 7d 11.8cm, 1d 14cm. 21/30 days>4h. 20min/6cm ramp setup today. Using nasal pillows mask.     ESS= 8   HIT-6 score= 57    12/7/16: He continues to use cpap 13cm nightly. He has gained <10% wgt since study. Feels horrible if he falls asleep on couch w/o his mask on. Getting regular replacement supplies from [facility]. No chin strap use. Denies oral drying. Not using water system anymore, already congested. Continues to use dymista NS twice daily and take xyzal. ESS=9  Interrogation: 0% leak. AHI 1.9, avg 30d= 6:39h/n. Nuance mask. 0% periodic      Reports continued fairly frequent, just about daily mild headaches a lot on way home from work or after high stress situations. He has begun verapamil 120mg La in interim which he tolerates well w/o s/e. Reports severe headaches ~ 3/month. Still gets same prodromal symptoms prior to headaches and occasional nausea and photophobia. He is out of cambia, amerge and zomig which all remain effective. Unable to take triptan at work due to sleepiness.  Fioricet in interim prescribed was also helpful at times, but prefers prn tramadol 1/2 50mg mod episodes. He has been taking OTC analgesics frequently for his mild headaches. Remains very anxious/worrier. He has gotten  and is expecting 1st baby in summer '17!! 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     6/20/17:  Since last seen, he continues to use cpap 13-16cm nightly. Would like to trial alternative nasal mask. His straps get worn, current one needs replacing. No chin strap.Denies oral drying. Saw Dr. Reyes recently (ENT). HIt-6= 61. Inderal LA helping reduce intensity of headaches,having less severe  "episodes in interim. Had a bad one/lingering recently. zomig PO costly, using more amerge + cambia. Still taking up to 2hr notice complete relief (feels parasthesias then intense flush/sweat). Having ~ 15 headaches/month, but overall less frequent since last seen monthly. Inquiring about botox. Headache remains same location/nature.     Interrogation- AHI 1.4, avg 7:30h/n. Nasal pillow mask. 0% leak. 90% tile 13.4-14. 30/30d>4h.     12/29/17:   Since last seen he continues to have very frequent headaches, >15day/month. AMerge/cambia typically helps but having frequent abrupt onset episodes and pills take longer to work. Zomig NS was not covered. Lost 2#. Had new baby girl. Interested in trialing botox. Had first aura recently, peripheral bilateral kaleidoscope. HA remains same location and nature and triggers.     Continues to use apap nightly, faithfully.   Interrogation- 90% tile 14cm, avg 7:18h/n. AHI 2.1    5/2/18:   Since last seen he denies s/e from his 1stBotox (1/19th) and reports overall"better", less headaches Feb and April, bit more recently since overdue for botox. HA diary reveals ~ total 14 severe ones in interim, others mild-mod. Zomig costly and Cambia costly.  Had new baby girl. HX recent aura,  peripheral bilateral kaleidosc    11/14/18:  Since last seen he denies s/e from his 3rd botox , HIT-6= 57. 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 Only 1 bad headache had to miss work, still had some pain upon awakening after taking abortive meds at bedtime. Reluctant to take Imitrex 6mg SC due to potential s/e with young daughter in house. No recent aura,  peripheral bilateral kaleidoscope. HA remains same location and nature and triggers. Remains on inderal.     Continues to use apap nightly, faithfully    PROCEDURE NOTE:     After informed consent signed " and dated, and risks and benefits of procedure was discussed, BOTOX injection was performed     Frontalis- 15 units on each side   - 5 units on each side   Procerus- 10 units   Occipitalis- 20 units on each side   Temporalis - 25 units on each side   Trapezius- 15 units on each side   Cervical paraspinal muscles- 15 units on each side     5/2109:  Since last seen he denies s/e from his 5th botox , HIT-6= 62. HA have been less frequent since botox but close to repeat they return, past 2-3 wks more frequent. Still inc'd stress. 2nd baby due August. No recent aura,  peripheral bilateral kaleidoscope. HA remains same location and nature and triggers. Remains on inderal.     Continues to use apap nightly, faithfully  Interrogation- 7h/night. AHI 1.2, nuance mask. 90% tile 14cm    PROCEDURE NOTE:     After informed consent signed and dated, and risks and benefits of procedure was discussed, BOTOX injection was performed     Frontalis- 15 units on each side   - 5 units on each side   Procerus- 10 units   Occipitalis- 20 units on each side   Temporalis - 25 units on each side   Trapezius- 15 units on each side   Cervical paraspinal muscles- 15 units on each side     Patient tolerated procedure well w/o any complications.       PSG : (273#) 7/27/13: AHI was 18.4 with an oxygen magaly of 85.0%. The supine AHI was 22.3 and the REM AHI was 28.3. The patient did not qualify for a split night study due to an insufficient number of events in the first half of the study. Setup with APAP      Tried(verapamil, lexapro, topamax, gabapentin, metoprolol, treximet, relpax, maxalt, imitrex ?dose)    Employer/Position: [employer]  FH: HA, brother with presumed ALEXX, witnessed apneic pauses     EXAM:   General: W/D,obese, well-groomed  There were no vitals taken for this visit.        IMPRESSION:   Chronic migraine with and without aura, intractable 15d/month,request botox auth 5/2/18 and 8/14/18 and 11/14/18 and  2/13/19 Improvement in frequency and severity of headaches, some wearing off 2-3 wks prior to repeat botox  ALEXX, moderate, continued excellent aPAP adherence, with improvement of symptoms. AHI<5  Anxiety    PLAN     Preventative therapy:  Continue Inderal LA 120mg qd and Aimovig 140mg SC q28  Abortive therapy: Continue Zomig 5 mg or Amerge 2.5 mg PO q 2h, up to maximum of 5mg/24h , do not delay treatment. Continue imitrex 6mg SC q2h prn    Continue Phenergan 12.5-25 mg PO q6-8h prn for HA/nausea     Continue Tramadol 50 mg 1/2-1 tabs PO q6-8 h prn for headache  Refilled today    Continue Cambia 50mg powder q6h prn ([facility])    Continue to regulate sleep, mealtimes, try to reduce stress and to avoid known headache triggers/aggravating factors    Continue autoPAP 13-16cm. Continue excellent usage. [facility] prn supplies    RTC 3-mos botox

## 2019-08-01 ENCOUNTER — TELEPHONE (OUTPATIENT)
Dept: PHARMACY | Facility: CLINIC | Age: 39
End: 2019-08-01

## 2019-08-01 DIAGNOSIS — L73.9 FOLLICULITIS: Primary | ICD-10-CM

## 2019-08-01 DIAGNOSIS — Z79.899 LONG-TERM USE OF HIGH-RISK MEDICATION: ICD-10-CM

## 2019-08-01 RX ORDER — CLINDAMYCIN PHOSPHATE 11.9 MG/ML
SOLUTION TOPICAL
Qty: 60 ML | Refills: 3 | Status: SHIPPED | OUTPATIENT
Start: 2019-08-01 | End: 2020-01-23 | Stop reason: SDUPTHER

## 2019-08-01 RX ORDER — CICLOPIROX 1 G/100ML
SHAMPOO TOPICAL
Qty: 120 ML | Refills: 5 | Status: SHIPPED | OUTPATIENT
Start: 2019-08-01 | End: 2019-08-01 | Stop reason: SDUPTHER

## 2019-08-01 RX ORDER — CICLOPIROX 1 G/100ML
SHAMPOO TOPICAL
Qty: 120 ML | Refills: 5 | Status: SHIPPED | OUTPATIENT
Start: 2019-08-01 | End: 2020-07-02 | Stop reason: SDUPTHER

## 2019-08-01 RX ORDER — DAPSONE 25 MG/1
TABLET ORAL
Qty: 30 TABLET | Refills: 0 | Status: SHIPPED | OUTPATIENT
Start: 2019-08-01 | End: 2020-11-17

## 2019-08-06 ENCOUNTER — PATIENT MESSAGE (OUTPATIENT)
Dept: DERMATOLOGY | Facility: CLINIC | Age: 39
End: 2019-08-06

## 2019-08-10 ENCOUNTER — LAB VISIT (OUTPATIENT)
Dept: LAB | Facility: HOSPITAL | Age: 39
End: 2019-08-10
Attending: DERMATOLOGY
Payer: COMMERCIAL

## 2019-08-10 DIAGNOSIS — Z79.899 LONG-TERM USE OF HIGH-RISK MEDICATION: ICD-10-CM

## 2019-08-10 DIAGNOSIS — L73.9 FOLLICULITIS: ICD-10-CM

## 2019-08-10 LAB
ALBUMIN SERPL BCP-MCNC: 4.5 G/DL (ref 3.5–5.2)
ALBUMIN SERPL BCP-MCNC: 4.5 G/DL (ref 3.5–5.2)
ALP SERPL-CCNC: 65 U/L (ref 55–135)
ALP SERPL-CCNC: 65 U/L (ref 55–135)
ALT SERPL W/O P-5'-P-CCNC: 24 U/L (ref 10–44)
ALT SERPL W/O P-5'-P-CCNC: 24 U/L (ref 10–44)
ANION GAP SERPL CALC-SCNC: 11 MMOL/L (ref 8–16)
ANION GAP SERPL CALC-SCNC: 11 MMOL/L (ref 8–16)
AST SERPL-CCNC: 22 U/L (ref 10–40)
AST SERPL-CCNC: 22 U/L (ref 10–40)
BASOPHILS # BLD AUTO: 0.03 K/UL (ref 0–0.2)
BASOPHILS # BLD AUTO: 0.03 K/UL (ref 0–0.2)
BASOPHILS NFR BLD: 0.5 % (ref 0–1.9)
BASOPHILS NFR BLD: 0.5 % (ref 0–1.9)
BILIRUB SERPL-MCNC: 1 MG/DL (ref 0.1–1)
BILIRUB SERPL-MCNC: 1 MG/DL (ref 0.1–1)
BUN SERPL-MCNC: 15 MG/DL (ref 6–20)
BUN SERPL-MCNC: 15 MG/DL (ref 6–20)
CALCIUM SERPL-MCNC: 9.5 MG/DL (ref 8.7–10.5)
CALCIUM SERPL-MCNC: 9.5 MG/DL (ref 8.7–10.5)
CHLORIDE SERPL-SCNC: 104 MMOL/L (ref 95–110)
CHLORIDE SERPL-SCNC: 104 MMOL/L (ref 95–110)
CO2 SERPL-SCNC: 24 MMOL/L (ref 23–29)
CO2 SERPL-SCNC: 24 MMOL/L (ref 23–29)
CREAT SERPL-MCNC: 1 MG/DL (ref 0.5–1.4)
CREAT SERPL-MCNC: 1 MG/DL (ref 0.5–1.4)
DIFFERENTIAL METHOD: ABNORMAL
DIFFERENTIAL METHOD: ABNORMAL
EOSINOPHIL # BLD AUTO: 0.1 K/UL (ref 0–0.5)
EOSINOPHIL # BLD AUTO: 0.1 K/UL (ref 0–0.5)
EOSINOPHIL NFR BLD: 2.4 % (ref 0–8)
EOSINOPHIL NFR BLD: 2.4 % (ref 0–8)
ERYTHROCYTE [DISTWIDTH] IN BLOOD BY AUTOMATED COUNT: 13.6 % (ref 11.5–14.5)
ERYTHROCYTE [DISTWIDTH] IN BLOOD BY AUTOMATED COUNT: 13.6 % (ref 11.5–14.5)
EST. GFR  (AFRICAN AMERICAN): >60 ML/MIN/1.73 M^2
EST. GFR  (AFRICAN AMERICAN): >60 ML/MIN/1.73 M^2
EST. GFR  (NON AFRICAN AMERICAN): >60 ML/MIN/1.73 M^2
EST. GFR  (NON AFRICAN AMERICAN): >60 ML/MIN/1.73 M^2
GLUCOSE SERPL-MCNC: 94 MG/DL (ref 70–110)
GLUCOSE SERPL-MCNC: 94 MG/DL (ref 70–110)
HCT VFR BLD AUTO: 40.4 % (ref 40–54)
HCT VFR BLD AUTO: 40.4 % (ref 40–54)
HGB BLD-MCNC: 13.5 G/DL (ref 14–18)
HGB BLD-MCNC: 13.5 G/DL (ref 14–18)
IMM GRANULOCYTES # BLD AUTO: 0.03 K/UL (ref 0–0.04)
IMM GRANULOCYTES # BLD AUTO: 0.03 K/UL (ref 0–0.04)
IMM GRANULOCYTES NFR BLD AUTO: 0.5 % (ref 0–0.5)
IMM GRANULOCYTES NFR BLD AUTO: 0.5 % (ref 0–0.5)
LYMPHOCYTES # BLD AUTO: 1.6 K/UL (ref 1–4.8)
LYMPHOCYTES # BLD AUTO: 1.6 K/UL (ref 1–4.8)
LYMPHOCYTES NFR BLD: 29.7 % (ref 18–48)
LYMPHOCYTES NFR BLD: 29.7 % (ref 18–48)
MCH RBC QN AUTO: 29.5 PG (ref 27–31)
MCH RBC QN AUTO: 29.5 PG (ref 27–31)
MCHC RBC AUTO-ENTMCNC: 33.4 G/DL (ref 32–36)
MCHC RBC AUTO-ENTMCNC: 33.4 G/DL (ref 32–36)
MCV RBC AUTO: 88 FL (ref 82–98)
MCV RBC AUTO: 88 FL (ref 82–98)
MONOCYTES # BLD AUTO: 0.6 K/UL (ref 0.3–1)
MONOCYTES # BLD AUTO: 0.6 K/UL (ref 0.3–1)
MONOCYTES NFR BLD: 11.4 % (ref 4–15)
MONOCYTES NFR BLD: 11.4 % (ref 4–15)
NEUTROPHILS # BLD AUTO: 3 K/UL (ref 1.8–7.7)
NEUTROPHILS # BLD AUTO: 3 K/UL (ref 1.8–7.7)
NEUTROPHILS NFR BLD: 55.5 % (ref 38–73)
NEUTROPHILS NFR BLD: 55.5 % (ref 38–73)
NRBC BLD-RTO: 0 /100 WBC
NRBC BLD-RTO: 0 /100 WBC
PLATELET # BLD AUTO: 208 K/UL (ref 150–350)
PLATELET # BLD AUTO: 208 K/UL (ref 150–350)
PMV BLD AUTO: 9.7 FL (ref 9.2–12.9)
PMV BLD AUTO: 9.7 FL (ref 9.2–12.9)
POTASSIUM SERPL-SCNC: 4.1 MMOL/L (ref 3.5–5.1)
POTASSIUM SERPL-SCNC: 4.1 MMOL/L (ref 3.5–5.1)
PROT SERPL-MCNC: 7.9 G/DL (ref 6–8.4)
PROT SERPL-MCNC: 7.9 G/DL (ref 6–8.4)
RBC # BLD AUTO: 4.57 M/UL (ref 4.6–6.2)
RBC # BLD AUTO: 4.57 M/UL (ref 4.6–6.2)
SODIUM SERPL-SCNC: 139 MMOL/L (ref 136–145)
SODIUM SERPL-SCNC: 139 MMOL/L (ref 136–145)
WBC # BLD AUTO: 5.46 K/UL (ref 3.9–12.7)
WBC # BLD AUTO: 5.46 K/UL (ref 3.9–12.7)

## 2019-08-10 PROCEDURE — 85025 COMPLETE CBC W/AUTO DIFF WBC: CPT

## 2019-08-10 PROCEDURE — 36415 COLL VENOUS BLD VENIPUNCTURE: CPT | Mod: PO

## 2019-08-10 PROCEDURE — 80053 COMPREHEN METABOLIC PANEL: CPT

## 2019-08-13 ENCOUNTER — TELEPHONE (OUTPATIENT)
Dept: PHARMACY | Facility: CLINIC | Age: 39
End: 2019-08-13

## 2019-08-22 ENCOUNTER — PATIENT MESSAGE (OUTPATIENT)
Dept: SLEEP MEDICINE | Facility: CLINIC | Age: 39
End: 2019-08-22

## 2019-08-22 RX ORDER — METHYLPREDNISOLONE 4 MG/1
TABLET ORAL
Qty: 1 PACKAGE | Refills: 0 | Status: SHIPPED | OUTPATIENT
Start: 2019-08-22 | End: 2019-09-12

## 2019-08-28 ENCOUNTER — OFFICE VISIT (OUTPATIENT)
Dept: DERMATOLOGY | Facility: CLINIC | Age: 39
End: 2019-08-28
Payer: COMMERCIAL

## 2019-08-28 DIAGNOSIS — Z79.899 LONG-TERM USE OF HIGH-RISK MEDICATION: ICD-10-CM

## 2019-08-28 DIAGNOSIS — L73.9 FOLLICULITIS: Primary | ICD-10-CM

## 2019-08-28 PROCEDURE — 99213 OFFICE O/P EST LOW 20 MIN: CPT | Mod: S$GLB,,, | Performed by: DERMATOLOGY

## 2019-08-28 PROCEDURE — 99213 PR OFFICE/OUTPT VISIT, EST, LEVL III, 20-29 MIN: ICD-10-PCS | Mod: S$GLB,,, | Performed by: DERMATOLOGY

## 2019-08-28 PROCEDURE — 99999 PR PBB SHADOW E&M-EST. PATIENT-LVL II: CPT | Mod: PBBFAC,,, | Performed by: DERMATOLOGY

## 2019-08-28 PROCEDURE — 99999 PR PBB SHADOW E&M-EST. PATIENT-LVL II: ICD-10-PCS | Mod: PBBFAC,,, | Performed by: DERMATOLOGY

## 2019-08-28 RX ORDER — DAPSONE 25 MG/1
TABLET ORAL
Qty: 60 TABLET | Refills: 2 | Status: SHIPPED | OUTPATIENT
Start: 2019-08-28 | End: 2019-12-02 | Stop reason: SDUPTHER

## 2019-08-28 NOTE — PROGRESS NOTES
Subjective:       Patient ID:  Jatinder Moreno III is a 39 y.o. male who presents for   Chief Complaint   Patient presents with    Folliculitis     Pt here today for a folliculitis follow up tx with dapsone 25 mg , ciclopirox, vit c and vit e daily. Pt also tx with clindamycin solution which has helped alot and elocon solution prn. Tx helps. Feels less itchy.  Feels the clindamycin has helped a lot.        Review of Systems   Constitutional: Positive for fatigue. Negative for fever and chills.        Has a 2 week old   HENT: Negative for headaches.    Respiratory: Negative for shortness of breath.    Gastrointestinal: Negative for nausea.   Skin: Positive for rash. Negative for itching.   Neurological: Negative for numbness and headaches.        Objective:    Physical Exam   Constitutional: He appears well-developed and well-nourished. No distress.   Neurological: He is alert and oriented to person, place, and time. He is not disoriented.   Psychiatric: He has a normal mood and affect.   Skin:   Areas Examined (abnormalities noted in diagram):   Scalp / Hair Palpated and Inspected  Head / Face Inspection Performed              Diagram Legend     Erythematous scaling macule/papule c/w actinic keratosis       Vascular papule c/w angioma      Pigmented verrucoid papule/plaque c/w seborrheic keratosis      Yellow umbilicated papule c/w sebaceous hyperplasia      Irregularly shaped tan macule c/w lentigo     1-2 mm smooth white papules consistent with Milia      Movable subcutaneous cyst with punctum c/w epidermal inclusion cyst      Subcutaneous movable cyst c/w pilar cyst      Firm pink to brown papule c/w dermatofibroma      Pedunculated fleshy papule(s) c/w skin tag(s)      Evenly pigmented macule c/w junctional nevus     Mildly variegated pigmented, slightly irregular-bordered macule c/w mildly atypical nevus      Flesh colored to evenly pigmented papule c/w intradermal nevus       Pink pearly papule/plaque c/w  basal cell carcinoma      Erythematous hyperkeratotic cursted plaque c/w SCC      Surgical scar with no sign of skin cancer recurrence      Open and closed comedones      Inflammatory papules and pustules      Verrucoid papule consistent consistent with wart     Erythematous eczematous patches and plaques     Dystrophic onycholytic nail with subungual debris c/w onychomycosis     Umbilicated papule    Erythematous-base heme-crusted tan verrucoid plaque consistent with inflamed seborrheic keratosis     Erythematous Silvery Scaling Plaque c/w Psoriasis     See annotation      Assessment / Plan:        Folliculitis  Long-term use of high-risk medication  Increase dapsone dose to 50 mg po qd  Pt tolerating well  Dapsone side effects:   methemaglobunemia which would cause fatigue and headache, hemolytic anemia, decreased blood counts, hepatitis, hypersensitivity syndrome, drug rash, nausea, neuropathy with motor effects  Avoid use of sulfa antibiotics while on this medication  Antacid medications can decrease absorption of dapsone  Take Vit C 1000mg daily and Vit E 800iu daily for first 1-2 months     -     dapsone 25 MG Tab; Sig 2 tablets po qd  Dispense: 60 tablet; Refill: 2  -     CBC auto differential; Future; Expected date: 08/28/2019  -     Hepatic function panel; Future; Expected date: 08/28/2019  Continue clindamycin solution as needed         Follow up in about 2 months (around 10/28/2019).

## 2019-09-03 ENCOUNTER — TELEPHONE (OUTPATIENT)
Dept: PHARMACY | Facility: CLINIC | Age: 39
End: 2019-09-03

## 2019-09-04 RX ORDER — DAPSONE 25 MG/1
TABLET ORAL
Qty: 30 TABLET | Refills: 0 | OUTPATIENT
Start: 2019-09-04

## 2019-09-09 ENCOUNTER — PATIENT MESSAGE (OUTPATIENT)
Dept: DERMATOLOGY | Facility: CLINIC | Age: 39
End: 2019-09-09

## 2019-09-18 DIAGNOSIS — G43.711 INTRACTABLE CHRONIC MIGRAINE WITHOUT AURA AND WITH STATUS MIGRAINOSUS: ICD-10-CM

## 2019-09-18 RX ORDER — PROPRANOLOL HYDROCHLORIDE 120 MG/1
CAPSULE, EXTENDED RELEASE ORAL
Qty: 90 CAPSULE | Refills: 1 | Status: SHIPPED | OUTPATIENT
Start: 2019-09-18 | End: 2020-03-23 | Stop reason: SDUPTHER

## 2019-09-18 NOTE — TELEPHONE ENCOUNTER
propranolol (INDERAL LA) 120 MG 24 hr capsule   Edit       Summary: TAKE 1 CAPSULE (120 MG TOTAL) BY MOUTH ONCE DAILY., Normal   Start: 5/19/2019  Ord/Sold: 5/19/2019 (O)  Report  Adh:   Long-term:   Pharmacy: [facility]/pharmacy #[id number] - [place], [place] - [address]  Med Dose History       Patient Sig: TAKE 1 CAPSULE (120 MG TOTAL) BY MOUTH ONCE DAILY.       Ordered on: 5/19/2019       Authorized by: TYSON ALCARAZ       Dispense: 30 capsule       Refills: 5 ordered       Admin Instructions: TAKE 1 CAPSULE (120 MG TOTAL) BY MOUTH ONCE DAILY.        Last office visit: 7/31/2019

## 2019-09-19 ENCOUNTER — LAB VISIT (OUTPATIENT)
Dept: LAB | Facility: HOSPITAL | Age: 39
End: 2019-09-19
Attending: DERMATOLOGY
Payer: COMMERCIAL

## 2019-09-19 DIAGNOSIS — L73.9 FOLLICULITIS: ICD-10-CM

## 2019-09-19 LAB
BASOPHILS # BLD AUTO: 0.02 K/UL (ref 0–0.2)
BASOPHILS NFR BLD: 0.4 % (ref 0–1.9)
DIFFERENTIAL METHOD: ABNORMAL
EOSINOPHIL # BLD AUTO: 0.1 K/UL (ref 0–0.5)
EOSINOPHIL NFR BLD: 2.1 % (ref 0–8)
ERYTHROCYTE [DISTWIDTH] IN BLOOD BY AUTOMATED COUNT: 14.4 % (ref 11.5–14.5)
HCT VFR BLD AUTO: 38.3 % (ref 40–54)
HGB BLD-MCNC: 12.7 G/DL (ref 14–18)
IMM GRANULOCYTES # BLD AUTO: 0.02 K/UL (ref 0–0.04)
IMM GRANULOCYTES NFR BLD AUTO: 0.4 % (ref 0–0.5)
LYMPHOCYTES # BLD AUTO: 1.9 K/UL (ref 1–4.8)
LYMPHOCYTES NFR BLD: 36.5 % (ref 18–48)
MCH RBC QN AUTO: 30 PG (ref 27–31)
MCHC RBC AUTO-ENTMCNC: 33.2 G/DL (ref 32–36)
MCV RBC AUTO: 91 FL (ref 82–98)
MONOCYTES # BLD AUTO: 0.5 K/UL (ref 0.3–1)
MONOCYTES NFR BLD: 8.8 % (ref 4–15)
NEUTROPHILS # BLD AUTO: 2.7 K/UL (ref 1.8–7.7)
NEUTROPHILS NFR BLD: 51.8 % (ref 38–73)
NRBC BLD-RTO: 0 /100 WBC
PLATELET # BLD AUTO: 203 K/UL (ref 150–350)
PMV BLD AUTO: 9.6 FL (ref 9.2–12.9)
RBC # BLD AUTO: 4.23 M/UL (ref 4.6–6.2)
WBC # BLD AUTO: 5.21 K/UL (ref 3.9–12.7)

## 2019-09-19 PROCEDURE — 85025 COMPLETE CBC W/AUTO DIFF WBC: CPT

## 2019-09-19 PROCEDURE — 36415 COLL VENOUS BLD VENIPUNCTURE: CPT | Mod: PO

## 2019-09-19 PROCEDURE — 80076 HEPATIC FUNCTION PANEL: CPT

## 2019-09-20 LAB
ALBUMIN SERPL BCP-MCNC: 5 G/DL (ref 3.5–5.2)
ALP SERPL-CCNC: 65 U/L (ref 55–135)
ALT SERPL W/O P-5'-P-CCNC: 19 U/L (ref 10–44)
AST SERPL-CCNC: 23 U/L (ref 10–40)
BILIRUB DIRECT SERPL-MCNC: 0.4 MG/DL (ref 0.1–0.3)
BILIRUB SERPL-MCNC: 1.5 MG/DL (ref 0.1–1)
PROT SERPL-MCNC: 8 G/DL (ref 6–8.4)

## 2019-09-25 DIAGNOSIS — L73.9 FOLLICULITIS: Primary | ICD-10-CM

## 2019-09-25 DIAGNOSIS — Z79.899 LONG-TERM USE OF HIGH-RISK MEDICATION: ICD-10-CM

## 2019-09-26 ENCOUNTER — TELEPHONE (OUTPATIENT)
Dept: PHARMACY | Facility: CLINIC | Age: 39
End: 2019-09-26

## 2019-10-04 ENCOUNTER — PATIENT MESSAGE (OUTPATIENT)
Dept: DERMATOLOGY | Facility: CLINIC | Age: 39
End: 2019-10-04

## 2019-10-07 ENCOUNTER — TELEPHONE (OUTPATIENT)
Dept: PHARMACY | Facility: CLINIC | Age: 39
End: 2019-10-07

## 2019-10-21 ENCOUNTER — PATIENT OUTREACH (OUTPATIENT)
Dept: ADMINISTRATIVE | Facility: OTHER | Age: 39
End: 2019-10-21

## 2019-10-22 RX ORDER — MONTELUKAST SODIUM 10 MG/1
TABLET ORAL
Qty: 90 TABLET | Refills: 2 | Status: SHIPPED | OUTPATIENT
Start: 2019-10-22 | End: 2020-08-03

## 2019-10-22 RX ORDER — LEVOCETIRIZINE DIHYDROCHLORIDE 5 MG/1
TABLET, FILM COATED ORAL
Qty: 90 TABLET | Refills: 2 | Status: SHIPPED | OUTPATIENT
Start: 2019-10-22 | End: 2020-08-03

## 2019-10-24 ENCOUNTER — OFFICE VISIT (OUTPATIENT)
Dept: DERMATOLOGY | Facility: CLINIC | Age: 39
End: 2019-10-24
Payer: COMMERCIAL

## 2019-10-24 ENCOUNTER — LAB VISIT (OUTPATIENT)
Dept: LAB | Facility: HOSPITAL | Age: 39
End: 2019-10-24
Attending: DERMATOLOGY
Payer: COMMERCIAL

## 2019-10-24 DIAGNOSIS — L73.9 FOLLICULITIS: ICD-10-CM

## 2019-10-24 DIAGNOSIS — L73.9 FOLLICULITIS: Primary | ICD-10-CM

## 2019-10-24 DIAGNOSIS — Z79.899 LONG-TERM USE OF HIGH-RISK MEDICATION: ICD-10-CM

## 2019-10-24 LAB
ALBUMIN SERPL BCP-MCNC: 4.7 G/DL (ref 3.5–5.2)
ALP SERPL-CCNC: 69 U/L (ref 55–135)
ALT SERPL W/O P-5'-P-CCNC: 23 U/L (ref 10–44)
ANION GAP SERPL CALC-SCNC: 10 MMOL/L (ref 8–16)
AST SERPL-CCNC: 26 U/L (ref 10–40)
BASOPHILS # BLD AUTO: 0.04 K/UL (ref 0–0.2)
BASOPHILS NFR BLD: 0.7 % (ref 0–1.9)
BILIRUB SERPL-MCNC: 1.4 MG/DL (ref 0.1–1)
BUN SERPL-MCNC: 17 MG/DL (ref 6–20)
CALCIUM SERPL-MCNC: 9.3 MG/DL (ref 8.7–10.5)
CHLORIDE SERPL-SCNC: 102 MMOL/L (ref 95–110)
CO2 SERPL-SCNC: 25 MMOL/L (ref 23–29)
CREAT SERPL-MCNC: 1.1 MG/DL (ref 0.5–1.4)
DIFFERENTIAL METHOD: ABNORMAL
EOSINOPHIL # BLD AUTO: 0.1 K/UL (ref 0–0.5)
EOSINOPHIL NFR BLD: 1.7 % (ref 0–8)
ERYTHROCYTE [DISTWIDTH] IN BLOOD BY AUTOMATED COUNT: 13.4 % (ref 11.5–14.5)
EST. GFR  (AFRICAN AMERICAN): >60 ML/MIN/1.73 M^2
EST. GFR  (NON AFRICAN AMERICAN): >60 ML/MIN/1.73 M^2
GLUCOSE SERPL-MCNC: 94 MG/DL (ref 70–110)
HCT VFR BLD AUTO: 38.1 % (ref 40–54)
HGB BLD-MCNC: 13.2 G/DL (ref 14–18)
IMM GRANULOCYTES # BLD AUTO: 0.03 K/UL (ref 0–0.04)
IMM GRANULOCYTES NFR BLD AUTO: 0.5 % (ref 0–0.5)
LYMPHOCYTES # BLD AUTO: 1.5 K/UL (ref 1–4.8)
LYMPHOCYTES NFR BLD: 25.4 % (ref 18–48)
MCH RBC QN AUTO: 31.6 PG (ref 27–31)
MCHC RBC AUTO-ENTMCNC: 34.6 G/DL (ref 32–36)
MCV RBC AUTO: 91 FL (ref 82–98)
MONOCYTES # BLD AUTO: 0.5 K/UL (ref 0.3–1)
MONOCYTES NFR BLD: 8.6 % (ref 4–15)
NEUTROPHILS # BLD AUTO: 3.8 K/UL (ref 1.8–7.7)
NEUTROPHILS NFR BLD: 63.1 % (ref 38–73)
NRBC BLD-RTO: 0 /100 WBC
PLATELET # BLD AUTO: 201 K/UL (ref 150–350)
PMV BLD AUTO: 9.8 FL (ref 9.2–12.9)
POTASSIUM SERPL-SCNC: 3.9 MMOL/L (ref 3.5–5.1)
PROT SERPL-MCNC: 7.7 G/DL (ref 6–8.4)
RBC # BLD AUTO: 4.18 M/UL (ref 4.6–6.2)
SODIUM SERPL-SCNC: 137 MMOL/L (ref 136–145)
WBC # BLD AUTO: 6.03 K/UL (ref 3.9–12.7)

## 2019-10-24 PROCEDURE — 99999 PR PBB SHADOW E&M-EST. PATIENT-LVL III: ICD-10-PCS | Mod: PBBFAC,,, | Performed by: DERMATOLOGY

## 2019-10-24 PROCEDURE — 85025 COMPLETE CBC W/AUTO DIFF WBC: CPT

## 2019-10-24 PROCEDURE — 36415 COLL VENOUS BLD VENIPUNCTURE: CPT | Mod: PO

## 2019-10-24 PROCEDURE — 99213 PR OFFICE/OUTPT VISIT, EST, LEVL III, 20-29 MIN: ICD-10-PCS | Mod: S$GLB,,, | Performed by: DERMATOLOGY

## 2019-10-24 PROCEDURE — 80053 COMPREHEN METABOLIC PANEL: CPT

## 2019-10-24 PROCEDURE — 99213 OFFICE O/P EST LOW 20 MIN: CPT | Mod: S$GLB,,, | Performed by: DERMATOLOGY

## 2019-10-24 PROCEDURE — 99999 PR PBB SHADOW E&M-EST. PATIENT-LVL III: CPT | Mod: PBBFAC,,, | Performed by: DERMATOLOGY

## 2019-10-24 NOTE — PROGRESS NOTES
Subjective:       Patient ID:  Jatinder Moreno III is a 39 y.o. male who presents for   Chief Complaint   Patient presents with    Folliculitis     Pt here today for a follow up on Folliculitis tx with clindmycin solution and dapsone 50mg. tx helps. Started dapsone July 2019. Still gets occ few new lesions but less.  No large lesions. Feels his hair is growing back. Less itching.  Uses clindamycin minimally.          Review of Systems   Constitutional: Negative for fatigue.   HENT: Negative for headaches.    Gastrointestinal: Negative for abdominal pain.   Skin: Negative for rash.   Neurological: Negative for numbness and headaches.        Objective:    Physical Exam   Constitutional: He appears well-developed and well-nourished. No distress.   Neurological: He is alert and oriented to person, place, and time. He is not disoriented.   Psychiatric: He has a normal mood and affect.   Skin:   Areas Examined (abnormalities noted in diagram):   Scalp / Hair Palpated and Inspected  Head / Face Inspection Performed              Diagram Legend     Erythematous scaling macule/papule c/w actinic keratosis       Vascular papule c/w angioma      Pigmented verrucoid papule/plaque c/w seborrheic keratosis      Yellow umbilicated papule c/w sebaceous hyperplasia      Irregularly shaped tan macule c/w lentigo     1-2 mm smooth white papules consistent with Milia      Movable subcutaneous cyst with punctum c/w epidermal inclusion cyst      Subcutaneous movable cyst c/w pilar cyst      Firm pink to brown papule c/w dermatofibroma      Pedunculated fleshy papule(s) c/w skin tag(s)      Evenly pigmented macule c/w junctional nevus     Mildly variegated pigmented, slightly irregular-bordered macule c/w mildly atypical nevus      Flesh colored to evenly pigmented papule c/w intradermal nevus       Pink pearly papule/plaque c/w basal cell carcinoma      Erythematous hyperkeratotic cursted plaque c/w SCC      Surgical scar with no sign  of skin cancer recurrence      Open and closed comedones      Inflammatory papules and pustules      Verrucoid papule consistent consistent with wart     Erythematous eczematous patches and plaques     Dystrophic onycholytic nail with subungual debris c/w onychomycosis     Umbilicated papule    Erythematous-base heme-crusted tan verrucoid plaque consistent with inflamed seborrheic keratosis     Erythematous Silvery Scaling Plaque c/w Psoriasis     See annotation      Assessment / Plan:        Folliculitis- scalp   -  Long-term use of high-risk medication  Continue dapsone 50 mg po qd   Check labs today   Continue ciclopirox , clindamycin as needed and lidex as needed  -     CBC auto differential; Future; Expected date: 10/24/2019  -     Comprehensive metabolic panel; Future; Expected date: 10/24/2019             Follow up in about 3 months (around 1/24/2020).

## 2019-10-31 ENCOUNTER — PATIENT OUTREACH (OUTPATIENT)
Dept: ADMINISTRATIVE | Facility: OTHER | Age: 39
End: 2019-10-31

## 2019-11-04 ENCOUNTER — TELEPHONE (OUTPATIENT)
Dept: PHARMACY | Facility: CLINIC | Age: 39
End: 2019-11-04

## 2019-11-04 NOTE — TELEPHONE ENCOUNTER
Call attempt 1 for Aimovig refill and follow-up. LVM for patient and sent [employer] message. $5.00 copay at 004.     You Vasquez, PharmD  Clinical Pharmacist  [facility]  P: [phone]

## 2019-11-05 ENCOUNTER — PATIENT MESSAGE (OUTPATIENT)
Dept: SLEEP MEDICINE | Facility: CLINIC | Age: 39
End: 2019-11-05

## 2019-11-05 ENCOUNTER — TELEPHONE (OUTPATIENT)
Dept: SLEEP MEDICINE | Facility: CLINIC | Age: 39
End: 2019-11-05

## 2019-11-05 NOTE — TELEPHONE ENCOUNTER
11/5/2019    4:20 PM  20 mins.  Linsey Roland NP    [facility]      Reason for Cancellation: Other

## 2019-11-06 ENCOUNTER — OFFICE VISIT (OUTPATIENT)
Dept: INTERNAL MEDICINE | Facility: CLINIC | Age: 39
End: 2019-11-06
Payer: COMMERCIAL

## 2019-11-06 ENCOUNTER — PATIENT MESSAGE (OUTPATIENT)
Dept: INTERNAL MEDICINE | Facility: CLINIC | Age: 39
End: 2019-11-06

## 2019-11-06 VITALS
HEART RATE: 76 BPM | OXYGEN SATURATION: 99 % | TEMPERATURE: 98 F | DIASTOLIC BLOOD PRESSURE: 90 MMHG | WEIGHT: 282.19 LBS | HEIGHT: 75 IN | SYSTOLIC BLOOD PRESSURE: 120 MMHG | BODY MASS INDEX: 35.09 KG/M2

## 2019-11-06 DIAGNOSIS — R05.9 COUGH: ICD-10-CM

## 2019-11-06 DIAGNOSIS — J06.9 VIRAL UPPER RESPIRATORY ILLNESS: ICD-10-CM

## 2019-11-06 DIAGNOSIS — R05.9 COUGH: Primary | ICD-10-CM

## 2019-11-06 LAB
CTP QC/QA: YES
POC MOLECULAR INFLUENZA A AGN: NEGATIVE
POC MOLECULAR INFLUENZA B AGN: NEGATIVE

## 2019-11-06 PROCEDURE — 99999 PR PBB SHADOW E&M-EST. PATIENT-LVL V: ICD-10-PCS | Mod: PBBFAC,,, | Performed by: FAMILY MEDICINE

## 2019-11-06 PROCEDURE — 99999 PR PBB SHADOW E&M-EST. PATIENT-LVL V: CPT | Mod: PBBFAC,,, | Performed by: FAMILY MEDICINE

## 2019-11-06 PROCEDURE — 99213 PR OFFICE/OUTPT VISIT, EST, LEVL III, 20-29 MIN: ICD-10-PCS | Mod: S$GLB,,, | Performed by: FAMILY MEDICINE

## 2019-11-06 PROCEDURE — 87502 INFLUENZA DNA AMP PROBE: CPT | Mod: QW,S$GLB,, | Performed by: FAMILY MEDICINE

## 2019-11-06 PROCEDURE — 87502 POCT INFLUENZA A/B MOLECULAR: ICD-10-PCS | Mod: QW,S$GLB,, | Performed by: FAMILY MEDICINE

## 2019-11-06 PROCEDURE — 99213 OFFICE O/P EST LOW 20 MIN: CPT | Mod: S$GLB,,, | Performed by: FAMILY MEDICINE

## 2019-11-06 PROCEDURE — 3080F DIAST BP >= 90 MM HG: CPT | Mod: CPTII,S$GLB,, | Performed by: FAMILY MEDICINE

## 2019-11-06 PROCEDURE — 3074F SYST BP LT 130 MM HG: CPT | Mod: CPTII,S$GLB,, | Performed by: FAMILY MEDICINE

## 2019-11-06 PROCEDURE — 3080F PR MOST RECENT DIASTOLIC BLOOD PRESSURE >= 90 MM HG: ICD-10-PCS | Mod: CPTII,S$GLB,, | Performed by: FAMILY MEDICINE

## 2019-11-06 PROCEDURE — 3008F PR BODY MASS INDEX (BMI) DOCUMENTED: ICD-10-PCS | Mod: CPTII,S$GLB,, | Performed by: FAMILY MEDICINE

## 2019-11-06 PROCEDURE — 3074F PR MOST RECENT SYSTOLIC BLOOD PRESSURE < 130 MM HG: ICD-10-PCS | Mod: CPTII,S$GLB,, | Performed by: FAMILY MEDICINE

## 2019-11-06 PROCEDURE — 3008F BODY MASS INDEX DOCD: CPT | Mod: CPTII,S$GLB,, | Performed by: FAMILY MEDICINE

## 2019-11-06 RX ORDER — HYDROCODONE BITARTRATE AND HOMATROPINE METHYLBROMIDE ORAL SOLUTION 5; 1.5 MG/5ML; MG/5ML
5 LIQUID ORAL EVERY 6 HOURS PRN
Qty: 120 ML | Refills: 0 | Status: SHIPPED | OUTPATIENT
Start: 2019-11-06 | End: 2019-11-06 | Stop reason: SDUPTHER

## 2019-11-06 RX ORDER — METHYLPREDNISOLONE 4 MG/1
TABLET ORAL
Qty: 1 PACKAGE | Refills: 0 | Status: SHIPPED | OUTPATIENT
Start: 2019-11-06 | End: 2019-11-20

## 2019-11-06 RX ORDER — HYDROCODONE BITARTRATE AND HOMATROPINE METHYLBROMIDE ORAL SOLUTION 5; 1.5 MG/5ML; MG/5ML
5 LIQUID ORAL EVERY 6 HOURS PRN
Qty: 120 ML | Refills: 0 | Status: SHIPPED | OUTPATIENT
Start: 2019-11-06 | End: 2020-11-17

## 2019-11-06 NOTE — TELEPHONE ENCOUNTER
Spoke with patient on the phone.  Cough syrup will be sent to an alternative pharmacy due to his normal pharmacy not having enough of the medication to complete the prescription.

## 2019-11-06 NOTE — PROGRESS NOTES
Patient ID: Jatinder Moreno III is a 39 y.o. male.    Chief Complaint: Sinus Problem and Cough    HPI  Jatinder Moreno III is a 39 y.o. year old male with ADHD, depression, anxiety, ALEXX, migraines, allergies who presents today complaining of a cough and sinus congestion.    Cough   This is a recurrent problem. The current episode started in the past 7 days. The problem has been gradually worsening. The problem occurs every few minutes. The cough is productive of sputum. Associated symptoms include nasal congestion, postnasal drip and a rash. Pertinent negatives include no chest pain, chills, ear congestion, fever, headaches, heartburn, hemoptysis, myalgias, rhinorrhea, sore throat, shortness of breath, sweats, weight loss or wheezing. The symptoms are aggravated by cold air. He has tried OTC cough suppressant, leukotriene antagonists and rest for the symptoms. The treatment provided no relief. His past medical history is significant for asthma and bronchitis. There is no history of bronchiectasis, COPD, emphysema, environmental allergies or pneumonia.   Complains of sinus congestion and rhinorrhea that began a few weeks ago.  Says his cough developed about 2 days ago.  He has been using Singulair, his nasal sprays, Sudafed, Mucinex.  He has not used albuterol, as he ran out.    I personally reviewed Past Medical History.     Review of Systems   Constitutional: Negative for chills, fatigue, fever, unexpected weight change and weight loss.   HENT: Positive for postnasal drip. Negative for congestion, hearing loss, rhinorrhea and sore throat.    Eyes: Negative for visual disturbance.   Respiratory: Positive for cough. Negative for hemoptysis, shortness of breath and wheezing.    Cardiovascular: Negative for chest pain, palpitations and leg swelling.   Gastrointestinal: Negative for abdominal pain, constipation, diarrhea, heartburn, nausea and vomiting.   Genitourinary: Negative for dysuria, frequency and urgency.  "  Musculoskeletal: Negative for arthralgias and myalgias.   Skin: Negative for rash.   Allergic/Immunologic: Negative for environmental allergies.   Neurological: Negative for dizziness, syncope and headaches.   Psychiatric/Behavioral: Negative for dysphoric mood and sleep disturbance. The patient is not nervous/anxious.        Objective:      Vitals:    11/06/19 1042 11/06/19 1130   BP: (!) 136/100 (!) 120/90   Pulse: 76    Temp:  98.2 °F (36.8 °C)   SpO2: 99%    Weight: 128 kg (282 lb 3 oz)    Height: 6' 3" (1.905 m)      Physical Exam   Constitutional: He is oriented to person, place, and time. He appears well-developed and well-nourished. No distress.   HENT:   Head: Normocephalic and atraumatic.   Right Ear: Hearing, tympanic membrane, external ear and ear canal normal.   Left Ear: Hearing, tympanic membrane, external ear and ear canal normal.   Nose: Nose normal. Right sinus exhibits no maxillary sinus tenderness and no frontal sinus tenderness. Left sinus exhibits no maxillary sinus tenderness and no frontal sinus tenderness.   Mouth/Throat: Oropharynx is clear and moist and mucous membranes are normal.   Eyes: Pupils are equal, round, and reactive to light. Conjunctivae, EOM and lids are normal.   Neck: Normal range of motion. Neck supple.   Cardiovascular: Normal rate, regular rhythm and normal heart sounds.   No murmur heard.  Pulmonary/Chest: Effort normal and breath sounds normal. No respiratory distress.   Lymphadenopathy:     He has no cervical adenopathy.   Neurological: He is alert and oriented to person, place, and time.   Skin: Skin is warm and dry.   Psychiatric: He has a normal mood and affect. His behavior is normal. Thought content normal.   Nursing note and vitals reviewed.      Assessment:       1. Cough    2. Viral upper respiratory illness        Plan:     Cough  Viral upper respiratory illness  Rapid flu testing in the office negative.  Will treat viral upper respiratory illness with " Medrol Dosepak and cough syrup the patient typically uses. RTC if worsening or not improving.  -     POCT Influenza A/B Molecular  -     methylPREDNISolone (MEDROL DOSEPACK) 4 mg tablet; use as directed  Dispense: 1 Package; Refill: 0  -     hydrocodone-homatropine 5-1.5 mg/5 ml (HYCODAN) 5-1.5 mg/5 mL Syrp; Take 5 mLs by mouth every 6 (six) hours as needed (cough).  Dispense: 120 mL; Refill: 0

## 2019-11-07 ENCOUNTER — TELEPHONE (OUTPATIENT)
Dept: PHARMACY | Facility: CLINIC | Age: 39
End: 2019-11-07

## 2019-11-07 NOTE — TELEPHONE ENCOUNTER
Spoke to patient, but he could not speak at the moment. Informed him that it was in regards to his refill and [facility] would call back later. WebMD message was read on 11/4. $5.00 copay for Aimovig at 004. Next injection date is unclear, but it should be soon.    You Vasquez, PharmD  Clinical Pharmacist  [facility]  P: [phone]

## 2019-11-08 NOTE — TELEPHONE ENCOUNTER
"Refill and followup call for Aimovig. Patient confirmed need of the refill. Will [employer] on  to arrive on  with patient consent. Copay $5 at 004 with auth to charge [facility]. Address confirmed. Patient has 0 doses remaining / next injection due . Patient denies missed doses and no side effects.  No new medications/allergies/medical conditions. No labs needed. No ER/Urgent care visits in past month. No Sharps container needed. Patient taking the medication as directed. Patient denies any further questions. Confirmed 2 patient identifiers - Name and .  Patient has been on therapy for ~5 months - has nearly reached peak efficacy. Still getting migraines - but many fewer. Duration: couple house. "Hasn't had too many bad ones, maybe 3 bad ones." Still gets headaches, less frequent. Pain 6 (bad ones get up to a 10; Triptans are more efficacious). Has Ultram on hand as well. Doesn't have to take multiple triptan doses anymore. Health QoL 8 (10-best).  "

## 2019-11-20 ENCOUNTER — OFFICE VISIT (OUTPATIENT)
Dept: NEUROLOGY | Facility: CLINIC | Age: 39
End: 2019-11-20
Payer: COMMERCIAL

## 2019-11-20 VITALS
DIASTOLIC BLOOD PRESSURE: 80 MMHG | HEIGHT: 75 IN | WEIGHT: 280.44 LBS | SYSTOLIC BLOOD PRESSURE: 128 MMHG | HEART RATE: 77 BPM | BODY MASS INDEX: 34.87 KG/M2

## 2019-11-20 DIAGNOSIS — G43.719 INTRACTABLE CHRONIC MIGRAINE WITHOUT AURA AND WITHOUT STATUS MIGRAINOSUS: ICD-10-CM

## 2019-11-20 DIAGNOSIS — G47.33 OBSTRUCTIVE SLEEP APNEA: Primary | ICD-10-CM

## 2019-11-20 PROCEDURE — 64615 CHEMODENERV MUSC MIGRAINE: CPT | Mod: S$GLB,,, | Performed by: NURSE PRACTITIONER

## 2019-11-20 PROCEDURE — 99999 PR PBB SHADOW E&M-EST. PATIENT-LVL IV: CPT | Mod: PBBFAC,,, | Performed by: NURSE PRACTITIONER

## 2019-11-20 PROCEDURE — 99999 PR PBB SHADOW E&M-EST. PATIENT-LVL IV: ICD-10-PCS | Mod: PBBFAC,,, | Performed by: NURSE PRACTITIONER

## 2019-11-20 PROCEDURE — 99499 NO LOS: ICD-10-PCS | Mod: S$GLB,,, | Performed by: NURSE PRACTITIONER

## 2019-11-20 PROCEDURE — 64615 PR CHEMODENERVATION OF MUSCLE FOR CHRONIC MIGRAINE: ICD-10-PCS | Mod: S$GLB,,, | Performed by: NURSE PRACTITIONER

## 2019-11-20 PROCEDURE — 99499 UNLISTED E&M SERVICE: CPT | Mod: S$GLB,,, | Performed by: NURSE PRACTITIONER

## 2019-11-20 RX ORDER — ONDANSETRON 4 MG/1
4 TABLET, ORALLY DISINTEGRATING ORAL EVERY 6 HOURS PRN
Qty: 30 TABLET | Refills: 3 | Status: SHIPPED | OUTPATIENT
Start: 2019-11-20 | End: 2019-12-20

## 2019-11-20 RX ORDER — TRAMADOL HYDROCHLORIDE 50 MG/1
50 TABLET ORAL EVERY 6 HOURS PRN
Qty: 30 TABLET | Refills: 3 | Status: SHIPPED | OUTPATIENT
Start: 2019-11-20 | End: 2020-05-20 | Stop reason: SDUPTHER

## 2019-11-20 RX ORDER — DICLOFENAC POTASSIUM 50 MG/1
1 POWDER, FOR SOLUTION ORAL EVERY 6 HOURS PRN
Qty: 9 EACH | Refills: 5 | Status: SHIPPED | OUTPATIENT
Start: 2019-11-20 | End: 2020-11-17

## 2019-11-20 NOTE — PROGRESS NOTES
Mr. Moreno is here today for management of  Headaches/ALEXX    He wants new apap machine. Continues to us his apap faithfully, benefiting from therapy. Using nose  Pillows.Wife now using therapy also.   Continue apap 13-16cm, [facility]. RTC b/t 31-90d after setup adherence    PROCEDURE NOTE:     After informed consent signed and dated, and risks and benefits of procedure was discussed, BOTOX injection was performed     Frontalis- 15 units on each side   - 5 units on each side   Procerus- 10 units   Occipitalis- 20 units on each side   Temporalis - 25 units on each side   Trapezius- 15 units on each side   Cervical paraspinal muscles- 15 units on each side

## 2019-12-02 DIAGNOSIS — L73.9 FOLLICULITIS: ICD-10-CM

## 2019-12-02 RX ORDER — DAPSONE 25 MG/1
TABLET ORAL
Qty: 180 TABLET | Refills: 0 | Status: SHIPPED | OUTPATIENT
Start: 2019-12-02 | End: 2020-02-19 | Stop reason: SDUPTHER

## 2019-12-04 ENCOUNTER — TELEPHONE (OUTPATIENT)
Dept: PHARMACY | Facility: CLINIC | Age: 39
End: 2019-12-04

## 2019-12-30 ENCOUNTER — OFFICE VISIT (OUTPATIENT)
Dept: INTERNAL MEDICINE | Facility: CLINIC | Age: 39
End: 2019-12-30
Attending: FAMILY MEDICINE
Payer: COMMERCIAL

## 2019-12-30 VITALS
DIASTOLIC BLOOD PRESSURE: 96 MMHG | OXYGEN SATURATION: 99 % | HEART RATE: 88 BPM | SYSTOLIC BLOOD PRESSURE: 140 MMHG | HEIGHT: 75 IN | WEIGHT: 286.81 LBS | BODY MASS INDEX: 35.66 KG/M2

## 2019-12-30 DIAGNOSIS — M54.9 UPPER BACK PAIN ON LEFT SIDE: Primary | ICD-10-CM

## 2019-12-30 DIAGNOSIS — M54.50 CHRONIC LEFT-SIDED LOW BACK PAIN WITHOUT SCIATICA: ICD-10-CM

## 2019-12-30 DIAGNOSIS — G89.29 CHRONIC LEFT-SIDED LOW BACK PAIN WITHOUT SCIATICA: ICD-10-CM

## 2019-12-30 PROCEDURE — 96372 PR INJECTION,THERAP/PROPH/DIAG2ST, IM OR SUBCUT: ICD-10-PCS | Mod: S$GLB,,, | Performed by: FAMILY MEDICINE

## 2019-12-30 PROCEDURE — 3080F DIAST BP >= 90 MM HG: CPT | Mod: CPTII,S$GLB,, | Performed by: FAMILY MEDICINE

## 2019-12-30 PROCEDURE — 3080F PR MOST RECENT DIASTOLIC BLOOD PRESSURE >= 90 MM HG: ICD-10-PCS | Mod: CPTII,S$GLB,, | Performed by: FAMILY MEDICINE

## 2019-12-30 PROCEDURE — 3074F SYST BP LT 130 MM HG: CPT | Mod: CPTII,S$GLB,, | Performed by: FAMILY MEDICINE

## 2019-12-30 PROCEDURE — 99214 OFFICE O/P EST MOD 30 MIN: CPT | Mod: 25,S$GLB,, | Performed by: FAMILY MEDICINE

## 2019-12-30 PROCEDURE — 99999 PR PBB SHADOW E&M-EST. PATIENT-LVL III: ICD-10-PCS | Mod: PBBFAC,,, | Performed by: FAMILY MEDICINE

## 2019-12-30 PROCEDURE — 3074F PR MOST RECENT SYSTOLIC BLOOD PRESSURE < 130 MM HG: ICD-10-PCS | Mod: CPTII,S$GLB,, | Performed by: FAMILY MEDICINE

## 2019-12-30 PROCEDURE — 99999 PR PBB SHADOW E&M-EST. PATIENT-LVL III: CPT | Mod: PBBFAC,,, | Performed by: FAMILY MEDICINE

## 2019-12-30 PROCEDURE — 3008F BODY MASS INDEX DOCD: CPT | Mod: CPTII,S$GLB,, | Performed by: FAMILY MEDICINE

## 2019-12-30 PROCEDURE — 96372 THER/PROPH/DIAG INJ SC/IM: CPT | Mod: S$GLB,,, | Performed by: FAMILY MEDICINE

## 2019-12-30 PROCEDURE — 99214 PR OFFICE/OUTPT VISIT, EST, LEVL IV, 30-39 MIN: ICD-10-PCS | Mod: 25,S$GLB,, | Performed by: FAMILY MEDICINE

## 2019-12-30 PROCEDURE — 3008F PR BODY MASS INDEX (BMI) DOCUMENTED: ICD-10-PCS | Mod: CPTII,S$GLB,, | Performed by: FAMILY MEDICINE

## 2019-12-30 RX ORDER — METHOCARBAMOL 500 MG/1
500 TABLET, FILM COATED ORAL 2 TIMES DAILY PRN
Qty: 60 TABLET | Refills: 0 | Status: SHIPPED | OUTPATIENT
Start: 2019-12-30 | End: 2019-12-31

## 2019-12-30 RX ORDER — TRIAMCINOLONE ACETONIDE 40 MG/ML
40 INJECTION, SUSPENSION INTRA-ARTICULAR; INTRAMUSCULAR ONCE
Status: COMPLETED | OUTPATIENT
Start: 2019-12-30 | End: 2019-12-30

## 2019-12-30 RX ORDER — METHYLPREDNISOLONE 4 MG/1
TABLET ORAL
Qty: 1 PACKAGE | Refills: 0 | Status: SHIPPED | OUTPATIENT
Start: 2019-12-30 | End: 2020-01-20

## 2019-12-30 RX ADMIN — TRIAMCINOLONE ACETONIDE 40 MG: 40 INJECTION, SUSPENSION INTRA-ARTICULAR; INTRAMUSCULAR at 04:12

## 2019-12-30 NOTE — LETTER
December 30, 2019    Jatinder Moreno III  [address] [place] [address]         [facility] [id number]  [facility] [address]  Phone: [phone]  Fax: [phone] December 30, 2019     Patient: Jatinder Moreno III   YOB: 1980   Date of Visit: 12/30/2019       To Whom It May Concern:    Patient seen in clinic today; please excuse from missed days of work. Please allow for hourly breaks from sitting at desk.   If you have any questions or concerns, please don't hesitate to call.    Sincerely,        Margareth Hdz MD

## 2019-12-30 NOTE — PROGRESS NOTES
"Per order, patient to receive 1mL of Kenalog (triamcinolone acetonide) 40mg/mL. The area of injection was palpated using the medial fold and the iliac crest as anatomical landmarks. Patient was advised to relax the muscle. The area was cleaned with alcohol and allowed to dry. Using a 25g 1.5" needle, 1mL of Kenalog (triamcinolone acetonide) 40mg/mL was placed intramuscularly into the left upper outer gluteal quadrant. Patient experienced no complications and was discharged in stable condition. Kenalog Lot: [id number] Exp: 04/2021    "

## 2019-12-30 NOTE — PROGRESS NOTES
"Subjective:      Patient ID: Jatinder Moreno III is a 39 y.o. male.    Chief Complaint: Back Pain    HPI   This is a new patient to me. Patient here today for chronic low back pain that has been recently exacerbated that started years ago and no injury/trauma or accident. It does shoot/zap forward to the pelvis at times. He was leaning forward and putting together a smoker and then about 4 days ago sitting for long period of time and this triggered this to reoccur. He has tried ibuprofen and valium and this has helped minimally. It is a cramping sensation he has tried to stretch and completed therapy before. He does home exercises and has been consistent with it. Heating pad has also helped. He denies fevers, chills, loss of bowel or bladder function.       Review of Systems   Constitutional: Negative for activity change, appetite change, chills, diaphoresis, fatigue, fever and unexpected weight change.   HENT: Negative for congestion, postnasal drip, rhinorrhea, sinus pressure, sore throat and trouble swallowing.    Respiratory: Negative for cough, choking, chest tightness, shortness of breath and wheezing.    Cardiovascular: Negative for chest pain, palpitations and leg swelling.   Gastrointestinal: Negative for abdominal distention, abdominal pain, blood in stool, constipation, diarrhea, nausea and vomiting.   Genitourinary: Negative for difficulty urinating, discharge, frequency and hematuria.   Musculoskeletal: Positive for back pain.     I personally reviewed Past Medical History, Past Surgical history,  Past Social History and Family History      Objective:   BP (!) 140/96   Pulse 88   Ht 6' 3" (1.905 m)   Wt 130.1 kg (286 lb 13.1 oz)   SpO2 99%   BMI 35.85 kg/m²     Physical Exam   Constitutional: He appears well-developed and well-nourished. No distress.   HENT:   Head: Normocephalic and atraumatic.   Right Ear: Hearing, tympanic membrane, external ear and ear canal normal.   Left Ear: Hearing, " tympanic membrane, external ear and ear canal normal.   Nose: Nose normal.   Mouth/Throat: Uvula is midline and oropharynx is clear and moist. No oropharyngeal exudate.   Eyes: Pupils are equal, round, and reactive to light. Conjunctivae and EOM are normal.   Neck: Normal range of motion. Neck supple.   Cardiovascular: Normal rate, regular rhythm, normal heart sounds and intact distal pulses. Exam reveals no gallop and no friction rub.   No murmur heard.  Pulmonary/Chest: Effort normal and breath sounds normal. No stridor. No respiratory distress. He has no wheezes. He has no rales. He exhibits no tenderness.   Abdominal: Soft. Bowel sounds are normal. He exhibits no distension and no mass. There is no tenderness. There is no rebound and no guarding.   Musculoskeletal:   Negative BL straight leg raise test   TTP left thoracic and lumbar paraspinal muscles    Skin: He is not diaphoretic.   Vitals reviewed.      1. Upper back pain on left side    2. Chronic left-sided low back pain without sciatica        1-2. Per patient spasms induced from lifting and sitting for long period, will trial medrol dose pack and robaxin  -pain management appt for eval if no relief    Orders Placed This Encounter   Procedures    Ambulatory consult to Pain Clinic     Medications Ordered This Encounter   Medications    carisoprodol (SOMA) 250 mg tablet     Sig: Take 1 tablet (250 mg total) by mouth 2 (two) times daily as needed.     Dispense:  30 tablet     Refill:  0    methylPREDNISolone (MEDROL DOSEPACK) 4 mg tablet     Sig: use as directed     Dispense:  1 Package     Refill:  0    triamcinolone acetonide injection 40 mg

## 2019-12-31 ENCOUNTER — PATIENT MESSAGE (OUTPATIENT)
Dept: INTERNAL MEDICINE | Facility: CLINIC | Age: 39
End: 2019-12-31

## 2019-12-31 RX ORDER — CARISOPRODOL 250 MG/1
250 TABLET ORAL 2 TIMES DAILY PRN
Qty: 30 TABLET | Refills: 0 | Status: SHIPPED | OUTPATIENT
Start: 2019-12-31 | End: 2020-01-02

## 2020-01-02 RX ORDER — TIZANIDINE 4 MG/1
4 TABLET ORAL 3 TIMES DAILY PRN
Qty: 30 TABLET | Refills: 0 | Status: SHIPPED | OUTPATIENT
Start: 2020-01-02 | End: 2020-01-24 | Stop reason: SDUPTHER

## 2020-01-08 ENCOUNTER — TELEPHONE (OUTPATIENT)
Dept: PHARMACY | Facility: CLINIC | Age: 40
End: 2020-01-08

## 2020-01-23 ENCOUNTER — LAB VISIT (OUTPATIENT)
Dept: LAB | Facility: HOSPITAL | Age: 40
End: 2020-01-23
Attending: DERMATOLOGY
Payer: COMMERCIAL

## 2020-01-23 ENCOUNTER — OFFICE VISIT (OUTPATIENT)
Dept: DERMATOLOGY | Facility: CLINIC | Age: 40
End: 2020-01-23
Payer: COMMERCIAL

## 2020-01-23 DIAGNOSIS — L73.9 FOLLICULITIS: ICD-10-CM

## 2020-01-23 DIAGNOSIS — L72.0 EPIDERMAL CYST: ICD-10-CM

## 2020-01-23 DIAGNOSIS — Z79.899 LONG-TERM USE OF HIGH-RISK MEDICATION: ICD-10-CM

## 2020-01-23 DIAGNOSIS — L73.9 FOLLICULITIS: Primary | ICD-10-CM

## 2020-01-23 LAB
ALBUMIN SERPL BCP-MCNC: 4.8 G/DL (ref 3.5–5.2)
ALP SERPL-CCNC: 67 U/L (ref 55–135)
ALT SERPL W/O P-5'-P-CCNC: 17 U/L (ref 10–44)
ANION GAP SERPL CALC-SCNC: 8 MMOL/L (ref 8–16)
AST SERPL-CCNC: 18 U/L (ref 10–40)
BASOPHILS # BLD AUTO: 0.04 K/UL (ref 0–0.2)
BASOPHILS NFR BLD: 0.6 % (ref 0–1.9)
BILIRUB SERPL-MCNC: 1.4 MG/DL (ref 0.1–1)
BUN SERPL-MCNC: 13 MG/DL (ref 6–20)
CALCIUM SERPL-MCNC: 9.6 MG/DL (ref 8.7–10.5)
CHLORIDE SERPL-SCNC: 104 MMOL/L (ref 95–110)
CO2 SERPL-SCNC: 28 MMOL/L (ref 23–29)
CREAT SERPL-MCNC: 1.3 MG/DL (ref 0.5–1.4)
DIFFERENTIAL METHOD: ABNORMAL
EOSINOPHIL # BLD AUTO: 0.1 K/UL (ref 0–0.5)
EOSINOPHIL NFR BLD: 1.9 % (ref 0–8)
ERYTHROCYTE [DISTWIDTH] IN BLOOD BY AUTOMATED COUNT: 13.8 % (ref 11.5–14.5)
EST. GFR  (AFRICAN AMERICAN): >60 ML/MIN/1.73 M^2
EST. GFR  (NON AFRICAN AMERICAN): >60 ML/MIN/1.73 M^2
GLUCOSE SERPL-MCNC: 93 MG/DL (ref 70–110)
HCT VFR BLD AUTO: 37.5 % (ref 40–54)
HGB BLD-MCNC: 12.8 G/DL (ref 14–18)
IMM GRANULOCYTES # BLD AUTO: 0.03 K/UL (ref 0–0.04)
IMM GRANULOCYTES NFR BLD AUTO: 0.5 % (ref 0–0.5)
LYMPHOCYTES # BLD AUTO: 2 K/UL (ref 1–4.8)
LYMPHOCYTES NFR BLD: 32.6 % (ref 18–48)
MCH RBC QN AUTO: 30.2 PG (ref 27–31)
MCHC RBC AUTO-ENTMCNC: 34.1 G/DL (ref 32–36)
MCV RBC AUTO: 88 FL (ref 82–98)
MONOCYTES # BLD AUTO: 0.6 K/UL (ref 0.3–1)
MONOCYTES NFR BLD: 9.2 % (ref 4–15)
NEUTROPHILS # BLD AUTO: 3.4 K/UL (ref 1.8–7.7)
NEUTROPHILS NFR BLD: 55.2 % (ref 38–73)
NRBC BLD-RTO: 0 /100 WBC
PLATELET # BLD AUTO: 200 K/UL (ref 150–350)
PMV BLD AUTO: 9.5 FL (ref 9.2–12.9)
POTASSIUM SERPL-SCNC: 4.1 MMOL/L (ref 3.5–5.1)
PROT SERPL-MCNC: 8 G/DL (ref 6–8.4)
RBC # BLD AUTO: 4.24 M/UL (ref 4.6–6.2)
SODIUM SERPL-SCNC: 140 MMOL/L (ref 136–145)
WBC # BLD AUTO: 6.17 K/UL (ref 3.9–12.7)

## 2020-01-23 PROCEDURE — 99213 PR OFFICE/OUTPT VISIT, EST, LEVL III, 20-29 MIN: ICD-10-PCS | Mod: S$GLB,,, | Performed by: DERMATOLOGY

## 2020-01-23 PROCEDURE — 85025 COMPLETE CBC W/AUTO DIFF WBC: CPT

## 2020-01-23 PROCEDURE — 36415 COLL VENOUS BLD VENIPUNCTURE: CPT | Mod: PO

## 2020-01-23 PROCEDURE — 80053 COMPREHEN METABOLIC PANEL: CPT

## 2020-01-23 PROCEDURE — 99999 PR PBB SHADOW E&M-EST. PATIENT-LVL II: ICD-10-PCS | Mod: PBBFAC,,, | Performed by: DERMATOLOGY

## 2020-01-23 PROCEDURE — 99999 PR PBB SHADOW E&M-EST. PATIENT-LVL II: CPT | Mod: PBBFAC,,, | Performed by: DERMATOLOGY

## 2020-01-23 PROCEDURE — 99213 OFFICE O/P EST LOW 20 MIN: CPT | Mod: S$GLB,,, | Performed by: DERMATOLOGY

## 2020-01-23 RX ORDER — CLINDAMYCIN PHOSPHATE 11.9 MG/ML
SOLUTION TOPICAL
Qty: 60 ML | Refills: 3 | Status: SHIPPED | OUTPATIENT
Start: 2020-01-23 | End: 2021-03-31

## 2020-01-23 RX ORDER — MOMETASONE FUROATE 1 MG/ML
SOLUTION TOPICAL
Qty: 60 ML | Refills: 6 | Status: SHIPPED | OUTPATIENT
Start: 2020-01-23 | End: 2021-03-31

## 2020-01-23 NOTE — PROGRESS NOTES
Subjective:       Patient ID:  Jatinder Moreno III is a 39 y.o. male who presents for   Chief Complaint   Patient presents with    Folliculitis     Pt here today for a follow up on Folliculitis- scalp tx with dapsone 50 mg ,  ciclopirox shampoo, clindamycin  And mometasone soultion. Tx helps.  Has one recent flare but resolves.  Tolerates dapsone well.    C/o bump on back. Not painful or draining. Present for years. No tx.   -       Review of Systems   Constitutional: Negative for fatigue.   Respiratory: Negative for shortness of breath.    Skin: Positive for rash.   Neurological: Negative for numbness.        Objective:    Physical Exam   Constitutional: He appears well-developed and well-nourished. No distress.   Neurological: He is alert and oriented to person, place, and time. He is not disoriented.   Psychiatric: He has a normal mood and affect.   Skin:   Areas Examined (abnormalities noted in diagram):   Scalp / Hair Palpated and Inspected  Head / Face Inspection Performed                   Diagram Legend     Erythematous scaling macule/papule c/w actinic keratosis       Vascular papule c/w angioma      Pigmented verrucoid papule/plaque c/w seborrheic keratosis      Yellow umbilicated papule c/w sebaceous hyperplasia      Irregularly shaped tan macule c/w lentigo     1-2 mm smooth white papules consistent with Milia      Movable subcutaneous cyst with punctum c/w epidermal inclusion cyst      Subcutaneous movable cyst c/w pilar cyst      Firm pink to brown papule c/w dermatofibroma      Pedunculated fleshy papule(s) c/w skin tag(s)      Evenly pigmented macule c/w junctional nevus     Mildly variegated pigmented, slightly irregular-bordered macule c/w mildly atypical nevus      Flesh colored to evenly pigmented papule c/w intradermal nevus       Pink pearly papule/plaque c/w basal cell carcinoma      Erythematous hyperkeratotic cursted plaque c/w SCC      Surgical scar with no sign of skin cancer recurrence       Open and closed comedones      Inflammatory papules and pustules      Verrucoid papule consistent consistent with wart     Erythematous eczematous patches and plaques     Dystrophic onycholytic nail with subungual debris c/w onychomycosis     Umbilicated papule    Erythematous-base heme-crusted tan verrucoid plaque consistent with inflamed seborrheic keratosis     Erythematous Silvery Scaling Plaque c/w Psoriasis     See annotation      Assessment / Plan:        Folliculitis  -   Long-term use of high-risk medication  Dapsone side effects:   methemaglobunemia which would cause fatigue and headache, hemolytic anemia, decreased blood counts, hepatitis, hypersensitivity syndrome, drug rash, nausea, neuropathy with motor effects  Avoid use of sulfa antibiotics while on this medication  Antacid medications can decrease absorption of dapsone  Take Vit C 1000mg daily and Vit E 800iu daily for first 1-2 months     Continue ciclopirox shampoo and dapsone 50 mg po qd   -     CBC auto differential; Future; Expected date: 01/23/2020  -     Comprehensive metabolic panel; Future; Expected date: 01/23/2020  -     clindamycin (CLEOCIN T) 1 % external solution; AAA bid  Dispense: 60 mL; Refill: 3  -     mometasone (ELOCON) 0.1 % solution; aaa on scalp qd  Dispense: 60 mL; Refill: 6    Epidermal cyst  Reassurance   warm compresses to scalp if needed            Follow up in about 6 months (around 7/23/2020).

## 2020-01-24 RX ORDER — TIZANIDINE 4 MG/1
4 TABLET ORAL 3 TIMES DAILY PRN
Qty: 30 TABLET | Refills: 0 | Status: SHIPPED | OUTPATIENT
Start: 2020-01-24 | End: 2020-02-03

## 2020-01-27 DIAGNOSIS — L73.9 FOLLICULITIS: Primary | ICD-10-CM

## 2020-01-30 ENCOUNTER — PATIENT MESSAGE (OUTPATIENT)
Dept: PAIN MEDICINE | Facility: CLINIC | Age: 40
End: 2020-01-30

## 2020-01-30 ENCOUNTER — TELEPHONE (OUTPATIENT)
Dept: PAIN MEDICINE | Facility: CLINIC | Age: 40
End: 2020-01-30

## 2020-01-30 NOTE — TELEPHONE ENCOUNTER
Good morning,afternoon Mr./Mrs.    My name is Yael I am contacting you from [facility] regarding your appointment scheduled for,01/31/20 with, Himanshu Chapman just confirming you will be able to make it.        Staff L/V/CHIQUITA CADENA

## 2020-02-04 ENCOUNTER — OFFICE VISIT (OUTPATIENT)
Dept: PAIN MEDICINE | Facility: CLINIC | Age: 40
End: 2020-02-04
Attending: FAMILY MEDICINE
Payer: COMMERCIAL

## 2020-02-04 ENCOUNTER — TELEPHONE (OUTPATIENT)
Dept: PHARMACY | Facility: CLINIC | Age: 40
End: 2020-02-04

## 2020-02-04 VITALS
BODY MASS INDEX: 34.46 KG/M2 | DIASTOLIC BLOOD PRESSURE: 85 MMHG | RESPIRATION RATE: 18 BRPM | OXYGEN SATURATION: 100 % | TEMPERATURE: 98 F | HEART RATE: 83 BPM | SYSTOLIC BLOOD PRESSURE: 129 MMHG | WEIGHT: 277.13 LBS | HEIGHT: 75 IN

## 2020-02-04 DIAGNOSIS — M47.816 LUMBAR SPONDYLOSIS: ICD-10-CM

## 2020-02-04 DIAGNOSIS — G89.4 CHRONIC PAIN DISORDER: ICD-10-CM

## 2020-02-04 DIAGNOSIS — M79.18 MYOFASCIAL PAIN SYNDROME: Primary | ICD-10-CM

## 2020-02-04 PROCEDURE — 99999 PR PBB SHADOW E&M-EST. PATIENT-LVL V: CPT | Mod: PBBFAC,,, | Performed by: ANESTHESIOLOGY

## 2020-02-04 PROCEDURE — 20553 PR INJECT TRIGGER POINTS, > 3: ICD-10-PCS | Mod: S$GLB,,, | Performed by: ANESTHESIOLOGY

## 2020-02-04 PROCEDURE — 99244 PR OFFICE CONSULTATION,LEVEL IV: ICD-10-PCS | Mod: 25,S$GLB,, | Performed by: ANESTHESIOLOGY

## 2020-02-04 PROCEDURE — 20553 NJX 1/MLT TRIGGER POINTS 3/>: CPT | Mod: S$GLB,,, | Performed by: ANESTHESIOLOGY

## 2020-02-04 PROCEDURE — 99244 OFF/OP CNSLTJ NEW/EST MOD 40: CPT | Mod: 25,S$GLB,, | Performed by: ANESTHESIOLOGY

## 2020-02-04 PROCEDURE — 99999 PR PBB SHADOW E&M-EST. PATIENT-LVL V: ICD-10-PCS | Mod: PBBFAC,,, | Performed by: ANESTHESIOLOGY

## 2020-02-04 RX ORDER — MELOXICAM 15 MG/1
15 TABLET ORAL DAILY
Qty: 30 TABLET | Refills: 0 | Status: SHIPPED | OUTPATIENT
Start: 2020-02-04 | End: 2020-11-17

## 2020-02-04 NOTE — LETTER
February 4, 2020      Margareth Hdz MD  [address]  [place] [address]           [facility] [place] [address] [facility] [address]  [address]  [facility] [address]  Phone: [phone]  Fax: [phone]          Patient: Jatinder Moreno III   MR Number: [id number]   YOB: 1980   Date of Visit: 2/4/2020       Dear Dr. Margareth Hdz:    Thank you for referring Jatinder Moreno to me for evaluation. Attached you will find relevant portions of my assessment and plan of care.    If you have questions, please do not hesitate to call me. I look forward to following Jatinder Moreno along with you.    Sincerely,    Guzman Reina MD    Enclosure  CC:  No Recipients    If you would like to receive this communication electronically, please contact [email or web address] or [phone] to request more information on [employer] access.    For providers and/or their staff who would like to refer a patient to [facility], please contact us through our one-stop-shop provider referral line, [facility], at [phone].    If you feel you have received this communication in error or would no longer like to receive these types of communications, please e-mail [email or web address]

## 2020-02-04 NOTE — PROGRESS NOTES
Chronic Pain - New Consult    Referring Physician: Margareth Hdz MD    Chief Complaint: No chief complaint on file.       SUBJECTIVE: Disclaimer: This note has been generated using voice-recognition software. There may be typographical errors that have been missed during proof-reading    Initial encounter:    Jatinder Moreno III presents to the clinic for the evaluation of back pain. He states his pain began approximately 15-20 years ago insidiously and has progressively worsened. He describes his pain as a left-sided intermittent, low thoracic and upper lumbar paraspinous dull, aching pain with intermittent radiation down into his left buttock and left inguinal region. He says it is exacerbated by bending and holding a particular position for an extended period of time. He says it is improved with movement and medication.     Brief history:    Pain Description:    At BEST  3/10     At WORST  10/10 on the WORST day.      On average pain is rated as 5/10.     Today the pain is rated as 5/10    Symptoms interfere with daily activity, sleeping and work.     Patient denies significant weight loss, significant motor weakness and loss of sensations.  Patient denies any suicidal or homicidal ideations    Pain Medications:  Current:  Ibuprofen 1000 mg PRN (usually 1x/day)  Tramadol 50 mg PRN   Hycodan PRN     Tried in Past:  NSAIDs -Yes  TCA -Never  SNRI -Never  Anti-convulsants -Gabapentin- no relief   Muscle Relaxants -Baclofen, Zanaflex, Soma - most significant relief with Zanaflex  Opioids-Yes, see above    Physical Therapy/Home Exercise: yes  (most recently approx. 3 years ago, 2-3 wks,  with no relief)     report:  Reviewed and consistent with medication use as prescribed.    Pain Procedures:   None    Chiropractor -yes  Acupuncture - never  TENS unit -yes, some relief   Spinal decompression -never  Joint replacement -never    Imaging: none available for review today    Past Medical History:   Diagnosis  Date    ADD (attention deficit disorder with hyperactivity) 7/5/2012    ADHD (attention deficit hyperactivity disorder)     Asthma, well controlled     Bronchitis     History of migraine headaches     History of psychiatric care     Seborrheic dermatitis 06/13/2018     No past surgical history on file.  Social History     Socioeconomic History    Marital status:      Spouse name: Not on file    Number of children: 0    Years of education: Not on file    Highest education level: Not on file   Occupational History    Occupation: ICU Nurse     Employer: [employer]   Social Needs    Financial resource strain: Not hard at all    Food insecurity:     Worry: Never true     Inability: Never true    Transportation needs:     Medical: No     Non-medical: No   Tobacco Use    Smoking status: Never Smoker    Smokeless tobacco: Never Used   Substance and Sexual Activity    Alcohol use: Yes     Alcohol/week: 0.0 standard drinks     Types: 1 Standard drinks or equivalent per week     Frequency: Monthly or less     Drinks per session: 3 or 4     Binge frequency: Never     Comment: rarely    Drug use: No    Sexual activity: Yes     Partners: Female     Birth control/protection: None   Lifestyle    Physical activity:     Days per week: 3 days     Minutes per session: 90 min    Stress: To some extent   Relationships    Social connections:     Talks on phone: Three times a week     Gets together: Once a week     Attends Quaker service: Not on file     Active member of club or organization: Yes     Attends meetings of clubs or organizations: More than 4 times per year     Relationship status:    Other Topics Concern    Caffeine Use Yes    Financial Status: Disabled Not Asked    Legal: Involved in criminal litigation No    Caffeine Use: Frequent Not Asked    Financial Status: Employed Yes    Legal: Other Not Asked    Caffeine Use: Moderate Not Asked    Financial Status:  Unemployed Not Asked    Leisure: Exercise Yes    Caffeine Use: Substantial Not Asked    Financial Status: Other Not Asked    Leisure: Fishing Not Asked    Childhood History: Adopted Not Asked    Firearms: Does patient have access to a firearm? Not Asked    Leisure: Hunting Not Asked    Childhood History: Early trauma Not Asked    Home situation: homeless Not Asked    Leisure: Movie Watching Not Asked    Childhood History: Raised by parents Yes    Home situation: lives alone No    Leisure: Shopping Not Asked    Childhood History: Uneventful Not Asked    Home situation: lives in group home Not Asked    Leisure: Sports Not Asked    Childhood History: Other Not Asked    Home situation: lives in nursing home Not Asked    Leisure: Time with family Not Asked    Education: Unfinished High School Not Asked    Home situation: lives in shelter Not Asked     Service Not Asked    Education: High School Graduate Yes    Home situation: lives with family Yes    Spirituality: Active Participation No    Education: Unfinished college Not Asked    Home situation: lives with friends Not Asked    Spirituality: Organized Uatsdin Not Asked    Education: Trade School Not Asked    Home situation: lives with significant other Not Asked    Spirituality: Private Participation Yes    Education: Associate's Degree Not Asked    Home situation: lives with spouse Not Asked    Patient feels they ought to cut down on drinking/drug use No    Education: Bachelor's Degree Yes    Legal consequences of chemical use No    Patient annoyed by others criticizing their drinking/drug use No    Education: More than one Bachelor's or Professional Not Asked    Legal: Arrest history No    Patient has felt bad or guilty about drinking/drug use No    Education: Master's, PhD Not Asked    Legal: Involved in civil litigation No    Patient has had a drink/used drugs as an eye opener in the AM No   Social History  Narrative    Not on file     Family History   Problem Relation Age of Onset    Anxiety disorder Mother     Hypertension Mother     Depression Mother     Bipolar disorder Brother     Schizophrenia Brother     Drug abuse Brother     Mental illness Brother         BPD    Bipolar disorder Cousin     Hypertension Father     Diabetes Father        Review of patient's allergies indicates:  No Known Allergies    Current Outpatient Medications   Medication Sig    busPIRone (BUSPAR) 10 MG tablet Take 1 tablet (10 mg total) by mouth 3 (three) times daily.    ciclopirox 1 % shampoo Use as directed qohs    clindamycin (CLEOCIN T) 1 % external solution AAA bid    dapsone 25 MG Tab Take 1 tablet (25 mg total) by mouth once daily.    dapsone 25 MG Tab TAKE 1 TABLET BY MOUTH EVERY DAY    dapsone 25 MG Tab TAKE 2 TABLETS DAILY    diazePAM (VALIUM) 10 MG Tab Take 1 tablet (10 mg total) by mouth every 6 (six) hours as needed for Anxiety.    diclofenac potassium (CAMBIA) 50 mg PwPk Take 1 packet by mouth every 6 (six) hours as needed.    erenumab-aooe 140 mg/mL AtIn Inject 140 mg into the skin every 28 days.    levocetirizine (XYZAL) 5 MG tablet TAKE 1 TABLET BY MOUTH EVERY DAY IN THE EVENING    methylphenidate HCl (RITALIN) 20 MG tablet Take 1 tablet (20 mg total) by mouth 3 (three) times daily with meals.    mometasone (ELOCON) 0.1 % solution aaa on scalp qd    montelukast (SINGULAIR) 10 mg tablet TAKE 1 TABLET BY MOUTH EVERY DAY IN THE EVENING    multivitamin (ONE DAILY MULTIVITAMIN) per tablet Take 1 tablet by mouth once daily.    naratriptan (AMERGE) 2.5 MG tablet 2.5 mg at onset of headache, may repeat in 4 hours if needed    promethazine (PHENERGAN) 12.5 MG Tab Take 1 tablet (12.5 mg total) by mouth every 6 (six) hours as needed.    propranolol (INDERAL LA) 120 MG 24 hr capsule TAKE 1 CAPSULE (120 MG TOTAL) BY MOUTH ONCE DAILY.    traMADol (ULTRAM) 50 mg tablet Take 1 tablet (50 mg total) by mouth  "every 6 (six) hours as needed (breakthrough pain).    vit A & D3 in cod liver oil (COD LIVER OIL) 4,000 unit-400 unit/5 mL Liqd Take 5 mLs by mouth every evening.    ZOLMitriptan (ZOMIG) 2.5 mg tablet Take 1 tablet (2.5 mg total) by mouth every 2 (two) hours as needed.    hydrocodone-homatropine 5-1.5 mg/5 ml (HYCODAN) 5-1.5 mg/5 mL Syrp Take 5 mLs by mouth every 6 (six) hours as needed (cough).    meloxicam (MOBIC) 15 MG tablet Take 1 tablet (15 mg total) by mouth once daily.    sumatriptan (IMITREX STATDOSE) 6 mg/0.5 mL kit Inject 0.5 mLs (6 mg total) into the skin every 2 (two) hours as needed for Migraine.     No current facility-administered medications for this visit.        REVIEW OF SYSTEMS:    GENERAL:  No weight loss, malaise or fevers.  HEENT:   No recent changes in vision or hearing  NECK:  Negative for lumps, no difficulty with swallowing.  RESPIRATORY:  Negative for cough, wheezing or shortness of breath, patient denies any recent URI.  CARDIOVASCULAR:  Negative for chest pain, leg swelling or palpitations.  GI:  Negative for abdominal discomfort, blood in stools or black stools or change in bowel habits.  MUSCULOSKELETAL:  See HPI.  SKIN:  Negative for lesions, rash, and itching.  PSYCH:  No mood disorder or recent psychosocial stressors.  Patients sleep is disturbed secondary to pain.  HEMATOLOGY/LYMPHOLOGY:  Negative for prolonged bleeding, bruising easily or swollen nodes.  Patient is not currently taking any anti-coagulants  ENDO: No history of diabetes or thyroid dysfunction  NEURO:   No history of headaches, syncope, paralysis, seizures or tremors.  All other reviewed and negative other than HPI.    OBJECTIVE:    /85   Pulse 83   Temp 97.9 °F (36.6 °C)   Resp 18   Ht 6' 3" (1.905 m)   Wt 125.7 kg (277 lb 1.9 oz)   SpO2 100%   BMI 34.64 kg/m²     PHYSICAL EXAMINATION:    GENERAL: Well appearing, in no acute distress, alert and oriented x3.  PSYCH:  Mood and affect " appropriate.  SKIN: Skin color, texture, turgor normal, no rashes or lesions.  HEAD/FACE:  Normocephalic, atraumatic. Cranial nerves grossly intact.  NECK: No pain to palpation over the cervical paraspinous muscles. No pain with neck flexion, extension, or lateral flexion.   CV: RRR with palpation of the radial artery.  PULM: No evidence of respiratory difficulty, symmetric chest rise.  GI:  Soft and non-tender.  BACK: Straight leg raising in the supine position is negative to radicular pain. Normal range of motion without pain reproduction. Negative facet loading bilaterally. Positive pain to palpation over the lumbar paraspinous muscles (L>R).   EXTREMITIES: Peripheral joint ROM is full and pain free without obvious instability or laxity in all four extremities. No deformities, edema, or skin discoloration. Good capillary refill.  MUSCULOSKELETAL: There is pain with palpation over the sacroiliac joints on left.  FABERs test is negative.  FADIRs test is negative.   Bilateral upper and lower extremity strength is normal and symmetric.  No atrophy or tone abnormalities are noted.  NEURO: Bilateral upper and lower extremity coordination and muscle stretch reflexes are physiologic and symmetric.  Plantar response are downgoing. No clonus.  Mild loss of sensation along L4 distribution on left foot.   GAIT: normal.    ASSESSMENT: 39 y.o. year old male with pain, consistent with below diagnosis.    Encounter Diagnoses   Name Primary?    Myofascial pain syndrome Yes    Lumbar spondylosis     Chronic pain disorder        PLAN:   - Trigger Point injection performed today. See Procedure note below.     - Will order Lumbar Xray with flexion and extension to further elucidate his level of pain.    - Will prescribe meloxicam 15 mg qday to assist with inflammatory component of his pain.     - Cont Zanaflex PRN to assist with myofascial pain relief.     - Discussed potential for additional procedures in future including LEX vs  MBB/RFA pending results of imaging and pain symptoms in future.     - Recommend continuing home exercise routine for core strenghtening and stretching.     - Discussed importance of consistent sleeping habits and exercise.     - RTC in 4 weeks.     The above plan and management options were discussed at length with patient. Patient is in agreement with the above and verbalized understanding. It will be communicated with the referring physician via electronic record, fax, or mail.    Piter Carrillo MD  [facility], PGY V  02/04/2020      Trigger Point Injection:   The procedure was discussed with the patient including complications of nerve damage,  bleeding, infection, and failure of pain relief.   Trigger points were identified by palpation and marked. Alcohol prep of sites done. A mixture of 7mL 0.25% bupivacaine +80mg Depo-Medrol was prepared (10 mL total) and 60 mg Toradol.   A 27-gauge needle was advanced to the point of maximal tenderness, and  1.5 mL  was injected after negative aspiration. All sites done in the same manner. Patient tolerated the procedure well and without complications. Sites injected included: lumbar paraspinal muscles and gluteus yrn on the left side      I reviewed and edited the  fellow's note, I conducted my own interview and physical examination and agree with the findings.     Guzman Reina  02/04/2020

## 2020-02-06 ENCOUNTER — TELEPHONE (OUTPATIENT)
Dept: PHARMACY | Facility: CLINIC | Age: 40
End: 2020-02-06

## 2020-02-18 ENCOUNTER — OFFICE VISIT (OUTPATIENT)
Dept: SLEEP MEDICINE | Facility: CLINIC | Age: 40
End: 2020-02-18
Payer: COMMERCIAL

## 2020-02-18 DIAGNOSIS — G43.719 INTRACTABLE CHRONIC MIGRAINE WITHOUT AURA AND WITHOUT STATUS MIGRAINOSUS: ICD-10-CM

## 2020-02-18 DIAGNOSIS — G47.33 OBSTRUCTIVE SLEEP APNEA (ADULT) (PEDIATRIC): Primary | ICD-10-CM

## 2020-02-18 PROCEDURE — 64615 CHEMODENERV MUSC MIGRAINE: CPT | Mod: S$GLB,,, | Performed by: NURSE PRACTITIONER

## 2020-02-18 PROCEDURE — 99499 NO LOS: ICD-10-PCS | Mod: S$GLB,,, | Performed by: NURSE PRACTITIONER

## 2020-02-18 PROCEDURE — 99999 PR PBB SHADOW E&M-EST. PATIENT-LVL I: ICD-10-PCS | Mod: PBBFAC,,, | Performed by: NURSE PRACTITIONER

## 2020-02-18 PROCEDURE — 99499 UNLISTED E&M SERVICE: CPT | Mod: S$GLB,,, | Performed by: NURSE PRACTITIONER

## 2020-02-18 PROCEDURE — 99999 PR PBB SHADOW E&M-EST. PATIENT-LVL I: CPT | Mod: PBBFAC,,, | Performed by: NURSE PRACTITIONER

## 2020-02-18 PROCEDURE — 64615 PR CHEMODENERVATION OF MUSCLE FOR CHRONIC MIGRAINE: ICD-10-PCS | Mod: S$GLB,,, | Performed by: NURSE PRACTITIONER

## 2020-02-18 NOTE — PROGRESS NOTES
Mr. Moreno is here today for management of headaches/ALEXX    continues to us his apap faithfully, benefiting from therapy. Using nose pillows.Wife now using therapy also.   Continue apap 13-16cm, [facility] [place] supplies.not happy with supplier    PROCEDURE NOTE:     After informed consent signed and dated, and risks and benefits of procedure was discussed, BOTOX injection was performed     Frontalis- 15 units on each side   - 5 units on each side   Procerus- 10 units   Occipitalis- 20 units on each side   Temporalis - 25 units on each side   Trapezius- 15 units on each side   Cervical paraspinal muscles- 15 units on each side     More stress recently(leaving [facility] on [facility]) ,more aura (sweat/kaleidoscope). HA begins with in ~ 30min  Amerge, cambia, zomig helpful.   Stable mo ~ 5 migrainous HA/mos

## 2020-02-19 ENCOUNTER — PATIENT MESSAGE (OUTPATIENT)
Dept: DERMATOLOGY | Facility: CLINIC | Age: 40
End: 2020-02-19

## 2020-02-19 DIAGNOSIS — L73.9 FOLLICULITIS: ICD-10-CM

## 2020-02-19 RX ORDER — DAPSONE 25 MG/1
TABLET ORAL
Qty: 180 TABLET | Refills: 0 | Status: SHIPPED | OUTPATIENT
Start: 2020-02-19 | End: 2020-11-17

## 2020-02-19 RX ORDER — DAPSONE 25 MG/1
TABLET ORAL
Qty: 180 TABLET | Refills: 0 | Status: SHIPPED | OUTPATIENT
Start: 2020-02-19 | End: 2020-05-27 | Stop reason: SDUPTHER

## 2020-03-04 ENCOUNTER — TELEPHONE (OUTPATIENT)
Dept: PHARMACY | Facility: CLINIC | Age: 40
End: 2020-03-04

## 2020-03-06 ENCOUNTER — HOSPITAL ENCOUNTER (OUTPATIENT)
Dept: RADIOLOGY | Facility: HOSPITAL | Age: 40
Discharge: HOME | End: 2020-03-06
Attending: STUDENT IN AN ORGANIZED HEALTH CARE EDUCATION/TRAINING PROGRAM
Payer: COMMERCIAL

## 2020-03-06 DIAGNOSIS — M47.816 LUMBAR SPONDYLOSIS: ICD-10-CM

## 2020-03-06 PROCEDURE — 72120 X-RAY BEND ONLY L-S SPINE: CPT | Mod: TC

## 2020-03-06 PROCEDURE — 72120 XR LUMBAR SPINE FLEXION AND EXTENSION ONLY: ICD-10-PCS | Mod: 26,,, | Performed by: RADIOLOGY

## 2020-03-06 PROCEDURE — 72120 X-RAY BEND ONLY L-S SPINE: CPT | Mod: 26,,, | Performed by: RADIOLOGY

## 2020-03-06 NOTE — TELEPHONE ENCOUNTER
Refill and call for Aimovig. Patient confirmed need of the refill. Will deliver via [employer] on 3/9 to arrive on 3/10 with patient consent. Copay $5.00 at 004 with auth to charge [facility]. Address confirmed. Patient has 0 doses remaining (0 day supply)/ next injection due 3/10. Patient denies missed doses and no side effects.  No new medications/allergies/medical conditions. Labs are stable. No ER/Urgent care visits in past month. No Sharps container needed. Patient taking the medication as directed. Patient denies any further questions. Confirmed 2 patient identifiers - Name and .     Hai Wilde, PharmD  Clinical Pharmacist  [facility]  P: [phone]

## 2020-03-19 RX ORDER — BUSPIRONE HYDROCHLORIDE 10 MG/1
TABLET ORAL
Qty: 270 TABLET | Refills: 3 | OUTPATIENT
Start: 2020-03-19

## 2020-03-23 DIAGNOSIS — G43.711 INTRACTABLE CHRONIC MIGRAINE WITHOUT AURA AND WITH STATUS MIGRAINOSUS: ICD-10-CM

## 2020-03-23 RX ORDER — PROPRANOLOL HYDROCHLORIDE 120 MG/1
CAPSULE, EXTENDED RELEASE ORAL
Qty: 90 CAPSULE | Refills: 1 | Status: SHIPPED | OUTPATIENT
Start: 2020-03-23 | End: 2020-11-04

## 2020-03-24 ENCOUNTER — PATIENT MESSAGE (OUTPATIENT)
Dept: INTERNAL MEDICINE | Facility: CLINIC | Age: 40
End: 2020-03-24

## 2020-03-26 RX ORDER — ALBUTEROL SULFATE 90 UG/1
2 AEROSOL, METERED RESPIRATORY (INHALATION)
Qty: 18 G | Refills: 1 | Status: SHIPPED | OUTPATIENT
Start: 2020-03-26 | End: 2020-06-28

## 2020-03-31 ENCOUNTER — TELEPHONE (OUTPATIENT)
Dept: PHARMACY | Facility: CLINIC | Age: 40
End: 2020-03-31

## 2020-04-13 ENCOUNTER — PATIENT MESSAGE (OUTPATIENT)
Dept: PSYCHIATRY | Facility: CLINIC | Age: 40
End: 2020-04-13

## 2020-04-13 ENCOUNTER — TELEPHONE (OUTPATIENT)
Dept: PSYCHIATRY | Facility: CLINIC | Age: 40
End: 2020-04-13

## 2020-04-13 NOTE — TELEPHONE ENCOUNTER
Called patient to schedule follow up appointment with Dr Horta per patient request. LVM to ask that he please call us back to schedule.   
no

## 2020-04-17 ENCOUNTER — OFFICE VISIT (OUTPATIENT)
Dept: PSYCHIATRY | Facility: CLINIC | Age: 40
End: 2020-04-17
Payer: COMMERCIAL

## 2020-04-17 VITALS
DIASTOLIC BLOOD PRESSURE: 76 MMHG | HEIGHT: 75 IN | BODY MASS INDEX: 35.26 KG/M2 | WEIGHT: 283.63 LBS | HEART RATE: 84 BPM | SYSTOLIC BLOOD PRESSURE: 122 MMHG

## 2020-04-17 DIAGNOSIS — F98.8 ADULT ATTENTION DEFICIT DISORDER: Primary | ICD-10-CM

## 2020-04-17 DIAGNOSIS — G47.00 INSOMNIA, UNSPECIFIED TYPE: ICD-10-CM

## 2020-04-17 DIAGNOSIS — F41.1 GENERALIZED ANXIETY DISORDER: ICD-10-CM

## 2020-04-17 PROCEDURE — 3078F DIAST BP <80 MM HG: CPT | Mod: CPTII,S$GLB,, | Performed by: PSYCHIATRY & NEUROLOGY

## 2020-04-17 PROCEDURE — 3008F BODY MASS INDEX DOCD: CPT | Mod: CPTII,S$GLB,, | Performed by: PSYCHIATRY & NEUROLOGY

## 2020-04-17 PROCEDURE — 3074F SYST BP LT 130 MM HG: CPT | Mod: CPTII,S$GLB,, | Performed by: PSYCHIATRY & NEUROLOGY

## 2020-04-17 PROCEDURE — 3078F PR MOST RECENT DIASTOLIC BLOOD PRESSURE < 80 MM HG: ICD-10-PCS | Mod: CPTII,S$GLB,, | Performed by: PSYCHIATRY & NEUROLOGY

## 2020-04-17 PROCEDURE — 3008F PR BODY MASS INDEX (BMI) DOCUMENTED: ICD-10-PCS | Mod: CPTII,S$GLB,, | Performed by: PSYCHIATRY & NEUROLOGY

## 2020-04-17 PROCEDURE — 99999 PR PBB SHADOW E&M-EST. PATIENT-LVL III: CPT | Mod: PBBFAC,,, | Performed by: PSYCHIATRY & NEUROLOGY

## 2020-04-17 PROCEDURE — 99999 PR PBB SHADOW E&M-EST. PATIENT-LVL III: ICD-10-PCS | Mod: PBBFAC,,, | Performed by: PSYCHIATRY & NEUROLOGY

## 2020-04-17 PROCEDURE — 99213 OFFICE O/P EST LOW 20 MIN: CPT | Mod: S$GLB,,, | Performed by: PSYCHIATRY & NEUROLOGY

## 2020-04-17 PROCEDURE — 99213 PR OFFICE/OUTPT VISIT, EST, LEVL III, 20-29 MIN: ICD-10-PCS | Mod: S$GLB,,, | Performed by: PSYCHIATRY & NEUROLOGY

## 2020-04-17 PROCEDURE — 3074F PR MOST RECENT SYSTOLIC BLOOD PRESSURE < 130 MM HG: ICD-10-PCS | Mod: CPTII,S$GLB,, | Performed by: PSYCHIATRY & NEUROLOGY

## 2020-04-17 RX ORDER — DOXEPIN HYDROCHLORIDE 10 MG/1
10-20 CAPSULE ORAL NIGHTLY PRN
Qty: 180 CAPSULE | Refills: 1 | Status: SHIPPED | OUTPATIENT
Start: 2020-04-17 | End: 2022-05-11

## 2020-04-17 RX ORDER — BUSPIRONE HYDROCHLORIDE 10 MG/1
10 TABLET ORAL 3 TIMES DAILY
Qty: 270 TABLET | Refills: 3 | Status: SHIPPED | OUTPATIENT
Start: 2020-04-17 | End: 2021-06-14

## 2020-04-17 RX ORDER — METHYLPHENIDATE HYDROCHLORIDE 20 MG/1
20 TABLET ORAL
Qty: 90 TABLET | Refills: 0 | Status: SHIPPED | OUTPATIENT
Start: 2020-06-16 | End: 2021-08-25 | Stop reason: SDUPTHER

## 2020-04-17 RX ORDER — DIAZEPAM 10 MG/1
10 TABLET ORAL EVERY 6 HOURS PRN
Qty: 60 TABLET | Refills: 2 | Status: SHIPPED | OUTPATIENT
Start: 2020-04-17 | End: 2022-05-11

## 2020-04-17 RX ORDER — METHYLPHENIDATE HYDROCHLORIDE 20 MG/1
20 TABLET ORAL
Qty: 90 TABLET | Refills: 0 | Status: SHIPPED | OUTPATIENT
Start: 2020-05-17 | End: 2020-11-17

## 2020-04-17 RX ORDER — METHYLPHENIDATE HYDROCHLORIDE 20 MG/1
20 TABLET ORAL
Qty: 90 TABLET | Refills: 0 | Status: SHIPPED | OUTPATIENT
Start: 2020-04-17 | End: 2020-11-17

## 2020-04-17 NOTE — PATIENT INSTRUCTIONS
[facility] - DEPARTMENT OF PSYCHIATRY   NEW PATIENT ORIENTATION INFORMATION  OUTPATIENT SERVICES COUNSELING CONTRACT    We appreciate the opportunity to participate in your medical care and hope the following protocols will make it easier for you to receive quality treatment in our department.    1. PUNCTUALITY: Your appointment is scheduled for a fixed amount of time reserved especially for you.  To get the benefit of your appointment, please arrive early enough to allow time for parking and registration.  If you are late for your appointment, your clinician is not able to offer additional time.  Please make every effort to be on time.  You may be asked to reschedule.    2. PAYMENT FOR SERVICES:   Payments are expected at the time of service.  Please contact [phone] if you need to resolve issues involving your account at [facility] or to set up a payment plan.    3. CANCELLATION / MISSED APPOINTMENTS:   In order to receive quality care, all appointments must be kept.  Appointment may be cancelled, ONLY by talking with an  at phone number [phone], between 8:00 a.m. and 5:00 p.m., Monday through Friday, at least 24 hours before your appointment time.  Your clinician reserves this time specifically for you, and if you will be unable to use it, it is necessary that you cancel in a timely manner.  If you do not give at least 24-hour notice of cancellation a fee may be assessed.  Please note that insurance does not cover no-show charges, so you will be billed directly.  If you are consistently late, cancel, or do not show for your appointments, our department reserves the right to terminate treatment.    4. CALLING THE DEPARTMENT:   MESSAGES, SCHEDULE OR CANCEL APPOINTMENTS- In general you can reach the department by calling [phone], between 8:00 a.m. and 5:00 p.m., Monday through Friday, to schedule or cancel appointments or leave a message for your clinician.  It is  advisable to schedule your visits far in advance to obtain the most convenient times for your appointments.   AFTER HOURS, WEEKEND OR HOLIDAYS- For urgent questions after hours, weekends and holidays, calling the department number [phone] will connect you to the [facility] On [facility] nursing staff or the Psychiatry Inpatient Unit.  The [facility] On [facility] nursing staff will speak with you and direct your call/care as necessary.   EMERGENCY-  In case of a crisis when there is a concern of harm to self or others, call 911 or the office [phone] between 8:00 a.m. and 5:00 p.m., Monday through Friday.  After hours, weekends or holidays, please call 911 or go to the Emergency Department where you can be thoroughly evaluated by a physician.    5. TEAM APPROACH:  Most patients receive therapy through our team system.  In the team system, your primary therapist will be a , psychologist, psychiatry resident or psychiatrist.  If your therapist is a , psychologist or psychiatry resident, please contact your primary therapist first in matters other than medications or acute medical problems.    6. PRESCRIPTION REFILLS:  Prescription refills must be done at your physician office visit.  You will be given a sufficient number of refills to last one extra month beyond your next appointment.  No additional refills will be approved beyond the original treatment plan.  After hours, sufficient medication may be approved to last until the next scheduled appointment. After hours requests for refills on controlled substances will be declined by the psychiatrist on call, as he or she may not be familiar with your case.  Please work closely with your doctor so that you have sufficient medication until your next appointment.  Again, please note that no additional prescriptions will be approved per patient request over the phone.   No additional refills will be approved beyond the original treatment plan.   In  "certain exceptional situations, a phone consult appointment may be arranged, with appropriate charge, for the review and approval of prescription refills to last until the next scheduled appointment.    7. FOLLOW UP APPOINTMENTS:  Follow-up appointments can be made in person at the [facility] Psychiatry office, or by calling [phone], from 8am to 5pm, between Monday and Friday.  It is advisable to schedule your office visits far enough in advance to obtain the most convenient times for your appointments.    Revised Feb. 23, 2010 - F/OA1/Newpatient                      You have been provided with a certain amount of medication with a specified number of refills.  Please follow up within an adequate time before you run out of medications.    REFILLS FOR CONTROLLED SUBSTANCES WILL NOT BE GIVEN WITHOUT AN APPOINTMENT.  I will not honor or fill automated refill requests from pharmacies.  You must come in for an appointment to get refills.        Please book your next appointment for myself or therapist by phone by calling our office at [phone].        Note that follow up appointments are 10-15 minutes long.  It is important that we focus on medication management.  Should you need therapy, please get set up with our therapist or call your insurance company to find out which therapists are available in your area.      PLEASE BE AT LEAST 15 MINUTES EARLY FOR YOUR NEXT APPOINTMENT.  Late arrivals WILL BE TURNED AWAY AND ASKED TO RESCHEDULE.  YOU MUST COME EARLY TO ALLOW TIME FOR CHECK-IN AS WELL AS GET YOUR VITAL SIGNS AND GO OVER YOUR MEDICATIONS.  Tardiness is not fair to the patients who present after you and are on time for their appointments.  It causes a delay in the appointments for patients and staff.  YOU MAY ALSO BE DISCHARGED FROM CLINIC with multiple late arrivals or "No Show" appointments.   "     -----------------------------------------------------------------------------------------------------------------  IF YOU FEEL SUICIDAL OR HAVING THOUGHTS OR PLANS TO HURT YOURSELF OR OTHERS, CALL 911 OR REPORT TO THE NEAREST EMERGENCY ROOM.  YOU CAN ALSO ACCESS THE FOLLOWING HOTLINE:    National Suicide Hotline Number 4-424-946-TALK (4116)

## 2020-04-17 NOTE — PROGRESS NOTES
Outpatient Psychiatry Follow-Up Visit (MD/NP)    4/17/2020    Clinical Status of Patient:  Outpatient (Ambulatory)    Chief Complaint:  Jatinder Moreno III is a 39 y.o. male who presents today for follow-up of anxiety and attention problems.  Met with patient.      Interval History and Content of Current Session:  Interim Events/Subjective Report/Content of Current Session: Fermin Moreno presents to clinic after a long hiatus.  Stable and without complaint.  Doing well overall.  No depression or anxiety.  A lot of stress related with a viral pandemic and his job.  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  Still rarely uses the Valium but it does help when he needs it.  Also rarely uses doxepin but it does help with sleep.  Rarely uses Ritalin in the last 3 months prescriptions lasted him a year.  He utilizes this when he has to focus and concentrate on work and reading assignments.  No side effects noted or endorsed from the medicines.  Asking for refills.    Psychotherapy:  · Target symptoms: distractability, lack of focus, anxiety , work stress  · Why chosen therapy is appropriate versus another modality: relevant to diagnosis, patient responds to this modality  · Outcome monitoring methods: self-report, observation  · Therapeutic intervention type: supportive psychotherapy  · Topics discussed/themes: work stress, building skills sets for symptom management, symptom recognition  · The patient's response to the intervention is accepting. The patient's progress toward treatment goals is fair.   · Duration of intervention: 15 minutes.    Review of Systems   · PSYCHIATRIC: Pertinant items are noted in the narrative.  · CONSTITUTIONAL: No weight gain or loss.   · MUSCULOSKELETAL: No pain or stiffness of the joints.  · NEUROLOGIC: No weakness, sensory changes, seizures, confusion, memory loss, tremor or other abnormal movements.  · RESPIRATORY: No shortness of breath.  · CARDIOVASCULAR:  "No tachycardia or chest pain.  · GASTROINTESTINAL: No nausea, vomiting, pain, constipation or diarrhea.    Past Medical, Family and Social History: The patient's past medical, family and social history have been reviewed and updated as appropriate within the electronic medical record - see encounter notes.    Compliance: yes    Side effects: None    Risk Parameters:  Patient reports no suicidal ideation  Patient reports no homicidal ideation  Patient reports no self-injurious behavior  Patient reports no violent behavior    Exam (detailed: at least 9 elements; comprehensive: all 15 elements)   Constitutional  Vitals:  Most recent vital signs, dated less than 90 days prior to this appointment, were reviewed.   Vitals:    04/17/20 1046   BP: 122/76   Pulse: 84   Weight: 128.7 kg (283 lb 10 oz)   Height: 6' 3" (1.905 m)        General:  age appropriate, overweight     Musculoskeletal  Muscle Strength/Tone:  no tremor, no tic   Gait & Station:  non-ataxic     Psychiatric  Speech:  no latency; no press   Mood & Affect:  anxious  congruent and appropriate   Thought Process:  normal and logical   Associations:  intact   Thought Content:  normal, no suicidality, no homicidality, delusions, or paranoia   Insight:  has awareness of illness   Judgement: behavior is adequate to circumstances   Orientation:  person, place, situation, time/date   Memory: intact for content of interview   Language: grossly intact, able to name, able to repeat   Attention Span & Concentration:  distracted   Fund of Knowledge:  intact and appropriate to age and level of education     Assessment and Diagnosis   Status/Progress: Based on the examination today, the patient's problem(s) is/are inadequately controlled.  New problems have been presented today.   Co-morbidities are complicating management of the primary condition.  There are no active rule-out diagnoses for this patient at this time.     General Impression: We will continue pharmacological " intervention and adjunctive therapy.       ICD-10-CM ICD-9-CM   1. Adult attention deficit disorder F98.8 314.00   2. Generalized anxiety disorder F41.1 300.02   3. Insomnia, unspecified type G47.00 780.52       Intervention/Counseling/Treatment Plan   · Medication Management: Continue current medications. The risks and benefits of medication were discussed with the patient.  · Counseling provided with patient as follows: importance of compliance with chosen treatment options was emphasized, risks and benefits of treatment options, including medications, were discussed with the patient, risk factor reduction, prognosis, patient education, instructions for  management, treatment and follow-up were reviewed  1.  Continue buspirone 10mg TID targeting anxiety and depression.  Warned of risk of suicidality, roberth, and serotonin syndrome.  2.  Continue Ritalin 20 mg by mouth 3 times a day targeting focus and concentration.  Warned of risk of roberth, anxiety, addiction potential, HTN, withdrawals.  Also will need to be seen in office once every 3 months to continue prescriptions for this class of medications.  3.  Continue diazepam 10 mg by mouth daily as needed for anxiety.  Warned of risk of oversedation, falls, and not to drink or drive until the effects of the medication are known.  Warned of risk of addictive and withdrawal potential.       Return to Clinic: 3 months, as needed

## 2020-04-21 DIAGNOSIS — Z01.84 ANTIBODY RESPONSE EXAMINATION: ICD-10-CM

## 2020-04-28 ENCOUNTER — TELEPHONE (OUTPATIENT)
Dept: PHARMACY | Facility: CLINIC | Age: 40
End: 2020-04-28

## 2020-05-13 ENCOUNTER — PATIENT MESSAGE (OUTPATIENT)
Dept: NEUROLOGY | Facility: CLINIC | Age: 40
End: 2020-05-13

## 2020-05-13 RX ORDER — METHYLPREDNISOLONE 4 MG/1
TABLET ORAL
Qty: 1 PACKAGE | Refills: 0 | Status: SHIPPED | OUTPATIENT
Start: 2020-05-13 | End: 2020-06-03

## 2020-05-14 ENCOUNTER — TELEPHONE (OUTPATIENT)
Dept: SLEEP MEDICINE | Facility: CLINIC | Age: 40
End: 2020-05-14

## 2020-05-20 ENCOUNTER — OFFICE VISIT (OUTPATIENT)
Dept: NEUROLOGY | Facility: CLINIC | Age: 40
End: 2020-05-20
Payer: COMMERCIAL

## 2020-05-20 DIAGNOSIS — G47.33 OBSTRUCTIVE SLEEP APNEA: Primary | ICD-10-CM

## 2020-05-20 DIAGNOSIS — G43.719 INTRACTABLE CHRONIC MIGRAINE WITHOUT AURA AND WITHOUT STATUS MIGRAINOSUS: ICD-10-CM

## 2020-05-20 DIAGNOSIS — G43.711 INTRACTABLE CHRONIC MIGRAINE WITHOUT AURA AND WITH STATUS MIGRAINOSUS: ICD-10-CM

## 2020-05-20 PROCEDURE — 99499 UNLISTED E&M SERVICE: CPT | Mod: S$GLB,,, | Performed by: NURSE PRACTITIONER

## 2020-05-20 PROCEDURE — 64615 PR CHEMODENERVATION OF MUSCLE FOR CHRONIC MIGRAINE: ICD-10-PCS | Mod: S$GLB,,, | Performed by: NURSE PRACTITIONER

## 2020-05-20 PROCEDURE — 99499 NO LOS: ICD-10-PCS | Mod: S$GLB,,, | Performed by: NURSE PRACTITIONER

## 2020-05-20 PROCEDURE — 64615 CHEMODENERV MUSC MIGRAINE: CPT | Mod: S$GLB,,, | Performed by: NURSE PRACTITIONER

## 2020-05-20 RX ORDER — TRAMADOL HYDROCHLORIDE 50 MG/1
50 TABLET ORAL EVERY 6 HOURS PRN
Qty: 30 TABLET | Refills: 3 | Status: SHIPPED | OUTPATIENT
Start: 2020-05-20 | End: 2020-12-23 | Stop reason: SDUPTHER

## 2020-05-20 RX ORDER — NARATRIPTAN 2.5 MG/1
TABLET ORAL
Qty: 9 TABLET | Refills: 5 | Status: SHIPPED | OUTPATIENT
Start: 2020-05-20 | End: 2021-06-01 | Stop reason: CLARIF

## 2020-05-20 RX ORDER — ZOLMITRIPTAN 2.5 MG/1
2.5 TABLET, FILM COATED ORAL
Qty: 9 TABLET | Refills: 5 | Status: SHIPPED | OUTPATIENT
Start: 2020-05-20 | End: 2021-06-01 | Stop reason: CLARIF

## 2020-05-20 NOTE — PROGRESS NOTES
Mr. Moreno is here today for management of headaches/ALEXX    continues to us his apap faithfully, benefiting from therapy. Using nose pillows, wants alternative one though.  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 Having some aura s (sweat/kaleidoscope), not all of them, overall HA less, had just a few bad ones. Medrol dose kaylene helpful with worsening recently (bro. ).  HA begins with in ~ 30min  Amerge, cambia, zomig helpful but can cause sleepiness/continue and trial alternative Nurtec ODT 75mg qd prn. Wants to trial imitrex injection, triptans make him feel weird. Begin Zembrace 3mg   HIT-6- 59    Botox defers next 3-mo unless they worsen, continue Aimovig 140mg SC q28d

## 2020-05-21 DIAGNOSIS — Z01.84 ANTIBODY RESPONSE EXAMINATION: ICD-10-CM

## 2020-06-02 ENCOUNTER — TELEPHONE (OUTPATIENT)
Dept: PHARMACY | Facility: CLINIC | Age: 40
End: 2020-06-02

## 2020-06-02 DIAGNOSIS — G43.719 INTRACTABLE CHRONIC MIGRAINE WITHOUT AURA AND WITHOUT STATUS MIGRAINOSUS: ICD-10-CM

## 2020-06-04 ENCOUNTER — TELEPHONE (OUTPATIENT)
Dept: PHARMACY | Facility: CLINIC | Age: 40
End: 2020-06-04

## 2020-06-20 DIAGNOSIS — Z01.84 ANTIBODY RESPONSE EXAMINATION: ICD-10-CM

## 2020-07-02 ENCOUNTER — OFFICE VISIT (OUTPATIENT)
Dept: DERMATOLOGY | Facility: CLINIC | Age: 40
End: 2020-07-02
Payer: COMMERCIAL

## 2020-07-02 DIAGNOSIS — Z79.899 LONG-TERM USE OF HIGH-RISK MEDICATION: ICD-10-CM

## 2020-07-02 DIAGNOSIS — L73.9 FOLLICULITIS: Primary | ICD-10-CM

## 2020-07-02 PROCEDURE — 99213 PR OFFICE/OUTPT VISIT, EST, LEVL III, 20-29 MIN: ICD-10-PCS | Mod: 95,,, | Performed by: DERMATOLOGY

## 2020-07-02 PROCEDURE — 99213 OFFICE O/P EST LOW 20 MIN: CPT | Mod: 95,,, | Performed by: DERMATOLOGY

## 2020-07-02 RX ORDER — CICLOPIROX 1 G/100ML
SHAMPOO TOPICAL
Qty: 120 ML | Refills: 5 | Status: SHIPPED | OUTPATIENT
Start: 2020-07-02 | End: 2022-05-11

## 2020-07-02 NOTE — PROGRESS NOTES
Subjective:       Patient ID:  Jatinder Moreno III is a 40 y.o. male who presents for No chief complaint on file.    The patient location is: office   The chief complaint leading to consultation is: rash on scalp     Visit type: audiovisual    Face to Face time with patient: 6minutes  10 minutes of total time spent on the encounter, which includes face to face time and non-face to face time preparing to see the patient (eg, review of tests), Obtaining and/or reviewing separately obtained history, Documenting clinical information in the electronic or other health record, Independently interpreting results (not separately reported) and communicating results to the patient/family/caregiver, or Care coordination (not separately reported).         Each patient to whom he or she provides medical services by telemedicine is:  (1) informed of the relationship between the physician and patient and the respective role of any other health care provider with respect to management of the patient; and (2) notified that he or she may decline to receive medical services by telemedicine and may withdraw from such care at any time.    Notes: pt doing well on dapsone 50 mg daily. If he forgets he will get a flare.  He uses ciclopirox shampoo and clindamycin  And mometasone. This helps a lot.  Otherwise his scalp is doing well and he tolerates the dapsone well      Review of Systems   Constitutional: Negative for fever and chills.   Respiratory: Negative for shortness of breath.    Skin: Positive for rash.   Neurological: Negative for numbness.        Objective:    Physical Exam   Constitutional: He appears well-developed and well-nourished. No distress.   Neurological: He is alert and oriented to person, place, and time. He is not disoriented.   Psychiatric: He has a normal mood and affect.   Skin:   Areas Examined (abnormalities noted in diagram):   Scalp / Hair Palpated and Inspected  Head / Face Inspection Performed               Diagram Legend     Erythematous scaling macule/papule c/w actinic keratosis       Vascular papule c/w angioma      Pigmented verrucoid papule/plaque c/w seborrheic keratosis      Yellow umbilicated papule c/w sebaceous hyperplasia      Irregularly shaped tan macule c/w lentigo     1-2 mm smooth white papules consistent with Milia      Movable subcutaneous cyst with punctum c/w epidermal inclusion cyst      Subcutaneous movable cyst c/w pilar cyst      Firm pink to brown papule c/w dermatofibroma      Pedunculated fleshy papule(s) c/w skin tag(s)      Evenly pigmented macule c/w junctional nevus     Mildly variegated pigmented, slightly irregular-bordered macule c/w mildly atypical nevus      Flesh colored to evenly pigmented papule c/w intradermal nevus       Pink pearly papule/plaque c/w basal cell carcinoma      Erythematous hyperkeratotic cursted plaque c/w SCC      Surgical scar with no sign of skin cancer recurrence      Open and closed comedones      Inflammatory papules and pustules      Verrucoid papule consistent consistent with wart     Erythematous eczematous patches and plaques     Dystrophic onycholytic nail with subungual debris c/w onychomycosis     Umbilicated papule    Erythematous-base heme-crusted tan verrucoid plaque consistent with inflamed seborrheic keratosis     Erythematous Silvery Scaling Plaque c/w Psoriasis     See annotation      Assessment / Plan:        Folliculitis- scalp, severe  Long-term use of high-risk medication    Dapsone side effects:   Methemaglobunemia which would cause fatigue and headache, hemolytic anemia, decreased blood counts, hepatitis, hypersensitivity syndrome, drug rash, nausea, neuropathy with motor effects. Avoid use of sulfa antibiotics while on this medication. Anticonvulsants may decrease the levels of dapsone. Antacid medications can decrease absorption of dapsone.     -     CBC auto differential; Future; Expected date: 07/02/2020  -     Comprehensive  metabolic panel; Future; Expected date: 07/02/2020  -     ciclopirox 1 % shampoo; Use as directed qohs  Dispense: 120 mL; Refill: 5  Last labs 1/2020 so will plan to recheck   -     ciclopirox 1 % shampoo; Use as directed qohs  Dispense: 120 mL; Refill: 5    Continue mometasone and clindamycin as needed         Follow up in about 6 months (around 1/2/2021).

## 2020-07-06 ENCOUNTER — TELEPHONE (OUTPATIENT)
Dept: PHARMACY | Facility: CLINIC | Age: 40
End: 2020-07-06

## 2020-07-17 NOTE — TELEPHONE ENCOUNTER
Refill call for Aimovig. Verified that patient will pickup at [facility] on Friday 7/17. $5.00 copay at 004.     You Vasquez, PharmD  Clinical Pharmacist  [facility]  P: [phone]

## 2020-07-20 DIAGNOSIS — Z01.84 ANTIBODY RESPONSE EXAMINATION: ICD-10-CM

## 2020-08-08 ENCOUNTER — TELEPHONE (OUTPATIENT)
Dept: PHARMACY | Facility: CLINIC | Age: 40
End: 2020-08-08

## 2020-08-08 NOTE — TELEPHONE ENCOUNTER
Call attempt 1 regarding Aimovig refill. LVM and sent [employer] message. Copay $0 at 004

## 2020-08-19 DIAGNOSIS — Z01.84 ANTIBODY RESPONSE EXAMINATION: ICD-10-CM

## 2020-09-11 ENCOUNTER — TELEPHONE (OUTPATIENT)
Dept: PHARMACY | Facility: CLINIC | Age: 40
End: 2020-09-11

## 2020-09-18 DIAGNOSIS — Z01.84 ANTIBODY RESPONSE EXAMINATION: ICD-10-CM

## 2020-09-21 ENCOUNTER — PATIENT MESSAGE (OUTPATIENT)
Dept: NEUROLOGY | Facility: CLINIC | Age: 40
End: 2020-09-21

## 2020-09-21 ENCOUNTER — TELEPHONE (OUTPATIENT)
Dept: PRIMARY CARE CLINIC | Facility: CLINIC | Age: 40
End: 2020-09-21

## 2020-09-21 DIAGNOSIS — R51.9 COMPLAINT OF HEADACHE: Primary | ICD-10-CM

## 2020-09-21 DIAGNOSIS — R11.0 NAUSEA: ICD-10-CM

## 2020-09-21 RX ORDER — METHYLPREDNISOLONE 4 MG/1
TABLET ORAL
Qty: 1 PACKAGE | Refills: 0 | Status: SHIPPED | OUTPATIENT
Start: 2020-09-21 | End: 2020-10-12

## 2020-10-07 ENCOUNTER — TELEPHONE (OUTPATIENT)
Dept: PHARMACY | Facility: CLINIC | Age: 40
End: 2020-10-07

## 2020-10-18 DIAGNOSIS — Z01.84 ANTIBODY RESPONSE EXAMINATION: ICD-10-CM

## 2020-11-13 ENCOUNTER — PATIENT MESSAGE (OUTPATIENT)
Dept: SLEEP MEDICINE | Facility: CLINIC | Age: 40
End: 2020-11-13

## 2020-11-14 ENCOUNTER — TELEPHONE (OUTPATIENT)
Dept: PHARMACY | Facility: CLINIC | Age: 40
End: 2020-11-14

## 2020-11-17 ENCOUNTER — OFFICE VISIT (OUTPATIENT)
Dept: INTERNAL MEDICINE | Facility: CLINIC | Age: 40
End: 2020-11-17
Payer: COMMERCIAL

## 2020-11-17 ENCOUNTER — OFFICE VISIT (OUTPATIENT)
Dept: SLEEP MEDICINE | Facility: CLINIC | Age: 40
End: 2020-11-17
Payer: COMMERCIAL

## 2020-11-17 VITALS
WEIGHT: 273.81 LBS | DIASTOLIC BLOOD PRESSURE: 90 MMHG | HEIGHT: 75 IN | HEART RATE: 76 BPM | BODY MASS INDEX: 34.05 KG/M2 | RESPIRATION RATE: 14 BRPM | SYSTOLIC BLOOD PRESSURE: 140 MMHG | TEMPERATURE: 98 F

## 2020-11-17 VITALS
HEIGHT: 75 IN | SYSTOLIC BLOOD PRESSURE: 139 MMHG | DIASTOLIC BLOOD PRESSURE: 85 MMHG | WEIGHT: 273 LBS | BODY MASS INDEX: 33.94 KG/M2 | HEART RATE: 77 BPM

## 2020-11-17 DIAGNOSIS — Z01.84 ANTIBODY RESPONSE EXAMINATION: ICD-10-CM

## 2020-11-17 DIAGNOSIS — R03.0 ELEVATED BP WITHOUT DIAGNOSIS OF HYPERTENSION: ICD-10-CM

## 2020-11-17 DIAGNOSIS — G43.719 INTRACTABLE CHRONIC MIGRAINE WITHOUT AURA AND WITHOUT STATUS MIGRAINOSUS: ICD-10-CM

## 2020-11-17 DIAGNOSIS — G47.33 OBSTRUCTIVE SLEEP APNEA (ADULT) (PEDIATRIC): Primary | ICD-10-CM

## 2020-11-17 DIAGNOSIS — F90.0 ATTENTION DEFICIT HYPERACTIVITY DISORDER (ADHD), PREDOMINANTLY INATTENTIVE TYPE: ICD-10-CM

## 2020-11-17 DIAGNOSIS — G47.33 OBSTRUCTIVE SLEEP APNEA (ADULT) (PEDIATRIC): ICD-10-CM

## 2020-11-17 DIAGNOSIS — J30.9 CHRONIC ALLERGIC RHINITIS: ICD-10-CM

## 2020-11-17 DIAGNOSIS — Z00.00 ANNUAL PHYSICAL EXAM: Primary | ICD-10-CM

## 2020-11-17 PROCEDURE — 3008F BODY MASS INDEX DOCD: CPT | Mod: CPTII,S$GLB,, | Performed by: NURSE PRACTITIONER

## 2020-11-17 PROCEDURE — 3078F PR MOST RECENT DIASTOLIC BLOOD PRESSURE < 80 MM HG: ICD-10-PCS | Mod: CPTII,S$GLB,, | Performed by: HOSPITALIST

## 2020-11-17 PROCEDURE — 99214 OFFICE O/P EST MOD 30 MIN: CPT | Mod: S$GLB,,, | Performed by: NURSE PRACTITIONER

## 2020-11-17 PROCEDURE — 1125F AMNT PAIN NOTED PAIN PRSNT: CPT | Mod: S$GLB,,, | Performed by: NURSE PRACTITIONER

## 2020-11-17 PROCEDURE — 1125F AMNT PAIN NOTED PAIN PRSNT: CPT | Mod: S$GLB,,, | Performed by: HOSPITALIST

## 2020-11-17 PROCEDURE — 3074F SYST BP LT 130 MM HG: CPT | Mod: CPTII,S$GLB,, | Performed by: HOSPITALIST

## 2020-11-17 PROCEDURE — 3075F SYST BP GE 130 - 139MM HG: CPT | Mod: CPTII,S$GLB,, | Performed by: NURSE PRACTITIONER

## 2020-11-17 PROCEDURE — 3074F PR MOST RECENT SYSTOLIC BLOOD PRESSURE < 130 MM HG: ICD-10-PCS | Mod: CPTII,S$GLB,, | Performed by: HOSPITALIST

## 2020-11-17 PROCEDURE — 99395 PR PREVENTIVE VISIT,EST,18-39: ICD-10-PCS | Mod: S$GLB,,, | Performed by: HOSPITALIST

## 2020-11-17 PROCEDURE — 3008F PR BODY MASS INDEX (BMI) DOCUMENTED: ICD-10-PCS | Mod: CPTII,S$GLB,, | Performed by: HOSPITALIST

## 2020-11-17 PROCEDURE — 99395 PREV VISIT EST AGE 18-39: CPT | Mod: S$GLB,,, | Performed by: HOSPITALIST

## 2020-11-17 PROCEDURE — 3008F PR BODY MASS INDEX (BMI) DOCUMENTED: ICD-10-PCS | Mod: CPTII,S$GLB,, | Performed by: NURSE PRACTITIONER

## 2020-11-17 PROCEDURE — 3079F PR MOST RECENT DIASTOLIC BLOOD PRESSURE 80-89 MM HG: ICD-10-PCS | Mod: CPTII,S$GLB,, | Performed by: NURSE PRACTITIONER

## 2020-11-17 PROCEDURE — 3075F PR MOST RECENT SYSTOLIC BLOOD PRESS GE 130-139MM HG: ICD-10-PCS | Mod: CPTII,S$GLB,, | Performed by: NURSE PRACTITIONER

## 2020-11-17 PROCEDURE — 99999 PR PBB SHADOW E&M-EST. PATIENT-LVL IV: ICD-10-PCS | Mod: PBBFAC,,, | Performed by: NURSE PRACTITIONER

## 2020-11-17 PROCEDURE — 1125F PR PAIN SEVERITY QUANTIFIED, PAIN PRESENT: ICD-10-PCS | Mod: S$GLB,,, | Performed by: NURSE PRACTITIONER

## 2020-11-17 PROCEDURE — 3008F BODY MASS INDEX DOCD: CPT | Mod: CPTII,S$GLB,, | Performed by: HOSPITALIST

## 2020-11-17 PROCEDURE — 99999 PR PBB SHADOW E&M-EST. PATIENT-LVL IV: CPT | Mod: PBBFAC,,, | Performed by: NURSE PRACTITIONER

## 2020-11-17 PROCEDURE — 3079F DIAST BP 80-89 MM HG: CPT | Mod: CPTII,S$GLB,, | Performed by: NURSE PRACTITIONER

## 2020-11-17 PROCEDURE — 99999 PR PBB SHADOW E&M-EST. PATIENT-LVL V: ICD-10-PCS | Mod: PBBFAC,,, | Performed by: HOSPITALIST

## 2020-11-17 PROCEDURE — 3078F DIAST BP <80 MM HG: CPT | Mod: CPTII,S$GLB,, | Performed by: HOSPITALIST

## 2020-11-17 PROCEDURE — 99999 PR PBB SHADOW E&M-EST. PATIENT-LVL V: CPT | Mod: PBBFAC,,, | Performed by: HOSPITALIST

## 2020-11-17 PROCEDURE — 1125F PR PAIN SEVERITY QUANTIFIED, PAIN PRESENT: ICD-10-PCS | Mod: S$GLB,,, | Performed by: HOSPITALIST

## 2020-11-17 PROCEDURE — 99214 PR OFFICE/OUTPT VISIT, EST, LEVL IV, 30-39 MIN: ICD-10-PCS | Mod: S$GLB,,, | Performed by: NURSE PRACTITIONER

## 2020-11-17 RX ORDER — MONTELUKAST SODIUM 10 MG/1
10 TABLET ORAL NIGHTLY
Qty: 90 TABLET | Refills: 3 | Status: SHIPPED | OUTPATIENT
Start: 2020-11-17 | End: 2022-05-11

## 2020-11-17 RX ORDER — LEVOCETIRIZINE DIHYDROCHLORIDE 5 MG/1
5 TABLET, FILM COATED ORAL NIGHTLY
Qty: 90 TABLET | Refills: 3 | Status: SHIPPED | OUTPATIENT
Start: 2020-11-17 | End: 2022-05-11

## 2020-11-17 NOTE — PROGRESS NOTES
"Mr. Moreno is here today for management of headaches/ALEXX    continues to us his apap faithfully, benefiting from therapy. Using nose mask but needs new chin strap/ NE[place] got new machine yet, ready. Hard to get away from work.   Continue apap 13-16cm.   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 No recent auras (sweat/kaleidoscope), headache begins with in ~ 30min. Keeps amerge with him.   Amerge, cambia, zomig helpful but can cause sleepiness. Nurtec ODT 75mg qd works very well. Zembrace 3mg SC helpful also. Finds headaches are less frequent, even w/o Inderal which he stopped in interim.. Last botox 5/20/20 . He continues to take Aimovig 140mg SC q28d.   Medrol dose kaylene helpful during lingering/flair late Sept.   Has seen Dr. Reyes 2017/ENT  Ritalin takes rarely    EXAM:  Ht 6' 3" (1.905 m)   Wt 123.8 kg (273 lb)   BMI 34.12 kg/m²   Well groomed, obese, W/D    Assessment:  Intractable migraines w and w/o aura  ALEXX  Chronic allergic rhinitis  ADHD    PLAN:  Continue aimovig 140mg SC  Continue Amerge,Cambia and zomig po as prescribed  Nurtec odt 75mg qd prn continue  Zembrace 3mg SC q2h prn continue  See ENT Dr. Bob, continue Xyzal qhs and consider flonase NS bid  Continue apap 13-16cm. GET new machine this month and rtc adherence monitoring Feb 2021. [facility].    "

## 2020-11-17 NOTE — PROGRESS NOTES
Subjective:     @Patient ID: Jatinder Moreno III is a 40 y.o. male.    Chief Complaint: Annual Exam    HPI     39 yo male presents for annual visit ADHD, depression, anxiety, ALEXX, migraines, allergies. Pt is new to me. His last PCP has moved and he is transferring care here. 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  with 2 kids. Reports today BP is likely elevated due to him currently having a migraine.        Lipid disorders/ASCVD risk (ages >/= 45 or >/= 20 if increased risk ): ordered  DM (>45y yearly or if obese, HTN): A1c ordered  Eye exam:         Vaccines:   Influenza (yearly) done  Tetanus (every 10 yrs - 1st tdap) done 2016     Exercise: no scheduled exercise  Diet: reg      Review of Systems   Constitutional: Negative for activity change, chills, fever and unexpected weight change.   HENT: Negative for congestion, hearing loss, rhinorrhea, sore throat and trouble swallowing.    Eyes: Negative for pain, discharge and visual disturbance.   Respiratory: Negative for cough, chest tightness, shortness of breath and wheezing.    Cardiovascular: Negative for chest pain, palpitations and leg swelling.   Gastrointestinal: Negative for abdominal pain, blood in stool, constipation, diarrhea, nausea and vomiting.   Endocrine: Negative for polydipsia and polyuria.   Genitourinary: Negative for difficulty urinating, dysuria and hematuria.   Musculoskeletal: Negative for arthralgias, back pain, joint swelling and neck pain.   Skin: Negative for rash and wound.   Neurological: Negative for weakness and headaches.   Psychiatric/Behavioral: Negative for agitation, confusion and dysphoric mood.     Past medical history, surgical history, and family medical history reviewed and updated as appropriate.    Medications and allergies reviewed.     Objective:     Vitals:    11/17/20 1356 11/17/20 1430   BP: (!) 140/96 (!) 140/90   BP Location: Left arm    Patient Position: Sitting    BP Method: Large (Manual)    Pulse:  "76    Resp: 14    Temp: 97.7 °F (36.5 °C)    TempSrc: Temporal    Weight: 124.2 kg (273 lb 13 oz)    Height: 6' 3" (1.905 m)      Body mass index is 34.22 kg/m².  Physical Exam  Vitals signs reviewed.   Constitutional:       General: He is not in acute distress.     Appearance: He is well-developed.   HENT:      Head: Normocephalic and atraumatic.      Right Ear: Tympanic membrane normal.      Left Ear: Tympanic membrane normal.      Mouth/Throat:      Mouth: Mucous membranes are moist.      Pharynx: No oropharyngeal exudate.   Eyes:      General:         Right eye: No discharge.         Left eye: No discharge.      Conjunctiva/sclera: Conjunctivae normal.   Neck:      Musculoskeletal: Normal range of motion and neck supple.   Cardiovascular:      Rate and Rhythm: Normal rate and regular rhythm.      Heart sounds: No murmur. No friction rub.   Pulmonary:      Effort: Pulmonary effort is normal.      Breath sounds: Normal breath sounds.   Abdominal:      General: Bowel sounds are normal. There is no distension.      Palpations: Abdomen is soft.      Tenderness: There is no abdominal tenderness.   Musculoskeletal: Normal range of motion.      Right lower leg: No edema.      Left lower leg: No edema.   Lymphadenopathy:      Cervical: No cervical adenopathy.   Skin:     General: Skin is warm and dry.   Neurological:      Mental Status: He is alert and oriented to person, place, and time.   Psychiatric:         Mood and Affect: Mood normal.         Behavior: Behavior normal.         Lab Results   Component Value Date    WBC 6.17 01/23/2020    HGB 12.8 (L) 01/23/2020    HCT 37.5 (L) 01/23/2020     01/23/2020    CHOL 149 08/04/2018    TRIG 244 (H) 08/04/2018    HDL 31 (L) 08/04/2018    ALT 17 01/23/2020    AST 18 01/23/2020     01/23/2020    K 4.1 01/23/2020     01/23/2020    CREATININE 1.3 01/23/2020    BUN 13 01/23/2020    CO2 28 01/23/2020    TSH 2.536 06/09/2018    HGBA1C 4.6 01/12/2011 "       Assessment:     1. Annual physical exam    2. Obstructive sleep apnea (adult) (pediatric)    3. Attention deficit hyperactivity disorder (ADHD), predominantly inattentive type    4. Intractable chronic migraine without aura and without status migrainosus    5. Elevated BP without diagnosis of hypertension    6. BMI 34.0-34.9,adult      Plan:   Jatinder was seen today for annual exam.    Diagnoses and all orders for this visit:    Annual physical exam  -     Comprehensive Metabolic Panel; Future  -     CBC Auto Differential; Future  -     TSH; Future  -     Vitamin D; Future  -     Lipid Panel; Future  -     Hemoglobin A1C; Future    Obstructive sleep apnea (adult) (pediatric)        - Stable. Continue cpap a     Attention deficit hyperactivity disorder (ADHD), predominantly inattentive type        - Stable    Intractable chronic migraine without aura and without status migrainosus        - On aimovig, triptans prn. F/u with neurology    Elevated BP        -  Possibly due to HA. Pt will check bp and send update in 1-2 weeks    BMI 34-34.9         - Encouraged exercise and weight loss     Other orders  -     levocetirizine (XYZAL) 5 MG tablet; Take 1 tablet (5 mg total) by mouth every evening.  -     montelukast (SINGULAIR) 10 mg tablet; Take 1 tablet (10 mg total) by mouth every evening.          RTC 1 year and prn  Kristi Peters MD  Internal Medicine    11/17/2020

## 2020-11-20 ENCOUNTER — LAB VISIT (OUTPATIENT)
Dept: LAB | Facility: HOSPITAL | Age: 40
End: 2020-11-20
Attending: HOSPITALIST
Payer: COMMERCIAL

## 2020-11-20 DIAGNOSIS — Z00.00 ANNUAL PHYSICAL EXAM: ICD-10-CM

## 2020-11-20 LAB
ALBUMIN SERPL BCP-MCNC: 4.9 G/DL (ref 3.5–5.2)
ALP SERPL-CCNC: 67 U/L (ref 55–135)
ALT SERPL W/O P-5'-P-CCNC: 32 U/L (ref 10–44)
ANION GAP SERPL CALC-SCNC: 10 MMOL/L (ref 8–16)
AST SERPL-CCNC: 23 U/L (ref 10–40)
BASOPHILS # BLD AUTO: 0.03 K/UL (ref 0–0.2)
BASOPHILS NFR BLD: 0.5 % (ref 0–1.9)
BILIRUB SERPL-MCNC: 1.3 MG/DL (ref 0.1–1)
BUN SERPL-MCNC: 14 MG/DL (ref 6–20)
CALCIUM SERPL-MCNC: 9.8 MG/DL (ref 8.7–10.5)
CHLORIDE SERPL-SCNC: 104 MMOL/L (ref 95–110)
CHOLEST SERPL-MCNC: 164 MG/DL (ref 120–199)
CHOLEST/HDLC SERPL: 5.7 {RATIO} (ref 2–5)
CO2 SERPL-SCNC: 26 MMOL/L (ref 23–29)
CREAT SERPL-MCNC: 1.1 MG/DL (ref 0.5–1.4)
DIFFERENTIAL METHOD: ABNORMAL
EOSINOPHIL # BLD AUTO: 0.1 K/UL (ref 0–0.5)
EOSINOPHIL NFR BLD: 0.9 % (ref 0–8)
ERYTHROCYTE [DISTWIDTH] IN BLOOD BY AUTOMATED COUNT: 13.2 % (ref 11.5–14.5)
EST. GFR  (AFRICAN AMERICAN): >60 ML/MIN/1.73 M^2
EST. GFR  (NON AFRICAN AMERICAN): >60 ML/MIN/1.73 M^2
ESTIMATED AVG GLUCOSE: ABNORMAL MG/DL (ref 68–131)
GLUCOSE SERPL-MCNC: 103 MG/DL (ref 70–110)
HBA1C MFR BLD HPLC: <4 % (ref 4–5.6)
HCT VFR BLD AUTO: 38.7 % (ref 40–54)
HDLC SERPL-MCNC: 29 MG/DL (ref 40–75)
HDLC SERPL: 17.7 % (ref 20–50)
HGB BLD-MCNC: 13.7 G/DL (ref 14–18)
IMM GRANULOCYTES # BLD AUTO: 0.04 K/UL (ref 0–0.04)
IMM GRANULOCYTES NFR BLD AUTO: 0.6 % (ref 0–0.5)
LDLC SERPL CALC-MCNC: 85.4 MG/DL (ref 63–159)
LYMPHOCYTES # BLD AUTO: 1.6 K/UL (ref 1–4.8)
LYMPHOCYTES NFR BLD: 24.7 % (ref 18–48)
MCH RBC QN AUTO: 30.2 PG (ref 27–31)
MCHC RBC AUTO-ENTMCNC: 35.4 G/DL (ref 32–36)
MCV RBC AUTO: 85 FL (ref 82–98)
MONOCYTES # BLD AUTO: 0.4 K/UL (ref 0.3–1)
MONOCYTES NFR BLD: 6.8 % (ref 4–15)
NEUTROPHILS # BLD AUTO: 4.3 K/UL (ref 1.8–7.7)
NEUTROPHILS NFR BLD: 66.5 % (ref 38–73)
NONHDLC SERPL-MCNC: 135 MG/DL
NRBC BLD-RTO: 0 /100 WBC
PLATELET # BLD AUTO: 191 K/UL (ref 150–350)
PMV BLD AUTO: 9 FL (ref 9.2–12.9)
POTASSIUM SERPL-SCNC: 4 MMOL/L (ref 3.5–5.1)
PROT SERPL-MCNC: 7.9 G/DL (ref 6–8.4)
RBC # BLD AUTO: 4.53 M/UL (ref 4.6–6.2)
SODIUM SERPL-SCNC: 140 MMOL/L (ref 136–145)
TRIGL SERPL-MCNC: 248 MG/DL (ref 30–150)
TSH SERPL DL<=0.005 MIU/L-ACNC: 1.68 UIU/ML (ref 0.4–4)
WBC # BLD AUTO: 6.47 K/UL (ref 3.9–12.7)

## 2020-11-20 PROCEDURE — 36415 COLL VENOUS BLD VENIPUNCTURE: CPT

## 2020-11-20 PROCEDURE — 82306 VITAMIN D 25 HYDROXY: CPT

## 2020-11-20 PROCEDURE — 80061 LIPID PANEL: CPT

## 2020-11-20 PROCEDURE — 80053 COMPREHEN METABOLIC PANEL: CPT

## 2020-11-20 PROCEDURE — 85025 COMPLETE CBC W/AUTO DIFF WBC: CPT

## 2020-11-20 PROCEDURE — 83036 HEMOGLOBIN GLYCOSYLATED A1C: CPT

## 2020-11-20 PROCEDURE — 84443 ASSAY THYROID STIM HORMONE: CPT

## 2020-11-21 LAB — 25(OH)D3+25(OH)D2 SERPL-MCNC: 43 NG/ML (ref 30–96)

## 2020-12-09 ENCOUNTER — TELEPHONE (OUTPATIENT)
Dept: NEUROLOGY | Facility: CLINIC | Age: 40
End: 2020-12-09

## 2020-12-09 NOTE — TELEPHONE ENCOUNTER
----- Message from Collin Cadena sent at 12/9/2020  3:22 PM CST -----   Name of Who is Calling:     What is the request in detail: request  prior authorization for medication    erenumab-aooe (AIMOVIG) 140 mg/mL autoinjector    Please contact to further discuss and advise      Can the clinic reply by [facility]: no    What Number to Call Back if not in [facility]:  tom / robby   /[phone] / PA # [id number]

## 2020-12-10 ENCOUNTER — SPECIALTY PHARMACY (OUTPATIENT)
Dept: PHARMACY | Facility: CLINIC | Age: 40
End: 2020-12-10

## 2020-12-15 ENCOUNTER — IMMUNIZATION (OUTPATIENT)
Dept: OBSTETRICS AND GYNECOLOGY | Facility: CLINIC | Age: 40
End: 2020-12-15
Payer: COMMERCIAL

## 2020-12-15 DIAGNOSIS — Z23 NEED FOR VACCINATION: ICD-10-CM

## 2020-12-15 PROCEDURE — 91300 COVID-19, MRNA, LNP-S, PF, 30 MCG/0.3 ML DOSE VACCINE: ICD-10-PCS | Mod: ,,, | Performed by: FAMILY MEDICINE

## 2020-12-15 PROCEDURE — 0001A COVID-19, MRNA, LNP-S, PF, 30 MCG/0.3 ML DOSE VACCINE: CPT | Mod: CV19,,, | Performed by: FAMILY MEDICINE

## 2020-12-15 PROCEDURE — 0001A COVID-19, MRNA, LNP-S, PF, 30 MCG/0.3 ML DOSE VACCINE: ICD-10-PCS | Mod: CV19,,, | Performed by: FAMILY MEDICINE

## 2020-12-15 PROCEDURE — 91300 COVID-19, MRNA, LNP-S, PF, 30 MCG/0.3 ML DOSE VACCINE: CPT | Mod: ,,, | Performed by: FAMILY MEDICINE

## 2020-12-23 ENCOUNTER — PATIENT MESSAGE (OUTPATIENT)
Dept: SLEEP MEDICINE | Facility: CLINIC | Age: 40
End: 2020-12-23

## 2020-12-23 DIAGNOSIS — R11.0 NAUSEA: ICD-10-CM

## 2020-12-23 DIAGNOSIS — G43.711 INTRACTABLE CHRONIC MIGRAINE WITHOUT AURA AND WITH STATUS MIGRAINOSUS: ICD-10-CM

## 2020-12-23 DIAGNOSIS — G43.719 INTRACTABLE CHRONIC MIGRAINE WITHOUT AURA AND WITHOUT STATUS MIGRAINOSUS: ICD-10-CM

## 2020-12-26 RX ORDER — PROMETHAZINE HYDROCHLORIDE 12.5 MG/1
12.5 TABLET ORAL EVERY 6 HOURS PRN
Qty: 30 TABLET | Refills: 3 | Status: SHIPPED | OUTPATIENT
Start: 2020-12-26 | End: 2022-05-11

## 2020-12-26 RX ORDER — TRAMADOL HYDROCHLORIDE 50 MG/1
50 TABLET ORAL EVERY 6 HOURS PRN
Qty: 30 TABLET | Refills: 3 | Status: SHIPPED | OUTPATIENT
Start: 2020-12-26 | End: 2021-03-31

## 2021-01-06 ENCOUNTER — IMMUNIZATION (OUTPATIENT)
Dept: OBSTETRICS AND GYNECOLOGY | Facility: CLINIC | Age: 41
End: 2021-01-06
Payer: COMMERCIAL

## 2021-01-06 DIAGNOSIS — Z23 NEED FOR VACCINATION: ICD-10-CM

## 2021-01-06 PROCEDURE — 0002A COVID-19, MRNA, LNP-S, PF, 30 MCG/0.3 ML DOSE VACCINE: CPT | Mod: PBBFAC | Performed by: FAMILY MEDICINE

## 2021-01-06 PROCEDURE — 91300 COVID-19, MRNA, LNP-S, PF, 30 MCG/0.3 ML DOSE VACCINE: CPT | Mod: PBBFAC | Performed by: FAMILY MEDICINE

## 2021-01-07 ENCOUNTER — OFFICE VISIT (OUTPATIENT)
Dept: DERMATOLOGY | Facility: CLINIC | Age: 41
End: 2021-01-07
Payer: COMMERCIAL

## 2021-01-07 DIAGNOSIS — L73.9 FOLLICULITIS: Primary | ICD-10-CM

## 2021-01-07 DIAGNOSIS — Z79.899 LONG-TERM USE OF HIGH-RISK MEDICATION: ICD-10-CM

## 2021-01-07 DIAGNOSIS — L71.9 ROSACEA: ICD-10-CM

## 2021-01-07 PROCEDURE — 99213 PR OFFICE/OUTPT VISIT, EST, LEVL III, 20-29 MIN: ICD-10-PCS | Mod: 95,,, | Performed by: DERMATOLOGY

## 2021-01-07 PROCEDURE — 99213 OFFICE O/P EST LOW 20 MIN: CPT | Mod: 95,,, | Performed by: DERMATOLOGY

## 2021-01-07 RX ORDER — DAPSONE 50 MG/G
GEL TOPICAL
Qty: 60 G | Refills: 3 | Status: SHIPPED | OUTPATIENT
Start: 2021-01-07 | End: 2022-05-11

## 2021-01-07 RX ORDER — DAPSONE 25 MG/1
50 TABLET ORAL DAILY
Qty: 180 TABLET | Refills: 0 | Status: SHIPPED | OUTPATIENT
Start: 2021-01-07 | End: 2021-04-26

## 2021-01-07 RX ORDER — SODIUM SULFACETAMIDE AND SULFUR 80; 40 MG/ML; MG/ML
SOLUTION TOPICAL
Qty: 473 ML | Refills: 3 | Status: SHIPPED | OUTPATIENT
Start: 2021-01-07 | End: 2022-05-11

## 2021-01-14 ENCOUNTER — PATIENT MESSAGE (OUTPATIENT)
Dept: SLEEP MEDICINE | Facility: CLINIC | Age: 41
End: 2021-01-14

## 2021-01-14 DIAGNOSIS — G43.711 INTRACTABLE CHRONIC MIGRAINE WITHOUT AURA AND WITH STATUS MIGRAINOSUS: ICD-10-CM

## 2021-01-14 RX ORDER — SUMATRIPTAN SUCCINATE 3 MG/1
3 INJECTION, SOLUTION SUBCUTANEOUS
Qty: 3 ML | Refills: 5 | Status: SHIPPED | OUTPATIENT
Start: 2021-01-14 | End: 2021-11-17 | Stop reason: RX

## 2021-01-14 RX ORDER — SUMATRIPTAN SUCCINATE 100 MG/1
100 TABLET ORAL
Qty: 9 TABLET | Refills: 5 | Status: SHIPPED | OUTPATIENT
Start: 2021-01-14 | End: 2021-07-21

## 2021-02-02 ENCOUNTER — OFFICE VISIT (OUTPATIENT)
Dept: OTOLARYNGOLOGY | Facility: CLINIC | Age: 41
End: 2021-02-02
Payer: COMMERCIAL

## 2021-02-02 VITALS
TEMPERATURE: 97 F | SYSTOLIC BLOOD PRESSURE: 142 MMHG | BODY MASS INDEX: 33.73 KG/M2 | WEIGHT: 271.31 LBS | HEART RATE: 86 BPM | DIASTOLIC BLOOD PRESSURE: 97 MMHG | HEIGHT: 75 IN

## 2021-02-02 DIAGNOSIS — J34.2 NASAL SEPTAL DEVIATION: Primary | ICD-10-CM

## 2021-02-02 DIAGNOSIS — G47.33 OSA (OBSTRUCTIVE SLEEP APNEA): ICD-10-CM

## 2021-02-02 DIAGNOSIS — J34.3 NASAL TURBINATE HYPERTROPHY: ICD-10-CM

## 2021-02-02 DIAGNOSIS — J34.89 NASAL OBSTRUCTION: ICD-10-CM

## 2021-02-02 PROCEDURE — 99204 PR OFFICE/OUTPT VISIT, NEW, LEVL IV, 45-59 MIN: ICD-10-PCS | Mod: S$GLB,,, | Performed by: OTOLARYNGOLOGY

## 2021-02-02 PROCEDURE — 3080F PR MOST RECENT DIASTOLIC BLOOD PRESSURE >= 90 MM HG: ICD-10-PCS | Mod: CPTII,S$GLB,, | Performed by: OTOLARYNGOLOGY

## 2021-02-02 PROCEDURE — 3008F PR BODY MASS INDEX (BMI) DOCUMENTED: ICD-10-PCS | Mod: CPTII,S$GLB,, | Performed by: OTOLARYNGOLOGY

## 2021-02-02 PROCEDURE — 1126F PR PAIN SEVERITY QUANTIFIED, NO PAIN PRESENT: ICD-10-PCS | Mod: S$GLB,,, | Performed by: OTOLARYNGOLOGY

## 2021-02-02 PROCEDURE — 99204 OFFICE O/P NEW MOD 45 MIN: CPT | Mod: S$GLB,,, | Performed by: OTOLARYNGOLOGY

## 2021-02-02 PROCEDURE — 1126F AMNT PAIN NOTED NONE PRSNT: CPT | Mod: S$GLB,,, | Performed by: OTOLARYNGOLOGY

## 2021-02-02 PROCEDURE — 3008F BODY MASS INDEX DOCD: CPT | Mod: CPTII,S$GLB,, | Performed by: OTOLARYNGOLOGY

## 2021-02-02 PROCEDURE — 3077F PR MOST RECENT SYSTOLIC BLOOD PRESSURE >= 140 MM HG: ICD-10-PCS | Mod: CPTII,S$GLB,, | Performed by: OTOLARYNGOLOGY

## 2021-02-02 PROCEDURE — 3080F DIAST BP >= 90 MM HG: CPT | Mod: CPTII,S$GLB,, | Performed by: OTOLARYNGOLOGY

## 2021-02-02 PROCEDURE — 3077F SYST BP >= 140 MM HG: CPT | Mod: CPTII,S$GLB,, | Performed by: OTOLARYNGOLOGY

## 2021-03-25 ENCOUNTER — PATIENT MESSAGE (OUTPATIENT)
Dept: OTOLARYNGOLOGY | Facility: CLINIC | Age: 41
End: 2021-03-25

## 2021-04-01 ENCOUNTER — TELEPHONE (OUTPATIENT)
Dept: SLEEP MEDICINE | Facility: CLINIC | Age: 41
End: 2021-04-01

## 2021-04-01 ENCOUNTER — TELEPHONE (OUTPATIENT)
Dept: PHARMACY | Facility: CLINIC | Age: 41
End: 2021-04-01

## 2021-04-07 ENCOUNTER — TELEPHONE (OUTPATIENT)
Dept: SLEEP MEDICINE | Facility: CLINIC | Age: 41
End: 2021-04-07

## 2021-04-07 ENCOUNTER — PATIENT MESSAGE (OUTPATIENT)
Dept: SLEEP MEDICINE | Facility: CLINIC | Age: 41
End: 2021-04-07

## 2021-04-19 ENCOUNTER — OFFICE VISIT (OUTPATIENT)
Dept: OTOLARYNGOLOGY | Facility: CLINIC | Age: 41
End: 2021-04-19
Payer: COMMERCIAL

## 2021-04-19 VITALS — HEART RATE: 88 BPM | SYSTOLIC BLOOD PRESSURE: 156 MMHG | DIASTOLIC BLOOD PRESSURE: 107 MMHG

## 2021-04-19 DIAGNOSIS — J34.3 NASAL TURBINATE HYPERTROPHY: ICD-10-CM

## 2021-04-19 DIAGNOSIS — J34.2 NASAL SEPTAL DEVIATION: ICD-10-CM

## 2021-04-19 DIAGNOSIS — K13.79 HYPERTROPHIC SOFT PALATE: ICD-10-CM

## 2021-04-19 DIAGNOSIS — J35.3 TONSILLAR AND ADENOID HYPERTROPHY: ICD-10-CM

## 2021-04-19 DIAGNOSIS — J34.89 NASAL OBSTRUCTION: ICD-10-CM

## 2021-04-19 DIAGNOSIS — G47.33 OSA (OBSTRUCTIVE SLEEP APNEA): Primary | ICD-10-CM

## 2021-04-19 PROCEDURE — 1125F PR PAIN SEVERITY QUANTIFIED, PAIN PRESENT: ICD-10-PCS | Mod: S$GLB,,, | Performed by: OTOLARYNGOLOGY

## 2021-04-19 PROCEDURE — 99214 PR OFFICE/OUTPT VISIT, EST, LEVL IV, 30-39 MIN: ICD-10-PCS | Mod: S$GLB,,, | Performed by: OTOLARYNGOLOGY

## 2021-04-19 PROCEDURE — 99214 OFFICE O/P EST MOD 30 MIN: CPT | Mod: S$GLB,,, | Performed by: OTOLARYNGOLOGY

## 2021-04-19 PROCEDURE — 1125F AMNT PAIN NOTED PAIN PRSNT: CPT | Mod: S$GLB,,, | Performed by: OTOLARYNGOLOGY

## 2021-04-19 RX ORDER — BUDESONIDE 0.5 MG/2ML
INHALANT ORAL
COMMUNITY
Start: 2021-02-03 | End: 2021-06-01 | Stop reason: CLARIF

## 2021-05-12 ENCOUNTER — TELEPHONE (OUTPATIENT)
Dept: OTOLARYNGOLOGY | Facility: CLINIC | Age: 41
End: 2021-05-12

## 2021-05-13 DIAGNOSIS — J34.2 NASAL SEPTAL DEVIATION: ICD-10-CM

## 2021-05-13 DIAGNOSIS — Z01.818 PRE-OP TESTING: Primary | ICD-10-CM

## 2021-05-13 DIAGNOSIS — J34.89 NASAL OBSTRUCTION: ICD-10-CM

## 2021-05-13 DIAGNOSIS — J35.3 TONSILLAR AND ADENOID HYPERTROPHY: ICD-10-CM

## 2021-05-13 DIAGNOSIS — K13.79 HYPERTROPHIC SOFT PALATE: ICD-10-CM

## 2021-05-13 DIAGNOSIS — J34.3 NASAL TURBINATE HYPERTROPHY: ICD-10-CM

## 2021-05-13 DIAGNOSIS — G47.33 OSA (OBSTRUCTIVE SLEEP APNEA): Primary | ICD-10-CM

## 2021-05-17 ENCOUNTER — PATIENT MESSAGE (OUTPATIENT)
Dept: OPTOMETRY | Facility: CLINIC | Age: 41
End: 2021-05-17

## 2021-05-24 ENCOUNTER — OFFICE VISIT (OUTPATIENT)
Dept: DERMATOLOGY | Facility: CLINIC | Age: 41
End: 2021-05-24
Payer: COMMERCIAL

## 2021-05-24 ENCOUNTER — LAB VISIT (OUTPATIENT)
Dept: LAB | Facility: HOSPITAL | Age: 41
End: 2021-05-24
Attending: DERMATOLOGY
Payer: COMMERCIAL

## 2021-05-24 DIAGNOSIS — Z79.899 LONG-TERM USE OF HIGH-RISK MEDICATION: ICD-10-CM

## 2021-05-24 DIAGNOSIS — L71.9 ROSACEA: ICD-10-CM

## 2021-05-24 DIAGNOSIS — L73.9 FOLLICULITIS: ICD-10-CM

## 2021-05-24 DIAGNOSIS — L73.9 FOLLICULITIS: Primary | ICD-10-CM

## 2021-05-24 LAB
ALBUMIN SERPL BCP-MCNC: 4.7 G/DL (ref 3.5–5.2)
ALP SERPL-CCNC: 70 U/L (ref 55–135)
ALT SERPL W/O P-5'-P-CCNC: 25 U/L (ref 10–44)
ANION GAP SERPL CALC-SCNC: 10 MMOL/L (ref 8–16)
AST SERPL-CCNC: 22 U/L (ref 10–40)
BASOPHILS # BLD AUTO: 0.02 K/UL (ref 0–0.2)
BASOPHILS NFR BLD: 0.3 % (ref 0–1.9)
BILIRUB SERPL-MCNC: 1.2 MG/DL (ref 0.1–1)
BUN SERPL-MCNC: 12 MG/DL (ref 6–20)
CALCIUM SERPL-MCNC: 9.8 MG/DL (ref 8.7–10.5)
CHLORIDE SERPL-SCNC: 102 MMOL/L (ref 95–110)
CO2 SERPL-SCNC: 25 MMOL/L (ref 23–29)
CREAT SERPL-MCNC: 1.1 MG/DL (ref 0.5–1.4)
DIFFERENTIAL METHOD: ABNORMAL
EOSINOPHIL # BLD AUTO: 0.1 K/UL (ref 0–0.5)
EOSINOPHIL NFR BLD: 1.7 % (ref 0–8)
ERYTHROCYTE [DISTWIDTH] IN BLOOD BY AUTOMATED COUNT: 13.1 % (ref 11.5–14.5)
EST. GFR  (AFRICAN AMERICAN): >60 ML/MIN/1.73 M^2
EST. GFR  (NON AFRICAN AMERICAN): >60 ML/MIN/1.73 M^2
GLUCOSE SERPL-MCNC: 91 MG/DL (ref 70–110)
HCT VFR BLD AUTO: 38.1 % (ref 40–54)
HGB BLD-MCNC: 13.3 G/DL (ref 14–18)
IMM GRANULOCYTES # BLD AUTO: 0.03 K/UL (ref 0–0.04)
IMM GRANULOCYTES NFR BLD AUTO: 0.5 % (ref 0–0.5)
LYMPHOCYTES # BLD AUTO: 1.8 K/UL (ref 1–4.8)
LYMPHOCYTES NFR BLD: 27.3 % (ref 18–48)
MCH RBC QN AUTO: 30.6 PG (ref 27–31)
MCHC RBC AUTO-ENTMCNC: 34.9 G/DL (ref 32–36)
MCV RBC AUTO: 88 FL (ref 82–98)
MONOCYTES # BLD AUTO: 0.5 K/UL (ref 0.3–1)
MONOCYTES NFR BLD: 7.9 % (ref 4–15)
NEUTROPHILS # BLD AUTO: 4.1 K/UL (ref 1.8–7.7)
NEUTROPHILS NFR BLD: 62.3 % (ref 38–73)
NRBC BLD-RTO: 0 /100 WBC
PLATELET # BLD AUTO: 222 K/UL (ref 150–450)
PMV BLD AUTO: 9.6 FL (ref 9.2–12.9)
POTASSIUM SERPL-SCNC: 4 MMOL/L (ref 3.5–5.1)
PROT SERPL-MCNC: 7.9 G/DL (ref 6–8.4)
RBC # BLD AUTO: 4.34 M/UL (ref 4.6–6.2)
SODIUM SERPL-SCNC: 137 MMOL/L (ref 136–145)
WBC # BLD AUTO: 6.55 K/UL (ref 3.9–12.7)

## 2021-05-24 PROCEDURE — 99999 PR PBB SHADOW E&M-EST. PATIENT-LVL III: CPT | Mod: PBBFAC,,, | Performed by: DERMATOLOGY

## 2021-05-24 PROCEDURE — 80053 COMPREHEN METABOLIC PANEL: CPT | Performed by: DERMATOLOGY

## 2021-05-24 PROCEDURE — 99999 PR PBB SHADOW E&M-EST. PATIENT-LVL III: ICD-10-PCS | Mod: PBBFAC,,, | Performed by: DERMATOLOGY

## 2021-05-24 PROCEDURE — 1126F AMNT PAIN NOTED NONE PRSNT: CPT | Mod: S$GLB,,, | Performed by: DERMATOLOGY

## 2021-05-24 PROCEDURE — 1126F PR PAIN SEVERITY QUANTIFIED, NO PAIN PRESENT: ICD-10-PCS | Mod: S$GLB,,, | Performed by: DERMATOLOGY

## 2021-05-24 PROCEDURE — 85025 COMPLETE CBC W/AUTO DIFF WBC: CPT | Performed by: DERMATOLOGY

## 2021-05-24 PROCEDURE — 99214 OFFICE O/P EST MOD 30 MIN: CPT | Mod: S$GLB,,, | Performed by: DERMATOLOGY

## 2021-05-24 PROCEDURE — 99214 PR OFFICE/OUTPT VISIT, EST, LEVL IV, 30-39 MIN: ICD-10-PCS | Mod: S$GLB,,, | Performed by: DERMATOLOGY

## 2021-05-24 PROCEDURE — 36415 COLL VENOUS BLD VENIPUNCTURE: CPT | Mod: PO | Performed by: DERMATOLOGY

## 2021-05-24 RX ORDER — MINOCYCLINE 40 MG/G
AEROSOL, FOAM TOPICAL
Qty: 30 G | Refills: 3 | Status: SHIPPED | OUTPATIENT
Start: 2021-05-24 | End: 2022-05-11

## 2021-05-24 RX ORDER — B3/AZEL AC/ZINC/B6/COPPER/FA 600-5-500
TABLET ORAL
Qty: 60 TABLET | Refills: 5 | Status: SHIPPED | OUTPATIENT
Start: 2021-05-24 | End: 2021-06-01 | Stop reason: CLARIF

## 2021-05-27 ENCOUNTER — ANESTHESIA EVENT (OUTPATIENT)
Dept: SURGERY | Facility: OTHER | Age: 41
End: 2021-05-27
Payer: COMMERCIAL

## 2021-05-27 DIAGNOSIS — G47.33 OBSTRUCTIVE SLEEP APNEA: Primary | ICD-10-CM

## 2021-05-28 ENCOUNTER — TELEPHONE (OUTPATIENT)
Dept: DERMATOLOGY | Facility: CLINIC | Age: 41
End: 2021-05-28

## 2021-06-01 ENCOUNTER — HOSPITAL ENCOUNTER (OUTPATIENT)
Dept: PREADMISSION TESTING | Facility: OTHER | Age: 41
Discharge: HOME | End: 2021-06-01
Attending: OTOLARYNGOLOGY
Payer: COMMERCIAL

## 2021-06-01 VITALS
DIASTOLIC BLOOD PRESSURE: 94 MMHG | TEMPERATURE: 98 F | HEIGHT: 75 IN | WEIGHT: 264 LBS | SYSTOLIC BLOOD PRESSURE: 164 MMHG | HEART RATE: 83 BPM | BODY MASS INDEX: 32.83 KG/M2 | OXYGEN SATURATION: 97 %

## 2021-06-01 DIAGNOSIS — Z01.818 PRE-OP TESTING: ICD-10-CM

## 2021-06-01 LAB
CLOSURE TME COLL+ADP BLD: 188 SECS (ref 59–120)
PLATELET FUNCTION ASSAY - EPINEPHRINE: >300 SECS (ref 76–199)

## 2021-06-01 PROCEDURE — 36415 COLL VENOUS BLD VENIPUNCTURE: CPT | Performed by: OTOLARYNGOLOGY

## 2021-06-01 PROCEDURE — 85576 BLOOD PLATELET AGGREGATION: CPT | Mod: 91 | Performed by: OTOLARYNGOLOGY

## 2021-06-01 PROCEDURE — 85576 BLOOD PLATELET AGGREGATION: CPT | Performed by: OTOLARYNGOLOGY

## 2021-06-01 RX ORDER — SODIUM CHLORIDE, SODIUM LACTATE, POTASSIUM CHLORIDE, CALCIUM CHLORIDE 600; 310; 30; 20 MG/100ML; MG/100ML; MG/100ML; MG/100ML
INJECTION, SOLUTION INTRAVENOUS CONTINUOUS
Status: CANCELLED | OUTPATIENT
Start: 2021-06-01

## 2021-06-01 RX ORDER — ACETAMINOPHEN 500 MG
1000 TABLET ORAL
Status: CANCELLED | OUTPATIENT
Start: 2021-06-01 | End: 2021-06-01

## 2021-06-02 ENCOUNTER — TELEPHONE (OUTPATIENT)
Dept: OTOLARYNGOLOGY | Facility: CLINIC | Age: 41
End: 2021-06-02

## 2021-06-03 ENCOUNTER — ANESTHESIA (OUTPATIENT)
Dept: SURGERY | Facility: OTHER | Age: 41
End: 2021-06-03
Payer: COMMERCIAL

## 2021-06-03 ENCOUNTER — HOSPITAL ENCOUNTER (OUTPATIENT)
Facility: OTHER | Age: 41
Discharge: HOME | End: 2021-06-04
Attending: OTOLARYNGOLOGY | Admitting: OTOLARYNGOLOGY
Payer: COMMERCIAL

## 2021-06-03 DIAGNOSIS — G47.33 OSA (OBSTRUCTIVE SLEEP APNEA): Primary | ICD-10-CM

## 2021-06-03 PROCEDURE — 63600175 PHARM REV CODE 636 W HCPCS: Performed by: NURSE ANESTHETIST, CERTIFIED REGISTERED

## 2021-06-03 PROCEDURE — 25000003 PHARM REV CODE 250: Performed by: STUDENT IN AN ORGANIZED HEALTH CARE EDUCATION/TRAINING PROGRAM

## 2021-06-03 PROCEDURE — 63600175 PHARM REV CODE 636 W HCPCS: Performed by: ANESTHESIOLOGY

## 2021-06-03 PROCEDURE — 36000706: Performed by: OTOLARYNGOLOGY

## 2021-06-03 PROCEDURE — 71000039 HC RECOVERY, EACH ADD'L HOUR: Performed by: OTOLARYNGOLOGY

## 2021-06-03 PROCEDURE — 37000009 HC ANESTHESIA EA ADD 15 MINS: Performed by: OTOLARYNGOLOGY

## 2021-06-03 PROCEDURE — 42145 REPAIR PALATE PHARYNX/UVULA: CPT | Mod: ,,, | Performed by: OTOLARYNGOLOGY

## 2021-06-03 PROCEDURE — 88304 TISSUE EXAM BY PATHOLOGIST: CPT | Mod: 26,,, | Performed by: PATHOLOGY

## 2021-06-03 PROCEDURE — 30140 RESECT INFERIOR TURBINATE: CPT | Mod: 50,51,, | Performed by: OTOLARYNGOLOGY

## 2021-06-03 PROCEDURE — 71000033 HC RECOVERY, INTIAL HOUR: Performed by: OTOLARYNGOLOGY

## 2021-06-03 PROCEDURE — 37000008 HC ANESTHESIA 1ST 15 MINUTES: Performed by: OTOLARYNGOLOGY

## 2021-06-03 PROCEDURE — 25000003 PHARM REV CODE 250: Performed by: NURSE ANESTHETIST, CERTIFIED REGISTERED

## 2021-06-03 PROCEDURE — 42145 PR PALATOPHAYNGOPLASTY: ICD-10-PCS | Mod: ,,, | Performed by: OTOLARYNGOLOGY

## 2021-06-03 PROCEDURE — 36000707: Performed by: OTOLARYNGOLOGY

## 2021-06-03 PROCEDURE — 63600175 PHARM REV CODE 636 W HCPCS: Performed by: STUDENT IN AN ORGANIZED HEALTH CARE EDUCATION/TRAINING PROGRAM

## 2021-06-03 PROCEDURE — 25000003 PHARM REV CODE 250: Performed by: OTOLARYNGOLOGY

## 2021-06-03 PROCEDURE — 88304 PR  SURG PATH,LEVEL III: ICD-10-PCS | Mod: 26,,, | Performed by: PATHOLOGY

## 2021-06-03 PROCEDURE — 30140 PR EXCISION TURBINATE,SUBMUCOUS: ICD-10-PCS | Mod: 50,51,, | Performed by: OTOLARYNGOLOGY

## 2021-06-03 PROCEDURE — 88304 TISSUE EXAM BY PATHOLOGIST: CPT | Performed by: PATHOLOGY

## 2021-06-03 PROCEDURE — 25000003 PHARM REV CODE 250: Performed by: ANESTHESIOLOGY

## 2021-06-03 PROCEDURE — 30520 REPAIR OF NASAL SEPTUM: CPT | Mod: 51,,, | Performed by: OTOLARYNGOLOGY

## 2021-06-03 PROCEDURE — 27201423 OPTIME MED/SURG SUP & DEVICES STERILE SUPPLY: Performed by: OTOLARYNGOLOGY

## 2021-06-03 PROCEDURE — 30520 PR REPAIR, NASAL SEPTUM: ICD-10-PCS | Mod: 51,,, | Performed by: OTOLARYNGOLOGY

## 2021-06-03 RX ORDER — HYDROCODONE BITARTRATE AND ACETAMINOPHEN 10; 325 MG/1; MG/1
1 TABLET ORAL EVERY 4 HOURS PRN
Status: DISCONTINUED | OUTPATIENT
Start: 2021-06-03 | End: 2021-06-04 | Stop reason: HOSPADM

## 2021-06-03 RX ORDER — ONDANSETRON 2 MG/ML
4 INJECTION INTRAMUSCULAR; INTRAVENOUS DAILY PRN
Status: DISCONTINUED | OUTPATIENT
Start: 2021-06-03 | End: 2021-06-03 | Stop reason: HOSPADM

## 2021-06-03 RX ORDER — SODIUM CHLORIDE 0.9 % (FLUSH) 0.9 %
3 SYRINGE (ML) INJECTION
Status: DISCONTINUED | OUTPATIENT
Start: 2021-06-03 | End: 2021-06-03

## 2021-06-03 RX ORDER — HYDROCODONE BITARTRATE AND ACETAMINOPHEN 7.5; 325 MG/15ML; MG/15ML
15 SOLUTION ORAL EVERY 4 HOURS PRN
Qty: 473 ML | Refills: 0 | Status: SHIPPED | OUTPATIENT
Start: 2021-06-03 | End: 2022-05-11

## 2021-06-03 RX ORDER — BUSPIRONE HYDROCHLORIDE 5 MG/1
10 TABLET ORAL 3 TIMES DAILY
Status: DISCONTINUED | OUTPATIENT
Start: 2021-06-03 | End: 2021-06-04 | Stop reason: HOSPADM

## 2021-06-03 RX ORDER — ONDANSETRON 2 MG/ML
INJECTION INTRAMUSCULAR; INTRAVENOUS
Status: DISCONTINUED | OUTPATIENT
Start: 2021-06-03 | End: 2021-06-03

## 2021-06-03 RX ORDER — SODIUM CHLORIDE, SODIUM LACTATE, POTASSIUM CHLORIDE, CALCIUM CHLORIDE 600; 310; 30; 20 MG/100ML; MG/100ML; MG/100ML; MG/100ML
INJECTION, SOLUTION INTRAVENOUS CONTINUOUS
Status: DISCONTINUED | OUTPATIENT
Start: 2021-06-03 | End: 2021-06-03

## 2021-06-03 RX ORDER — DOXEPIN HYDROCHLORIDE 10 MG/1
10 CAPSULE ORAL NIGHTLY PRN
Status: DISCONTINUED | OUTPATIENT
Start: 2021-06-03 | End: 2021-06-04 | Stop reason: HOSPADM

## 2021-06-03 RX ORDER — LIDOCAINE HYDROCHLORIDE 10 MG/ML
1 INJECTION, SOLUTION EPIDURAL; INFILTRATION; INTRACAUDAL; PERINEURAL ONCE
Status: DISCONTINUED | OUTPATIENT
Start: 2021-06-03 | End: 2021-06-03

## 2021-06-03 RX ORDER — ONDANSETRON 4 MG/1
8 TABLET, ORALLY DISINTEGRATING ORAL EVERY 8 HOURS PRN
Qty: 15 TABLET | Refills: 0 | Status: SHIPPED | OUTPATIENT
Start: 2021-06-03 | End: 2022-05-11

## 2021-06-03 RX ORDER — DEXAMETHASONE SODIUM PHOSPHATE 4 MG/ML
INJECTION, SOLUTION INTRA-ARTICULAR; INTRALESIONAL; INTRAMUSCULAR; INTRAVENOUS; SOFT TISSUE
Status: DISCONTINUED | OUTPATIENT
Start: 2021-06-03 | End: 2021-06-03

## 2021-06-03 RX ORDER — SODIUM CHLORIDE 0.9 % (FLUSH) 0.9 %
10 SYRINGE (ML) INJECTION
Status: DISCONTINUED | OUTPATIENT
Start: 2021-06-03 | End: 2021-06-04 | Stop reason: HOSPADM

## 2021-06-03 RX ORDER — BUPIVACAINE HYDROCHLORIDE AND EPINEPHRINE 2.5; 5 MG/ML; UG/ML
INJECTION, SOLUTION EPIDURAL; INFILTRATION; INTRACAUDAL; PERINEURAL
Status: DISCONTINUED | OUTPATIENT
Start: 2021-06-03 | End: 2021-06-03 | Stop reason: HOSPADM

## 2021-06-03 RX ORDER — MEPERIDINE HYDROCHLORIDE 25 MG/ML
12.5 INJECTION INTRAMUSCULAR; INTRAVENOUS; SUBCUTANEOUS ONCE AS NEEDED
Status: DISCONTINUED | OUTPATIENT
Start: 2021-06-03 | End: 2021-06-03 | Stop reason: HOSPADM

## 2021-06-03 RX ORDER — LIDOCAINE HYDROCHLORIDE 20 MG/ML
INJECTION INTRAVENOUS
Status: DISCONTINUED | OUTPATIENT
Start: 2021-06-03 | End: 2021-06-03

## 2021-06-03 RX ORDER — CEFAZOLIN SODIUM 1 G/3ML
2 INJECTION, POWDER, FOR SOLUTION INTRAMUSCULAR; INTRAVENOUS
Status: COMPLETED | OUTPATIENT
Start: 2021-06-03 | End: 2021-06-03

## 2021-06-03 RX ORDER — TALC
6 POWDER (GRAM) TOPICAL NIGHTLY PRN
Status: DISCONTINUED | OUTPATIENT
Start: 2021-06-03 | End: 2021-06-04 | Stop reason: HOSPADM

## 2021-06-03 RX ORDER — ROCURONIUM BROMIDE 10 MG/ML
INJECTION, SOLUTION INTRAVENOUS
Status: DISCONTINUED | OUTPATIENT
Start: 2021-06-03 | End: 2021-06-03

## 2021-06-03 RX ORDER — ACETAMINOPHEN 325 MG/1
650 TABLET ORAL EVERY 8 HOURS PRN
Status: DISCONTINUED | OUTPATIENT
Start: 2021-06-03 | End: 2021-06-04 | Stop reason: HOSPADM

## 2021-06-03 RX ORDER — OXYCODONE HYDROCHLORIDE 5 MG/1
5 TABLET ORAL
Status: DISCONTINUED | OUTPATIENT
Start: 2021-06-03 | End: 2021-06-03 | Stop reason: HOSPADM

## 2021-06-03 RX ORDER — ONDANSETRON 8 MG/1
8 TABLET, ORALLY DISINTEGRATING ORAL EVERY 8 HOURS PRN
Status: DISCONTINUED | OUTPATIENT
Start: 2021-06-03 | End: 2021-06-04 | Stop reason: HOSPADM

## 2021-06-03 RX ORDER — ACETAMINOPHEN 500 MG
1000 TABLET ORAL
Status: COMPLETED | OUTPATIENT
Start: 2021-06-03 | End: 2021-06-03

## 2021-06-03 RX ORDER — PROPOFOL 10 MG/ML
VIAL (ML) INTRAVENOUS
Status: DISCONTINUED | OUTPATIENT
Start: 2021-06-03 | End: 2021-06-03

## 2021-06-03 RX ORDER — SODIUM CHLORIDE 0.9 % (FLUSH) 0.9 %
10 SYRINGE (ML) INJECTION
Status: DISCONTINUED | OUTPATIENT
Start: 2021-06-03 | End: 2021-06-03

## 2021-06-03 RX ORDER — PHENYLEPHRINE HYDROCHLORIDE 10 MG/ML
INJECTION INTRAVENOUS
Status: DISCONTINUED | OUTPATIENT
Start: 2021-06-03 | End: 2021-06-03

## 2021-06-03 RX ORDER — LABETALOL HYDROCHLORIDE 5 MG/ML
INJECTION, SOLUTION INTRAVENOUS
Status: DISCONTINUED | OUTPATIENT
Start: 2021-06-03 | End: 2021-06-03

## 2021-06-03 RX ORDER — BACITRACIN ZINC 500 UNIT/G
OINTMENT (GRAM) TOPICAL
Status: DISCONTINUED | OUTPATIENT
Start: 2021-06-03 | End: 2021-06-03 | Stop reason: HOSPADM

## 2021-06-03 RX ORDER — HYDROCODONE BITARTRATE AND ACETAMINOPHEN 7.5; 325 MG/15ML; MG/15ML
15 SOLUTION ORAL EVERY 4 HOURS PRN
Status: DISCONTINUED | OUTPATIENT
Start: 2021-06-03 | End: 2021-06-04 | Stop reason: HOSPADM

## 2021-06-03 RX ORDER — HYDROMORPHONE HYDROCHLORIDE 2 MG/ML
0.4 INJECTION, SOLUTION INTRAMUSCULAR; INTRAVENOUS; SUBCUTANEOUS EVERY 5 MIN PRN
Status: DISCONTINUED | OUTPATIENT
Start: 2021-06-03 | End: 2021-06-03 | Stop reason: HOSPADM

## 2021-06-03 RX ORDER — LIDOCAINE HYDROCHLORIDE 10 MG/ML
1 INJECTION INFILTRATION; PERINEURAL ONCE
Status: DISCONTINUED | OUTPATIENT
Start: 2021-06-03 | End: 2021-06-04 | Stop reason: HOSPADM

## 2021-06-03 RX ORDER — LIDOCAINE HYDROCHLORIDE AND EPINEPHRINE 10; 10 MG/ML; UG/ML
INJECTION, SOLUTION INFILTRATION; PERINEURAL
Status: DISCONTINUED | OUTPATIENT
Start: 2021-06-03 | End: 2021-06-03 | Stop reason: HOSPADM

## 2021-06-03 RX ORDER — CEPHALEXIN 250 MG/5ML
250 POWDER, FOR SUSPENSION ORAL EVERY 12 HOURS
Qty: 100 ML | Refills: 0 | Status: SHIPPED | OUTPATIENT
Start: 2021-06-03 | End: 2021-06-13

## 2021-06-03 RX ORDER — MONTELUKAST SODIUM 10 MG/1
10 TABLET ORAL NIGHTLY
Status: DISCONTINUED | OUTPATIENT
Start: 2021-06-03 | End: 2021-06-04 | Stop reason: HOSPADM

## 2021-06-03 RX ORDER — SUMATRIPTAN 50 MG/1
100 TABLET, FILM COATED ORAL EVERY 12 HOURS PRN
Status: DISCONTINUED | OUTPATIENT
Start: 2021-06-03 | End: 2021-06-04 | Stop reason: HOSPADM

## 2021-06-03 RX ORDER — FENTANYL CITRATE 50 UG/ML
INJECTION, SOLUTION INTRAMUSCULAR; INTRAVENOUS
Status: DISCONTINUED | OUTPATIENT
Start: 2021-06-03 | End: 2021-06-03

## 2021-06-03 RX ADMIN — BUSPIRONE HYDROCHLORIDE 10 MG: 5 TABLET ORAL at 06:06

## 2021-06-03 RX ADMIN — HYDROMORPHONE HYDROCHLORIDE 0.4 MG: 2 INJECTION INTRAMUSCULAR; INTRAVENOUS; SUBCUTANEOUS at 01:06

## 2021-06-03 RX ADMIN — PROPOFOL 50 MG: 10 INJECTION, EMULSION INTRAVENOUS at 12:06

## 2021-06-03 RX ADMIN — PHENYLEPHRINE HYDROCHLORIDE 100 MCG: 10 INJECTION INTRAVENOUS at 12:06

## 2021-06-03 RX ADMIN — ROCURONIUM BROMIDE 50 MG: 10 SOLUTION INTRAVENOUS at 11:06

## 2021-06-03 RX ADMIN — ACETAMINOPHEN 650 MG: 325 TABLET, FILM COATED ORAL at 05:06

## 2021-06-03 RX ADMIN — ROCURONIUM BROMIDE 20 MG: 10 SOLUTION INTRAVENOUS at 11:06

## 2021-06-03 RX ADMIN — LABETALOL HYDROCHLORIDE 20 MG: 5 INJECTION, SOLUTION INTRAVENOUS at 11:06

## 2021-06-03 RX ADMIN — SODIUM CHLORIDE, SODIUM LACTATE, POTASSIUM CHLORIDE, AND CALCIUM CHLORIDE: 600; 310; 30; 20 INJECTION, SOLUTION INTRAVENOUS at 09:06

## 2021-06-03 RX ADMIN — SODIUM CHLORIDE, SODIUM LACTATE, POTASSIUM CHLORIDE, AND CALCIUM CHLORIDE: 600; 310; 30; 20 INJECTION, SOLUTION INTRAVENOUS at 12:06

## 2021-06-03 RX ADMIN — MONTELUKAST 10 MG: 10 TABLET, FILM COATED ORAL at 09:06

## 2021-06-03 RX ADMIN — DEXAMETHASONE SODIUM PHOSPHATE 8 MG: 4 INJECTION, SOLUTION INTRAMUSCULAR; INTRAVENOUS at 11:06

## 2021-06-03 RX ADMIN — ACETAMINOPHEN 1000 MG: 500 TABLET, FILM COATED ORAL at 08:06

## 2021-06-03 RX ADMIN — HYDROCODONE BITARTRATE AND ACETAMINOPHEN 1 TABLET: 10; 325 TABLET ORAL at 07:06

## 2021-06-03 RX ADMIN — FENTANYL CITRATE 100 MCG: 50 INJECTION, SOLUTION INTRAMUSCULAR; INTRAVENOUS at 11:06

## 2021-06-03 RX ADMIN — ONDANSETRON HYDROCHLORIDE 4 MG: 2 INJECTION INTRAMUSCULAR; INTRAVENOUS at 11:06

## 2021-06-03 RX ADMIN — CEFAZOLIN 3 G: 330 INJECTION, POWDER, FOR SOLUTION INTRAMUSCULAR; INTRAVENOUS at 11:06

## 2021-06-03 RX ADMIN — ROCURONIUM BROMIDE 10 MG: 10 SOLUTION INTRAVENOUS at 12:06

## 2021-06-03 RX ADMIN — ONDANSETRON 8 MG: 8 TABLET, ORALLY DISINTEGRATING ORAL at 06:06

## 2021-06-03 RX ADMIN — ESMOLOL HYDROCHLORIDE 20 MG: 10 INJECTION INTRAVENOUS at 11:06

## 2021-06-03 RX ADMIN — FENTANYL CITRATE 150 MCG: 50 INJECTION, SOLUTION INTRAMUSCULAR; INTRAVENOUS at 11:06

## 2021-06-03 RX ADMIN — HYDROCODONE BITARTRATE AND ACETAMINOPHEN 15 ML: 7.5; 325 SOLUTION ORAL at 03:06

## 2021-06-03 RX ADMIN — PROPOFOL 300 MG: 10 INJECTION, EMULSION INTRAVENOUS at 11:06

## 2021-06-03 RX ADMIN — SUMATRIPTAN SUCCINATE 100 MG: 50 TABLET ORAL at 09:06

## 2021-06-03 RX ADMIN — PROPOFOL 50 MG: 10 INJECTION, EMULSION INTRAVENOUS at 11:06

## 2021-06-03 RX ADMIN — SUGAMMADEX 400 MG: 100 INJECTION, SOLUTION INTRAVENOUS at 12:06

## 2021-06-03 RX ADMIN — LIDOCAINE HYDROCHLORIDE 100 MG: 20 INJECTION, SOLUTION INTRAVENOUS at 11:06

## 2021-06-04 VITALS
DIASTOLIC BLOOD PRESSURE: 80 MMHG | RESPIRATION RATE: 18 BRPM | TEMPERATURE: 97 F | SYSTOLIC BLOOD PRESSURE: 140 MMHG | BODY MASS INDEX: 33.58 KG/M2 | HEART RATE: 89 BPM | HEIGHT: 75 IN | OXYGEN SATURATION: 95 % | WEIGHT: 270.06 LBS

## 2021-06-04 PROCEDURE — 25000003 PHARM REV CODE 250: Performed by: STUDENT IN AN ORGANIZED HEALTH CARE EDUCATION/TRAINING PROGRAM

## 2021-06-04 RX ADMIN — HYDROCODONE BITARTRATE AND ACETAMINOPHEN 1 TABLET: 10; 325 TABLET ORAL at 08:06

## 2021-06-04 RX ADMIN — HYDROCODONE BITARTRATE AND ACETAMINOPHEN 15 ML: 7.5; 325 SOLUTION ORAL at 12:06

## 2021-06-04 RX ADMIN — BUSPIRONE HYDROCHLORIDE 10 MG: 5 TABLET ORAL at 12:06

## 2021-06-04 RX ADMIN — HYDROCODONE BITARTRATE AND ACETAMINOPHEN 1 TABLET: 10; 325 TABLET ORAL at 04:06

## 2021-06-04 RX ADMIN — BUSPIRONE HYDROCHLORIDE 10 MG: 5 TABLET ORAL at 08:06

## 2021-06-08 ENCOUNTER — OFFICE VISIT (OUTPATIENT)
Dept: OTOLARYNGOLOGY | Facility: CLINIC | Age: 41
End: 2021-06-08
Payer: COMMERCIAL

## 2021-06-08 VITALS
SYSTOLIC BLOOD PRESSURE: 139 MMHG | TEMPERATURE: 98 F | WEIGHT: 264.38 LBS | BODY MASS INDEX: 33.05 KG/M2 | HEART RATE: 83 BPM | DIASTOLIC BLOOD PRESSURE: 87 MMHG

## 2021-06-08 DIAGNOSIS — J34.2 NASAL SEPTAL DEVIATION: ICD-10-CM

## 2021-06-08 DIAGNOSIS — K13.79 HYPERTROPHIC SOFT PALATE: ICD-10-CM

## 2021-06-08 DIAGNOSIS — J34.3 NASAL TURBINATE HYPERTROPHY: ICD-10-CM

## 2021-06-08 DIAGNOSIS — G47.33 OSA (OBSTRUCTIVE SLEEP APNEA): Primary | ICD-10-CM

## 2021-06-08 DIAGNOSIS — J35.3 TONSILLAR AND ADENOID HYPERTROPHY: ICD-10-CM

## 2021-06-08 PROCEDURE — 1126F PR PAIN SEVERITY QUANTIFIED, NO PAIN PRESENT: ICD-10-PCS | Mod: S$GLB,,, | Performed by: OTOLARYNGOLOGY

## 2021-06-08 PROCEDURE — 99024 PR POST-OP FOLLOW-UP VISIT: ICD-10-PCS | Mod: S$GLB,,, | Performed by: OTOLARYNGOLOGY

## 2021-06-08 PROCEDURE — 1126F AMNT PAIN NOTED NONE PRSNT: CPT | Mod: S$GLB,,, | Performed by: OTOLARYNGOLOGY

## 2021-06-08 PROCEDURE — 3008F BODY MASS INDEX DOCD: CPT | Mod: CPTII,S$GLB,, | Performed by: OTOLARYNGOLOGY

## 2021-06-08 PROCEDURE — 99024 POSTOP FOLLOW-UP VISIT: CPT | Mod: S$GLB,,, | Performed by: OTOLARYNGOLOGY

## 2021-06-08 PROCEDURE — 3008F PR BODY MASS INDEX (BMI) DOCUMENTED: ICD-10-PCS | Mod: CPTII,S$GLB,, | Performed by: OTOLARYNGOLOGY

## 2021-06-09 ENCOUNTER — TELEPHONE (OUTPATIENT)
Dept: PSYCHIATRY | Facility: CLINIC | Age: 41
End: 2021-06-09

## 2021-06-10 ENCOUNTER — PATIENT MESSAGE (OUTPATIENT)
Dept: PSYCHIATRY | Facility: CLINIC | Age: 41
End: 2021-06-10

## 2021-06-10 LAB
FINAL PATHOLOGIC DIAGNOSIS: NORMAL
Lab: NORMAL

## 2021-06-14 ENCOUNTER — PATIENT MESSAGE (OUTPATIENT)
Dept: PSYCHIATRY | Facility: CLINIC | Age: 41
End: 2021-06-14

## 2021-06-14 RX ORDER — BUSPIRONE HYDROCHLORIDE 10 MG/1
10 TABLET ORAL 3 TIMES DAILY
Qty: 90 TABLET | Refills: 0 | Status: SHIPPED | OUTPATIENT
Start: 2021-06-14 | End: 2021-08-25 | Stop reason: SDUPTHER

## 2021-06-21 DIAGNOSIS — G43.719 INTRACTABLE CHRONIC MIGRAINE WITHOUT AURA AND WITHOUT STATUS MIGRAINOSUS: ICD-10-CM

## 2021-06-22 RX ORDER — ERENUMAB-AOOE 140 MG/ML
140 INJECTION, SOLUTION SUBCUTANEOUS
Qty: 1 ML | Refills: 11 | Status: SHIPPED | OUTPATIENT
Start: 2021-06-22 | End: 2022-01-19 | Stop reason: SDUPTHER

## 2021-06-29 ENCOUNTER — OFFICE VISIT (OUTPATIENT)
Dept: OTOLARYNGOLOGY | Facility: CLINIC | Age: 41
End: 2021-06-29
Payer: COMMERCIAL

## 2021-06-29 VITALS
HEIGHT: 75 IN | TEMPERATURE: 98 F | BODY MASS INDEX: 32.83 KG/M2 | DIASTOLIC BLOOD PRESSURE: 97 MMHG | WEIGHT: 264 LBS | SYSTOLIC BLOOD PRESSURE: 140 MMHG | HEART RATE: 84 BPM

## 2021-06-29 DIAGNOSIS — K13.79 HYPERTROPHIC SOFT PALATE: ICD-10-CM

## 2021-06-29 DIAGNOSIS — J34.2 NASAL SEPTAL DEVIATION: Primary | ICD-10-CM

## 2021-06-29 DIAGNOSIS — Z98.890 POST-OPERATIVE STATE: ICD-10-CM

## 2021-06-29 DIAGNOSIS — J34.3 NASAL TURBINATE HYPERTROPHY: ICD-10-CM

## 2021-06-29 PROCEDURE — 1126F PR PAIN SEVERITY QUANTIFIED, NO PAIN PRESENT: ICD-10-PCS | Mod: S$GLB,,, | Performed by: OTOLARYNGOLOGY

## 2021-06-29 PROCEDURE — 99024 PR POST-OP FOLLOW-UP VISIT: ICD-10-PCS | Mod: S$GLB,,, | Performed by: OTOLARYNGOLOGY

## 2021-06-29 PROCEDURE — 1126F AMNT PAIN NOTED NONE PRSNT: CPT | Mod: S$GLB,,, | Performed by: OTOLARYNGOLOGY

## 2021-06-29 PROCEDURE — 3008F BODY MASS INDEX DOCD: CPT | Mod: CPTII,S$GLB,, | Performed by: OTOLARYNGOLOGY

## 2021-06-29 PROCEDURE — 99024 POSTOP FOLLOW-UP VISIT: CPT | Mod: S$GLB,,, | Performed by: OTOLARYNGOLOGY

## 2021-06-29 PROCEDURE — 3008F PR BODY MASS INDEX (BMI) DOCUMENTED: ICD-10-PCS | Mod: CPTII,S$GLB,, | Performed by: OTOLARYNGOLOGY

## 2021-06-30 ENCOUNTER — OFFICE VISIT (OUTPATIENT)
Dept: OPTOMETRY | Facility: CLINIC | Age: 41
End: 2021-06-30
Payer: COMMERCIAL

## 2021-06-30 ENCOUNTER — TELEPHONE (OUTPATIENT)
Dept: OPTOMETRY | Facility: CLINIC | Age: 41
End: 2021-06-30

## 2021-06-30 DIAGNOSIS — Z97.3 WEARS CONTACT LENSES: ICD-10-CM

## 2021-06-30 DIAGNOSIS — Z46.0 FITTING AND ADJUSTMENT OF SPECTACLES AND CONTACT LENSES: Primary | ICD-10-CM

## 2021-06-30 DIAGNOSIS — H52.13 MYOPIA OF BOTH EYES: Primary | ICD-10-CM

## 2021-06-30 PROCEDURE — 92015 DETERMINE REFRACTIVE STATE: CPT | Mod: S$GLB,,, | Performed by: OPTOMETRIST

## 2021-06-30 PROCEDURE — 92015 PR REFRACTION: ICD-10-PCS | Mod: S$GLB,,, | Performed by: OPTOMETRIST

## 2021-06-30 PROCEDURE — 99999 PR PBB SHADOW E&M-EST. PATIENT-LVL III: ICD-10-PCS | Mod: PBBFAC,,, | Performed by: OPTOMETRIST

## 2021-06-30 PROCEDURE — 92014 COMPRE OPH EXAM EST PT 1/>: CPT | Mod: S$GLB,,, | Performed by: OPTOMETRIST

## 2021-06-30 PROCEDURE — 92014 PR EYE EXAM, EST PATIENT,COMPREHESV: ICD-10-PCS | Mod: S$GLB,,, | Performed by: OPTOMETRIST

## 2021-06-30 PROCEDURE — 99999 PR PBB SHADOW E&M-EST. PATIENT-LVL III: CPT | Mod: PBBFAC,,, | Performed by: OPTOMETRIST

## 2021-06-30 PROCEDURE — 92310 PR CONTACT LENS FITTING (NO CHANGE): ICD-10-PCS | Mod: S$GLB,,, | Performed by: OPTOMETRIST

## 2021-06-30 PROCEDURE — 92310 CONTACT LENS FITTING OU: CPT | Mod: S$GLB,,, | Performed by: OPTOMETRIST

## 2021-08-25 ENCOUNTER — OFFICE VISIT (OUTPATIENT)
Dept: PSYCHIATRY | Facility: CLINIC | Age: 41
End: 2021-08-25
Payer: COMMERCIAL

## 2021-08-25 DIAGNOSIS — G47.00 INSOMNIA, UNSPECIFIED TYPE: ICD-10-CM

## 2021-08-25 DIAGNOSIS — F41.1 GENERALIZED ANXIETY DISORDER: ICD-10-CM

## 2021-08-25 DIAGNOSIS — F98.8 ADULT ATTENTION DEFICIT DISORDER: Primary | ICD-10-CM

## 2021-08-25 PROCEDURE — 99214 OFFICE O/P EST MOD 30 MIN: CPT | Mod: 95,,, | Performed by: PSYCHIATRY & NEUROLOGY

## 2021-08-25 PROCEDURE — 1160F PR REVIEW ALL MEDS BY PRESCRIBER/CLIN PHARMACIST DOCUMENTED: ICD-10-PCS | Mod: CPTII,,, | Performed by: PSYCHIATRY & NEUROLOGY

## 2021-08-25 PROCEDURE — 99214 PR OFFICE/OUTPT VISIT, EST, LEVL IV, 30-39 MIN: ICD-10-PCS | Mod: 95,,, | Performed by: PSYCHIATRY & NEUROLOGY

## 2021-08-25 PROCEDURE — 1159F MED LIST DOCD IN RCRD: CPT | Mod: CPTII,,, | Performed by: PSYCHIATRY & NEUROLOGY

## 2021-08-25 PROCEDURE — 1160F RVW MEDS BY RX/DR IN RCRD: CPT | Mod: CPTII,,, | Performed by: PSYCHIATRY & NEUROLOGY

## 2021-08-25 PROCEDURE — 1159F PR MEDICATION LIST DOCUMENTED IN MEDICAL RECORD: ICD-10-PCS | Mod: CPTII,,, | Performed by: PSYCHIATRY & NEUROLOGY

## 2021-08-25 RX ORDER — METHYLPHENIDATE HYDROCHLORIDE 20 MG/1
20 TABLET ORAL
Qty: 90 TABLET | Refills: 0 | Status: SHIPPED | OUTPATIENT
Start: 2021-08-25 | End: 2022-05-11

## 2021-08-25 RX ORDER — METHYLPHENIDATE HYDROCHLORIDE 20 MG/1
20 TABLET ORAL
Qty: 90 TABLET | Refills: 0 | Status: SHIPPED | OUTPATIENT
Start: 2021-09-24 | End: 2022-05-11

## 2021-08-25 RX ORDER — BUSPIRONE HYDROCHLORIDE 10 MG/1
10 TABLET ORAL 3 TIMES DAILY
Qty: 270 TABLET | Refills: 3 | Status: SHIPPED | OUTPATIENT
Start: 2021-08-25 | End: 2022-05-11

## 2021-08-25 RX ORDER — METHYLPHENIDATE HYDROCHLORIDE 20 MG/1
20 TABLET ORAL
Qty: 90 TABLET | Refills: 0 | Status: SHIPPED | OUTPATIENT
Start: 2021-10-24 | End: 2022-09-21 | Stop reason: SDUPTHER

## 2021-09-22 ENCOUNTER — OFFICE VISIT (OUTPATIENT)
Dept: OTOLARYNGOLOGY | Facility: CLINIC | Age: 41
End: 2021-09-22
Payer: COMMERCIAL

## 2021-09-22 VITALS
WEIGHT: 264 LBS | HEIGHT: 75 IN | DIASTOLIC BLOOD PRESSURE: 103 MMHG | SYSTOLIC BLOOD PRESSURE: 154 MMHG | HEART RATE: 93 BPM | TEMPERATURE: 98 F | BODY MASS INDEX: 32.83 KG/M2

## 2021-09-22 DIAGNOSIS — K13.79 HYPERTROPHIC SOFT PALATE: ICD-10-CM

## 2021-09-22 DIAGNOSIS — J34.2 NASAL SEPTAL DEVIATION: Primary | ICD-10-CM

## 2021-09-22 DIAGNOSIS — Z98.890 POST-OPERATIVE STATE: ICD-10-CM

## 2021-09-22 PROCEDURE — 3077F PR MOST RECENT SYSTOLIC BLOOD PRESSURE >= 140 MM HG: ICD-10-PCS | Mod: CPTII,S$GLB,, | Performed by: OTOLARYNGOLOGY

## 2021-09-22 PROCEDURE — 1159F MED LIST DOCD IN RCRD: CPT | Mod: CPTII,S$GLB,, | Performed by: OTOLARYNGOLOGY

## 2021-09-22 PROCEDURE — 3080F DIAST BP >= 90 MM HG: CPT | Mod: CPTII,S$GLB,, | Performed by: OTOLARYNGOLOGY

## 2021-09-22 PROCEDURE — 1160F RVW MEDS BY RX/DR IN RCRD: CPT | Mod: CPTII,S$GLB,, | Performed by: OTOLARYNGOLOGY

## 2021-09-22 PROCEDURE — 3008F PR BODY MASS INDEX (BMI) DOCUMENTED: ICD-10-PCS | Mod: CPTII,S$GLB,, | Performed by: OTOLARYNGOLOGY

## 2021-09-22 PROCEDURE — 1159F PR MEDICATION LIST DOCUMENTED IN MEDICAL RECORD: ICD-10-PCS | Mod: CPTII,S$GLB,, | Performed by: OTOLARYNGOLOGY

## 2021-09-22 PROCEDURE — 99024 PR POST-OP FOLLOW-UP VISIT: ICD-10-PCS | Mod: S$GLB,,, | Performed by: OTOLARYNGOLOGY

## 2021-09-22 PROCEDURE — 1160F PR REVIEW ALL MEDS BY PRESCRIBER/CLIN PHARMACIST DOCUMENTED: ICD-10-PCS | Mod: CPTII,S$GLB,, | Performed by: OTOLARYNGOLOGY

## 2021-09-22 PROCEDURE — 3008F BODY MASS INDEX DOCD: CPT | Mod: CPTII,S$GLB,, | Performed by: OTOLARYNGOLOGY

## 2021-09-22 PROCEDURE — 3080F PR MOST RECENT DIASTOLIC BLOOD PRESSURE >= 90 MM HG: ICD-10-PCS | Mod: CPTII,S$GLB,, | Performed by: OTOLARYNGOLOGY

## 2021-09-22 PROCEDURE — 99024 POSTOP FOLLOW-UP VISIT: CPT | Mod: S$GLB,,, | Performed by: OTOLARYNGOLOGY

## 2021-09-22 PROCEDURE — 3077F SYST BP >= 140 MM HG: CPT | Mod: CPTII,S$GLB,, | Performed by: OTOLARYNGOLOGY

## 2021-10-08 DIAGNOSIS — G43.711 INTRACTABLE CHRONIC MIGRAINE WITHOUT AURA AND WITH STATUS MIGRAINOSUS: ICD-10-CM

## 2021-10-08 RX ORDER — RIMEGEPANT SULFATE 75 MG/75MG
TABLET, ORALLY DISINTEGRATING ORAL
Qty: 8 TABLET | Refills: 2 | Status: SHIPPED | OUTPATIENT
Start: 2021-10-08 | End: 2021-10-12 | Stop reason: SDUPTHER

## 2021-10-12 DIAGNOSIS — G43.711 INTRACTABLE CHRONIC MIGRAINE WITHOUT AURA AND WITH STATUS MIGRAINOSUS: ICD-10-CM

## 2021-10-12 RX ORDER — RIMEGEPANT SULFATE 75 MG/75MG
TABLET, ORALLY DISINTEGRATING ORAL
Qty: 8 TABLET | Refills: 2 | Status: SHIPPED | OUTPATIENT
Start: 2021-10-12 | End: 2021-11-17 | Stop reason: SDUPTHER

## 2021-10-18 ENCOUNTER — TELEPHONE (OUTPATIENT)
Dept: SLEEP MEDICINE | Facility: CLINIC | Age: 41
End: 2021-10-18

## 2021-11-16 ENCOUNTER — PATIENT OUTREACH (OUTPATIENT)
Dept: ADMINISTRATIVE | Facility: OTHER | Age: 41
End: 2021-11-16
Payer: COMMERCIAL

## 2021-11-17 ENCOUNTER — OFFICE VISIT (OUTPATIENT)
Dept: NEUROLOGY | Facility: CLINIC | Age: 41
End: 2021-11-17
Payer: COMMERCIAL

## 2021-11-17 ENCOUNTER — PATIENT MESSAGE (OUTPATIENT)
Dept: NEUROLOGY | Facility: CLINIC | Age: 41
End: 2021-11-17

## 2021-11-17 DIAGNOSIS — G43.711 INTRACTABLE CHRONIC MIGRAINE WITHOUT AURA AND WITH STATUS MIGRAINOSUS: ICD-10-CM

## 2021-11-17 DIAGNOSIS — G47.33 OSA (OBSTRUCTIVE SLEEP APNEA): Primary | ICD-10-CM

## 2021-11-17 DIAGNOSIS — G43.719 INTRACTABLE CHRONIC MIGRAINE WITHOUT AURA AND WITHOUT STATUS MIGRAINOSUS: ICD-10-CM

## 2021-11-17 PROCEDURE — 99214 OFFICE O/P EST MOD 30 MIN: CPT | Mod: 95,CR,, | Performed by: NURSE PRACTITIONER

## 2021-11-17 PROCEDURE — 99214 PR OFFICE/OUTPT VISIT, EST, LEVL IV, 30-39 MIN: ICD-10-PCS | Mod: 95,CR,, | Performed by: NURSE PRACTITIONER

## 2021-11-17 RX ORDER — ONDANSETRON 4 MG/1
4 TABLET, ORALLY DISINTEGRATING ORAL 2 TIMES DAILY
Status: CANCELLED | OUTPATIENT
Start: 2021-11-17

## 2021-11-17 RX ORDER — TRAMADOL HYDROCHLORIDE 50 MG/1
50 TABLET ORAL EVERY 6 HOURS PRN
Qty: 30 TABLET | Refills: 1 | Status: SHIPPED | OUTPATIENT
Start: 2021-11-17 | End: 2022-05-11

## 2021-11-17 RX ORDER — SUMATRIPTAN 10 MG/1
20 SPRAY NASAL
Qty: 6 EACH | Refills: 5 | Status: SHIPPED | OUTPATIENT
Start: 2021-11-17 | End: 2022-05-11

## 2021-11-17 RX ORDER — RIMEGEPANT SULFATE 75 MG/75MG
TABLET, ORALLY DISINTEGRATING ORAL
Qty: 8 TABLET | Refills: 5 | Status: SHIPPED | OUTPATIENT
Start: 2021-11-17 | End: 2022-05-11

## 2021-11-17 RX ORDER — SUMATRIPTAN SUCCINATE 100 MG/1
100 TABLET ORAL
COMMUNITY
End: 2022-05-11

## 2021-11-17 RX ORDER — SUMATRIPTAN SUCCINATE 100 MG/1
100 TABLET ORAL
Qty: 9 TABLET | Refills: 5 | Status: SHIPPED | OUTPATIENT
Start: 2021-11-17 | End: 2022-10-17 | Stop reason: SDUPTHER

## 2021-11-17 RX ORDER — ONDANSETRON 8 MG/1
8 TABLET, ORALLY DISINTEGRATING ORAL EVERY 8 HOURS PRN
Qty: 30 TABLET | Refills: 3 | Status: SHIPPED | OUTPATIENT
Start: 2021-11-17 | End: 2022-05-11

## 2021-11-19 ENCOUNTER — TELEPHONE (OUTPATIENT)
Dept: SLEEP MEDICINE | Facility: OTHER | Age: 41
End: 2021-11-19
Payer: COMMERCIAL

## 2021-11-22 ENCOUNTER — TELEPHONE (OUTPATIENT)
Dept: SLEEP MEDICINE | Facility: CLINIC | Age: 41
End: 2021-11-22
Payer: COMMERCIAL

## 2021-11-22 ENCOUNTER — TELEPHONE (OUTPATIENT)
Dept: PHARMACY | Facility: CLINIC | Age: 41
End: 2021-11-22
Payer: COMMERCIAL

## 2021-12-01 ENCOUNTER — TELEPHONE (OUTPATIENT)
Dept: SLEEP MEDICINE | Facility: OTHER | Age: 41
End: 2021-12-01
Payer: COMMERCIAL

## 2021-12-14 ENCOUNTER — TELEPHONE (OUTPATIENT)
Dept: SLEEP MEDICINE | Facility: OTHER | Age: 41
End: 2021-12-14
Payer: COMMERCIAL

## 2021-12-27 ENCOUNTER — OFFICE VISIT (OUTPATIENT)
Dept: FAMILY MEDICINE | Facility: CLINIC | Age: 41
End: 2021-12-27
Payer: COMMERCIAL

## 2021-12-27 VITALS
WEIGHT: 251 LBS | BODY MASS INDEX: 31.21 KG/M2 | HEART RATE: 74 BPM | HEIGHT: 75 IN | OXYGEN SATURATION: 98 % | DIASTOLIC BLOOD PRESSURE: 88 MMHG | SYSTOLIC BLOOD PRESSURE: 138 MMHG

## 2021-12-27 DIAGNOSIS — Z00.00 GENERAL MEDICAL EXAM: Primary | ICD-10-CM

## 2021-12-27 PROCEDURE — 3079F DIAST BP 80-89 MM HG: CPT | Mod: CPTII,S$GLB,, | Performed by: FAMILY MEDICINE

## 2021-12-27 PROCEDURE — 3075F SYST BP GE 130 - 139MM HG: CPT | Mod: CPTII,S$GLB,, | Performed by: FAMILY MEDICINE

## 2021-12-27 PROCEDURE — 1159F PR MEDICATION LIST DOCUMENTED IN MEDICAL RECORD: ICD-10-PCS | Mod: CPTII,S$GLB,, | Performed by: FAMILY MEDICINE

## 2021-12-27 PROCEDURE — 3008F BODY MASS INDEX DOCD: CPT | Mod: CPTII,S$GLB,, | Performed by: FAMILY MEDICINE

## 2021-12-27 PROCEDURE — 1159F MED LIST DOCD IN RCRD: CPT | Mod: CPTII,S$GLB,, | Performed by: FAMILY MEDICINE

## 2021-12-27 PROCEDURE — 99395 PREV VISIT EST AGE 18-39: CPT | Mod: S$GLB,,, | Performed by: FAMILY MEDICINE

## 2021-12-27 PROCEDURE — 99395 PR PREVENTIVE VISIT,EST,18-39: ICD-10-PCS | Mod: S$GLB,,, | Performed by: FAMILY MEDICINE

## 2021-12-27 PROCEDURE — 99999 PR PBB SHADOW E&M-EST. PATIENT-LVL III: ICD-10-PCS | Mod: PBBFAC,,, | Performed by: FAMILY MEDICINE

## 2021-12-27 PROCEDURE — 1160F RVW MEDS BY RX/DR IN RCRD: CPT | Mod: CPTII,S$GLB,, | Performed by: FAMILY MEDICINE

## 2021-12-27 PROCEDURE — 3008F PR BODY MASS INDEX (BMI) DOCUMENTED: ICD-10-PCS | Mod: CPTII,S$GLB,, | Performed by: FAMILY MEDICINE

## 2021-12-27 PROCEDURE — 1160F PR REVIEW ALL MEDS BY PRESCRIBER/CLIN PHARMACIST DOCUMENTED: ICD-10-PCS | Mod: CPTII,S$GLB,, | Performed by: FAMILY MEDICINE

## 2021-12-27 PROCEDURE — 3079F PR MOST RECENT DIASTOLIC BLOOD PRESSURE 80-89 MM HG: ICD-10-PCS | Mod: CPTII,S$GLB,, | Performed by: FAMILY MEDICINE

## 2021-12-27 PROCEDURE — 3075F PR MOST RECENT SYSTOLIC BLOOD PRESS GE 130-139MM HG: ICD-10-PCS | Mod: CPTII,S$GLB,, | Performed by: FAMILY MEDICINE

## 2021-12-27 PROCEDURE — 99999 PR PBB SHADOW E&M-EST. PATIENT-LVL III: CPT | Mod: PBBFAC,,, | Performed by: FAMILY MEDICINE

## 2021-12-29 DIAGNOSIS — R05.9 COUGH: Primary | ICD-10-CM

## 2021-12-30 ENCOUNTER — LAB VISIT (OUTPATIENT)
Dept: PRIMARY CARE CLINIC | Facility: OTHER | Age: 41
End: 2021-12-30
Attending: INTERNAL MEDICINE
Payer: COMMERCIAL

## 2021-12-30 DIAGNOSIS — Z20.822 ENCOUNTER FOR LABORATORY TESTING FOR COVID-19 VIRUS: ICD-10-CM

## 2021-12-30 LAB
CTP QC/QA: YES
SARS-COV-2 AG RESP QL IA.RAPID: NEGATIVE

## 2022-01-07 ENCOUNTER — TELEPHONE (OUTPATIENT)
Dept: SLEEP MEDICINE | Facility: OTHER | Age: 42
End: 2022-01-07
Payer: COMMERCIAL

## 2022-01-07 DIAGNOSIS — G47.33 OBSTRUCTIVE SLEEP APNEA: Primary | ICD-10-CM

## 2022-01-17 ENCOUNTER — PATIENT MESSAGE (OUTPATIENT)
Dept: NEUROLOGY | Facility: CLINIC | Age: 42
End: 2022-01-17
Payer: COMMERCIAL

## 2022-01-18 RX ORDER — METHYLPREDNISOLONE 4 MG/1
TABLET ORAL
Qty: 21 EACH | Refills: 0 | Status: SHIPPED | OUTPATIENT
Start: 2022-01-18 | End: 2022-02-08

## 2022-01-19 ENCOUNTER — PATIENT MESSAGE (OUTPATIENT)
Dept: NEUROLOGY | Facility: CLINIC | Age: 42
End: 2022-01-19
Payer: COMMERCIAL

## 2022-01-19 DIAGNOSIS — G43.719 INTRACTABLE CHRONIC MIGRAINE WITHOUT AURA AND WITHOUT STATUS MIGRAINOSUS: ICD-10-CM

## 2022-01-19 RX ORDER — ERENUMAB-AOOE 140 MG/ML
140 INJECTION, SOLUTION SUBCUTANEOUS
Qty: 1 ML | Refills: 11 | Status: SHIPPED | OUTPATIENT
Start: 2022-01-19 | End: 2022-06-14 | Stop reason: SDUPTHER

## 2022-05-11 ENCOUNTER — OFFICE VISIT (OUTPATIENT)
Dept: FAMILY MEDICINE | Facility: CLINIC | Age: 42
End: 2022-05-11
Payer: COMMERCIAL

## 2022-05-11 VITALS
BODY MASS INDEX: 31.28 KG/M2 | TEMPERATURE: 99 F | HEIGHT: 75 IN | SYSTOLIC BLOOD PRESSURE: 146 MMHG | WEIGHT: 251.56 LBS | OXYGEN SATURATION: 97 % | DIASTOLIC BLOOD PRESSURE: 92 MMHG | HEART RATE: 94 BPM

## 2022-05-11 DIAGNOSIS — J06.9 VIRAL URI WITH COUGH: Primary | ICD-10-CM

## 2022-05-11 PROCEDURE — 1159F PR MEDICATION LIST DOCUMENTED IN MEDICAL RECORD: ICD-10-PCS | Mod: CPTII,S$GLB,, | Performed by: FAMILY MEDICINE

## 2022-05-11 PROCEDURE — 3080F PR MOST RECENT DIASTOLIC BLOOD PRESSURE >= 90 MM HG: ICD-10-PCS | Mod: CPTII,S$GLB,, | Performed by: FAMILY MEDICINE

## 2022-05-11 PROCEDURE — 1159F MED LIST DOCD IN RCRD: CPT | Mod: CPTII,S$GLB,, | Performed by: FAMILY MEDICINE

## 2022-05-11 PROCEDURE — 3077F SYST BP >= 140 MM HG: CPT | Mod: CPTII,S$GLB,, | Performed by: FAMILY MEDICINE

## 2022-05-11 PROCEDURE — 3077F PR MOST RECENT SYSTOLIC BLOOD PRESSURE >= 140 MM HG: ICD-10-PCS | Mod: CPTII,S$GLB,, | Performed by: FAMILY MEDICINE

## 2022-05-11 PROCEDURE — 99213 PR OFFICE/OUTPT VISIT, EST, LEVL III, 20-29 MIN: ICD-10-PCS | Mod: 25,S$GLB,, | Performed by: FAMILY MEDICINE

## 2022-05-11 PROCEDURE — 99999 PR PBB SHADOW E&M-EST. PATIENT-LVL III: ICD-10-PCS | Mod: PBBFAC,,, | Performed by: FAMILY MEDICINE

## 2022-05-11 PROCEDURE — 1160F RVW MEDS BY RX/DR IN RCRD: CPT | Mod: CPTII,S$GLB,, | Performed by: FAMILY MEDICINE

## 2022-05-11 PROCEDURE — 3008F BODY MASS INDEX DOCD: CPT | Mod: CPTII,S$GLB,, | Performed by: FAMILY MEDICINE

## 2022-05-11 PROCEDURE — 3008F PR BODY MASS INDEX (BMI) DOCUMENTED: ICD-10-PCS | Mod: CPTII,S$GLB,, | Performed by: FAMILY MEDICINE

## 2022-05-11 PROCEDURE — 1160F PR REVIEW ALL MEDS BY PRESCRIBER/CLIN PHARMACIST DOCUMENTED: ICD-10-PCS | Mod: CPTII,S$GLB,, | Performed by: FAMILY MEDICINE

## 2022-05-11 PROCEDURE — 96372 THER/PROPH/DIAG INJ SC/IM: CPT | Mod: S$GLB,,, | Performed by: FAMILY MEDICINE

## 2022-05-11 PROCEDURE — 99999 PR PBB SHADOW E&M-EST. PATIENT-LVL III: CPT | Mod: PBBFAC,,, | Performed by: FAMILY MEDICINE

## 2022-05-11 PROCEDURE — 96372 PR INJECTION,THERAP/PROPH/DIAG2ST, IM OR SUBCUT: ICD-10-PCS | Mod: S$GLB,,, | Performed by: FAMILY MEDICINE

## 2022-05-11 PROCEDURE — 99213 OFFICE O/P EST LOW 20 MIN: CPT | Mod: 25,S$GLB,, | Performed by: FAMILY MEDICINE

## 2022-05-11 PROCEDURE — 3080F DIAST BP >= 90 MM HG: CPT | Mod: CPTII,S$GLB,, | Performed by: FAMILY MEDICINE

## 2022-05-11 RX ORDER — BENZONATATE 200 MG/1
200 CAPSULE ORAL 3 TIMES DAILY PRN
Qty: 30 CAPSULE | Refills: 0 | Status: SHIPPED | OUTPATIENT
Start: 2022-05-11 | End: 2022-05-21

## 2022-05-11 RX ORDER — HYDROCODONE BITARTRATE AND HOMATROPINE METHYLBROMIDE ORAL SOLUTION 5; 1.5 MG/5ML; MG/5ML
5 LIQUID ORAL EVERY 4 HOURS PRN
Qty: 120 ML | Refills: 0 | Status: SHIPPED | OUTPATIENT
Start: 2022-05-11 | End: 2022-06-14 | Stop reason: ALTCHOICE

## 2022-05-11 RX ORDER — METHYLPREDNISOLONE 4 MG/1
TABLET ORAL
Qty: 1 EACH | Refills: 0 | Status: SHIPPED | OUTPATIENT
Start: 2022-05-11 | End: 2022-06-14 | Stop reason: ALTCHOICE

## 2022-05-11 RX ORDER — METHYLPREDNISOLONE SOD SUCC 125 MG
125 VIAL (EA) INJECTION
Status: COMPLETED | OUTPATIENT
Start: 2022-05-11 | End: 2022-05-11

## 2022-05-11 RX ADMIN — Medication 125 MG: at 04:05

## 2022-05-16 NOTE — PROGRESS NOTES
Subjective:       Patient ID: Jatinder Moreno III is a 41 y.o. male.    Chief Complaint: URI    URI   This is a new problem. The current episode started in the past 7 days. The problem has been gradually worsening. Associated symptoms include congestion, coughing, dysuria, ear pain, headaches, rhinorrhea and a sore throat. Pertinent negatives include no abdominal pain, chest pain, joint swelling, neck pain or plugged ear sensation.     Review of Systems   Constitutional: Negative for chills, diaphoresis and fever.   HENT: Positive for nasal congestion, ear pain, rhinorrhea, sore throat and voice change (lost voice).    Respiratory: Positive for cough.    Cardiovascular: Negative for chest pain.   Gastrointestinal: Negative for abdominal pain.   Genitourinary: Positive for dysuria.   Musculoskeletal: Negative for neck pain.   Neurological: Positive for headaches.         Objective:      Physical Exam  Vitals reviewed.   Constitutional:       General: He is not in acute distress.     Appearance: He is well-developed. He is ill-appearing.   HENT:      Head: Normocephalic and atraumatic.      Right Ear: Tympanic membrane, ear canal and external ear normal.      Left Ear: Tympanic membrane, ear canal and external ear normal.      Nose: Mucosal edema and rhinorrhea present.      Right Sinus: No maxillary sinus tenderness.      Left Sinus: No maxillary sinus tenderness.      Mouth/Throat:      Pharynx: Posterior oropharyngeal erythema present.   Eyes:      General: Lids are normal.      Pupils: Pupils are equal, round, and reactive to light.   Neck:      Thyroid: No thyroid mass.      Trachea: Trachea normal. No tracheal tenderness.   Cardiovascular:      Rate and Rhythm: Normal rate and regular rhythm.      Heart sounds: Normal heart sounds.   Pulmonary:      Effort: Pulmonary effort is normal. No respiratory distress.      Breath sounds: Normal breath sounds. No wheezing or rales.   Musculoskeletal:      Cervical  back: Normal range of motion.   Lymphadenopathy:      Cervical: No cervical adenopathy.   Psychiatric:         Behavior: Behavior normal.         Assessment:       Problem List Items Addressed This Visit    None     Visit Diagnoses     Viral URI with cough    -  Primary    Relevant Medications    hydrocodone-homatropine 5-1.5 mg/5 ml (HYCODAN) 5-1.5 mg/5 mL Syrp    benzonatate (TESSALON) 200 MG capsule    methylPREDNISolone (MEDROL DOSEPACK) 4 mg tablet    methylPREDNISolone sodium succinate injection 125 mg (Completed)          Plan:       Jatinder was seen today for uri.    Diagnoses and all orders for this visit:    Viral URI with cough  -     hydrocodone-homatropine 5-1.5 mg/5 ml (HYCODAN) 5-1.5 mg/5 mL Syrp; Take 5 mLs by mouth every 4 (four) hours as needed (cough).  -     benzonatate (TESSALON) 200 MG capsule; Take 1 capsule (200 mg total) by mouth 3 (three) times daily as needed for Cough.  -     methylPREDNISolone (MEDROL DOSEPACK) 4 mg tablet; use as directed  -     methylPREDNISolone sodium succinate injection 125 mg      Advised symptomatic care with plenty of fluids, honey and lemon, rest, and above regimen.  OTC Ibuprofen or Tylenol for pain.  Discussed course of a viral respiratory infections.  Explained that after respiratory infection is better, may continue to have a dry cough for a week or two.  Return to office if symptoms worsen or do not improve in a week.

## 2022-06-14 ENCOUNTER — OFFICE VISIT (OUTPATIENT)
Dept: SLEEP MEDICINE | Facility: CLINIC | Age: 42
End: 2022-06-14
Payer: COMMERCIAL

## 2022-06-14 DIAGNOSIS — G43.719 INTRACTABLE CHRONIC MIGRAINE WITHOUT AURA AND WITHOUT STATUS MIGRAINOSUS: ICD-10-CM

## 2022-06-14 DIAGNOSIS — G43.719 INTRACTABLE CHRONIC MIGRAINE WITHOUT AURA AND WITHOUT STATUS MIGRAINOSUS: Primary | ICD-10-CM

## 2022-06-14 PROCEDURE — 99214 PR OFFICE/OUTPT VISIT, EST, LEVL IV, 30-39 MIN: ICD-10-PCS | Mod: 95,CR,, | Performed by: NURSE PRACTITIONER

## 2022-06-14 PROCEDURE — 99214 OFFICE O/P EST MOD 30 MIN: CPT | Mod: 95,CR,, | Performed by: NURSE PRACTITIONER

## 2022-06-14 RX ORDER — ONDANSETRON 8 MG/1
8 TABLET, ORALLY DISINTEGRATING ORAL 2 TIMES DAILY PRN
Qty: 30 TABLET | Refills: 3 | Status: SHIPPED | OUTPATIENT
Start: 2022-06-14 | End: 2023-11-15 | Stop reason: SDUPTHER

## 2022-06-14 RX ORDER — DIHYDROERGOTAMINE MESYLATE 4 MG/ML
1.45 SPRAY, METERED NASAL DAILY PRN
Qty: 0.725 ML | Refills: 3 | Status: SHIPPED | OUTPATIENT
Start: 2022-06-14 | End: 2022-06-14 | Stop reason: SDUPTHER

## 2022-06-14 RX ORDER — DICLOFENAC POTASSIUM 50 MG/1
1 POWDER, FOR SOLUTION ORAL EVERY 6 HOURS
Qty: 9 EACH | Refills: 3 | Status: SHIPPED | OUTPATIENT
Start: 2022-06-14 | End: 2023-07-14

## 2022-06-14 RX ORDER — KETOROLAC TROMETHAMINE 10 MG/1
10 TABLET, FILM COATED ORAL EVERY 8 HOURS PRN
Qty: 30 TABLET | Refills: 0 | Status: SHIPPED | OUTPATIENT
Start: 2022-06-14 | End: 2022-07-14

## 2022-06-14 RX ORDER — RIMEGEPANT SULFATE 75 MG/75MG
75 TABLET, ORALLY DISINTEGRATING ORAL ONCE AS NEEDED
Qty: 8 TABLET | Refills: 11 | Status: SHIPPED | OUTPATIENT
Start: 2022-06-14 | End: 2022-06-14

## 2022-06-14 RX ORDER — METHYLPREDNISOLONE 4 MG/1
TABLET ORAL
Qty: 21 EACH | Refills: 0 | Status: SHIPPED | OUTPATIENT
Start: 2022-06-14 | End: 2022-07-05

## 2022-06-14 RX ORDER — TRAMADOL HYDROCHLORIDE 50 MG/1
50 TABLET ORAL EVERY 6 HOURS
Qty: 30 TABLET | Refills: 0 | Status: SHIPPED | OUTPATIENT
Start: 2022-06-14 | End: 2022-08-27 | Stop reason: SDUPTHER

## 2022-06-14 RX ORDER — DIHYDROERGOTAMINE MESYLATE 4 MG/ML
1.45 SPRAY, METERED NASAL DAILY PRN
Qty: 8 ML | Refills: 3 | Status: SHIPPED | OUTPATIENT
Start: 2022-06-14 | End: 2022-12-09

## 2022-06-14 RX ORDER — ERENUMAB-AOOE 140 MG/ML
140 INJECTION, SOLUTION SUBCUTANEOUS
Qty: 1 ML | Refills: 11 | Status: SHIPPED | OUTPATIENT
Start: 2022-06-14 | End: 2022-12-09 | Stop reason: SDUPTHER

## 2022-06-14 NOTE — PROGRESS NOTES
The patient location is:home  The chief complaint leading to consultation is: headache/ALEXX    Visit type: TELE AUDIOVISUAL:72296  Face to Face time with patient: 20minutes of total time spent on the encounter, which includes face to face time and non-face to face time preparing to see the patient (eg, review of tests), Obtaining and/or reviewing separately obtained history, Documenting clinical information in the electronic or other health record, Independently interpreting results (not separately reported) and communicating results to the patient/family/caregiver, or Care coordination (not separately reported). Each patient to whom he or she provides medical services by telemedicine is:  (1) informed of the relationship between the physician and patient and the respective role of any other health care provider with respect to management of the patient; and (2) notified that he or she may decline to receive medical services by telemedicine and may withdraw from such care at any time.      Hx T&A, septal deviation repair and UPPP and stoppped pap. (wgt down to 245-250#) Snoring resolved still and sleeps well except recent awakening ~4a with throbbing pain which can be diffuse. No identifiable triggers for these or for why he's having more frequent headaches overall past 1-2 mos. He ran out of Plexx maybe Mar or April. Other headaches remain typical location/nature Ran out of nurtec. Had headache lingering x 5 days now, been taking imitrex PO.      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 Hx  auras (sweat/kaleidoscope), headache begins with in ~ 30min  Last MRI /MRA 2010    Tried(verapamil, lexapro, topamax, gabapentin, metoprolol, treximet, relpax, maxalt, imitrex 100mg po, cambia, zomig, nurtec, toradol, zembrace, botox)    26184(273#) AHI 18.4/low sat 85%    Assessment:  Intractable migraines w and w/o aura, stable until recent months more frequent  ALEXX, s/p ENT surgery and symptoms improved, not had requal  study  Chronic allergic rhinitis  ADHD    PLAN:  Resume aimovig 140mg SC  Resume Nurtec odt 75mg qd prn or tramadol prn  Cambia 50mg po q6h prn (if ineffective consider Elyxb)  Trudhesa NS 1.45mg each nostril qd prn (carepoint)  Defer REQUAL PSG  Medrol dose kaylene and toradol 10mg po q8hr to help break headache cycle  Zofran odt 8mg bid prn  rtc re-eval 3mos, sooner if needed

## 2022-06-27 ENCOUNTER — OFFICE VISIT (OUTPATIENT)
Dept: CARDIOLOGY | Facility: CLINIC | Age: 42
End: 2022-06-27
Payer: COMMERCIAL

## 2022-06-27 VITALS
OXYGEN SATURATION: 100 % | SYSTOLIC BLOOD PRESSURE: 173 MMHG | HEART RATE: 103 BPM | WEIGHT: 261.69 LBS | HEIGHT: 75 IN | DIASTOLIC BLOOD PRESSURE: 101 MMHG | BODY MASS INDEX: 32.54 KG/M2

## 2022-06-27 DIAGNOSIS — I10 ESSENTIAL HYPERTENSION: Primary | ICD-10-CM

## 2022-06-27 DIAGNOSIS — R00.2 HEART PALPITATIONS: ICD-10-CM

## 2022-06-27 DIAGNOSIS — R07.9 CHEST PAIN, UNSPECIFIED TYPE: ICD-10-CM

## 2022-06-27 DIAGNOSIS — R94.31 ABNORMAL EKG: ICD-10-CM

## 2022-06-27 DIAGNOSIS — G47.33 OSA (OBSTRUCTIVE SLEEP APNEA): ICD-10-CM

## 2022-06-27 DIAGNOSIS — R42 DIZZINESS: ICD-10-CM

## 2022-06-27 PROCEDURE — 3008F BODY MASS INDEX DOCD: CPT | Mod: CPTII,S$GLB,, | Performed by: INTERNAL MEDICINE

## 2022-06-27 PROCEDURE — 99204 OFFICE O/P NEW MOD 45 MIN: CPT | Mod: S$GLB,,, | Performed by: INTERNAL MEDICINE

## 2022-06-27 PROCEDURE — 3077F PR MOST RECENT SYSTOLIC BLOOD PRESSURE >= 140 MM HG: ICD-10-PCS | Mod: CPTII,S$GLB,, | Performed by: INTERNAL MEDICINE

## 2022-06-27 PROCEDURE — 3080F PR MOST RECENT DIASTOLIC BLOOD PRESSURE >= 90 MM HG: ICD-10-PCS | Mod: CPTII,S$GLB,, | Performed by: INTERNAL MEDICINE

## 2022-06-27 PROCEDURE — 1159F PR MEDICATION LIST DOCUMENTED IN MEDICAL RECORD: ICD-10-PCS | Mod: CPTII,S$GLB,, | Performed by: INTERNAL MEDICINE

## 2022-06-27 PROCEDURE — 1159F MED LIST DOCD IN RCRD: CPT | Mod: CPTII,S$GLB,, | Performed by: INTERNAL MEDICINE

## 2022-06-27 PROCEDURE — 99999 PR PBB SHADOW E&M-EST. PATIENT-LVL IV: ICD-10-PCS | Mod: PBBFAC,,, | Performed by: INTERNAL MEDICINE

## 2022-06-27 PROCEDURE — 3077F SYST BP >= 140 MM HG: CPT | Mod: CPTII,S$GLB,, | Performed by: INTERNAL MEDICINE

## 2022-06-27 PROCEDURE — 99204 PR OFFICE/OUTPT VISIT, NEW, LEVL IV, 45-59 MIN: ICD-10-PCS | Mod: S$GLB,,, | Performed by: INTERNAL MEDICINE

## 2022-06-27 PROCEDURE — 3008F PR BODY MASS INDEX (BMI) DOCUMENTED: ICD-10-PCS | Mod: CPTII,S$GLB,, | Performed by: INTERNAL MEDICINE

## 2022-06-27 PROCEDURE — 1160F PR REVIEW ALL MEDS BY PRESCRIBER/CLIN PHARMACIST DOCUMENTED: ICD-10-PCS | Mod: CPTII,S$GLB,, | Performed by: INTERNAL MEDICINE

## 2022-06-27 PROCEDURE — 3080F DIAST BP >= 90 MM HG: CPT | Mod: CPTII,S$GLB,, | Performed by: INTERNAL MEDICINE

## 2022-06-27 PROCEDURE — 1160F RVW MEDS BY RX/DR IN RCRD: CPT | Mod: CPTII,S$GLB,, | Performed by: INTERNAL MEDICINE

## 2022-06-27 PROCEDURE — 99999 PR PBB SHADOW E&M-EST. PATIENT-LVL IV: CPT | Mod: PBBFAC,,, | Performed by: INTERNAL MEDICINE

## 2022-06-27 RX ORDER — HYDROCHLOROTHIAZIDE 25 MG/1
25 TABLET ORAL DAILY
Qty: 90 TABLET | Refills: 3 | Status: SHIPPED | OUTPATIENT
Start: 2022-06-27 | End: 2023-07-12

## 2022-06-27 NOTE — PROGRESS NOTES
CARDIOVASCULAR CONSULTATION    REASON FOR CONSULT:   Jatinder Moreno III is a 42 y.o. male who presents for HTN.    PCP: Jag  HISTORY OF PRESENT ILLNESS:   The patient is a 42-year-old man self-referred for evaluation of hypertension.  He has a history of sleep apnea but is not currently on CPAP.  He previously had a history FESS with improvement in his symptoms, and needs a repeat sleep study for recall vacation.  He also has a history of migraine headaches and intermittently takes oral steroids.  He presents with uncontrolled high blood pressure.  He is not currently on any medications.  He does describe an episode of brief stabbing chest discomfort which precipitated today's visit.  He denies any exertional symptoms or shortness of breath.  There has been some palpitations without syncope.  Denies any PND, orthopnea, or lower extremity edema.  There has been no melena, hematuria, or claudicant symptoms.    Family history is notable for the absence premature CAD amongst first-degree relatives.    The patient denies alcohol excess or illicit drug use.  He is a nonsmoker.    CARDIOVASCULAR HISTORY:   none    PAST MEDICAL HISTORY:     Past Medical History:   Diagnosis Date    ADD (attention deficit disorder with hyperactivity) 7/5/2012    ADHD (attention deficit hyperactivity disorder)     Anxiety     Asthma, well controlled     Bronchitis     History of migraine headaches     History of psychiatric care     Seborrheic dermatitis 06/13/2018    Sleep apnea        PAST SURGICAL HISTORY:     Past Surgical History:   Procedure Laterality Date    ESOPHAGOGASTRODUODENOSCOPY      NASAL SEPTOPLASTY N/A 6/3/2021    Procedure: SEPTOPLASTY, NOSE;  Surgeon: Ahmet Bob III, MD;  Location: [facility] OR;  Service: ENT;  Laterality: N/A;    TONSILLECTOMY Bilateral 6/3/2021    Procedure: TONSILLECTOMY;  Surgeon: Ahmet Bob III, MD;  Location: [facility] OR;  Service: ENT;  Laterality: Bilateral;     UVULOPALATOPHARYNGOPLASTY N/A 6/3/2021    Procedure: UPPP (UVULOPALATOPHARYNGOPLASTY);  Surgeon: Ahmet Bob III, MD;  Location: [facility];  Service: ENT;  Laterality: N/A;       ALLERGIES AND MEDICATION:     Review of patient's allergies indicates:   Allergen Reactions    Compazine [prochlorperazine edisylate] Other (See Comments)     Jittery        Medication List          Accurate as of June 27, 2022  8:39 AM. If you have any questions, ask your nurse or doctor.            CONTINUE taking these medications    AIMOVIG AUTOINJECTOR 140 mg/mL autoinjector  Generic drug: erenumab-aooe  Inject 1 mL (140 mg total) into the skin every 28 days.     CAMBIA 50 mg Pwpk  Generic drug: diclofenac potassium  Take 1 packet by mouth every 6 (six) hours.     ketorolac 10 mg tablet  Commonly known as: TORADOL  Take 1 tablet (10 mg total) by mouth every 8 (eight) hours as needed for Pain.     methylphenidate HCl 20 MG tablet  Commonly known as: RITALIN  Take 1 tablet (20 mg total) by mouth 3 (three) times daily with meals.     methylPREDNISolone 4 mg tablet  Commonly known as: MEDROL DOSEPACK  use as directed     multivitamin per tablet  Commonly known as: THERAGRAN     ondansetron 8 MG Tbdl  Commonly known as: ZOFRAN-ODT  Take 1 tablet (8 mg total) by mouth 2 (two) times daily as needed (nausea).     sumatriptan 100 MG tablet  Commonly known as: IMITREX  Take 1 tablet (100 mg total) by mouth every 2 (two) hours as needed for Migraine. Max 2 tabs per day     traMADoL 50 mg tablet  Commonly known as: ULTRAM  Take 1 tablet (50 mg total) by mouth every 6 (six) hours.     TRUDHESA 0.725 mg/pump act. (4 mg/mL) Spry  Generic drug: dihydroergotamine  1.45 mg by Nasal route daily as needed (migraine).     vit A and D3 in cod liver oiL 4,000 unit-400 unit/5 mL Liqd            SOCIAL HISTORY:     Social History     Socioeconomic History    Marital status:     Number of children: 0   Occupational History    Occupation: ICU Nurse      Employer: [employer]   Tobacco Use    Smoking status: Never Smoker    Smokeless tobacco: Never Used   Substance and Sexual Activity    Alcohol use: Yes     Alcohol/week: 0.0 standard drinks     Types: 1 Standard drinks or equivalent per week     Comment: rarely    Drug use: No    Sexual activity: Yes     Partners: Female     Birth control/protection: None   Other Topics Concern    Caffeine Use Yes    Legal: Involved in criminal litigation No    Financial Status: Employed Yes    Leisure: Exercise Yes    Childhood History: Raised by parents Yes    Home situation: lives alone No    Education: High School Graduate Yes    Home situation: lives with family Yes    Spirituality: Active Participation No    Spirituality: Private Participation Yes    Patient feels they ought to cut down on drinking/drug use No    Education: Bachelor's Degree Yes    Legal consequences of chemical use No    Patient annoyed by others criticizing their drinking/drug use No    Legal: Arrest history No    Patient has felt bad or guilty about drinking/drug use No    Legal: Involved in civil litigation No    Patient has had a drink/used drugs as an eye opener in the AM No       FAMILY HISTORY:     Family History   Problem Relation Age of Onset    Anxiety disorder Mother     Hypertension Mother     Depression Mother     Bipolar disorder Brother     Schizophrenia Brother     Drug abuse Brother     Mental illness Brother         BPD    Bipolar disorder Cousin     Hypertension Father     Diabetes Father        REVIEW OF SYSTEMS:   Review of Systems   Constitutional: Negative for chills, diaphoresis and fever.   HENT: Negative for nosebleeds.    Eyes: Negative for blurred vision, double vision and photophobia.   Respiratory: Negative for hemoptysis, shortness of breath and wheezing.    Cardiovascular: Positive for chest pain and palpitations. Negative for orthopnea, claudication, leg swelling and PND.  "  Gastrointestinal: Negative for abdominal pain, blood in stool, heartburn, melena, nausea and vomiting.   Genitourinary: Negative for flank pain and hematuria.   Musculoskeletal: Negative for falls, myalgias and neck pain.   Skin: Negative for rash.   Neurological: Negative for dizziness, seizures, loss of consciousness, weakness and headaches.   Endo/Heme/Allergies: Negative for polydipsia. Does not bruise/bleed easily.   Psychiatric/Behavioral: Negative for depression and memory loss. The patient is not nervous/anxious.        PHYSICAL EXAM:     Vitals:    06/27/22 0830   BP: (!) 173/101   Pulse: 103    Body mass index is 32.71 kg/m².  Weight: 118.7 kg (261 lb 11 oz)   Height: 6' 3" (190.5 cm)     Physical Exam  Vitals reviewed.   Constitutional:       General: He is not in acute distress.     Appearance: He is well-developed. He is not ill-appearing, toxic-appearing or diaphoretic.   HENT:      Head: Normocephalic and atraumatic.   Eyes:      General: No scleral icterus.     Extraocular Movements: Extraocular movements intact.      Conjunctiva/sclera: Conjunctivae normal.      Pupils: Pupils are equal, round, and reactive to light.   Neck:      Thyroid: No thyromegaly.      Vascular: Normal carotid pulses. No carotid bruit or JVD.      Trachea: Trachea normal.   Cardiovascular:      Rate and Rhythm: Normal rate and regular rhythm.      Pulses:           Carotid pulses are 2+ on the right side and 2+ on the left side.     Heart sounds: S1 normal and S2 normal. No murmur heard.    No friction rub. No gallop.   Pulmonary:      Effort: Pulmonary effort is normal. No respiratory distress.      Breath sounds: Normal breath sounds. No stridor. No wheezing, rhonchi or rales.   Chest:      Chest wall: No tenderness.   Abdominal:      General: There is no distension.      Palpations: Abdomen is soft.   Musculoskeletal:         General: No swelling or tenderness. Normal range of motion.      Cervical back: Normal range of " motion and neck supple. No edema or rigidity.      Right lower leg: No edema.      Left lower leg: No edema.   Feet:      Right foot:      Skin integrity: No ulcer.      Left foot:      Skin integrity: No ulcer.   Skin:     General: Skin is warm and dry.      Coloration: Skin is not jaundiced.   Neurological:      General: No focal deficit present.      Mental Status: He is alert and oriented to person, place, and time.      Cranial Nerves: No cranial nerve deficit.   Psychiatric:         Mood and Affect: Mood normal.         Speech: Speech normal.         Behavior: Behavior normal. Behavior is cooperative.         DATA:   EKG: (personally reviewed tracing)  6/27/22 SR 95, PRWP    Laboratory:  CBC:  Recent Labs   Lab 01/23/20  1610 11/20/20  1250 05/24/21  1110   WBC 6.17 6.47 6.55   Hemoglobin 12.8 L 13.7 L 13.3 L   Hematocrit 37.5 L 38.7 L 38.1 L   Platelets 200 191 222       CHEMISTRIES:  Recent Labs   Lab 01/23/20  1610 11/20/20  1250 05/24/21  1110   Glucose 93 103 91   Sodium 140 140 137   Potassium 4.1 4.0 4.0   BUN 13 14 12   Creatinine 1.3 1.1 1.1   eGFR if African American >60.0 >60 >60.0   eGFR if non African American >60.0 >60 >60.0   Calcium 9.6 9.8 9.8       CARDIAC BIOMARKERS:        COAGS:        LIPIDS/LFTS:  Recent Labs   Lab 01/23/20  1610 11/20/20  1250 05/24/21  1110   Cholesterol  --  164  --    Triglycerides  --  248 H  --    HDL  --  29 L  --    LDL Cholesterol  --  85.4  --    Non-HDL Cholesterol  --  135  --    AST 18 23 22   ALT 17 32 25     Lab Results   Component Value Date    TSH 1.679 11/20/2020     The 10-year ASCVD risk score (Charleenalana AGUILAR Jr., et al., 2013) is: 2.8%    Values used to calculate the score:      Age: 42 years      Sex: Male      Is Non- : No      Diabetic: No      Tobacco smoker: No      Systolic Blood Pressure: 173 mmHg      Is BP treated: No      HDL Cholesterol: 29 mg/dL      Total Cholesterol: 164 mg/dL      Cardiovascular  Testing:  Ordered:  Echo  Holter  ETT  Renal art US    ASSESSMENT:   # HTN, uncontrolled.  ?r/t untreated ALEXX or other meds  # palps  # CP, atyp  # abnl EKG  # ALEXX, not on CPAP    PLAN:   Start HCTZ 25mg qd  Check BMP 2 weeks  Echo  Holter  ETT  Renal art US  Follow up with sleep med to get on CPAP  RTC 1 month with BP log      Kendall Schaeffer MD, [facility]    Addendum 7/12/22:    Echo 7/6/22 (Aor root 4.1cm)  · The left ventricle is normal in size with normal systolic function.  · The estimated ejection fraction is 65%.  · Normal left ventricular diastolic function.  · Normal right ventricular size with normal right ventricular systolic function.  · Normal central venous pressure (3 mmHg).  · The estimated PA systolic pressure is 27 mmHg.  · There is a patent foramen ovale present with left to right shunting.    Holter 7/8/22  · Sinus rhythm with heart rates varying between 52 and 152 BPM with an average of 85 BPM.  · There were very rare PACs    ETT 7/6/22  The EKG portion of this study is negative for ischemia. (Sekou 9min, 10 METS, 91% MPHR)    Renal art US pending    The pt described sxs (dizziness) the day after his holter.  Will order EVM and delay f/u OV until after EVM is reported.

## 2022-06-28 DIAGNOSIS — G43.719 INTRACTABLE CHRONIC MIGRAINE WITHOUT AURA AND WITHOUT STATUS MIGRAINOSUS: Primary | ICD-10-CM

## 2022-06-28 RX ORDER — RIMEGEPANT SULFATE 75 MG/75MG
75 TABLET, ORALLY DISINTEGRATING ORAL ONCE AS NEEDED
Qty: 16 TABLET | Refills: 11 | Status: SHIPPED | OUTPATIENT
Start: 2022-06-28 | End: 2022-12-09 | Stop reason: SDUPTHER

## 2022-07-06 ENCOUNTER — HOSPITAL ENCOUNTER (OUTPATIENT)
Dept: CARDIOLOGY | Facility: HOSPITAL | Age: 42
Discharge: HOME | End: 2022-07-06
Attending: INTERNAL MEDICINE
Payer: COMMERCIAL

## 2022-07-06 VITALS — WEIGHT: 261 LBS | HEIGHT: 75 IN | BODY MASS INDEX: 32.45 KG/M2

## 2022-07-06 DIAGNOSIS — R00.2 HEART PALPITATIONS: ICD-10-CM

## 2022-07-06 DIAGNOSIS — R07.9 CHEST PAIN, UNSPECIFIED TYPE: ICD-10-CM

## 2022-07-06 DIAGNOSIS — R94.31 ABNORMAL EKG: ICD-10-CM

## 2022-07-06 DIAGNOSIS — I10 ESSENTIAL HYPERTENSION: ICD-10-CM

## 2022-07-06 LAB
CV STRESS BASE HR: 75 BPM
DIASTOLIC BLOOD PRESSURE: 97 MMHG
OHS CV CPX 1 MINUTE RECOVERY HEART RATE: 141 BPM
OHS CV CPX 85 PERCENT MAX PREDICTED HEART RATE MALE: 153
OHS CV CPX ESTIMATED METS: 10
OHS CV CPX MAX PREDICTED HEART RATE: 180
OHS CV CPX PATIENT IS FEMALE: 0
OHS CV CPX PATIENT IS MALE: 1
OHS CV CPX PEAK DIASTOLIC BLOOD PRESSURE: 72 MMHG
OHS CV CPX PEAK HEAR RATE: 164 BPM
OHS CV CPX PEAK RATE PRESSURE PRODUCT: NORMAL
OHS CV CPX PEAK SYSTOLIC BLOOD PRESSURE: 207 MMHG
OHS CV CPX PERCENT MAX PREDICTED HEART RATE ACHIEVED: 91
OHS CV CPX RATE PRESSURE PRODUCT PRESENTING: NORMAL
STRESS ECHO POST EXERCISE DUR MIN: 9 MINUTES
STRESS ECHO POST EXERCISE DUR SEC: 0 SECONDS
SYSTOLIC BLOOD PRESSURE: 143 MMHG

## 2022-07-06 PROCEDURE — 93306 TTE W/DOPPLER COMPLETE: CPT | Mod: 26,,, | Performed by: INTERNAL MEDICINE

## 2022-07-06 PROCEDURE — 93306 TTE W/DOPPLER COMPLETE: CPT

## 2022-07-06 PROCEDURE — 93018 EXERCISE STRESS - EKG (CUPID ONLY): ICD-10-PCS | Mod: ,,, | Performed by: INTERNAL MEDICINE

## 2022-07-06 PROCEDURE — 93017 CV STRESS TEST TRACING ONLY: CPT

## 2022-07-06 PROCEDURE — 93016 EXERCISE STRESS - EKG (CUPID ONLY): ICD-10-PCS | Mod: ,,, | Performed by: INTERNAL MEDICINE

## 2022-07-06 PROCEDURE — 93018 CV STRESS TEST I&R ONLY: CPT | Mod: ,,, | Performed by: INTERNAL MEDICINE

## 2022-07-06 PROCEDURE — 93306 ECHO (CUPID ONLY): ICD-10-PCS | Mod: 26,,, | Performed by: INTERNAL MEDICINE

## 2022-07-06 PROCEDURE — 93016 CV STRESS TEST SUPVJ ONLY: CPT | Mod: ,,, | Performed by: INTERNAL MEDICINE

## 2022-07-07 LAB
AORTIC ROOT ANNULUS: 4.05 CM
AORTIC VALVE CUSP SEPERATION: 2.94 CM
ASCENDING AORTA: 3.44 CM
AV INDEX (PROSTH): 0.9
AV MEAN GRADIENT: 3 MMHG
AV PEAK GRADIENT: 5 MMHG
AV VALVE AREA: 4.94 CM2
AV VELOCITY RATIO: 0.91
BSA FOR ECHO PROCEDURE: 2.5 M2
CV ECHO LV RWT: 0.38 CM
DOP CALC AO PEAK VEL: 1.17 M/S
DOP CALC AO VTI: 24.3 CM
DOP CALC LVOT AREA: 5.5 CM2
DOP CALC LVOT DIAMETER: 2.64 CM
DOP CALC LVOT PEAK VEL: 1.07 M/S
DOP CALC LVOT STROKE VOLUME: 120.09 CM3
DOP CALCLVOT PEAK VEL VTI: 21.95 CM
E WAVE DECELERATION TIME: 189.28 MSEC
E/A RATIO: 1.26
E/E' RATIO: 8 M/S
ECHO LV POSTERIOR WALL: 1.03 CM (ref 0.6–1.1)
EJECTION FRACTION: 65 %
FRACTIONAL SHORTENING: 31 % (ref 28–44)
INTERVENTRICULAR SEPTUM: 1.15 CM (ref 0.6–1.1)
IVRT: 114.18 MSEC
LA MAJOR: 5.32 CM
LA MINOR: 5.32 CM
LA WIDTH: 3.65 CM
LEFT ATRIUM SIZE: 4.21 CM
LEFT ATRIUM VOLUME INDEX: 28.2 ML/M2
LEFT ATRIUM VOLUME: 69.49 CM3
LEFT INTERNAL DIMENSION IN SYSTOLE: 3.68 CM (ref 2.1–4)
LEFT VENTRICLE DIASTOLIC VOLUME INDEX: 56.71 ML/M2
LEFT VENTRICLE DIASTOLIC VOLUME: 139.51 ML
LEFT VENTRICLE MASS INDEX: 93 G/M2
LEFT VENTRICLE SYSTOLIC VOLUME INDEX: 23.4 ML/M2
LEFT VENTRICLE SYSTOLIC VOLUME: 57.5 ML
LEFT VENTRICULAR INTERNAL DIMENSION IN DIASTOLE: 5.37 CM (ref 3.5–6)
LEFT VENTRICULAR MASS: 229.83 G
LV LATERAL E/E' RATIO: 6.8 M/S
LV SEPTAL E/E' RATIO: 9.71 M/S
MV PEAK A VEL: 0.54 M/S
MV PEAK E VEL: 0.68 M/S
MV STENOSIS PRESSURE HALF TIME: 54.89 MS
MV VALVE AREA P 1/2 METHOD: 4.01 CM2
PISA TR MAX VEL: 2.47 M/S
PULM VEIN S/D RATIO: 1.04
PV PEAK D VEL: 0.46 M/S
PV PEAK S VEL: 0.48 M/S
PV PEAK VELOCITY: 0.88 CM/S
RA MAJOR: 5.07 CM
RA PRESSURE: 3 MMHG
RA WIDTH: 3.76 CM
RIGHT VENTRICULAR END-DIASTOLIC DIMENSION: 3.27 CM
SINUS: 3.6 CM
STJ: 3.04 CM
TDI LATERAL: 0.1 M/S
TDI SEPTAL: 0.07 M/S
TDI: 0.09 M/S
TR MAX PG: 24 MMHG
TRICUSPID ANNULAR PLANE SYSTOLIC EXCURSION: 2.88 CM
TV REST PULMONARY ARTERY PRESSURE: 27 MMHG

## 2022-07-08 ENCOUNTER — HOSPITAL ENCOUNTER (OUTPATIENT)
Dept: CARDIOLOGY | Facility: HOSPITAL | Age: 42
Discharge: HOME | End: 2022-07-08
Attending: INTERNAL MEDICINE
Payer: COMMERCIAL

## 2022-07-08 PROCEDURE — 93227 HOLTER MONITOR - 24 HOUR (CUPID ONLY): ICD-10-PCS | Mod: ,,, | Performed by: INTERNAL MEDICINE

## 2022-07-08 PROCEDURE — 93226 XTRNL ECG REC<48 HR SCAN A/R: CPT

## 2022-07-08 PROCEDURE — 93227 XTRNL ECG REC<48 HR R&I: CPT | Mod: ,,, | Performed by: INTERNAL MEDICINE

## 2022-07-11 LAB
OHS CV EVENT MONITOR DAY: 0
OHS CV HOLTER LENGTH DECIMAL HOURS: 23.98
OHS CV HOLTER LENGTH HOURS: 23
OHS CV HOLTER LENGTH MINUTES: 59
OHS CV HOLTER SINUS AVERAGE HR: 85
OHS CV HOLTER SINUS MAX HR: 152
OHS CV HOLTER SINUS MIN HR: 52

## 2022-07-22 ENCOUNTER — HOSPITAL ENCOUNTER (OUTPATIENT)
Dept: CARDIOLOGY | Facility: HOSPITAL | Age: 42
Discharge: HOME | End: 2022-07-22
Attending: INTERNAL MEDICINE
Payer: COMMERCIAL

## 2022-07-22 DIAGNOSIS — I10 ESSENTIAL HYPERTENSION: ICD-10-CM

## 2022-07-22 LAB
ABDOMINAL AORTA MID PSV: 119 CM/S
LEFT RENAL DIST DIAS: 34 CM/S
LEFT RENAL DIST SYS: 83 CM/S
LEFT RENAL MID DIAS: 26 CM/S
LEFT RENAL MID RAR: 0.48
LEFT RENAL MID SYS: 57 CM/S
LEFT RENAL ORIGIN DIAS: 66 CM/S
LEFT RENAL ORIGIN SYS: 181 CM/S
LEFT RENAL PROX DIAS: 54 CM/S
LEFT RENAL PROX SYS: 125 CM/S
LEFT RENAL ULTRASOUND ACCELERATION TIME MEASUREMENT 1: 78 MS
LEFT RENAL ULTRASOUND ACCELERATION TIME MEASUREMENT 2: 144 MS
LEFT RENAL ULTRASOUND ACCELERATION TIME MEASUREMENT 3: 60 MS
LEFT RENAL ULTRASOUND ACCELERATION TIME MEASUREMENT AVERAGE: 144 MS
LEFT RENAL ULTRASOUND KIDNEY SIZE MEASUREMENT 1: 13 CM
LEFT RENAL ULTRASOUND KIDNEY SIZE MEASUREMENT 2: 12.9 CM
LEFT RENAL ULTRASOUND KIDNEY SIZE MEASUREMENT 3: 12.9 CM
LEFT RENAL ULTRASOUND KIDNEY SIZE MEASUREMENT AVERAGE: 13 CM
LEFT RENAL ULTRASOUND RESISTIVE INDEX MEASUREMENT 1: 0.5
LEFT RENAL ULTRASOUND RESISTIVE INDEX MEASUREMENT 2: 0.7
LEFT RENAL ULTRASOUND RESISTIVE INDEX MEASUREMENT 3: 0.61
LEFT RENAL ULTRASOUND RESISTIVE INDEX MEASUREMENT AVERAGE: 0.7
OHS CV LEFT RENAL RAR: 1.52
OHS CV RIGHT RENAL RAR: 0.62
OHS CV US LEFT RENAL HIGHEST EDV: 66
OHS CV US LEFT RENAL HIGHEST PSV: 181
OHS CV US RIGHT RENAL HIGHEST EDV: 37
OHS CV US RIGHT RENAL HIGHEST PSV: 74
RIGHT RENAL DIST DIAS: 37 CM/S
RIGHT RENAL DIST SYS: 74 CM/S
RIGHT RENAL MID DIAS: 32 CM/S
RIGHT RENAL MID RAR: 0.6
RIGHT RENAL MID SYS: 71 CM/S
RIGHT RENAL ORIGIN DIAS: 26 CM/S
RIGHT RENAL ORIGIN SYS: 44 CM/S
RIGHT RENAL PROX DIAS: 22 CM/S
RIGHT RENAL PROX SYS: 48 CM/S
RIGHT RENAL ULTRASOUND ACCELERATION TIME MEASUREMENT 1: 90 MS
RIGHT RENAL ULTRASOUND ACCELERATION TIME MEASUREMENT 2: 90 MS
RIGHT RENAL ULTRASOUND ACCELERATION TIME MEASUREMENT 3: 108 MS
RIGHT RENAL ULTRASOUND ACCELERATION TIME MEASUREMENT AVERAGE: 108 MS
RIGHT RENAL ULTRASOUND KIDNEY SIZE MEASUREMENT 1: 11.6 CM
RIGHT RENAL ULTRASOUND KIDNEY SIZE MEASUREMENT 2: 11.3 CM
RIGHT RENAL ULTRASOUND KIDNEY SIZE MEASUREMENT 3: 11.3 CM
RIGHT RENAL ULTRASOUND KIDNEY SIZE MEASUREMENT AVERAGE: 11.6 CM
RIGHT RENAL ULTRASOUND RESISTIVE INDEX MEASUREMENT 1: 0.64
RIGHT RENAL ULTRASOUND RESISTIVE INDEX MEASUREMENT 2: 0.55
RIGHT RENAL ULTRASOUND RESISTIVE INDEX MEASUREMENT 3: 0.53
RIGHT RENAL ULTRASOUND RESISTIVE INDEX MEASUREMENT AVERAGE: 0.64

## 2022-07-22 PROCEDURE — 93975 CV US RENAL ARTERY STENOSIS HYPERTENSION COMPLETE (CUPID ONLY): ICD-10-PCS | Mod: 26,,, | Performed by: INTERNAL MEDICINE

## 2022-07-22 PROCEDURE — 93975 VASCULAR STUDY: CPT | Mod: 26,,, | Performed by: INTERNAL MEDICINE

## 2022-07-22 PROCEDURE — 93975 VASCULAR STUDY: CPT

## 2022-08-03 ENCOUNTER — OFFICE VISIT (OUTPATIENT)
Dept: URGENT CARE | Facility: CLINIC | Age: 42
End: 2022-08-03
Payer: COMMERCIAL

## 2022-08-03 ENCOUNTER — CLINICAL SUPPORT (OUTPATIENT)
Dept: CARDIOLOGY | Facility: HOSPITAL | Age: 42
End: 2022-08-03
Attending: INTERNAL MEDICINE
Payer: COMMERCIAL

## 2022-08-03 VITALS
TEMPERATURE: 99 F | SYSTOLIC BLOOD PRESSURE: 151 MMHG | RESPIRATION RATE: 20 BRPM | HEART RATE: 90 BPM | DIASTOLIC BLOOD PRESSURE: 102 MMHG | OXYGEN SATURATION: 97 % | BODY MASS INDEX: 31.71 KG/M2 | HEIGHT: 75 IN | WEIGHT: 255 LBS

## 2022-08-03 DIAGNOSIS — R42 DIZZINESS: ICD-10-CM

## 2022-08-03 DIAGNOSIS — R94.31 ABNORMAL EKG: ICD-10-CM

## 2022-08-03 DIAGNOSIS — J20.8 ACUTE VIRAL BRONCHITIS: Primary | ICD-10-CM

## 2022-08-03 DIAGNOSIS — R00.2 HEART PALPITATIONS: ICD-10-CM

## 2022-08-03 LAB
CTP QC/QA: YES
SARS-COV-2 RDRP RESP QL NAA+PROBE: NEGATIVE

## 2022-08-03 PROCEDURE — 1159F PR MEDICATION LIST DOCUMENTED IN MEDICAL RECORD: ICD-10-PCS | Mod: CPTII,S$GLB,, | Performed by: STUDENT IN AN ORGANIZED HEALTH CARE EDUCATION/TRAINING PROGRAM

## 2022-08-03 PROCEDURE — 93270 REMOTE 30 DAY ECG REV/REPORT: CPT

## 2022-08-03 PROCEDURE — 3080F PR MOST RECENT DIASTOLIC BLOOD PRESSURE >= 90 MM HG: ICD-10-PCS | Mod: CPTII,S$GLB,, | Performed by: STUDENT IN AN ORGANIZED HEALTH CARE EDUCATION/TRAINING PROGRAM

## 2022-08-03 PROCEDURE — 94640 AIRWAY INHALATION TREATMENT: CPT | Mod: S$GLB,,, | Performed by: STUDENT IN AN ORGANIZED HEALTH CARE EDUCATION/TRAINING PROGRAM

## 2022-08-03 PROCEDURE — U0002: ICD-10-PCS | Mod: QW,S$GLB,, | Performed by: STUDENT IN AN ORGANIZED HEALTH CARE EDUCATION/TRAINING PROGRAM

## 2022-08-03 PROCEDURE — 3077F PR MOST RECENT SYSTOLIC BLOOD PRESSURE >= 140 MM HG: ICD-10-PCS | Mod: CPTII,S$GLB,, | Performed by: STUDENT IN AN ORGANIZED HEALTH CARE EDUCATION/TRAINING PROGRAM

## 2022-08-03 PROCEDURE — 3008F PR BODY MASS INDEX (BMI) DOCUMENTED: ICD-10-PCS | Mod: CPTII,S$GLB,, | Performed by: STUDENT IN AN ORGANIZED HEALTH CARE EDUCATION/TRAINING PROGRAM

## 2022-08-03 PROCEDURE — 99214 PR OFFICE/OUTPT VISIT, EST, LEVL IV, 30-39 MIN: ICD-10-PCS | Mod: 25,S$GLB,, | Performed by: STUDENT IN AN ORGANIZED HEALTH CARE EDUCATION/TRAINING PROGRAM

## 2022-08-03 PROCEDURE — 99214 OFFICE O/P EST MOD 30 MIN: CPT | Mod: 25,S$GLB,, | Performed by: STUDENT IN AN ORGANIZED HEALTH CARE EDUCATION/TRAINING PROGRAM

## 2022-08-03 PROCEDURE — 1159F MED LIST DOCD IN RCRD: CPT | Mod: CPTII,S$GLB,, | Performed by: STUDENT IN AN ORGANIZED HEALTH CARE EDUCATION/TRAINING PROGRAM

## 2022-08-03 PROCEDURE — 71046 X-RAY EXAM CHEST 2 VIEWS: CPT | Mod: FY,S$GLB,, | Performed by: RADIOLOGY

## 2022-08-03 PROCEDURE — 94640 PR INHAL RX, AIRWAY OBST/DX SPUTUM INDUCT: ICD-10-PCS | Mod: S$GLB,,, | Performed by: STUDENT IN AN ORGANIZED HEALTH CARE EDUCATION/TRAINING PROGRAM

## 2022-08-03 PROCEDURE — 93272 ECG/REVIEW INTERPRET ONLY: CPT | Mod: ,,, | Performed by: INTERNAL MEDICINE

## 2022-08-03 PROCEDURE — 71046 XR CHEST PA AND LATERAL: ICD-10-PCS | Mod: FY,S$GLB,, | Performed by: RADIOLOGY

## 2022-08-03 PROCEDURE — 1160F RVW MEDS BY RX/DR IN RCRD: CPT | Mod: CPTII,S$GLB,, | Performed by: STUDENT IN AN ORGANIZED HEALTH CARE EDUCATION/TRAINING PROGRAM

## 2022-08-03 PROCEDURE — 3077F SYST BP >= 140 MM HG: CPT | Mod: CPTII,S$GLB,, | Performed by: STUDENT IN AN ORGANIZED HEALTH CARE EDUCATION/TRAINING PROGRAM

## 2022-08-03 PROCEDURE — 93272 CARDIAC EVENT MONITOR (CUPID ONLY): ICD-10-PCS | Mod: ,,, | Performed by: INTERNAL MEDICINE

## 2022-08-03 PROCEDURE — 3080F DIAST BP >= 90 MM HG: CPT | Mod: CPTII,S$GLB,, | Performed by: STUDENT IN AN ORGANIZED HEALTH CARE EDUCATION/TRAINING PROGRAM

## 2022-08-03 PROCEDURE — 3008F BODY MASS INDEX DOCD: CPT | Mod: CPTII,S$GLB,, | Performed by: STUDENT IN AN ORGANIZED HEALTH CARE EDUCATION/TRAINING PROGRAM

## 2022-08-03 PROCEDURE — 1160F PR REVIEW ALL MEDS BY PRESCRIBER/CLIN PHARMACIST DOCUMENTED: ICD-10-PCS | Mod: CPTII,S$GLB,, | Performed by: STUDENT IN AN ORGANIZED HEALTH CARE EDUCATION/TRAINING PROGRAM

## 2022-08-03 PROCEDURE — U0002 COVID-19 LAB TEST NON-CDC: HCPCS | Mod: QW,S$GLB,, | Performed by: STUDENT IN AN ORGANIZED HEALTH CARE EDUCATION/TRAINING PROGRAM

## 2022-08-03 RX ORDER — PROMETHAZINE HYDROCHLORIDE AND DEXTROMETHORPHAN HYDROBROMIDE 6.25; 15 MG/5ML; MG/5ML
5 SYRUP ORAL EVERY 4 HOURS PRN
Qty: 118 ML | Refills: 0 | Status: SHIPPED | OUTPATIENT
Start: 2022-08-03 | End: 2022-08-13

## 2022-08-03 RX ORDER — ALBUTEROL SULFATE 0.83 MG/ML
2.5 SOLUTION RESPIRATORY (INHALATION)
Status: COMPLETED | OUTPATIENT
Start: 2022-08-03 | End: 2022-08-03

## 2022-08-03 RX ORDER — IPRATROPIUM BROMIDE 42 UG/1
2 SPRAY, METERED NASAL 3 TIMES DAILY
Qty: 30 ML | Refills: 0 | Status: SHIPPED | OUTPATIENT
Start: 2022-08-03 | End: 2024-02-06

## 2022-08-03 RX ORDER — IPRATROPIUM BROMIDE 0.5 MG/2.5ML
0.5 SOLUTION RESPIRATORY (INHALATION)
Status: COMPLETED | OUTPATIENT
Start: 2022-08-03 | End: 2022-08-03

## 2022-08-03 RX ORDER — ALBUTEROL SULFATE 90 UG/1
2 AEROSOL, METERED RESPIRATORY (INHALATION) EVERY 6 HOURS PRN
Qty: 18 G | Refills: 0 | Status: SHIPPED | OUTPATIENT
Start: 2022-08-03 | End: 2024-02-06

## 2022-08-03 RX ADMIN — IPRATROPIUM BROMIDE 0.5 MG: 0.5 SOLUTION RESPIRATORY (INHALATION) at 02:08

## 2022-08-03 RX ADMIN — ALBUTEROL SULFATE 2.5 MG: 0.83 SOLUTION RESPIRATORY (INHALATION) at 02:08

## 2022-08-03 RX ADMIN — ALBUTEROL SULFATE 2.5 MG: 0.83 SOLUTION RESPIRATORY (INHALATION) at 03:08

## 2022-08-03 RX ADMIN — IPRATROPIUM BROMIDE 0.5 MG: 0.5 SOLUTION RESPIRATORY (INHALATION) at 03:08

## 2022-08-03 NOTE — PROGRESS NOTES
"Subjective:       Patient ID: Jatinder Moreno III is a 42 y.o. male.    Vitals:  height is 6' 3" (1.905 m) and weight is 115.7 kg (255 lb). His oral temperature is 99.2 °F (37.3 °C). His blood pressure is 151/102 (abnormal) and his pulse is 90. His respiration is 20 and oxygen saturation is 97%.     Chief Complaint: Cough (Nasal congestion/ drip)    This is a 42 y.o. male who presents today with a chief complaint of coughing, nasal congestion, nasal drip, that began on Saturday. Pt explain that he used otc Cough med's and Flonase, nasal rinse for treatment but no relief and he would;d liked to be tested for covid in today visited.      Cough  This is a new problem. The current episode started in the past 7 days. The problem has been unchanged. The problem occurs every few minutes. The cough is productive of sputum. Associated symptoms include chills, ear congestion, headaches, nasal congestion and postnasal drip. Pertinent negatives include no ear pain, fever or sore throat. He has tried OTC cough suppressant for the symptoms.       Constitution: Positive for chills. Negative for fever.   HENT: Positive for postnasal drip. Negative for ear pain and sore throat.    Respiratory: Positive for cough.    Neurological: Positive for headaches.       Objective:      Physical Exam   Constitutional: He is oriented to person, place, and time. He does not appear ill. No distress.   HENT:   Head: Normocephalic.   Pulmonary/Chest: No respiratory distress. He has wheezes in the right upper field, the right lower field, the left upper field and the left lower field.   Neurological: He is alert and oriented to person, place, and time. Coordination normal.   Psychiatric: His behavior is normal. Mood normal.   Nursing note and vitals reviewed.        Assessment:       1. Acute viral bronchitis        Results for orders placed or performed in visit on 08/03/22   POCT COVID-19 Rapid Screening   Result Value Ref Range    POC Rapid " COVID Negative Negative     Acceptable Yes      EXAMINATION:  XR CHEST PA AND LATERAL     CLINICAL HISTORY:  Acute bronchitis due to other specified organisms     TECHNIQUE:  PA and lateral views of the chest were performed.     COMPARISON:  02/22/2013.     FINDINGS:  The lungs are well expanded and clear. No focal opacities are seen. The pleural spaces are clear.     The cardiac silhouette is unremarkable.     The visualized osseous structures are unremarkable.     Impression:     No acute cardiopulmonary abnormality.        Electronically signed by: Slim Ortiz  Date:                                            08/03/2022  Time:                                           15:38  Plan:         Acute viral bronchitis  -     POCT COVID-19 Rapid Screening  -     albuterol nebulizer solution 2.5 mg  -     ipratropium 0.02 % nebulizer solution 0.5 mg  -     promethazine-dextromethorphan (PROMETHAZINE-DM) 6.25-15 mg/5 mL Syrp; Take 5 mLs by mouth every 4 (four) hours as needed (cough and congestion).  Dispense: 118 mL; Refill: 0  -     albuterol (PROVENTIL HFA) 90 mcg/actuation inhaler; Inhale 2 puffs into the lungs every 6 (six) hours as needed for Wheezing or Shortness of Breath. Rescue  Dispense: 18 g; Refill: 0  -     ipratropium (ATROVENT) 42 mcg (0.06 %) nasal spray; 2 sprays by Nasal route 3 (three) times daily.  Dispense: 30 mL; Refill: 0    Rapid COVID negative.  Discussed acute bronchitis and medications for symptom relief.  Patient advised to use meds as directed.  Also advised to avoid use of decongestants due to his hypertension.  Okay to continue with Mucinex.  Patient was treated with 2 DuoNeb treatments in clinic but did not have much symptomatic relief.  Advised ED evaluation for any severe worsening symptoms.  Otherwise, use medications as directed and allow some time for viral respiratory infection to improve.  The patient did not have any questions or concerns prior to discharge and was  agreeable to proceed as outlined.  Work excuse provided.

## 2022-08-03 NOTE — LETTER
August 3, 2022      [facility]  [address]  [place] [address]  Phone: [phone]  Fax: [phone]       Patient: Jatinder Moreno   YOB: 1980  Date of Visit: 08/03/2022    To Whom It May Concern:    Gael Moreno  was at [facility] on 08/03/2022. The patient should be excused from work on 08/04-08/05/2022 and may return to work as scheduled after these dates with no restrictions. If you have any questions or concerns, or if I can be of further assistance, please do not hesitate to contact me.    Sincerely,      Beulah Kent MD

## 2022-08-19 ENCOUNTER — TELEPHONE (OUTPATIENT)
Dept: CARDIOLOGY | Facility: CLINIC | Age: 42
End: 2022-08-19

## 2022-08-19 NOTE — TELEPHONE ENCOUNTER
----- Message from Kendall Schaeffer MD sent at 7/12/2022  2:57 PM CDT -----  Pls schedule 30d event monitor (ordered) and reschedule pt's follow up visit until 2 weeks after EVM complete.    [facility]

## 2022-09-19 ENCOUNTER — OFFICE VISIT (OUTPATIENT)
Dept: INTERNAL MEDICINE | Facility: CLINIC | Age: 42
End: 2022-09-19
Payer: COMMERCIAL

## 2022-09-19 DIAGNOSIS — B96.89 BACTERIAL SINUSITIS: Primary | ICD-10-CM

## 2022-09-19 DIAGNOSIS — J32.9 BACTERIAL SINUSITIS: Primary | ICD-10-CM

## 2022-09-19 PROCEDURE — 1160F RVW MEDS BY RX/DR IN RCRD: CPT | Mod: CPTII,95,, | Performed by: FAMILY MEDICINE

## 2022-09-19 PROCEDURE — 1159F PR MEDICATION LIST DOCUMENTED IN MEDICAL RECORD: ICD-10-PCS | Mod: CPTII,95,, | Performed by: FAMILY MEDICINE

## 2022-09-19 PROCEDURE — 99213 OFFICE O/P EST LOW 20 MIN: CPT | Mod: 95,,, | Performed by: FAMILY MEDICINE

## 2022-09-19 PROCEDURE — 1160F PR REVIEW ALL MEDS BY PRESCRIBER/CLIN PHARMACIST DOCUMENTED: ICD-10-PCS | Mod: CPTII,95,, | Performed by: FAMILY MEDICINE

## 2022-09-19 PROCEDURE — 1159F MED LIST DOCD IN RCRD: CPT | Mod: CPTII,95,, | Performed by: FAMILY MEDICINE

## 2022-09-19 PROCEDURE — 99213 PR OFFICE/OUTPT VISIT, EST, LEVL III, 20-29 MIN: ICD-10-PCS | Mod: 95,,, | Performed by: FAMILY MEDICINE

## 2022-09-19 RX ORDER — BENZONATATE 200 MG/1
200 CAPSULE ORAL 3 TIMES DAILY PRN
Qty: 30 CAPSULE | Refills: 0 | Status: SHIPPED | OUTPATIENT
Start: 2022-09-19 | End: 2022-09-29

## 2022-09-19 RX ORDER — FLUTICASONE PROPIONATE 50 MCG
2 SPRAY, SUSPENSION (ML) NASAL DAILY
Qty: 16 G | Refills: 11 | Status: SHIPPED | OUTPATIENT
Start: 2022-09-19

## 2022-09-19 RX ORDER — AMOXICILLIN AND CLAVULANATE POTASSIUM 875; 125 MG/1; MG/1
1 TABLET, FILM COATED ORAL 2 TIMES DAILY
Qty: 20 TABLET | Refills: 0 | Status: SHIPPED | OUTPATIENT
Start: 2022-09-19 | End: 2022-09-29

## 2022-09-19 NOTE — PROGRESS NOTES
Subjective:       Patient ID: Jatinder Moreno III is a 42 y.o. male.    Chief Complaint: Cough  The patient location is:  [place]  The chief complaint leading to consultation is:  Cough, runny nose, sinus pressure, sore throat    Visit type: audiovisual    Face to Face time with patient:  5 minutes  13 minutes of total time spent on the encounter, which includes face to face time and non-face to face time preparing to see the patient (eg, review of tests), Obtaining and/or reviewing separately obtained history, Documenting clinical information in the electronic or other health record, Independently interpreting results (not separately reported) and communicating results to the patient/family/caregiver, or Care coordination (not separately reported).         Each patient to whom he or she provides medical services by telemedicine is:  (1) informed of the relationship between the physician and patient and the respective role of any other health care provider with respect to management of the patient; and (2) notified that he or she may decline to receive medical services by telemedicine and may withdraw from such care at any time.    Notes:  42-year-old white male is evaluated through telemedicine visit secondary to concerns of a decreased appetite, chills, fatigue, ear pain, postnasal drip, runny nose, sore throat, cough, chest congestion, shortness of breath, diarrhea, generalized body aches, and headaches since last week.  He has been using a sinus rinse and over-the-counter medications with improvement of symptoms but notes that on Saturday his symptoms rebounded and he has been feeling worse ever since.  He did test negative for COVID on Thursday.  He does have sick contacts with his children.  He has been using ibuprofen with improvement of his headaches but the symptoms persist.    Cough  This is a new problem. The current episode started in the past 7 days. The problem has been unchanged. The problem  occurs hourly. The cough is Productive of sputum. Associated symptoms include chills, ear pain, headaches, myalgias, nasal congestion, postnasal drip, rhinorrhea, a sore throat and shortness of breath. Pertinent negatives include no chest pain, eye redness, fever or rash. Nothing aggravates the symptoms. He has tried OTC cough suppressant, body position changes and rest for the symptoms. His past medical history is significant for bronchitis.   Review of Systems   Constitutional:  Positive for appetite change, chills and fatigue. Negative for fever.   HENT:  Positive for ear pain, postnasal drip, rhinorrhea and sore throat. Negative for nasal congestion, hearing loss, sinus pressure/congestion and tinnitus.    Eyes:  Negative for redness, itching and visual disturbance.   Respiratory:  Positive for cough, chest tightness and shortness of breath.    Cardiovascular:  Negative for chest pain and palpitations.   Gastrointestinal:  Positive for diarrhea. Negative for abdominal pain, constipation, nausea and vomiting.   Genitourinary:  Negative for decreased urine volume, difficulty urinating, dysuria, frequency, hematuria and urgency.   Musculoskeletal:  Positive for myalgias. Negative for back pain, neck pain and neck stiffness.   Integumentary:  Negative for rash.   Neurological:  Positive for headaches. Negative for dizziness and light-headedness.   Psychiatric/Behavioral: Negative.         Objective:      Physical Exam  Constitutional:       Appearance: Normal appearance.   HENT:      Head: Normocephalic and atraumatic.   Pulmonary:      Effort: Pulmonary effort is normal.   Neurological:      Mental Status: He is alert and oriented to person, place, and time.   Psychiatric:         Mood and Affect: Mood normal.         Behavior: Behavior normal.         Thought Content: Thought content normal.         Judgment: Judgment normal.       Assessment:       Problem List Items Addressed This Visit    None  Visit Diagnoses        Bacterial sinusitis    -  Primary    Relevant Medications    amoxicillin-clavulanate 875-125mg (AUGMENTIN) 875-125 mg per tablet    benzonatate (TESSALON) 200 MG capsule    fluticasone propionate (FLONASE) 50 mcg/actuation nasal spray            Plan:        Bacterial sinusitis  -     amoxicillin-clavulanate 875-125mg (AUGMENTIN) 875-125 mg per tablet; Take 1 tablet by mouth 2 (two) times daily. for 10 days  Dispense: 20 tablet; Refill: 0  -     benzonatate (TESSALON) 200 MG capsule; Take 1 capsule (200 mg total) by mouth 3 (three) times daily as needed for Cough.  Dispense: 30 capsule; Refill: 0  -     fluticasone propionate (FLONASE) 50 mcg/actuation nasal spray; 2 sprays (100 mcg total) by Each Nostril route once daily.  Dispense: 16 g; Refill: 11    Continue sinus rinse as needed.    Over-the-counter Claritin nightly.    Tylenol and ibuprofen as needed for fever or pain.    Saltwater or Listerine gargle as needed for sore throat.    Follow-up as needed if symptoms persist or worsen.

## 2022-09-21 ENCOUNTER — OFFICE VISIT (OUTPATIENT)
Dept: PSYCHIATRY | Facility: CLINIC | Age: 42
End: 2022-09-21
Payer: COMMERCIAL

## 2022-09-21 DIAGNOSIS — G47.00 INSOMNIA, UNSPECIFIED TYPE: ICD-10-CM

## 2022-09-21 DIAGNOSIS — F41.1 GENERALIZED ANXIETY DISORDER: ICD-10-CM

## 2022-09-21 DIAGNOSIS — F98.8 ADULT ATTENTION DEFICIT DISORDER: Primary | ICD-10-CM

## 2022-09-21 PROCEDURE — 99213 OFFICE O/P EST LOW 20 MIN: CPT | Mod: 95,,, | Performed by: PSYCHIATRY & NEUROLOGY

## 2022-09-21 PROCEDURE — 1160F PR REVIEW ALL MEDS BY PRESCRIBER/CLIN PHARMACIST DOCUMENTED: ICD-10-PCS | Mod: CPTII,95,, | Performed by: PSYCHIATRY & NEUROLOGY

## 2022-09-21 PROCEDURE — 1160F RVW MEDS BY RX/DR IN RCRD: CPT | Mod: CPTII,95,, | Performed by: PSYCHIATRY & NEUROLOGY

## 2022-09-21 PROCEDURE — 99213 PR OFFICE/OUTPT VISIT, EST, LEVL III, 20-29 MIN: ICD-10-PCS | Mod: 95,,, | Performed by: PSYCHIATRY & NEUROLOGY

## 2022-09-21 PROCEDURE — 1159F PR MEDICATION LIST DOCUMENTED IN MEDICAL RECORD: ICD-10-PCS | Mod: CPTII,95,, | Performed by: PSYCHIATRY & NEUROLOGY

## 2022-09-21 PROCEDURE — 1159F MED LIST DOCD IN RCRD: CPT | Mod: CPTII,95,, | Performed by: PSYCHIATRY & NEUROLOGY

## 2022-09-21 RX ORDER — DIAZEPAM 10 MG/1
10 TABLET ORAL DAILY PRN
Qty: 30 TABLET | Refills: 2 | Status: SHIPPED | OUTPATIENT
Start: 2022-09-21 | End: 2023-03-02 | Stop reason: SDUPTHER

## 2022-09-21 RX ORDER — BUSPIRONE HYDROCHLORIDE 10 MG/1
10 TABLET ORAL 3 TIMES DAILY
Qty: 270 TABLET | Refills: 1 | Status: SHIPPED | OUTPATIENT
Start: 2022-09-21 | End: 2024-02-02 | Stop reason: SDUPTHER

## 2022-09-21 RX ORDER — METHYLPHENIDATE HYDROCHLORIDE 20 MG/1
20 TABLET ORAL
Qty: 90 TABLET | Refills: 0 | Status: SHIPPED | OUTPATIENT
Start: 2022-10-21 | End: 2024-02-02

## 2022-09-21 RX ORDER — METHYLPHENIDATE HYDROCHLORIDE 20 MG/1
20 TABLET ORAL
Qty: 90 TABLET | Refills: 0 | Status: SHIPPED | OUTPATIENT
Start: 2022-09-21 | End: 2023-02-26 | Stop reason: SDUPTHER

## 2022-09-21 RX ORDER — METHYLPHENIDATE HYDROCHLORIDE 20 MG/1
20 TABLET ORAL
Qty: 90 TABLET | Refills: 0 | Status: SHIPPED | OUTPATIENT
Start: 2022-11-20 | End: 2024-02-02

## 2022-09-21 NOTE — PROGRESS NOTES
Outpatient Psychiatry Follow-Up Visit (MD/NP)    9/21/2022    The patient location is: home; [place], [place]  The chief complaint leading to consultation is:  Anxiety, focus problems, distraction    Visit type: audiovisual    Face to Face time with patient:  25 minutes  35 minutes of total time spent on the encounter, which includes face to face time and non-face to face time preparing to see the patient (eg, review of tests), Obtaining and/or reviewing separately obtained history, Documenting clinical information in the electronic or other health record, Independently interpreting results (not separately reported) and communicating results to the patient/family/caregiver, or Care coordination (not separately reported).         Each patient to whom he or she provides medical services by telemedicine is:  (1) informed of the relationship between the physician and patient and the respective role of any other health care provider with respect to management of the patient; and (2) notified that he or she may decline to receive medical services by telemedicine and may withdraw from such care at any time.      Clinical Status of Patient:  Outpatient (Ambulatory)    Chief Complaint:  Jatinder Moreno III is a 42 y.o. male who presents today for follow-up of anxiety and attention problems.  Met with patient.      Interval History and Content of Current Session:  Interim Events/Subjective Report/Content of Current Session: Patient Tiffanie presents to clinic after a long hiatus.  He is home sick today because of a sinus infection that it having difficulty healing.  He still gets focus and attention from Ritalin.  Able to focus better at work, school, and home environments.  Able to accomplish tasks without distraction.  Says that he doesn't like the stigma of having to take the meds so he doesn't take them as often as he would like, makes prescriptions last longer than a month.  Also rarely taking diazepam but feels that  he can use it more.  Still very stressed with work.  Also getting daughter set up with therapy because of emotional problems.  Likes his medications and would like to continue them.  No side effects noted or endorsed.      Psychotherapy:  Target symptoms: distractability, lack of focus, anxiety , work stress  Why chosen therapy is appropriate versus another modality: relevant to diagnosis, patient responds to this modality  Outcome monitoring methods: self-report, observation  Therapeutic intervention type: supportive psychotherapy  Topics discussed/themes: work stress, building skills sets for symptom management, symptom recognition  The patient's response to the intervention is accepting. The patient's progress toward treatment goals is fair.   Duration of intervention: 15 minutes.    Review of Systems   PSYCHIATRIC: Pertinant items are noted in the narrative.  CONSTITUTIONAL: No weight gain or loss.   MUSCULOSKELETAL: No pain or stiffness of the joints.  NEUROLOGIC: No weakness, sensory changes, seizures, confusion, memory loss, tremor or other abnormal movements.  RESPIRATORY: No shortness of breath.  CARDIOVASCULAR: No tachycardia or chest pain.  GASTROINTESTINAL: No nausea, vomiting, pain, constipation or diarrhea.    Past Medical, Family and Social History: The patient's past medical, family and social history have been reviewed and updated as appropriate within the electronic medical record - see encounter notes.    Compliance: yes    Side effects: None    Risk Parameters:  Patient reports no suicidal ideation  Patient reports no homicidal ideation  Patient reports no self-injurious behavior  Patient reports no violent behavior    Exam (detailed: at least 9 elements; comprehensive: all 15 elements)   Constitutional  Vitals:  Most recent vital signs, dated less than 90 days prior to this appointment, were reviewed.   There were no vitals filed for this visit.     General:  age appropriate, overweight      Musculoskeletal  Muscle Strength/Tone:  no tremor, no tic   Gait & Station:  Not observed; video visit     Psychiatric  Speech:  no latency; no press   Mood & Affect:  anxious  congruent and appropriate   Thought Process:  normal and logical   Associations:  intact   Thought Content:  normal, no suicidality, no homicidality, delusions, or paranoia   Insight:  has awareness of illness   Judgement: behavior is adequate to circumstances   Orientation:  person, place, situation, time/date   Memory: intact for content of interview   Language: grossly intact, able to name, able to repeat   Attention Span & Concentration:  distracted   Fund of Knowledge:  intact and appropriate to age and level of education     Assessment and Diagnosis   Status/Progress: Based on the examination today, the patient's problem(s) is/are inadequately controlled.  New problems have been presented today.   Co-morbidities are complicating management of the primary condition.  There are no active rule-out diagnoses for this patient at this time.     General Impression: We will continue pharmacological intervention and adjunctive therapy.       ICD-10-CM ICD-9-CM   1. Adult attention deficit disorder  F98.8 314.00   2. Generalized anxiety disorder  F41.1 300.02   3. Insomnia, unspecified type  G47.00 780.52         Intervention/Counseling/Treatment Plan   Medication Management: Continue current medications. The risks and benefits of medication were discussed with the patient.  Counseling provided with patient as follows: importance of compliance with chosen treatment options was emphasized, risks and benefits of treatment options, including medications, were discussed with the patient, risk factor reduction, prognosis, patient education, instructions for  management, treatment and follow-up were reviewed  1.  Continue buspirone 10mg TID targeting anxiety and depression.  Warned of risk of suicidality, roberth, and serotonin syndrome.  2.   Continue Ritalin 20 mg by mouth 3 times a day targeting focus and concentration.  Warned of risk of roberth, anxiety, addiction potential, HTN, withdrawals.  Also will need to be seen in office once every 3 months to continue prescriptions for this class of medications.  One prescription generally lasts him close to 6 months.  3.  Continue diazepam 10 mg by mouth daily as needed for anxiety.  Warned of risk of oversedation, falls, and not to drink or drive until the effects of the medication are known.  Warned of risk of addictive and withdrawal potential.       Return to Clinic: 3 months, as needed       Family

## 2022-09-21 NOTE — PATIENT INSTRUCTIONS

## 2022-10-05 ENCOUNTER — OFFICE VISIT (OUTPATIENT)
Dept: DERMATOLOGY | Facility: CLINIC | Age: 42
End: 2022-10-05
Payer: COMMERCIAL

## 2022-10-05 DIAGNOSIS — L73.9 FOLLICULITIS: ICD-10-CM

## 2022-10-05 DIAGNOSIS — L71.9 ROSACEA: Primary | ICD-10-CM

## 2022-10-05 PROCEDURE — 1159F PR MEDICATION LIST DOCUMENTED IN MEDICAL RECORD: ICD-10-PCS | Mod: CPTII,95,, | Performed by: DERMATOLOGY

## 2022-10-05 PROCEDURE — 1160F RVW MEDS BY RX/DR IN RCRD: CPT | Mod: CPTII,95,, | Performed by: DERMATOLOGY

## 2022-10-05 PROCEDURE — 1159F MED LIST DOCD IN RCRD: CPT | Mod: CPTII,95,, | Performed by: DERMATOLOGY

## 2022-10-05 PROCEDURE — 99214 PR OFFICE/OUTPT VISIT, EST, LEVL IV, 30-39 MIN: ICD-10-PCS | Mod: 95,,, | Performed by: DERMATOLOGY

## 2022-10-05 PROCEDURE — 99214 OFFICE O/P EST MOD 30 MIN: CPT | Mod: 95,,, | Performed by: DERMATOLOGY

## 2022-10-05 PROCEDURE — 1160F PR REVIEW ALL MEDS BY PRESCRIBER/CLIN PHARMACIST DOCUMENTED: ICD-10-PCS | Mod: CPTII,95,, | Performed by: DERMATOLOGY

## 2022-10-05 RX ORDER — CLINDAMYCIN PHOSPHATE 11.9 MG/ML
SOLUTION TOPICAL
Qty: 60 ML | Refills: 3 | Status: SHIPPED | OUTPATIENT
Start: 2022-10-05

## 2022-10-05 RX ORDER — MINOCYCLINE 40 MG/G
AEROSOL, FOAM TOPICAL
Qty: 30 G | Refills: 3 | Status: SHIPPED | OUTPATIENT
Start: 2022-10-05

## 2022-10-05 RX ORDER — SODIUM SULFACETAMIDE AND SULFUR 80; 40 MG/ML; MG/ML
SOLUTION TOPICAL
Qty: 473 ML | Refills: 3 | Status: SHIPPED | OUTPATIENT
Start: 2022-10-05

## 2022-10-05 NOTE — PATIENT INSTRUCTIONS
Wash face qhs with sulfacleanser and use clindamycin or amzeeq foam on affected areas  Wash scalp 2-3x per week with CLn sport body wash that can be purchased online     Can continue zinc /copper/timeric supplements  If not improving, will consider resuming dapsone

## 2022-10-05 NOTE — PROGRESS NOTES
Subjective:       Patient ID:  Jatinder Moreno III is a 42 y.o. male who presents for No chief complaint on file.    The patient location is: [place]  The chief complaint leading to consultation is: scalp folliculitis    Visit type: audiovisual    Face to Face time with patient:  10 minutes  12  minutes of total time spent on the encounter, which includes face to face time and non-face to face time preparing to see the patient (eg, review of tests), Obtaining and/or reviewing separately obtained history, Documenting clinical information in the electronic or other health record, Independently interpreting results (not separately reported) and communicating results to the patient/family/caregiver, or Care coordination (not separately reported).         Each patient to whom he or she provides medical services by telemedicine is:  (1) informed of the relationship between the physician and patient and the respective role of any other health care provider with respect to management of the patient; and (2) notified that he or she may decline to receive medical services by telemedicine and may withdraw from such care at any time.    Notes: Pt last OV over a year ago.  Was treated for folliculitis and rosacea that he has struggled with for years.  Pt has stopped the dapsone. Feels his outbreaks have been fewer and less severe. He can manage it with topicals.   Pt also has nose redness. Treated with bactroban. Recurs and gets red and swollen. Flares every 2-3 weeks for a few months. Tried clindamycin topically and steroid cream. Pt could not get sulfa wash or nicazel forte;          Review of Systems   Skin:  Positive for rash.      Objective:    Physical Exam   Constitutional: He appears well-developed and well-nourished. No distress.   Neurological: He is alert and oriented to person, place, and time. He is not disoriented.   Psychiatric: He has a normal mood and affect.   Skin:   Areas Examined (abnormalities noted in  diagram):   Scalp / Hair Palpated and Inspected  Head / Face Inspection Performed              Diagram Legend     Erythematous scaling macule/papule c/w actinic keratosis       Vascular papule c/w angioma      Pigmented verrucoid papule/plaque c/w seborrheic keratosis      Yellow umbilicated papule c/w sebaceous hyperplasia      Irregularly shaped tan macule c/w lentigo     1-2 mm smooth white papules consistent with Milia      Movable subcutaneous cyst with punctum c/w epidermal inclusion cyst      Subcutaneous movable cyst c/w pilar cyst      Firm pink to brown papule c/w dermatofibroma      Pedunculated fleshy papule(s) c/w skin tag(s)      Evenly pigmented macule c/w junctional nevus     Mildly variegated pigmented, slightly irregular-bordered macule c/w mildly atypical nevus      Flesh colored to evenly pigmented papule c/w intradermal nevus       Pink pearly papule/plaque c/w basal cell carcinoma      Erythematous hyperkeratotic cursted plaque c/w SCC      Surgical scar with no sign of skin cancer recurrence      Open and closed comedones      Inflammatory papules and pustules      Verrucoid papule consistent consistent with wart     Erythematous eczematous patches and plaques     Dystrophic onycholytic nail with subungual debris c/w onychomycosis     Umbilicated papule    Erythematous-base heme-crusted tan verrucoid plaque consistent with inflamed seborrheic keratosis     Erythematous Silvery Scaling Plaque c/w Psoriasis     See annotation      Assessment / Plan:        Rosacea  Folliculitis  -     clindamycin (CLEOCIN T) 1 % external solution; aaa bid  Dispense: 60 mL; Refill: 3  -     minocycline (AMZEEQ) 4 % Foam; Apply thin film to face and scalp qd prn flare  Dispense: 30 g; Refill: 3    Wash face qhs with sulfacleanser and use clindamycin or amzeeq foam on affected areas  Wash scalp 2-3x per week with CLn sport body wash that can be purchased online     Can continue zinc /copper/timeric supplements  If  not improving, will consider resuming dapsone         Follow up in about 3 months (around 1/5/2023) for in office.

## 2022-10-17 RX ORDER — SUMATRIPTAN SUCCINATE 100 MG/1
100 TABLET ORAL
Qty: 9 TABLET | Refills: 5 | Status: SHIPPED | OUTPATIENT
Start: 2022-10-17 | End: 2022-12-09 | Stop reason: SDUPTHER

## 2022-10-26 ENCOUNTER — TELEPHONE (OUTPATIENT)
Dept: NEUROLOGY | Facility: CLINIC | Age: 42
End: 2022-10-26
Payer: COMMERCIAL

## 2022-11-14 ENCOUNTER — MEDICATION MANAGEMENT (OUTPATIENT)
Dept: CARDIOLOGY | Facility: CLINIC | Age: 42
End: 2022-11-14
Payer: COMMERCIAL

## 2022-11-14 ENCOUNTER — TELEPHONE (OUTPATIENT)
Dept: CARDIOLOGY | Facility: CLINIC | Age: 42
End: 2022-11-14
Payer: COMMERCIAL

## 2022-11-14 DIAGNOSIS — I10 ESSENTIAL HYPERTENSION: Primary | ICD-10-CM

## 2022-11-14 RX ORDER — LOSARTAN POTASSIUM 25 MG/1
25 TABLET ORAL DAILY
Qty: 90 TABLET | Refills: 3 | Status: SHIPPED | OUTPATIENT
Start: 2022-11-14 | End: 2022-12-09 | Stop reason: SDUPTHER

## 2022-11-14 NOTE — PROGRESS NOTES
Pt calling with uncontrolled BP.  Also complaining of fatigue and occ headache.  Reports BP today of 144/90.  Was 170/110 over weekend.  Currently on HCTZ 25mg qd,    BMP  Lab Results   Component Value Date     05/24/2021    K 4.0 05/24/2021     05/24/2021    CO2 25 05/24/2021    BUN 12 05/24/2021    CREATININE 1.1 05/24/2021    CALCIUM 9.8 05/24/2021    ANIONGAP 10 05/24/2021     Will add Losartan 25mg qd  Check BMP 1 week.  RTC 2 weeks.

## 2022-12-09 ENCOUNTER — LAB VISIT (OUTPATIENT)
Dept: LAB | Facility: HOSPITAL | Age: 42
End: 2022-12-09
Attending: INTERNAL MEDICINE
Payer: COMMERCIAL

## 2022-12-09 ENCOUNTER — OFFICE VISIT (OUTPATIENT)
Dept: SLEEP MEDICINE | Facility: CLINIC | Age: 42
End: 2022-12-09
Payer: COMMERCIAL

## 2022-12-09 ENCOUNTER — OFFICE VISIT (OUTPATIENT)
Dept: CARDIOLOGY | Facility: CLINIC | Age: 42
End: 2022-12-09
Payer: COMMERCIAL

## 2022-12-09 VITALS — HEIGHT: 75 IN | BODY MASS INDEX: 32.95 KG/M2 | WEIGHT: 265 LBS

## 2022-12-09 VITALS
SYSTOLIC BLOOD PRESSURE: 135 MMHG | RESPIRATION RATE: 16 BRPM | BODY MASS INDEX: 33.14 KG/M2 | WEIGHT: 265.13 LBS | HEART RATE: 93 BPM | OXYGEN SATURATION: 99 % | DIASTOLIC BLOOD PRESSURE: 85 MMHG

## 2022-12-09 DIAGNOSIS — G47.33 OSA (OBSTRUCTIVE SLEEP APNEA): ICD-10-CM

## 2022-12-09 DIAGNOSIS — Q21.12 PFO (PATENT FORAMEN OVALE): ICD-10-CM

## 2022-12-09 DIAGNOSIS — G43.719 INTRACTABLE CHRONIC MIGRAINE WITHOUT AURA AND WITHOUT STATUS MIGRAINOSUS: ICD-10-CM

## 2022-12-09 DIAGNOSIS — R94.31 ABNORMAL EKG: ICD-10-CM

## 2022-12-09 DIAGNOSIS — I10 ESSENTIAL HYPERTENSION: Primary | ICD-10-CM

## 2022-12-09 DIAGNOSIS — I10 ESSENTIAL HYPERTENSION: ICD-10-CM

## 2022-12-09 DIAGNOSIS — R42 DIZZINESS: ICD-10-CM

## 2022-12-09 DIAGNOSIS — I77.810 AORTIC ROOT DILATATION: ICD-10-CM

## 2022-12-09 DIAGNOSIS — G47.33 OSA (OBSTRUCTIVE SLEEP APNEA): Primary | ICD-10-CM

## 2022-12-09 LAB
ANION GAP SERPL CALC-SCNC: 9 MMOL/L (ref 8–16)
BUN SERPL-MCNC: 11 MG/DL (ref 6–20)
CALCIUM SERPL-MCNC: 9.3 MG/DL (ref 8.7–10.5)
CHLORIDE SERPL-SCNC: 104 MMOL/L (ref 95–110)
CO2 SERPL-SCNC: 26 MMOL/L (ref 23–29)
CREAT SERPL-MCNC: 1 MG/DL (ref 0.5–1.4)
EST. GFR  (NO RACE VARIABLE): >60 ML/MIN/1.73 M^2
GLUCOSE SERPL-MCNC: 86 MG/DL (ref 70–110)
POTASSIUM SERPL-SCNC: 3.6 MMOL/L (ref 3.5–5.1)
SODIUM SERPL-SCNC: 139 MMOL/L (ref 136–145)

## 2022-12-09 PROCEDURE — 99214 PR OFFICE/OUTPT VISIT, EST, LEVL IV, 30-39 MIN: ICD-10-PCS | Mod: S$GLB,,, | Performed by: INTERNAL MEDICINE

## 2022-12-09 PROCEDURE — 3075F PR MOST RECENT SYSTOLIC BLOOD PRESS GE 130-139MM HG: ICD-10-PCS | Mod: CPTII,S$GLB,, | Performed by: INTERNAL MEDICINE

## 2022-12-09 PROCEDURE — 3079F DIAST BP 80-89 MM HG: CPT | Mod: CPTII,S$GLB,, | Performed by: INTERNAL MEDICINE

## 2022-12-09 PROCEDURE — 1160F PR REVIEW ALL MEDS BY PRESCRIBER/CLIN PHARMACIST DOCUMENTED: ICD-10-PCS | Mod: CPTII,S$GLB,, | Performed by: INTERNAL MEDICINE

## 2022-12-09 PROCEDURE — 1160F RVW MEDS BY RX/DR IN RCRD: CPT | Mod: CPTII,S$GLB,, | Performed by: INTERNAL MEDICINE

## 2022-12-09 PROCEDURE — 99999 PR PBB SHADOW E&M-EST. PATIENT-LVL V: CPT | Mod: PBBFAC,,, | Performed by: INTERNAL MEDICINE

## 2022-12-09 PROCEDURE — 4010F PR ACE/ARB THEARPY RXD/TAKEN: ICD-10-PCS | Mod: CPTII,S$GLB,, | Performed by: INTERNAL MEDICINE

## 2022-12-09 PROCEDURE — 4010F ACE/ARB THERAPY RXD/TAKEN: CPT | Mod: CPTII,95,, | Performed by: NURSE PRACTITIONER

## 2022-12-09 PROCEDURE — 4010F ACE/ARB THERAPY RXD/TAKEN: CPT | Mod: CPTII,S$GLB,, | Performed by: INTERNAL MEDICINE

## 2022-12-09 PROCEDURE — 3008F PR BODY MASS INDEX (BMI) DOCUMENTED: ICD-10-PCS | Mod: CPTII,95,, | Performed by: NURSE PRACTITIONER

## 2022-12-09 PROCEDURE — 4010F PR ACE/ARB THEARPY RXD/TAKEN: ICD-10-PCS | Mod: CPTII,95,, | Performed by: NURSE PRACTITIONER

## 2022-12-09 PROCEDURE — 3075F SYST BP GE 130 - 139MM HG: CPT | Mod: CPTII,S$GLB,, | Performed by: INTERNAL MEDICINE

## 2022-12-09 PROCEDURE — 1159F PR MEDICATION LIST DOCUMENTED IN MEDICAL RECORD: ICD-10-PCS | Mod: CPTII,S$GLB,, | Performed by: INTERNAL MEDICINE

## 2022-12-09 PROCEDURE — 3008F PR BODY MASS INDEX (BMI) DOCUMENTED: ICD-10-PCS | Mod: CPTII,S$GLB,, | Performed by: INTERNAL MEDICINE

## 2022-12-09 PROCEDURE — 99214 OFFICE O/P EST MOD 30 MIN: CPT | Mod: S$GLB,,, | Performed by: INTERNAL MEDICINE

## 2022-12-09 PROCEDURE — 3079F PR MOST RECENT DIASTOLIC BLOOD PRESSURE 80-89 MM HG: ICD-10-PCS | Mod: CPTII,S$GLB,, | Performed by: INTERNAL MEDICINE

## 2022-12-09 PROCEDURE — 36415 COLL VENOUS BLD VENIPUNCTURE: CPT | Performed by: INTERNAL MEDICINE

## 2022-12-09 PROCEDURE — 80048 BASIC METABOLIC PNL TOTAL CA: CPT | Performed by: INTERNAL MEDICINE

## 2022-12-09 PROCEDURE — 99999 PR PBB SHADOW E&M-EST. PATIENT-LVL V: ICD-10-PCS | Mod: PBBFAC,,, | Performed by: INTERNAL MEDICINE

## 2022-12-09 PROCEDURE — 3008F BODY MASS INDEX DOCD: CPT | Mod: CPTII,S$GLB,, | Performed by: INTERNAL MEDICINE

## 2022-12-09 PROCEDURE — 99214 PR OFFICE/OUTPT VISIT, EST, LEVL IV, 30-39 MIN: ICD-10-PCS | Mod: 95,,, | Performed by: NURSE PRACTITIONER

## 2022-12-09 PROCEDURE — 1159F MED LIST DOCD IN RCRD: CPT | Mod: CPTII,S$GLB,, | Performed by: INTERNAL MEDICINE

## 2022-12-09 PROCEDURE — 99214 OFFICE O/P EST MOD 30 MIN: CPT | Mod: 95,,, | Performed by: NURSE PRACTITIONER

## 2022-12-09 PROCEDURE — 3008F BODY MASS INDEX DOCD: CPT | Mod: CPTII,95,, | Performed by: NURSE PRACTITIONER

## 2022-12-09 RX ORDER — SUMATRIPTAN SUCCINATE 11 MG/1
22 CAPSULE NASAL
Qty: 16 EACH | Refills: 5 | Status: SHIPPED | OUTPATIENT
Start: 2022-12-09 | End: 2023-10-03

## 2022-12-09 RX ORDER — ERENUMAB-AOOE 140 MG/ML
140 INJECTION, SOLUTION SUBCUTANEOUS
Qty: 1 ML | Refills: 11 | Status: SHIPPED | OUTPATIENT
Start: 2022-12-09 | End: 2024-02-06

## 2022-12-09 RX ORDER — SUMATRIPTAN SUCCINATE 100 MG/1
100 TABLET ORAL
Qty: 9 TABLET | Refills: 5 | Status: SHIPPED | OUTPATIENT
Start: 2022-12-09 | End: 2023-10-05 | Stop reason: ALTCHOICE

## 2022-12-09 RX ORDER — RIMEGEPANT SULFATE 75 MG/75MG
75 TABLET, ORALLY DISINTEGRATING ORAL ONCE AS NEEDED
Qty: 16 TABLET | Refills: 11 | Status: SHIPPED | OUTPATIENT
Start: 2022-12-09 | End: 2023-10-05 | Stop reason: SDUPTHER

## 2022-12-09 RX ORDER — LOSARTAN POTASSIUM 50 MG/1
50 TABLET ORAL DAILY
Qty: 90 TABLET | Refills: 3 | Status: SHIPPED | OUTPATIENT
Start: 2022-12-09 | End: 2023-06-14 | Stop reason: SDUPTHER

## 2022-12-09 RX ORDER — METHYLPREDNISOLONE 4 MG/1
TABLET ORAL
Qty: 21 EACH | Refills: 0 | Status: SHIPPED | OUTPATIENT
Start: 2022-12-09 | End: 2022-12-30

## 2022-12-09 NOTE — LETTER
December 9, 2022        Moy Rm Jr., MD  [address] [place] [address]             [facility]  [address]  [place] [address]  Phone: [phone]   Patient: Jatinder Moreno III   MR Number: [id number]   YOB: 1980   Date of Visit: 12/9/2022       Dear Dr. Rm:    Thank you for referring Jatinder Moreno to me for evaluation. Attached you will find relevant portions of my assessment and plan of care.    If you have questions, please do not hesitate to call me. I look forward to following Jatinder Moreno along with you.    Sincerely,      Kendall Schaeffer MD            CC  No Recipients    Enclosure

## 2022-12-09 NOTE — PROGRESS NOTES
CARDIOVASCULAR PROGRESS NOTE    REASON FOR CONSULT:   Jatinder Moreno III is a 42 y.o. male who presents for f/u HTN.    PCP: Jag  HISTORY OF PRESENT ILLNESS:   The patient returns for follow-up of his testing.  He continues describe episodic dizziness, and generalized malaise.  Denies any rob angina, or racing heartbeats.  There has been no PND, orthopnea, or lower extremity edema.  Denies melena, hematuria, or claudicant symptoms.  Been monitoring his blood pressure at home noting elevated measurements.  He is recently been started on losartan in addition to hydrochlorothiazide.  He is not yet gotten his official sleep study, I have strongly encouraged him to follow-up with sleep medicine to get this done.    I reviewed the results of his testing.  The echocardiogram revealed normal LV function with mild aortic root dilatation and patent foramen ovale.  The patient has a history of PFO by his report, denies any history of stroke or TIA symptoms.  The ETT was negative.  The event monitor was negative, although the patient tells me did not have any symptoms during the event monitor.  The renal artery ultrasound was also negative.    CARDIOVASCULAR HISTORY:   PFO (echo 7/2022)  Ao root dil, 4.1cm (echo 7/2022)  ALEXX not yet on CPAP    PAST MEDICAL HISTORY:     Past Medical History:   Diagnosis Date    ADD (attention deficit disorder with hyperactivity) 7/5/2012    ADHD (attention deficit hyperactivity disorder)     Anxiety     Asthma, well controlled     Bronchitis     History of migraine headaches     History of psychiatric care     Seborrheic dermatitis 06/13/2018    Sleep apnea        PAST SURGICAL HISTORY:     Past Surgical History:   Procedure Laterality Date    ESOPHAGOGASTRODUODENOSCOPY      NASAL SEPTOPLASTY N/A 6/3/2021    Procedure: SEPTOPLASTY, NOSE;  Surgeon: Ahmet Bob III, MD;  Location: [facility];  Service: ENT;  Laterality: N/A;    TONSILLECTOMY Bilateral 6/3/2021    Procedure:  TONSILLECTOMY;  Surgeon: Ahmet Bob III, MD;  Location: [facility] OR;  Service: ENT;  Laterality: Bilateral;    UVULOPALATOPHARYNGOPLASTY N/A 6/3/2021    Procedure: UPPP (UVULOPALATOPHARYNGOPLASTY);  Surgeon: Ahmet Bob III, MD;  Location: [facility] OR;  Service: ENT;  Laterality: N/A;       ALLERGIES AND MEDICATION:     Review of patient's allergies indicates:   Allergen Reactions    Compazine [prochlorperazine edisylate] Other (See Comments)     Jittery        Medication List            Accurate as of December 9, 2022  9:23 AM. If you have any questions, ask your nurse or doctor.                CONTINUE taking these medications      AIMOVIG AUTOINJECTOR 140 mg/mL autoinjector  Generic drug: erenumab-aooe  Inject 1 mL (140 mg total) into the skin every 28 days.     albuterol 90 mcg/actuation inhaler  Commonly known as: PROVENTIL HFA  Inhale 2 puffs into the lungs every 6 (six) hours as needed for Wheezing or Shortness of Breath. Rescue     AMZEEQ 4 % Foam  Generic drug: minocycline  Apply thin film to face and scalp qd prn flare     busPIRone 10 MG tablet  Commonly known as: BUSPAR  Take 1 tablet (10 mg total) by mouth 3 (three) times daily.     CAMBIA 50 mg Pwpk  Generic drug: diclofenac potassium  Take 1 packet by mouth every 6 (six) hours.     clindamycin 1 % external solution  Commonly known as: CLEOCIN T  aaa bid     diazePAM 10 MG Tab  Commonly known as: VALIUM  Take 1 tablet (10 mg total) by mouth daily as needed (anxiety).     fluticasone propionate 50 mcg/actuation nasal spray  Commonly known as: FLONASE  2 sprays (100 mcg total) by Each Nostril route once daily.     hydroCHLOROthiazide 25 MG tablet  Commonly known as: HYDRODIURIL  Take 1 tablet (25 mg total) by mouth once daily.     ipratropium 42 mcg (0.06 %) nasal spray  Commonly known as: ATROVENT  2 sprays by Nasal route 3 (three) times daily.     losartan 25 MG tablet  Commonly known as: COZAAR  Take 1 tablet (25 mg total) by mouth once daily.     *  methylphenidate HCl 20 MG tablet  Commonly known as: RITALIN  Take 1 tablet (20 mg total) by mouth 3 (three) times daily with meals.     * methylphenidate HCl 20 MG tablet  Commonly known as: RITALIN  Take 1 tablet (20 mg total) by mouth 3 (three) times daily with meals.     * methylphenidate HCl 20 MG tablet  Commonly known as: RITALIN  Take 1 tablet (20 mg total) by mouth 3 (three) times daily with meals.     multivitamin per tablet  Commonly known as: THERAGRAN     NURTEC 75 mg odt  Generic drug: rimegepant  Take 1 tablet (75 mg total) by mouth once as needed for Migraine. Place ODT tablet on the tongue     ondansetron 8 MG Tbdl  Commonly known as: ZOFRAN-ODT  Take 1 tablet (8 mg total) by mouth 2 (two) times daily as needed (nausea).     sulfacetamide sodium-sulfur 8-4 % Susp  Commonly known as: SULFACLEANSE 8-4  Wash face qhs     sumatriptan 100 MG tablet  Commonly known as: IMITREX  Take 1 tablet (100 mg total) by mouth every 2 (two) hours as needed for Migraine. Max 2 tabs per day     traMADoL 50 mg tablet  Commonly known as: ULTRAM  Take 1 tablet (50 mg total) by mouth every 6 (six) hours.     TRUDHESA 0.725 mg/pump act. (4 mg/mL) Spry  Generic drug: dihydroergotamine  1.45 mg by Nasal route daily as needed (migraine).     vit A and D3 in cod liver oiL 4,000 unit-400 unit/5 mL Liqd           * This list has 3 medication(s) that are the same as other medications prescribed for you. Read the directions carefully, and ask your doctor or other care provider to review them with you.                  SOCIAL HISTORY:     Social History     Socioeconomic History    Marital status:     Number of children: 0   Occupational History    Occupation: Nursing Director - [employer]     Employer: [employer]   Tobacco Use    Smoking status: Never    Smokeless tobacco: Never   Substance and Sexual Activity    Alcohol use: Yes     Alcohol/week: 0.0 standard drinks     Types: 1 Standard drinks or equivalent  per week     Comment: rarely    Drug use: No    Sexual activity: Yes     Partners: Female     Birth control/protection: None   Other Topics Concern    Caffeine Use Yes    Legal: Involved in criminal litigation No    Financial Status: Employed Yes    Leisure: Exercise Yes    Childhood History: Raised by parents Yes    Home situation: lives alone No    Education: High School Graduate Yes    Home situation: lives with family Yes    Spirituality: Active Participation No    Spirituality: Private Participation Yes    Patient feels they ought to cut down on drinking/drug use No    Education: Bachelor's Degree Yes    Legal consequences of chemical use No    Patient annoyed by others criticizing their drinking/drug use No    Legal: Arrest history No    Patient has felt bad or guilty about drinking/drug use No    Legal: Involved in civil litigation No    Patient has had a drink/used drugs as an eye opener in the AM No     Social Determinants of Health     Financial Resource Strain: Unknown    Difficulty of Paying Living Expenses: Patient refused   Food Insecurity: Unknown    Worried About Running Out of Food in the Last Year: Patient refused    Ran Out of Food in the Last Year: Patient refused   Transportation Needs: Unknown    Lack of Transportation (Medical): Patient refused    Lack of Transportation (Non-Medical): Patient refused   Physical Activity: Unknown    Days of Exercise per Week: Patient refused   Stress: Unknown    Feeling of Stress : Patient refused   Social Connections: Unknown    Frequency of Communication with Friends and Family: Patient refused    Frequency of Social Gatherings with Friends and Family: Patient refused    Active Member of Clubs or Organizations: Patient refused    Marital Status: Patient refused   Housing Stability: Unknown    Unable to Pay for Housing in the Last Year: Patient refused    Unstable Housing in the Last Year: Patient refused       FAMILY HISTORY:     Family History   Problem  Relation Age of Onset    Anxiety disorder Mother     Hypertension Mother     Depression Mother     Bipolar disorder Brother     Schizophrenia Brother     Drug abuse Brother     Mental illness Brother         BPD    Bipolar disorder Cousin     Hypertension Father     Diabetes Father        REVIEW OF SYSTEMS:   Review of Systems   Constitutional:  Positive for malaise/fatigue. Negative for chills, diaphoresis and fever.   HENT:  Negative for nosebleeds.    Eyes:  Negative for blurred vision, double vision and photophobia.   Respiratory:  Negative for hemoptysis, shortness of breath and wheezing.    Cardiovascular:  Negative for chest pain, palpitations, orthopnea, claudication, leg swelling and PND.   Gastrointestinal:  Negative for abdominal pain, blood in stool, heartburn, melena, nausea and vomiting.   Genitourinary:  Negative for flank pain and hematuria.   Musculoskeletal:  Negative for falls, myalgias and neck pain.   Skin:  Negative for rash.   Neurological:  Positive for dizziness. Negative for seizures, loss of consciousness, weakness and headaches.   Endo/Heme/Allergies:  Negative for polydipsia. Does not bruise/bleed easily.   Psychiatric/Behavioral:  Negative for depression and memory loss. The patient is not nervous/anxious.      PHYSICAL EXAM:     Vitals:    12/09/22 0910   BP: 135/85   Pulse: 93   Resp: 16    Body mass index is 33.14 kg/m².  Weight: 120.2 kg (265 lb 1.7 oz)         Physical Exam  Vitals reviewed.   Constitutional:       General: He is not in acute distress.     Appearance: He is well-developed. He is obese. He is not ill-appearing, toxic-appearing or diaphoretic.   HENT:      Head: Normocephalic and atraumatic.   Eyes:      General: No scleral icterus.     Extraocular Movements: Extraocular movements intact.      Conjunctiva/sclera: Conjunctivae normal.      Pupils: Pupils are equal, round, and reactive to light.   Neck:      Thyroid: No thyromegaly.      Vascular: Normal carotid  pulses. No carotid bruit or JVD.      Trachea: Trachea normal.   Cardiovascular:      Rate and Rhythm: Normal rate and regular rhythm.      Pulses:           Carotid pulses are 2+ on the right side and 2+ on the left side.     Heart sounds: S1 normal and S2 normal. No murmur heard.    No friction rub. No gallop.   Pulmonary:      Effort: Pulmonary effort is normal. No respiratory distress.      Breath sounds: Normal breath sounds. No stridor. No wheezing, rhonchi or rales.   Chest:      Chest wall: No tenderness.   Abdominal:      General: There is no distension.      Palpations: Abdomen is soft.   Musculoskeletal:         General: No swelling or tenderness. Normal range of motion.      Cervical back: Normal range of motion and neck supple. No edema or rigidity.      Right lower leg: No edema.      Left lower leg: No edema.   Feet:      Right foot:      Skin integrity: No ulcer.      Left foot:      Skin integrity: No ulcer.   Skin:     General: Skin is warm and dry.      Coloration: Skin is not jaundiced.   Neurological:      General: No focal deficit present.      Mental Status: He is alert and oriented to person, place, and time.      Cranial Nerves: No cranial nerve deficit.   Psychiatric:         Mood and Affect: Mood normal.         Speech: Speech normal.         Behavior: Behavior normal. Behavior is cooperative.       DATA:   EKG: (personally reviewed tracing)  12/9/22 SR 84, PRWP/LAD    Laboratory:  CBC:  Recent Labs   Lab 01/23/20  1610 11/20/20  1250 05/24/21  1110   WBC 6.17 6.47 6.55   Hemoglobin 12.8 L 13.7 L 13.3 L   Hematocrit 37.5 L 38.7 L 38.1 L   Platelets 200 191 222         CHEMISTRIES:  Recent Labs   Lab 01/23/20  1610 11/20/20  1250 05/24/21  1110 12/09/22  0825   Glucose 93 103 91 86   Sodium 140 140 137 139   Potassium 4.1 4.0 4.0 3.6   BUN 13 14 12 11   Creatinine 1.3 1.1 1.1 1.0   eGFR if African American >60.0 >60 >60.0  --    eGFR if non African American >60.0 >60 >60.0  --    Calcium  9.6 9.8 9.8 9.3         CARDIAC BIOMARKERS:        COAGS:        LIPIDS/LFTS:  Recent Labs   Lab 01/23/20  1610 11/20/20  1250 05/24/21  1110   Cholesterol  --  164  --    Triglycerides  --  248 H  --    HDL  --  29 L  --    LDL Cholesterol  --  85.4  --    Non-HDL Cholesterol  --  135  --    AST 18 23 22   ALT 17 32 25       Lab Results   Component Value Date    TSH 1.679 11/20/2020     The 10-year ASCVD risk score (Jocelyne KEY, et al., 2019) is: 2.1%    Values used to calculate the score:      Age: 42 years      Sex: Male      Is Non- : No      Diabetic: No      Tobacco smoker: No      Systolic Blood Pressure: 135 mmHg      Is BP treated: Yes      HDL Cholesterol: 29 mg/dL      Total Cholesterol: 164 mg/dL      Cardiovascular Testing:  EVM 8/15/22  Insert baseline sinus with heart rate 91 beats per minute.  Only 1 other activation was made.  On 08/22 no symptom was present when the recording showed sinus tachycardia with heart rate of 131 beats per minute.    Impression: Sinus rhythm/sinus tachycardia.  Negative event monitor.    Renal art US 7/22/22  There is insignificant stenosis (0-59%) in the Right Renal Artery.  Right kidney 11.60 cm.  There is insignificant stenosis (0-59%) in the Left Renal Artery. Borderline increased flow velocity noted across prox left renal artery (181 cm/sec).  Left kidney 13.00 cm.    Holter 7/8/22  Sinus rhythm with heart rates varying between 52 and 152 BPM with an average of 85 BPM.  There were very rare PACs    Echo 7/6/22 (Ao root 4.1cm)  The left ventricle is normal in size with normal systolic function.  The estimated ejection fraction is 65%.  Normal left ventricular diastolic function.  Normal right ventricular size with normal right ventricular systolic function.  Normal central venous pressure (3 mmHg).  The estimated PA systolic pressure is 27 mmHg.  There is a patent foramen ovale present with left to right shunting.    ETT 7/6/22   The EKG portion  of this study is negative for ischemia. (Sekou 9:00, 10 METS, 91% MPHR).        ASSESSMENT:   # HTN, borderline controlled, uncontrolled at home.  ?r/t untreated ALEXX or other meds  # palps, persistent.  EVM 8/2022 neg.  # CP, atyp.  ETT/echo 7/2022 neg.  # abnl EKG  # ALEXX, not on CPAP  # PFO (echo 7/2022)  # Ao root dil, 4.1cm (echo 7/2022)    PLAN:   Cont med rx  Inc losartan 50mg qd  Check BMP 1 week  Repeat EVM (ILR +/- EP eval as contingency)  Follow up with sleep med to get on CPAP, pt strongly encouraged to follow up.  RTC 3 months with surveillance echo (March 2023)      Kendall Schaeffer MD, FACC

## 2022-12-13 ENCOUNTER — TELEPHONE (OUTPATIENT)
Dept: CARDIOLOGY | Facility: CLINIC | Age: 42
End: 2022-12-13
Payer: COMMERCIAL

## 2022-12-16 ENCOUNTER — CLINICAL SUPPORT (OUTPATIENT)
Dept: CARDIOLOGY | Facility: HOSPITAL | Age: 42
End: 2022-12-16
Attending: INTERNAL MEDICINE
Payer: COMMERCIAL

## 2022-12-16 DIAGNOSIS — R42 DIZZINESS: ICD-10-CM

## 2022-12-16 PROCEDURE — 93272 ECG/REVIEW INTERPRET ONLY: CPT | Mod: ,,, | Performed by: INTERNAL MEDICINE

## 2022-12-16 PROCEDURE — 93270 REMOTE 30 DAY ECG REV/REPORT: CPT

## 2022-12-16 PROCEDURE — 93272 CARDIAC EVENT MONITOR (CUPID ONLY): ICD-10-PCS | Mod: ,,, | Performed by: INTERNAL MEDICINE

## 2022-12-21 ENCOUNTER — TELEPHONE (OUTPATIENT)
Dept: SLEEP MEDICINE | Facility: OTHER | Age: 42
End: 2022-12-21
Payer: COMMERCIAL

## 2022-12-22 ENCOUNTER — HOSPITAL ENCOUNTER (OUTPATIENT)
Dept: SLEEP MEDICINE | Facility: OTHER | Age: 42
Discharge: HOME | End: 2022-12-22
Payer: COMMERCIAL

## 2022-12-22 DIAGNOSIS — G47.33 OSA (OBSTRUCTIVE SLEEP APNEA): ICD-10-CM

## 2022-12-22 PROCEDURE — 95810 POLYSOM 6/> YRS 4/> PARAM: CPT

## 2022-12-23 ENCOUNTER — PATIENT MESSAGE (OUTPATIENT)
Dept: CARDIOLOGY | Facility: CLINIC | Age: 42
End: 2022-12-23
Payer: COMMERCIAL

## 2022-12-23 NOTE — PROGRESS NOTES
Baseline PSG was performed on [facility]. The whole procedure was explained to the patient, and instructions were given on how to call out for assistance. Than kyou letter was given to the patient.    Patient did not meet criteria for a split night study

## 2022-12-29 ENCOUNTER — PATIENT MESSAGE (OUTPATIENT)
Dept: SLEEP MEDICINE | Facility: CLINIC | Age: 42
End: 2022-12-29
Payer: COMMERCIAL

## 2023-01-01 PROCEDURE — 95810 PR POLYSOMNOGRAPHY, 4 OR MORE: ICD-10-PCS | Mod: 26,,, | Performed by: INTERNAL MEDICINE

## 2023-01-01 PROCEDURE — 95810 POLYSOM 6/> YRS 4/> PARAM: CPT | Mod: 26,,, | Performed by: INTERNAL MEDICINE

## 2023-01-01 NOTE — PROCEDURES
"    [facility]    Polysomnography Interpretation Report    Patient Name:  Jatinder Moreno  MRN#:  [id number]  :  1980  Study Date:  2022  Referring Provider:  SOREN Roland NP    Indications for Polysomnography:  The patient is a 42 year old Male who is 6' 3" and weighs 265.0 lbs.  His BMI equals 33.3.  [place] was - and Neck Circumference was -.  A full night polysomnogram was performed to evaluate for -.    Polysomnogram Data  A full night polysomnogram recorded the standard physiologic parameters including EEG, EOG, EMG, EKG, nasal and oral airflow.  Respiratory parameters of chest and abdominal movements were recorded with [place]Crystal motion transducers.  Oxygen saturation was recorded by pulse oximetry.    Sleep Architecture  The total recording time of the polysomnogram was 412.4 minutes.  The total sleep time was 340.0 minutes.  The patient spent 9.6% of total sleep time in Stage N1, 47.5% in Stage N2, 22.8% in Stages N3, and 20.1% in REM.  Sleep latency was 49.6 minutes.  REM latency was 93.5 minutes.  Sleep Efficiency was 82.5%.  Wake after sleep onset was 22.5 minutes.    Respiratory Events  The polysomnogram revealed a presence of - obstructive, - central, and - mixed apneas resulting in a Total Apnea index of - events per hour.  There were 29 hypopneas resulting in a Total Hypopnea index of 5.1 events per hour.  The combined Apnea/Hypopnea index was 5.1 events per hour.  There were a total of - RERA events resulting in a Respiratory Disturbance Index (RDI) of 5.1 events per hour.     Mean oxygen saturation was 95.1%.  The lowest oxygen saturation during sleep was 87.0%.  Time spent ?88% oxygen saturation was 0.1 minutes (-).    Limb Activity  There were 8 limb movements recorded.     Cardiac:  single lead EKG revealed normal sinus rhythm    Oxygenation:  Hypoxemia was only observed in relation to obstructive events.    Impression:  -mild obstructive sleep apnea " "    Recommendations:    -treatment for the mild ALEXX seen in this study could be considered  -the patient has follow up with Sleep Medicine      Kendall Coffey MD    (This Sleep Study was interpreted by a Board Certified Sleep Specialist who conducted an epoch-by-epoch review of the entire raw data recording.)  (The indication for this sleep study was reviewed and deemed appropriate by AASM Practice Parameters or other reasons by a Board Certified Sleep Specialist.)    [facility]    Diagnostic PSG Report    Patient Name: Jatinder Moreno Study Date: 12/22/2022   YOB: 1980 MRN #: [id number]   Age: 42 year CARMEN #: [id number]   Sex: Male Referring Provider: SOREN Roland NP   Height: 6' 3" Recording Tech: Sunil Dorado RPSGT   Weight: 265.0 lbs Scoring Tech: Ron Coffman RRT RPSGT   BMI: 33.3 Interpreting Physician: -   ESS: - Neck Circumference: -     Study Overview    Lights Off: 10:28:24 PM  Count Index   Lights On: 05:20:45 AM Awakenings: 18 3.2   Time in Bed: 412.4 min. Arousals: 48 8.5   Total Sleep Time: 340.0 min. Apneas & Hypopneas: 29 5.1    Sleep Efficiency: 82.5% Limb Movements: 8 1.4   Sleep Latency: 49.6 min. Snores: - -   Wake After Sleep Onset: 22.5 min. Desaturations: 27 4.8    REM Latency from Sleep Onset: 93.5 min. Minimum SpO2 TST: 87.0%        Sleep Architecture   % of Time in Bed  Stages Time (mins) % Sleep Time   Wake 73.0    Stage N1 32.5 9.6%   Stage N2 161.5 47.5%   Stage N3 77.5 22.8%   REM 68.5 20.1%         Arousal Summary     NREM REM Sleep Index   Respiratory Arousals 3 2 5 0.9   PLM Arousals - - - -   Isolated Limb Movement Arousals - - - -   Spontaneous Arousals 36 7 43 7.6   Total 39 9 48 8.5       Limb Movement Summary     Count Index   Isolated Limb Movements - -   Periodic Limb Movements (PLMs) 8 1.4   Total Limb Movements 8 1.4         Respiratory Summary     By Sleep Stage By Body Position Total    NREM REM Supine Non-Supine    Time (min) 271.5 68.5 " 161.5 178.5 340.0           Obstructive Apnea - - - - -   Mixed Apnea - - - - -   Central Apnea - - - - -   Total Apneas - - - - -   Total Apnea Index - - - - -           Hypopnea 13 16 22 7 29   Hypopnea Index 2.9 14.0 8.2 2.4 5.1           Apnea & Hypopnea 13 16 22 7 29   Apnea & Hypopnea Index 2.9 14.0 8.2 2.4 5.1           RERAs - - - - -   RERA Index - - - - -           RDI 2.9 14.0 8.2 2.4 5.1     Scoring Criteria: Hypopneas scored at 3% desaturation criteria.    Respiratory Event Durations     Apnea Hypopnea    NREM REM NREM REM   Average (seconds) - - 16.5 27.0   Maximum (seconds) - - 26.1 75.3       Oxygen Saturation Summary     Wake NREM REM TST TIB   Average SpO2 96.2% 94.8% 95.0% 94.8% 95.1%   Minimum SpO2 88.0% 87.0% 87.0% 87.0% 87.0%   Maximum SpO2 99.0% 98.0% 97.0% 98.0% 99.0%       Oxygen Saturation Distribution    Range (%) Time in range (min) Time in range (%)   90.0 - 100.0 411.2 99.7%   80.0 - 90.0 1.2 0.3%   70.0 - 80.0 - -   60.0 - 70.0 - -   50.0 - 60.0 - -   0.0 - 50.0 - -   Time Spent ?88% SpO2    Range (%) Time in range (min) Time in range (%)   0.0 - 88.0 0.1 0.0%          Count Index   Desaturations 27 4.8      Cardiac Summary     Wake NREM REM Sleep Total   Average Pulse Rate (BPM) 79.9 70.8 71.6 71.0 72.6   Minimum Pulse Rate (BPM) 63.0 59.0 60.0 59.0 59.0   Maximum Pulse Rate (BPM) 96.0 94.0 86.0 94.0 96.0     Pulse Rate Distribution    Range (bpm) Time in range (min) Time in range (%)   0.0 - 40.0 - -   40.0 - 60.0 0.2 0.0%   60.0 - 80.0 376.1 91.1%   80.0 - 100.0 36.7 8.9%   100.0 - 120.0 - -   120.0 - 140.0 - -   140.0 - 200.0 - -     EtCO2 Summary    Stage Min (mmHg) Average (mmHg) Max (mmHg)   Wake - - -   NREM(1+2+3) - - -   REM - - -     Range (mmHg) Time in range (min) Time in range (%)   - - -   - - -   - - -   - - -   - - -     TcCO2 Summary    Stage Min (mmHg) Average (mmHg) Max (mmHg)   Wake - - -   NREM(1+2+3) - - -   REM - - -     Range (mmHg) Time in range (min) Time in  range (%)   20.0 - 40.0 - -   40.0 - 50.0 - -   50.0 - 55.0 - -   55.0 - 100.0 - -   Excluded data <20.0 & >65.0 413.0 100.0%     Comments    -

## 2023-01-03 ENCOUNTER — PATIENT MESSAGE (OUTPATIENT)
Dept: SLEEP MEDICINE | Facility: CLINIC | Age: 43
End: 2023-01-03
Payer: COMMERCIAL

## 2023-01-13 DIAGNOSIS — G43.719 INTRACTABLE CHRONIC MIGRAINE WITHOUT AURA AND WITHOUT STATUS MIGRAINOSUS: ICD-10-CM

## 2023-01-16 ENCOUNTER — PATIENT MESSAGE (OUTPATIENT)
Dept: DERMATOLOGY | Facility: CLINIC | Age: 43
End: 2023-01-16
Payer: COMMERCIAL

## 2023-01-17 ENCOUNTER — OFFICE VISIT (OUTPATIENT)
Dept: DERMATOLOGY | Facility: CLINIC | Age: 43
End: 2023-01-17
Payer: COMMERCIAL

## 2023-01-17 DIAGNOSIS — L73.9 FOLLICULITIS: ICD-10-CM

## 2023-01-17 DIAGNOSIS — L71.9 ROSACEA: Primary | ICD-10-CM

## 2023-01-17 PROCEDURE — 99999 PR PBB SHADOW E&M-EST. PATIENT-LVL IV: CPT | Mod: PBBFAC,,, | Performed by: DERMATOLOGY

## 2023-01-17 PROCEDURE — 1159F MED LIST DOCD IN RCRD: CPT | Mod: CPTII,S$GLB,, | Performed by: DERMATOLOGY

## 2023-01-17 PROCEDURE — 99214 PR OFFICE/OUTPT VISIT, EST, LEVL IV, 30-39 MIN: ICD-10-PCS | Mod: S$GLB,,, | Performed by: DERMATOLOGY

## 2023-01-17 PROCEDURE — 99999 PR PBB SHADOW E&M-EST. PATIENT-LVL IV: ICD-10-PCS | Mod: PBBFAC,,, | Performed by: DERMATOLOGY

## 2023-01-17 PROCEDURE — 1160F PR REVIEW ALL MEDS BY PRESCRIBER/CLIN PHARMACIST DOCUMENTED: ICD-10-PCS | Mod: CPTII,S$GLB,, | Performed by: DERMATOLOGY

## 2023-01-17 PROCEDURE — 1159F PR MEDICATION LIST DOCUMENTED IN MEDICAL RECORD: ICD-10-PCS | Mod: CPTII,S$GLB,, | Performed by: DERMATOLOGY

## 2023-01-17 PROCEDURE — 99214 OFFICE O/P EST MOD 30 MIN: CPT | Mod: S$GLB,,, | Performed by: DERMATOLOGY

## 2023-01-17 PROCEDURE — 1160F RVW MEDS BY RX/DR IN RCRD: CPT | Mod: CPTII,S$GLB,, | Performed by: DERMATOLOGY

## 2023-01-17 RX ORDER — DAPSONE 75 MG/G
GEL TOPICAL
Qty: 60 G | Refills: 2 | Status: SHIPPED | OUTPATIENT
Start: 2023-01-17

## 2023-01-17 RX ORDER — SODIUM SULFACETAMIDE AND SULFUR 80; 40 MG/ML; MG/ML
SOLUTION TOPICAL
Qty: 473 ML | Refills: 3 | Status: SHIPPED | OUTPATIENT
Start: 2023-01-17 | End: 2024-02-06 | Stop reason: SDUPTHER

## 2023-01-17 RX ORDER — CICLOPIROX 1 G/100ML
SHAMPOO TOPICAL
Qty: 120 ML | Refills: 3 | Status: SHIPPED | OUTPATIENT
Start: 2023-01-17 | End: 2024-01-11 | Stop reason: SDUPTHER

## 2023-01-17 NOTE — PROGRESS NOTES
Subjective:       Patient ID:  Jatinder Moreno III is a 42 y.o. male who presents for   Chief Complaint   Patient presents with    Acne     Follow up     Patient is here today for an acne follow up.     Pt was last seen on 10/05/2022 for a virtual visit for acne.     Pt feels his skin is dry. Pt uses amzeeq for flares  He took augmentin this summer and feels this helped a lot. He has less flares on his nose.    Pt feels when he has been on oral steroids this causes a flare        Acne - Follow-up  Symptom course: improving  Currently using: minocycline.  Affected locations: face    Review of Systems   HENT:  Positive for headaches (slight). Negative for nosebleeds.    Gastrointestinal:  Negative for diarrhea.   Musculoskeletal:  Negative for arthralgias.   Skin:  Positive for activity-related sunscreen use. Negative for daily sunscreen use and recent sunburn.   Neurological:  Positive for headaches (slight).   Psychiatric/Behavioral:  Negative for depressed mood.    Hematologic/Lymphatic: Does not bruise/bleed easily.      Objective:    Physical Exam   Constitutional: He appears well-developed and well-nourished. No distress.   Neurological: He is alert and oriented to person, place, and time. He is not disoriented.   Psychiatric: He has a normal mood and affect.   Skin:   Areas Examined (abnormalities noted in diagram):   Scalp / Hair Palpated and Inspected  Head / Face Inspection Performed            Diagram Legend     Erythematous scaling macule/papule c/w actinic keratosis       Vascular papule c/w angioma      Pigmented verrucoid papule/plaque c/w seborrheic keratosis      Yellow umbilicated papule c/w sebaceous hyperplasia      Irregularly shaped tan macule c/w lentigo     1-2 mm smooth white papules consistent with Milia      Movable subcutaneous cyst with punctum c/w epidermal inclusion cyst      Subcutaneous movable cyst c/w pilar cyst      Firm pink to brown papule c/w dermatofibroma       Pedunculated fleshy papule(s) c/w skin tag(s)      Evenly pigmented macule c/w junctional nevus     Mildly variegated pigmented, slightly irregular-bordered macule c/w mildly atypical nevus      Flesh colored to evenly pigmented papule c/w intradermal nevus       Pink pearly papule/plaque c/w basal cell carcinoma      Erythematous hyperkeratotic cursted plaque c/w SCC      Surgical scar with no sign of skin cancer recurrence      Open and closed comedones      Inflammatory papules and pustules      Verrucoid papule consistent consistent with wart     Erythematous eczematous patches and plaques     Dystrophic onycholytic nail with subungual debris c/w onychomycosis     Umbilicated papule    Erythematous-base heme-crusted tan verrucoid plaque consistent with inflamed seborrheic keratosis     Erythematous Silvery Scaling Plaque c/w Psoriasis     See annotation      Assessment / Plan:        Rosacea  -     sulfacetamide sodium-sulfur (SULFACLEANSE 8-4) 8-4 % Susp; Wash face qhs  Dispense: 473 mL; Refill: 3  -     dapsone (ACZONE) 7.5 % GlwP; Apply to affected area bid  Dispense: 60 g; Refill: 2    Folliculitis  -     sulfacetamide sodium-sulfur (SULFACLEANSE 8-4) 8-4 % Susp; Wash face qhs  Dispense: 473 mL; Refill: 3  -     ciclopirox 1 % shampoo; Apply to scalp, let sit 5minutes, lather and rinse tiw  Dispense: 120 mL; Refill: 3  -     dapsone (ACZONE) 7.5 % GlwP; Apply to affected area bid  Dispense: 60 g; Refill: 2    Wash with ciclopirox shampoo 3x per week  Wash face with sulfacleanser nightly and use sunscreen in the am - cetaphil sunscreen   Use aczone or amzeeq for spot tx  Continue zinc/copper/tumeric supplement.     Pt did well on oral dapsone in the past. Also feels augmentin helped more than doxy            Follow up in about 6 months (around 7/17/2023).

## 2023-01-17 NOTE — PATIENT INSTRUCTIONS
Wash with ciclopirox shampoo 3x per week  Wash face with sulfacleanser nightly and use sunscreen in the am - cetaphil sunscreen   Use aczone or amzeeq for spot tx  Continue zinc/copper/tumeric supplement.

## 2023-02-28 ENCOUNTER — PATIENT MESSAGE (OUTPATIENT)
Dept: PSYCHIATRY | Facility: CLINIC | Age: 43
End: 2023-02-28
Payer: COMMERCIAL

## 2023-03-01 RX ORDER — METHYLPHENIDATE HYDROCHLORIDE 20 MG/1
20 TABLET ORAL
Qty: 90 TABLET | Refills: 0 | Status: SHIPPED | OUTPATIENT
Start: 2023-03-01 | End: 2024-02-02 | Stop reason: SDUPTHER

## 2023-03-27 NOTE — PROCEDURE: DEFER
[address]     Thank you Detail Level: Detailed

## 2023-05-02 DIAGNOSIS — G43.719 INTRACTABLE CHRONIC MIGRAINE WITHOUT AURA AND WITHOUT STATUS MIGRAINOSUS: ICD-10-CM

## 2023-05-02 RX ORDER — TRAMADOL HYDROCHLORIDE 50 MG/1
50 TABLET ORAL EVERY 6 HOURS
Qty: 30 TABLET | Refills: 0 | Status: SHIPPED | OUTPATIENT
Start: 2023-05-02 | End: 2023-07-14

## 2023-05-09 ENCOUNTER — TELEPHONE (OUTPATIENT)
Dept: PHARMACY | Facility: CLINIC | Age: 43
End: 2023-05-09
Payer: COMMERCIAL

## 2023-05-10 ENCOUNTER — TELEPHONE (OUTPATIENT)
Dept: PHARMACY | Facility: CLINIC | Age: 43
End: 2023-05-10
Payer: COMMERCIAL

## 2023-05-23 ENCOUNTER — PATIENT MESSAGE (OUTPATIENT)
Dept: CARDIOLOGY | Facility: CLINIC | Age: 43
End: 2023-05-23

## 2023-05-23 ENCOUNTER — HOSPITAL ENCOUNTER (OUTPATIENT)
Dept: CARDIOLOGY | Facility: HOSPITAL | Age: 43
Discharge: HOME | End: 2023-05-23
Attending: INTERNAL MEDICINE
Payer: COMMERCIAL

## 2023-05-23 VITALS — HEIGHT: 75 IN | WEIGHT: 265 LBS | BODY MASS INDEX: 32.95 KG/M2

## 2023-05-23 DIAGNOSIS — I77.810 AORTIC ROOT DILATATION: ICD-10-CM

## 2023-05-23 LAB
ASCENDING AORTA: 3.52 CM
AV INDEX (PROSTH): 0.98
AV MEAN GRADIENT: 2 MMHG
AV PEAK GRADIENT: 4 MMHG
AV VALVE AREA: 4.37 CM2
AV VELOCITY RATIO: 1.04
BSA FOR ECHO PROCEDURE: 2.52 M2
CV ECHO LV RWT: 0.36 CM
DOP CALC AO PEAK VEL: 0.98 M/S
DOP CALC AO VTI: 20.4 CM
DOP CALC LVOT AREA: 4.5 CM2
DOP CALC LVOT DIAMETER: 2.39 CM
DOP CALC LVOT PEAK VEL: 1.02 M/S
DOP CALC LVOT STROKE VOLUME: 89.23 CM3
DOP CALCLVOT PEAK VEL VTI: 19.9 CM
E WAVE DECELERATION TIME: 172.49 MSEC
E/A RATIO: 1.39
E/E' RATIO: 9.38 M/S
ECHO LV POSTERIOR WALL: 1.02 CM (ref 0.6–1.1)
EJECTION FRACTION: 55 %
FRACTIONAL SHORTENING: 31 % (ref 28–44)
INTERVENTRICULAR SEPTUM: 0.97 CM (ref 0.6–1.1)
IVC DIAMETER: 1.72 CM
IVRT: 94.2 MSEC
LA MAJOR: 5.37 CM
LA MINOR: 5.42 CM
LA WIDTH: 4.1 CM
LEFT ATRIUM SIZE: 4.3 CM
LEFT ATRIUM VOLUME INDEX: 32.7 ML/M2
LEFT ATRIUM VOLUME: 80.85 CM3
LEFT INTERNAL DIMENSION IN SYSTOLE: 3.89 CM (ref 2.1–4)
LEFT VENTRICLE DIASTOLIC VOLUME INDEX: 63.98 ML/M2
LEFT VENTRICLE DIASTOLIC VOLUME: 158.04 ML
LEFT VENTRICLE MASS INDEX: 90 G/M2
LEFT VENTRICLE SYSTOLIC VOLUME INDEX: 26.6 ML/M2
LEFT VENTRICLE SYSTOLIC VOLUME: 65.64 ML
LEFT VENTRICULAR INTERNAL DIMENSION IN DIASTOLE: 5.67 CM (ref 3.5–6)
LEFT VENTRICULAR MASS: 222.89 G
LV LATERAL E/E' RATIO: 9.38 M/S
LV SEPTAL E/E' RATIO: 9.38 M/S
LVOT MG: 2.33 MMHG
LVOT MV: 0.73 CM/S
MV PEAK A VEL: 0.54 M/S
MV PEAK E VEL: 0.75 M/S
MV STENOSIS PRESSURE HALF TIME: 50.02 MS
MV VALVE AREA P 1/2 METHOD: 4.4 CM2
PISA TR MAX VEL: 1.65 M/S
PULM VEIN S/D RATIO: 0.98
PV PEAK D VEL: 0.49 M/S
PV PEAK S VEL: 0.48 M/S
PV PEAK VELOCITY: 0.87 CM/S
RA MAJOR: 5.31 CM
RA PRESSURE: 8 MMHG
RA WIDTH: 3.8 CM
RIGHT VENTRICULAR END-DIASTOLIC DIMENSION: 3.11 CM
RV TISSUE DOPPLER FREE WALL SYSTOLIC VELOCITY 1 (APICAL 4 CHAMBER VIEW): 0.02 CM/S
SINUS: 4.3 CM
STJ: 3.24 CM
TDI LATERAL: 0.08 M/S
TDI SEPTAL: 0.08 M/S
TDI: 0.08 M/S
TR MAX PG: 11 MMHG
TRICUSPID ANNULAR PLANE SYSTOLIC EXCURSION: 2.84 CM
TV PEAK GRADIENT: 2.5 MMHG
TV REST PULMONARY ARTERY PRESSURE: 19 MMHG

## 2023-05-23 PROCEDURE — 93306 TTE W/DOPPLER COMPLETE: CPT | Mod: 26,,, | Performed by: INTERNAL MEDICINE

## 2023-05-23 PROCEDURE — 93306 TTE W/DOPPLER COMPLETE: CPT

## 2023-05-23 PROCEDURE — 93306 ECHO (CUPID ONLY): ICD-10-PCS | Mod: 26,,, | Performed by: INTERNAL MEDICINE

## 2023-05-31 ENCOUNTER — TELEPHONE (OUTPATIENT)
Dept: PHARMACY | Facility: CLINIC | Age: 43
End: 2023-05-31
Payer: COMMERCIAL

## 2023-06-06 ENCOUNTER — TELEPHONE (OUTPATIENT)
Dept: PHARMACY | Facility: CLINIC | Age: 43
End: 2023-06-06
Payer: COMMERCIAL

## 2023-06-14 ENCOUNTER — OFFICE VISIT (OUTPATIENT)
Dept: CARDIOLOGY | Facility: CLINIC | Age: 43
End: 2023-06-14
Payer: COMMERCIAL

## 2023-06-14 ENCOUNTER — PATIENT MESSAGE (OUTPATIENT)
Dept: SLEEP MEDICINE | Facility: CLINIC | Age: 43
End: 2023-06-14
Payer: COMMERCIAL

## 2023-06-14 ENCOUNTER — PATIENT MESSAGE (OUTPATIENT)
Dept: CARDIOLOGY | Facility: CLINIC | Age: 43
End: 2023-06-14

## 2023-06-14 DIAGNOSIS — I10 ESSENTIAL HYPERTENSION: ICD-10-CM

## 2023-06-14 DIAGNOSIS — Q21.12 PFO (PATENT FORAMEN OVALE): ICD-10-CM

## 2023-06-14 DIAGNOSIS — I77.810 AORTIC ROOT DILATATION: Primary | ICD-10-CM

## 2023-06-14 DIAGNOSIS — G47.33 OSA (OBSTRUCTIVE SLEEP APNEA): ICD-10-CM

## 2023-06-14 PROCEDURE — 99213 PR OFFICE/OUTPT VISIT, EST, LEVL III, 20-29 MIN: ICD-10-PCS | Mod: 95,,, | Performed by: INTERNAL MEDICINE

## 2023-06-14 PROCEDURE — 99213 OFFICE O/P EST LOW 20 MIN: CPT | Mod: 95,,, | Performed by: INTERNAL MEDICINE

## 2023-06-14 PROCEDURE — 1160F PR REVIEW ALL MEDS BY PRESCRIBER/CLIN PHARMACIST DOCUMENTED: ICD-10-PCS | Mod: CPTII,95,, | Performed by: INTERNAL MEDICINE

## 2023-06-14 PROCEDURE — 1159F PR MEDICATION LIST DOCUMENTED IN MEDICAL RECORD: ICD-10-PCS | Mod: CPTII,95,, | Performed by: INTERNAL MEDICINE

## 2023-06-14 PROCEDURE — 4010F PR ACE/ARB THEARPY RXD/TAKEN: ICD-10-PCS | Mod: CPTII,95,, | Performed by: INTERNAL MEDICINE

## 2023-06-14 PROCEDURE — 1160F RVW MEDS BY RX/DR IN RCRD: CPT | Mod: CPTII,95,, | Performed by: INTERNAL MEDICINE

## 2023-06-14 PROCEDURE — 4010F ACE/ARB THERAPY RXD/TAKEN: CPT | Mod: CPTII,95,, | Performed by: INTERNAL MEDICINE

## 2023-06-14 PROCEDURE — 1159F MED LIST DOCD IN RCRD: CPT | Mod: CPTII,95,, | Performed by: INTERNAL MEDICINE

## 2023-06-14 RX ORDER — METHYLPREDNISOLONE 4 MG/1
TABLET ORAL
Qty: 21 EACH | Refills: 0 | Status: SHIPPED | OUTPATIENT
Start: 2023-06-14 | End: 2023-07-05

## 2023-06-14 RX ORDER — LOSARTAN POTASSIUM 100 MG/1
100 TABLET ORAL DAILY
Qty: 90 TABLET | Refills: 3 | Status: SHIPPED | OUTPATIENT
Start: 2023-06-14 | End: 2024-02-06 | Stop reason: SDUPTHER

## 2023-06-14 NOTE — PROGRESS NOTES
CARDIOVASCULAR PROGRESS NOTE    The patient location is: [place]  The chief complaint leading to consultation is: HTN    Visit type: audiovisual    Face to Face time with patient: 10  15 minutes of total time spent on the encounter, which includes face to face time and non-face to face time preparing to see the patient (eg, review of tests), Obtaining and/or reviewing separately obtained history, Documenting clinical information in the electronic or other health record, Independently interpreting results (not separately reported) and communicating results to the patient/family/caregiver, or Care coordination (not separately reported).         Each patient to whom he or she provides medical services by telemedicine is:  (1) informed of the relationship between the physician and patient and the respective role of any other health care provider with respect to management of the patient; and (2) notified that he or she may decline to receive medical services by telemedicine and may withdraw from such care at any time.    Notes:       REASON FOR CONSULT:   Jatinder Moreno III is a 43 y.o. male who presents for f/u HTN.    PCP: Jag  HISTORY OF PRESENT ILLNESS:   The patient returns for follow-up via virtual visit.  He tells me his blood pressure remains uncontrolled with systolics in the 140s.  Denies any angina, or dyspnea.  He continues to have palpitations although these appear to be doing better.  He has had no syncope.      I reviewed the results of his echocardiogram noting progression of his aortic root dilatation to 4.3 cm.  His event monitor was negative for arrhythmia.    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)  ALEXX not yet on CPAP    PAST MEDICAL HISTORY:     Past Medical History:   Diagnosis Date    ADD (attention deficit disorder with hyperactivity) 07/05/2012    ADHD (attention deficit hyperactivity disorder)     Anxiety     Asthma, well controlled      Bronchitis     Essential hypertension 12/09/2022    History of migraine headaches     History of psychiatric care     Seborrheic dermatitis 06/13/2018    Sleep apnea        PAST SURGICAL HISTORY:     Past Surgical History:   Procedure Laterality Date    ESOPHAGOGASTRODUODENOSCOPY      NASAL SEPTOPLASTY N/A 6/3/2021    Procedure: SEPTOPLASTY, NOSE;  Surgeon: Ahmet Bob III, MD;  Location: [facility];  Service: ENT;  Laterality: N/A;    TONSILLECTOMY Bilateral 6/3/2021    Procedure: TONSILLECTOMY;  Surgeon: Ahmet Bob III, MD;  Location: [facility] OR;  Service: ENT;  Laterality: Bilateral;    UVULOPALATOPHARYNGOPLASTY N/A 6/3/2021    Procedure: UPPP (UVULOPALATOPHARYNGOPLASTY);  Surgeon: Ahmet Bob III, MD;  Location: [facility] OR;  Service: ENT;  Laterality: N/A;       ALLERGIES AND MEDICATION:     Review of patient's allergies indicates:   Allergen Reactions    Compazine [prochlorperazine edisylate] Other (See Comments)     Jittery        Medication List            Accurate as of June 14, 2023  3:30 PM. If you have any questions, ask your nurse or doctor.                START taking these medications      methylPREDNISolone 4 mg tablet  Commonly known as: MEDROL DOSEPACK  use as directed  Started by: Linsey Roland NP            CONTINUE taking these medications      AIMOVIG AUTOINJECTOR 140 mg/mL autoinjector  Generic drug: erenumab-aooe  Inject 1 mL (140 mg total) into the skin every 28 days.     albuterol 90 mcg/actuation inhaler  Commonly known as: PROVENTIL HFA  Inhale 2 puffs into the lungs every 6 (six) hours as needed for Wheezing or Shortness of Breath. Rescue     AMZEEQ 4 % Foam  Generic drug: minocycline  Apply thin film to face and scalp qd prn flare     busPIRone 10 MG tablet  Commonly known as: BUSPAR  Take 1 tablet (10 mg total) by mouth 3 (three) times daily.     CAMBIA 50 mg Pwpk  Generic drug: diclofenac potassium  Take 1 packet by mouth every 6 (six) hours.     ciclopirox 1 % shampoo  Apply  to scalp, let sit 5minutes, lather and rinse tiw     clindamycin 1 % external solution  Commonly known as: CLEOCIN T  aaa bid     dapsone 7.5 % Glwp  Commonly known as: ACZONE  Apply to affected area bid     diazePAM 10 MG Tab  Commonly known as: VALIUM  Take 1 tablet (10 mg total) by mouth daily as needed (anxiety).     fluticasone propionate 50 mcg/actuation nasal spray  Commonly known as: FLONASE  2 sprays (100 mcg total) by Each Nostril route once daily.     hydroCHLOROthiazide 25 MG tablet  Commonly known as: HYDRODIURIL  Take 1 tablet (25 mg total) by mouth once daily.     ipratropium 42 mcg (0.06 %) nasal spray  Commonly known as: ATROVENT  2 sprays by Nasal route 3 (three) times daily.     losartan 50 MG tablet  Commonly known as: COZAAR  Take 1 tablet (50 mg total) by mouth once daily.     * methylphenidate HCl 20 MG tablet  Commonly known as: RITALIN  Take 1 tablet (20 mg total) by mouth 3 (three) times daily with meals.     * methylphenidate HCl 20 MG tablet  Commonly known as: RITALIN  Take 1 tablet (20 mg total) by mouth 3 (three) times daily with meals.     * methylphenidate HCl 20 MG tablet  Commonly known as: RITALIN  Take 1 tablet (20 mg total) by mouth 3 (three) times daily with meals.     multivitamin per tablet  Commonly known as: THERAGRAN     NURTEC 75 mg odt  Generic drug: rimegepant  Take 1 tablet (75 mg total) by mouth once as needed for Migraine. Place ODT tablet on the tongue     ondansetron 8 MG Tbdl  Commonly known as: ZOFRAN-ODT  Take 1 tablet (8 mg total) by mouth 2 (two) times daily as needed (nausea).     * sulfacetamide sodium-sulfur 8-4 % Susp  Commonly known as: SULFACLEANSE 8-4  Wash face qhs     * sulfacetamide sodium-sulfur 8-4 % Susp  Commonly known as: SULFACLEANSE 8-4  Wash face qhs     * sumatriptan 100 MG tablet  Commonly known as: IMITREX  Take 1 tablet (100 mg total) by mouth every 2 (two) hours as needed for Migraine. Max 2 tabs per day     * ONZETRA XSAIL 11 mg  Aepb  Generic drug: SUMAtriptan succinate  22 mg by Nasal route every 2 (two) hours as needed.     traMADoL 50 mg tablet  Commonly known as: ULTRAM  Take 1 tablet (50 mg total) by mouth every 6 (six) hours.     vit A and D3 in cod liver oiL 4,000 unit-400 unit/5 mL Liqd           * This list has 7 medication(s) that are the same as other medications prescribed for you. Read the directions carefully, and ask your doctor or other care provider to review them with you.                   Where to Get Your Medications        These medications were sent to [facility]/pharmacy #[id number] - [place] - [address]  [address], [place] [address]      Phone: [phone]   methylPREDNISolone 4 mg tablet         SOCIAL HISTORY:     Social History     Socioeconomic History    Marital status:     Number of children: 0   Occupational History    Occupation: Nursing Director - [employer]     Employer: [employer]   Tobacco Use    Smoking status: Never    Smokeless tobacco: Never   Substance and Sexual Activity    Alcohol use: Yes     Alcohol/week: 0.0 standard drinks     Types: 1 Standard drinks or equivalent per week     Comment: rarely    Drug use: No    Sexual activity: Yes     Partners: Female     Birth control/protection: None   Other Topics Concern    Caffeine Use Yes    Legal: Involved in criminal litigation No    Financial Status: Employed Yes    Leisure: Exercise Yes    Childhood History: Raised by parents Yes    Home situation: lives alone No    Education: High School Graduate Yes    Home situation: lives with family Yes    Spirituality: Active Participation No    Spirituality: Private Participation Yes    Patient feels they ought to cut down on drinking/drug use No    Education: Bachelor's Degree Yes    Legal consequences of chemical use No    Patient annoyed by others criticizing their drinking/drug use No    Legal: Arrest history No    Patient has felt bad or guilty about drinking/drug use No     Legal: Involved in civil litigation No    Patient has had a drink/used drugs as an eye opener in the AM No     Social Determinants of Health     Financial Resource Strain: Medium Risk    Difficulty of Paying Living Expenses: Somewhat hard   Food Insecurity: No Food Insecurity    Worried About Running Out of Food in the Last Year: Never true    Ran Out of Food in the Last Year: Never true   Transportation Needs: No Transportation Needs    Lack of Transportation (Medical): No    Lack of Transportation (Non-Medical): No   Physical Activity: Insufficiently Active    Days of Exercise per Week: 1 day    Minutes of Exercise per Session: 30 min   Stress: Stress Concern Present    Feeling of Stress : Rather much   Social Connections: Unknown    Frequency of Communication with Friends and Family: Once a week    Frequency of Social Gatherings with Friends and Family: Once a week    Active Member of Clubs or Organizations: Yes    Attends Club or Organization Meetings: 1 to 4 times per year    Marital Status:    Housing Stability: Low Risk     Unable to Pay for Housing in the Last Year: No    Number of Places Lived in the Last Year: 2    Unstable Housing in the Last Year: No       FAMILY HISTORY:     Family History   Problem Relation Age of Onset    Anxiety disorder Mother     Hypertension Mother     Depression Mother     Bipolar disorder Brother     Schizophrenia Brother     Drug abuse Brother     Mental illness Brother         BPD    Bipolar disorder Cousin     Hypertension Father     Diabetes Father        REVIEW OF SYSTEMS:   Review of Systems   Constitutional:  Negative for chills, diaphoresis, fever and malaise/fatigue.   HENT:  Negative for nosebleeds.    Eyes:  Negative for blurred vision, double vision and photophobia.   Respiratory:  Negative for hemoptysis, shortness of breath and wheezing.    Cardiovascular:  Negative for chest pain, palpitations, orthopnea, claudication, leg swelling and PND.    Gastrointestinal:  Negative for abdominal pain, blood in stool, heartburn, melena, nausea and vomiting.   Genitourinary:  Negative for flank pain and hematuria.   Musculoskeletal:  Negative for falls, myalgias and neck pain.   Skin:  Negative for rash.   Neurological:  Negative for dizziness, seizures, loss of consciousness, weakness and headaches.   Endo/Heme/Allergies:  Negative for polydipsia. Does not bruise/bleed easily.   Psychiatric/Behavioral:  Negative for depression and memory loss. The patient is not nervous/anxious.      PHYSICAL EXAM:     There were no vitals filed for this visit.   There is no height or weight on file to calculate BMI.            General:  Well-appearing, well-nourished and in no apparent distress.  HEENT:  NC/AT, EOMI, anicteric sclera.  Neck:  Supple, trachea midline.  CNS:  Alert and oriented x3, cranial nerves 2-12 grossly intact.  Psych:  Normal mood and affect.    Per OV 12/9/22:  Physical Exam  Vitals reviewed.   Constitutional:       General: He is not in acute distress.     Appearance: He is well-developed. He is obese. He is not ill-appearing, toxic-appearing or diaphoretic.   HENT:      Head: Normocephalic and atraumatic.   Eyes:      General: No scleral icterus.     Extraocular Movements: Extraocular movements intact.      Conjunctiva/sclera: Conjunctivae normal.      Pupils: Pupils are equal, round, and reactive to light.   Neck:      Thyroid: No thyromegaly.      Vascular: Normal carotid pulses. No carotid bruit or JVD.      Trachea: Trachea normal.   Cardiovascular:      Rate and Rhythm: Normal rate and regular rhythm.      Pulses:           Carotid pulses are 2+ on the right side and 2+ on the left side.     Heart sounds: S1 normal and S2 normal. No murmur heard.    No friction rub. No gallop.   Pulmonary:      Effort: Pulmonary effort is normal. No respiratory distress.      Breath sounds: Normal breath sounds. No stridor. No wheezing, rhonchi or rales.   Chest:       Chest wall: No tenderness.   Abdominal:      General: There is no distension.      Palpations: Abdomen is soft.   Musculoskeletal:         General: No swelling or tenderness. Normal range of motion.      Cervical back: Normal range of motion and neck supple. No edema or rigidity.      Right lower leg: No edema.      Left lower leg: No edema.   Feet:      Right foot:      Skin integrity: No ulcer.      Left foot:      Skin integrity: No ulcer.   Skin:     General: Skin is warm and dry.      Coloration: Skin is not jaundiced.   Neurological:      General: No focal deficit present.      Mental Status: He is alert and oriented to person, place, and time.      Cranial Nerves: No cranial nerve deficit.   Psychiatric:         Mood and Affect: Mood normal.         Speech: Speech normal.         Behavior: Behavior normal. Behavior is cooperative.       DATA:   EKG: (personally reviewed tracing)  12/9/22 SR 84, PRWP/LAD    Laboratory:  CBC:  Recent Labs   Lab 11/20/20  1250 05/24/21  1110   WBC 6.47 6.55   Hemoglobin 13.7 L 13.3 L   Hematocrit 38.7 L 38.1 L   Platelets 191 222         CHEMISTRIES:  Recent Labs   Lab 11/20/20  1250 05/24/21  1110 12/09/22  0825   Glucose 103 91 86   Sodium 140 137 139   Potassium 4.0 4.0 3.6   BUN 14 12 11   Creatinine 1.1 1.1 1.0   eGFR if African American >60 >60.0  --    eGFR if non African American >60 >60.0  --    Calcium 9.8 9.8 9.3         CARDIAC BIOMARKERS:        COAGS:        LIPIDS/LFTS:  Recent Labs   Lab 11/20/20  1250 05/24/21  1110   Cholesterol 164  --    Triglycerides 248 H  --    HDL 29 L  --    LDL Cholesterol 85.4  --    Non-HDL Cholesterol 135  --    AST 23 22   ALT 32 25       Lab Results   Component Value Date    TSH 1.679 11/20/2020     The 10-year ASCVD risk score (Jocelyne DK, et al., 2019) is: 2.4%    Values used to calculate the score:      Age: 43 years      Sex: Male      Is Non- : No      Diabetic: No      Tobacco smoker: No      Systolic  Blood Pressure: 135 mmHg      Is BP treated: Yes      HDL Cholesterol: 29 mg/dL      Total Cholesterol: 164 mg/dL      Cardiovascular Testing:  Echo 5/23/23  The left ventricle is mildly enlarged with normal systolic function.  The estimated ejection fraction is 55%.  Normal right ventricular size with normal right ventricular systolic function.  The aortic root is mildly-moderately dilated, 4.3cm (was 4.1cm on report 6/27/22).  The estimated PA systolic pressure is 19 mmHg.    EVM 1/13/23  Negative event monitor with no clinical arrhythmias.  Symptoms corresponding with sinus tachycardia.    Renal art US 7/22/22  There is insignificant stenosis (0-59%) in the Right Renal Artery.  Right kidney 11.60 cm.  There is insignificant stenosis (0-59%) in the Left Renal Artery. Borderline increased flow velocity noted across prox left renal artery (181 cm/sec).  Left kidney 13.00 cm.    Holter 7/8/22  Sinus rhythm with heart rates varying between 52 and 152 BPM with an average of 85 BPM.  There were very rare PACs    Echo 7/6/22 (Ao root 4.1cm)  The left ventricle is normal in size with normal systolic function.  The estimated ejection fraction is 65%.  Normal left ventricular diastolic function.  Normal right ventricular size with normal right ventricular systolic function.  Normal central venous pressure (3 mmHg).  The estimated PA systolic pressure is 27 mmHg.  There is a patent foramen ovale present with left to right shunting.    ETT 7/6/22   The EKG portion of this study is negative for ischemia. (Sekou 9:00, 10 METS, 91% MPHR).        ASSESSMENT:   # HTN, borderline controlled, uncontrolled at home.  ?r/t untreated ALEXX or other meds.  Prev intol of propranolol.  # palps, resolved.  EVM 8/2022 neg.  # CP, atyp, resolved.  ETT/echo 7/2022 neg.  # abnl EKG  # ALEXX  # PFO (echo 7/2022)  # Ao root dil, 4.1cm (echo 7/2022)    PLAN:   Cont med rx  Inc losartan 100mg qd  Check BMP 1 week  RTC 6 months with surveillance echo  (Dec 2023), sooner if BP remains uncontrolled.  Will try BBL next if pt able to tolerate..      Kendall Schaeffer MD, FACC

## 2023-07-12 RX ORDER — HYDROCHLOROTHIAZIDE 25 MG/1
TABLET ORAL
Qty: 90 TABLET | Refills: 3 | Status: SHIPPED | OUTPATIENT
Start: 2023-07-12 | End: 2024-02-06 | Stop reason: SDUPTHER

## 2023-07-14 DIAGNOSIS — G43.719 INTRACTABLE CHRONIC MIGRAINE WITHOUT AURA AND WITHOUT STATUS MIGRAINOSUS: ICD-10-CM

## 2023-07-14 RX ORDER — TRAMADOL HYDROCHLORIDE 50 MG/1
TABLET ORAL
Qty: 30 TABLET | Refills: 0 | Status: SHIPPED | OUTPATIENT
Start: 2023-07-14 | End: 2023-09-22 | Stop reason: SDUPTHER

## 2023-07-14 RX ORDER — DICLOFENAC POTASSIUM 50 MG/1
POWDER, FOR SOLUTION ORAL
Qty: 30 EACH | Refills: 3 | Status: SHIPPED | OUTPATIENT
Start: 2023-07-14

## 2023-08-21 ENCOUNTER — PATIENT MESSAGE (OUTPATIENT)
Dept: CARDIOLOGY | Facility: CLINIC | Age: 43
End: 2023-08-21
Payer: COMMERCIAL

## 2023-09-22 DIAGNOSIS — G43.719 INTRACTABLE CHRONIC MIGRAINE WITHOUT AURA AND WITHOUT STATUS MIGRAINOSUS: ICD-10-CM

## 2023-09-22 RX ORDER — TRAMADOL HYDROCHLORIDE 50 MG/1
50 TABLET ORAL EVERY 6 HOURS
Qty: 30 TABLET | Refills: 0 | Status: SHIPPED | OUTPATIENT
Start: 2023-09-22 | End: 2023-11-15 | Stop reason: SDUPTHER

## 2023-09-22 NOTE — TELEPHONE ENCOUNTER
Requested Prescriptions     Pending Prescriptions Disp Refills    traMADoL (ULTRAM) 50 mg tablet 30 tablet 0     Sig: Take 1 tablet (50 mg total) by mouth every 6 (six) hours.     LOV 12/9/2022

## 2023-09-28 ENCOUNTER — PATIENT MESSAGE (OUTPATIENT)
Dept: DERMATOLOGY | Facility: CLINIC | Age: 43
End: 2023-09-28
Payer: COMMERCIAL

## 2023-09-28 DIAGNOSIS — L73.9 FOLLICULITIS: Primary | ICD-10-CM

## 2023-10-02 ENCOUNTER — OFFICE VISIT (OUTPATIENT)
Dept: DERMATOLOGY | Facility: CLINIC | Age: 43
End: 2023-10-02
Payer: COMMERCIAL

## 2023-10-02 DIAGNOSIS — L71.9 ROSACEA: ICD-10-CM

## 2023-10-02 DIAGNOSIS — L73.9 FOLLICULITIS: Primary | ICD-10-CM

## 2023-10-02 PROCEDURE — 99214 OFFICE O/P EST MOD 30 MIN: CPT | Mod: 95,,, | Performed by: DERMATOLOGY

## 2023-10-02 PROCEDURE — 1159F MED LIST DOCD IN RCRD: CPT | Mod: CPTII,95,, | Performed by: DERMATOLOGY

## 2023-10-02 PROCEDURE — 1160F RVW MEDS BY RX/DR IN RCRD: CPT | Mod: CPTII,95,, | Performed by: DERMATOLOGY

## 2023-10-02 PROCEDURE — 1160F PR REVIEW ALL MEDS BY PRESCRIBER/CLIN PHARMACIST DOCUMENTED: ICD-10-PCS | Mod: CPTII,95,, | Performed by: DERMATOLOGY

## 2023-10-02 PROCEDURE — 4010F PR ACE/ARB THEARPY RXD/TAKEN: ICD-10-PCS | Mod: CPTII,95,, | Performed by: DERMATOLOGY

## 2023-10-02 PROCEDURE — 1159F PR MEDICATION LIST DOCUMENTED IN MEDICAL RECORD: ICD-10-PCS | Mod: CPTII,95,, | Performed by: DERMATOLOGY

## 2023-10-02 PROCEDURE — 99214 PR OFFICE/OUTPT VISIT, EST, LEVL IV, 30-39 MIN: ICD-10-PCS | Mod: 95,,, | Performed by: DERMATOLOGY

## 2023-10-02 PROCEDURE — 4010F ACE/ARB THERAPY RXD/TAKEN: CPT | Mod: CPTII,95,, | Performed by: DERMATOLOGY

## 2023-10-02 RX ORDER — CLOBETASOL PROPIONATE 0.46 MG/ML
SOLUTION TOPICAL
Qty: 50 ML | Refills: 3 | Status: SHIPPED | OUTPATIENT
Start: 2023-10-02

## 2023-10-02 NOTE — PATIENT INSTRUCTIONS
Clobetasol solution:Mix in bottle of DHS zinc; apply to dry scalp, let sit 5 minutes, then shower, lather and rinse BIW.  Shake well before using.     Can apply Amzeeq to scalp for 1-2 hours then wash off in shower.    Can use dapsone in the am     Cln sports wash 2x per week to scalp     Wash face with sulfacleanser only 1x per day or every other day; can apply amzeeq foam to face and leave on 1-2 hours then rinse off.    Can apply Winlevi to facial lesions 2x per day     Resume doxycycline 100 mg po qd   Discussed benefits and risks of doxycyline therapy including but not limited to GI discomfort, esophageal irritation/ulceration, and increased sun sensitivity. Patient was counseled to take medicine with meals and at least 1 hour before lying down.

## 2023-10-02 NOTE — PROGRESS NOTES
Subjective:      Patient ID:  Jatinder Moreno III is a 43 y.o. male who presents for No chief complaint on file.    The patient location is: [place]  The chief complaint leading to consultation is: rosacea and folliculitis    Visit type: audiovisual    Face to Face time with patient: 11 minutes  17 minutes of total time spent on the encounter, which includes face to face time and non-face to face time preparing to see the patient (eg, review of tests), Obtaining and/or reviewing separately obtained history, Documenting clinical information in the electronic or other health record, Independently interpreting results (not separately reported) and communicating results to the patient/family/caregiver, or Care coordination (not separately reported).         Each patient to whom he or she provides medical services by telemedicine is:  (1) informed of the relationship between the physician and patient and the respective role of any other health care provider with respect to management of the patient; and (2) notified that he or she may decline to receive medical services by telemedicine and may withdraw from such care at any time.    Notes: here for scalp folliculitis follow up   In past 4-5 months flaring but not as bad as before. Itching and inflamed. Has more around his mouth and around chin.  Feels the lesions around his chin are deep and tender. Pt has amzeeq foam but using dapsone bc amzeeq is yellow and crusty.     Pt alternating ciclopirox and sulfacleanser to scalp and face. Feels it is drying .  Using cetaphil and cerave which is less drying.             Review of Systems   Skin:  Positive for itching and rash.       Objective:   Physical Exam   Constitutional: He appears well-developed and well-nourished. No distress.   Neurological: He is alert and oriented to person, place, and time. He is not disoriented.   Psychiatric: He has a normal mood and affect.   Skin:   Areas Examined (abnormalities noted in  diagram):   Scalp / Hair Palpated and Inspected  Head / Face Inspection Performed              Diagram Legend     Erythematous scaling macule/papule c/w actinic keratosis       Vascular papule c/w angioma      Pigmented verrucoid papule/plaque c/w seborrheic keratosis      Yellow umbilicated papule c/w sebaceous hyperplasia      Irregularly shaped tan macule c/w lentigo     1-2 mm smooth white papules consistent with Milia      Movable subcutaneous cyst with punctum c/w epidermal inclusion cyst      Subcutaneous movable cyst c/w pilar cyst      Firm pink to brown papule c/w dermatofibroma      Pedunculated fleshy papule(s) c/w skin tag(s)      Evenly pigmented macule c/w junctional nevus     Mildly variegated pigmented, slightly irregular-bordered macule c/w mildly atypical nevus      Flesh colored to evenly pigmented papule c/w intradermal nevus       Pink pearly papule/plaque c/w basal cell carcinoma      Erythematous hyperkeratotic cursted plaque c/w SCC      Surgical scar with no sign of skin cancer recurrence      Open and closed comedones      Inflammatory papules and pustules      Verrucoid papule consistent consistent with wart     Erythematous eczematous patches and plaques     Dystrophic onycholytic nail with subungual debris c/w onychomycosis     Umbilicated papule    Erythematous-base heme-crusted tan verrucoid plaque consistent with inflamed seborrheic keratosis     Erythematous Silvery Scaling Plaque c/w Psoriasis     See annotation      Assessment / Plan:        Folliculitis  Rosacea  -     clobetasoL (TEMOVATE) 0.05 % external solution; Mix in bottle of DHS zinc; apply to dry scalp, let sit 5 minutes, then shower, lather and rinse BIW.  Shake well before using.  Dispense: 50 mL; Refill: 3    Clobetasol solution:Mix in bottle of DHS zinc; apply to dry scalp, let sit 5 minutes, then shower, lather and rinse BIW.  Shake well before using.     Can apply Amzeeq to scalp for 1-2 hours then wash off in  shower.    Can use dapsone in the am     Cln sports wash 2x per week to scalp     Wash face with sulfacleanser only 1x per day or every other day; can apply amzeeq foam to face and leave on 1-2 hours then rinse off.    Can apply Winlevi to facial lesions 2x per day     Resume doxycycline 100 mg po qd   Discussed benefits and risks of doxycyline therapy including but not limited to GI discomfort, esophageal irritation/ulceration, and increased sun sensitivity. Patient was counseled to take medicine with meals and at least 1 hour before lying down.     Consider resuming oral dapsone             Follow up in about 3 months (around 1/2/2024) for folliculitus.

## 2023-10-03 ENCOUNTER — PATIENT MESSAGE (OUTPATIENT)
Dept: ADMINISTRATIVE | Facility: HOSPITAL | Age: 43
End: 2023-10-03
Payer: COMMERCIAL

## 2023-10-03 DIAGNOSIS — G43.719 INTRACTABLE CHRONIC MIGRAINE WITHOUT AURA AND WITHOUT STATUS MIGRAINOSUS: ICD-10-CM

## 2023-10-03 RX ORDER — SUMATRIPTAN SUCCINATE 11 MG/1
CAPSULE NASAL
Qty: 16 EACH | Refills: 5 | Status: SHIPPED | OUTPATIENT
Start: 2023-10-03

## 2023-10-03 NOTE — TELEPHONE ENCOUNTER
Requested Prescriptions     Pending Prescriptions Disp Refills    ONZETRA XSAIL 11 mg AePB [Pharmacy Med Name: Onzetra Xsail 11 mg powder for nasal inhalation] 16 each 5     Sig: USE XSAIL DEVICE TO INHALE CONTENTS OF 1 NOSEPIECE (11MG) PER NOSTRIL (22MG/DOSE) -- MAY REPEAT DOSE IN 2 HOURS 1x/day, IF NO RELIEF     LOV 12/9/2022

## 2023-10-04 RX ORDER — DOXYCYCLINE HYCLATE 100 MG
TABLET ORAL
Qty: 30 TABLET | Refills: 2 | Status: SHIPPED | OUTPATIENT
Start: 2023-10-04 | End: 2024-02-06

## 2023-10-05 ENCOUNTER — PATIENT MESSAGE (OUTPATIENT)
Dept: SLEEP MEDICINE | Facility: CLINIC | Age: 43
End: 2023-10-05

## 2023-10-05 ENCOUNTER — OFFICE VISIT (OUTPATIENT)
Dept: SLEEP MEDICINE | Facility: CLINIC | Age: 43
End: 2023-10-05
Payer: COMMERCIAL

## 2023-10-05 VITALS
BODY MASS INDEX: 33.03 KG/M2 | DIASTOLIC BLOOD PRESSURE: 84 MMHG | HEIGHT: 75 IN | SYSTOLIC BLOOD PRESSURE: 136 MMHG | WEIGHT: 265.69 LBS | HEART RATE: 84 BPM

## 2023-10-05 DIAGNOSIS — G43.719 INTRACTABLE CHRONIC MIGRAINE WITHOUT AURA AND WITHOUT STATUS MIGRAINOSUS: ICD-10-CM

## 2023-10-05 DIAGNOSIS — G47.33 OSA (OBSTRUCTIVE SLEEP APNEA): Primary | ICD-10-CM

## 2023-10-05 PROCEDURE — 3079F DIAST BP 80-89 MM HG: CPT | Mod: CPTII,S$GLB,, | Performed by: NURSE PRACTITIONER

## 2023-10-05 PROCEDURE — 3008F BODY MASS INDEX DOCD: CPT | Mod: CPTII,S$GLB,, | Performed by: NURSE PRACTITIONER

## 2023-10-05 PROCEDURE — 1159F PR MEDICATION LIST DOCUMENTED IN MEDICAL RECORD: ICD-10-PCS | Mod: CPTII,S$GLB,, | Performed by: NURSE PRACTITIONER

## 2023-10-05 PROCEDURE — 99999 PR PBB SHADOW E&M-EST. PATIENT-LVL IV: ICD-10-PCS | Mod: PBBFAC,,, | Performed by: NURSE PRACTITIONER

## 2023-10-05 PROCEDURE — 3075F PR MOST RECENT SYSTOLIC BLOOD PRESS GE 130-139MM HG: ICD-10-PCS | Mod: CPTII,S$GLB,, | Performed by: NURSE PRACTITIONER

## 2023-10-05 PROCEDURE — 3079F PR MOST RECENT DIASTOLIC BLOOD PRESSURE 80-89 MM HG: ICD-10-PCS | Mod: CPTII,S$GLB,, | Performed by: NURSE PRACTITIONER

## 2023-10-05 PROCEDURE — 4010F PR ACE/ARB THEARPY RXD/TAKEN: ICD-10-PCS | Mod: CPTII,S$GLB,, | Performed by: NURSE PRACTITIONER

## 2023-10-05 PROCEDURE — 3075F SYST BP GE 130 - 139MM HG: CPT | Mod: CPTII,S$GLB,, | Performed by: NURSE PRACTITIONER

## 2023-10-05 PROCEDURE — 1159F MED LIST DOCD IN RCRD: CPT | Mod: CPTII,S$GLB,, | Performed by: NURSE PRACTITIONER

## 2023-10-05 PROCEDURE — 99214 OFFICE O/P EST MOD 30 MIN: CPT | Mod: S$GLB,,, | Performed by: NURSE PRACTITIONER

## 2023-10-05 PROCEDURE — 3008F PR BODY MASS INDEX (BMI) DOCUMENTED: ICD-10-PCS | Mod: CPTII,S$GLB,, | Performed by: NURSE PRACTITIONER

## 2023-10-05 PROCEDURE — 4010F ACE/ARB THERAPY RXD/TAKEN: CPT | Mod: CPTII,S$GLB,, | Performed by: NURSE PRACTITIONER

## 2023-10-05 PROCEDURE — 99999 PR PBB SHADOW E&M-EST. PATIENT-LVL IV: CPT | Mod: PBBFAC,,, | Performed by: NURSE PRACTITIONER

## 2023-10-05 PROCEDURE — 99214 PR OFFICE/OUTPT VISIT, EST, LEVL IV, 30-39 MIN: ICD-10-PCS | Mod: S$GLB,,, | Performed by: NURSE PRACTITIONER

## 2023-10-05 RX ORDER — RIMEGEPANT SULFATE 75 MG/75MG
75 TABLET, ORALLY DISINTEGRATING ORAL EVERY OTHER DAY
Qty: 16 TABLET | Refills: 11 | Status: SHIPPED | OUTPATIENT
Start: 2023-10-05

## 2023-10-05 RX ORDER — UBROGEPANT 100 MG/1
100 TABLET ORAL
Qty: 16 TABLET | Refills: 5 | Status: SHIPPED | OUTPATIENT
Start: 2023-10-05 | End: 2023-10-05

## 2023-10-05 RX ORDER — UBROGEPANT 100 MG/1
TABLET ORAL
Qty: 16 TABLET | Refills: 5 | Status: SHIPPED | OUTPATIENT
Start: 2023-10-05

## 2023-10-05 NOTE — TELEPHONE ENCOUNTER
Requested Prescriptions     Pending Prescriptions Disp Refills    UBRELVY 100 mg tablet [Pharmacy Med Name: UBRELVY 100 MG TABLET] 16 tablet 5     Sig: TAKE 1 TABLET (100 MG TOTAL) BY MOUTH EVERY 2 (TWO) HOURS AS NEEDED FOR MIGRAINE. AS NEEDED. MAXIMUM: 200 MG PER 24 HOURS     LOV 10/5/2023

## 2023-10-05 NOTE — PROGRESS NOTES
"Cc:ALEXX/headaches, last seen 12/2022      Hx T&A, septal deviation repair and UPPP and stoppped pap. (wgt down to 245-250#) , had requal PSG revealing significant improvement alexx. ESS=6.  He continues to use recalled DS1 machine qhs. Cards got on him for  not using at one point, his bp was elevated. No snoring w/o mask or w/o. Doesn't sleepwell w/o it.Had more frequent headaches 6/2023/took steroid dose kaylene. More stress with new house and 2wk old son and lack of sleep. Reports 15 month lasting 1-2days.nurtec helps as does onzetra (can feel bp up) or tramadol and/or cambia. Ins stopped paying for aimovig  Hx  auras (sweat/kaleidoscope), headache begins with in ~ 30min  Remote- 30davg 4:31h/n AHI 1.9, 90 %tile 9cm. 18/31d used. (Apap 8-12cm)     [facility]  Last MRI /MRA 2010    Tried(verapamil, lexapro, topamax, gabapentin, metoprolol, treximet, relpax, maxalt, imitrex 100mg po, cambia, zomig, nurtec, toradol, zembrace, botox, aimovig, tramadol)    22281(273#) AHI 18.4/low sat 85%  1/2023 AHI 5.1 requal after wgt loss    /84 (BP Location: Left arm, Patient Position: Sitting)   Pulse 84   Ht 6' 3" (1.905 m)   Wt 120.5 kg (265 lb 11.2 oz)   BMI 33.21 kg/m²       Assessment:  Intractable migraines w and w/o aura,having >15 headache days per month lasting 4hr or more. He has tried and failed numerous preventive therapies indicated above and he will only be receiving botox for preventive therapy for migraines.   ALEXX, s/p ENT surgery and symptoms improved, 1/2023 requal study revealed very mild ALEXX remains adherent, AHI<5  HTN    PLAN:  Nurtec odt 75mg QOD  Cambia 50mg po q6h prn continue  Continue Onzetra 22mg intranasal powder q2h prn ([place])  continue apap 8-12cm  Begin ubrelvy 100mg po q2h prn +- cambia  Rtc 4mos, sooner if needed. Request Botox auth      "

## 2023-10-22 DIAGNOSIS — J32.9 BACTERIAL SINUSITIS: ICD-10-CM

## 2023-10-22 DIAGNOSIS — B96.89 BACTERIAL SINUSITIS: ICD-10-CM

## 2023-10-23 RX ORDER — FLUTICASONE PROPIONATE 50 MCG
2 SPRAY, SUSPENSION (ML) NASAL
Qty: 48 ML | Refills: 3 | OUTPATIENT
Start: 2023-10-23

## 2023-10-23 NOTE — TELEPHONE ENCOUNTER
Refill Routing Note     Refill Routing Note   Medication(s) are not appropriate for processing by [facility] for the following reason(s):      Responsible provider unclear    [facility] action(s):  Defer Care Due:  None identified     Medication Therapy Plan: no pcp listed on profile      Appointments  past 12m or future 3m with PCP    Date Provider   Last Visit   9/19/2022 Kyle Rubio MD   Next Visit   Visit date not found Kyle Rubio MD   ED visits in past 90 days: 0        Note composed:4:20 AM 10/23/2023

## 2023-11-15 ENCOUNTER — PATIENT MESSAGE (OUTPATIENT)
Dept: SLEEP MEDICINE | Facility: CLINIC | Age: 43
End: 2023-11-15
Payer: COMMERCIAL

## 2023-11-15 DIAGNOSIS — G43.719 INTRACTABLE CHRONIC MIGRAINE WITHOUT AURA AND WITHOUT STATUS MIGRAINOSUS: ICD-10-CM

## 2023-11-15 RX ORDER — METHYLPREDNISOLONE 4 MG/1
TABLET ORAL
Qty: 21 EACH | Refills: 0 | Status: SHIPPED | OUTPATIENT
Start: 2023-11-15 | End: 2023-12-06

## 2023-11-15 RX ORDER — TRAMADOL HYDROCHLORIDE 50 MG/1
50 TABLET ORAL EVERY 6 HOURS
Qty: 30 TABLET | Refills: 3 | Status: SHIPPED | OUTPATIENT
Start: 2023-11-15

## 2023-11-15 RX ORDER — ONDANSETRON 8 MG/1
8 TABLET, ORALLY DISINTEGRATING ORAL 2 TIMES DAILY PRN
Qty: 30 TABLET | Refills: 3 | Status: SHIPPED | OUTPATIENT
Start: 2023-11-15

## 2023-11-15 NOTE — TELEPHONE ENCOUNTER
Requested Prescriptions     Pending Prescriptions Disp Refills    traMADoL (ULTRAM) 50 mg tablet 30 tablet 0     Sig: Take 1 tablet (50 mg total) by mouth every 6 (six) hours.     LOV 10/5/2023   [facility] HOME

## 2024-01-11 ENCOUNTER — OFFICE VISIT (OUTPATIENT)
Dept: DERMATOLOGY | Facility: CLINIC | Age: 44
End: 2024-01-11
Payer: COMMERCIAL

## 2024-01-11 DIAGNOSIS — L73.9 FOLLICULITIS: ICD-10-CM

## 2024-01-11 DIAGNOSIS — L71.9 ROSACEA: ICD-10-CM

## 2024-01-11 DIAGNOSIS — L21.9 SEBORRHEIC DERMATITIS, UNSPECIFIED: Primary | ICD-10-CM

## 2024-01-11 PROCEDURE — 1160F RVW MEDS BY RX/DR IN RCRD: CPT | Mod: CPTII,S$GLB,, | Performed by: DERMATOLOGY

## 2024-01-11 PROCEDURE — 4010F ACE/ARB THERAPY RXD/TAKEN: CPT | Mod: CPTII,S$GLB,, | Performed by: DERMATOLOGY

## 2024-01-11 PROCEDURE — 99214 OFFICE O/P EST MOD 30 MIN: CPT | Mod: S$GLB,,, | Performed by: DERMATOLOGY

## 2024-01-11 PROCEDURE — 99999 PR PBB SHADOW E&M-EST. PATIENT-LVL IV: CPT | Mod: PBBFAC,,, | Performed by: DERMATOLOGY

## 2024-01-11 PROCEDURE — 1159F MED LIST DOCD IN RCRD: CPT | Mod: CPTII,S$GLB,, | Performed by: DERMATOLOGY

## 2024-01-11 RX ORDER — CICLOPIROX 1 G/100ML
SHAMPOO TOPICAL
Qty: 120 ML | Refills: 3 | Status: SHIPPED | OUTPATIENT
Start: 2024-01-11

## 2024-01-11 RX ORDER — DOXYCYCLINE HYCLATE 20 MG
TABLET ORAL
Qty: 60 TABLET | Refills: 6 | Status: SHIPPED | OUTPATIENT
Start: 2024-01-11 | End: 2024-02-08

## 2024-01-11 NOTE — PROGRESS NOTES
Subjective:      Patient ID:  Jatinder Moreno III is a 43 y.o. male who presents for   Chief Complaint   Patient presents with    Warts    Hair Loss     Follow up for folliculitis      Pt is here for follow up: rosacea and scalp folliculitis  Pt last seen virtually on 10/2/2023    Pt is compliant with previously prescribed regimen and feels like it is more controlled, but scalp is still itching and inflamed. Has redness and dry skin on face/ mouth with bumps. He reports that his skin always feels dry. He was not prescribed Winlevi for spot treatment.     Pt feels his scalp is not very bothersome.    Pt feels his face is worse; worse if he is outside for a while.            Warts    Hair Loss        Review of Systems   Eyes:  Positive for eye irritation and eyelid inflammation.   Skin:  Positive for itching, rash, dry skin and activity-related sunscreen use.       Objective:   Physical Exam   Constitutional: He appears well-developed and well-nourished. No distress.   Neurological: He is alert and oriented to person, place, and time. He is not disoriented.   Psychiatric: He has a normal mood and affect.   Skin:   Areas Examined (abnormalities noted in diagram):   Scalp / Hair Palpated and Inspected  Head / Face Inspection Performed            Diagram Legend     Erythematous scaling macule/papule c/w actinic keratosis       Vascular papule c/w angioma      Pigmented verrucoid papule/plaque c/w seborrheic keratosis      Yellow umbilicated papule c/w sebaceous hyperplasia      Irregularly shaped tan macule c/w lentigo     1-2 mm smooth white papules consistent with Milia      Movable subcutaneous cyst with punctum c/w epidermal inclusion cyst      Subcutaneous movable cyst c/w pilar cyst      Firm pink to brown papule c/w dermatofibroma      Pedunculated fleshy papule(s) c/w skin tag(s)      Evenly pigmented macule c/w junctional nevus     Mildly variegated pigmented, slightly irregular-bordered macule c/w  mildly atypical nevus      Flesh colored to evenly pigmented papule c/w intradermal nevus       Pink pearly papule/plaque c/w basal cell carcinoma      Erythematous hyperkeratotic cursted plaque c/w SCC      Surgical scar with no sign of skin cancer recurrence      Open and closed comedones      Inflammatory papules and pustules      Verrucoid papule consistent consistent with wart     Erythematous eczematous patches and plaques     Dystrophic onycholytic nail with subungual debris c/w onychomycosis     Umbilicated papule    Erythematous-base heme-crusted tan verrucoid plaque consistent with inflamed seborrheic keratosis     Erythematous Silvery Scaling Plaque c/w Psoriasis     See annotation      Assessment / Plan:        Seborrheic dermatitis, unspecified  Folliculitis  Rosacea  -     doxycycline (PERIOSTAT) 20 MG tablet; Sig 2 po qd with food  Dispense: 60 tablet; Refill: 6  -     ciclopirox 1 % shampoo; Apply to scalp, let sit 5minutes, lather and rinse tiw  Dispense: 120 mL; Refill: 3    -     doxycycline (PERIOSTAT) 20 MG tablet; Sig 2 po qd with food  Dispense: 60 tablet; Refill: 6  PM:  Wash with cln face wash   Thin film of amzeeq to red/pimple areas; exelderm to scaly areas   Moisturize with cerave pm or vanicream daily moisturizer to minimize irritation or Neova Redness relief    AM:  Wash with mild cleanser like cerave hydrating or Cln wash  2. Spot treat with dapsone gel   3. Moisturizer with spf 30 +       Wash scalp with DHZ zinc/clobetasol at least 2x per week and ciclopirox 2x per week   Use dapsone to scalp for flares  Add po low dose doxy   Continue PO zinc  Exelderm for mustache area     Cln sports wash to scalp 2x per week           Follow up in about 3 months (around 4/11/2024).

## 2024-01-11 NOTE — PATIENT INSTRUCTIONS
We would like to see you back in the clinic in 3 months.  You will be able to schedule this appointment by calling or by using your [facility] portal 3 months before this time. Because our schedule fills so quickly, please set a reminder in your phone or on your calendar to schedule 3 months before you are due to come in so that we can see you in a timely fashion.  You should also receive a reminder from us in the mail. This will help us ensure we can continue to provide excellent healthcare for you. Thank you.      PM:  Wash with cln face wash   Thin film of amzeeq to red/pimple areas; exelderm to scaly areas   Moisturize with cerave pm or vanicream daily moisturizer to minimize irritation or Neova Redness relief    AM:  Wash with mild cleanser like cerave hydrating or Cln wash  2. Spot treat with dapsone gel   3. Moisturizer with spf 30 +       Wash scalp with DHZ zinc/clobetasol at least 2x per week and ciclopirox 2x per week   Use dapsone to scalp for flares  Add po low dose doxy   Continue PO zinc  Exelderm for mustache area     Cln sports wash to scalp 2x per week

## 2024-02-02 ENCOUNTER — OFFICE VISIT (OUTPATIENT)
Dept: INTERNAL MEDICINE | Facility: CLINIC | Age: 44
End: 2024-02-02
Payer: COMMERCIAL

## 2024-02-02 VITALS
OXYGEN SATURATION: 99 % | HEART RATE: 94 BPM | TEMPERATURE: 98 F | BODY MASS INDEX: 31.99 KG/M2 | HEIGHT: 75 IN | SYSTOLIC BLOOD PRESSURE: 120 MMHG | RESPIRATION RATE: 19 BRPM | WEIGHT: 257.25 LBS | DIASTOLIC BLOOD PRESSURE: 82 MMHG

## 2024-02-02 DIAGNOSIS — Q21.12 PFO (PATENT FORAMEN OVALE): ICD-10-CM

## 2024-02-02 DIAGNOSIS — F90.0 ATTENTION DEFICIT HYPERACTIVITY DISORDER (ADHD), PREDOMINANTLY INATTENTIVE TYPE: ICD-10-CM

## 2024-02-02 DIAGNOSIS — R53.83 FATIGUE, UNSPECIFIED TYPE: ICD-10-CM

## 2024-02-02 DIAGNOSIS — G43.719 INTRACTABLE CHRONIC MIGRAINE WITHOUT AURA AND WITHOUT STATUS MIGRAINOSUS: ICD-10-CM

## 2024-02-02 DIAGNOSIS — Z12.5 PROSTATE CANCER SCREENING: ICD-10-CM

## 2024-02-02 DIAGNOSIS — F41.1 GENERALIZED ANXIETY DISORDER: ICD-10-CM

## 2024-02-02 DIAGNOSIS — I10 ESSENTIAL HYPERTENSION: ICD-10-CM

## 2024-02-02 DIAGNOSIS — Z00.00 WELL ADULT EXAM: Primary | ICD-10-CM

## 2024-02-02 DIAGNOSIS — F32.0 CURRENT MILD EPISODE OF MAJOR DEPRESSIVE DISORDER WITHOUT PRIOR EPISODE: ICD-10-CM

## 2024-02-02 DIAGNOSIS — I77.810 AORTIC ROOT DILATATION: ICD-10-CM

## 2024-02-02 DIAGNOSIS — G47.33 OSA (OBSTRUCTIVE SLEEP APNEA): ICD-10-CM

## 2024-02-02 DIAGNOSIS — Z30.09 VASECTOMY EVALUATION: ICD-10-CM

## 2024-02-02 PROCEDURE — 1160F RVW MEDS BY RX/DR IN RCRD: CPT | Mod: CPTII,S$GLB,, | Performed by: FAMILY MEDICINE

## 2024-02-02 PROCEDURE — 99999 PR PBB SHADOW E&M-EST. PATIENT-LVL V: CPT | Mod: PBBFAC,,, | Performed by: FAMILY MEDICINE

## 2024-02-02 PROCEDURE — 3079F DIAST BP 80-89 MM HG: CPT | Mod: CPTII,S$GLB,, | Performed by: FAMILY MEDICINE

## 2024-02-02 PROCEDURE — 3074F SYST BP LT 130 MM HG: CPT | Mod: CPTII,S$GLB,, | Performed by: FAMILY MEDICINE

## 2024-02-02 PROCEDURE — 99396 PREV VISIT EST AGE 40-64: CPT | Mod: S$GLB,,, | Performed by: FAMILY MEDICINE

## 2024-02-02 PROCEDURE — 1159F MED LIST DOCD IN RCRD: CPT | Mod: CPTII,S$GLB,, | Performed by: FAMILY MEDICINE

## 2024-02-02 PROCEDURE — 4010F ACE/ARB THERAPY RXD/TAKEN: CPT | Mod: CPTII,S$GLB,, | Performed by: FAMILY MEDICINE

## 2024-02-02 PROCEDURE — 3008F BODY MASS INDEX DOCD: CPT | Mod: CPTII,S$GLB,, | Performed by: FAMILY MEDICINE

## 2024-02-02 RX ORDER — METHYLPHENIDATE HYDROCHLORIDE 20 MG/1
20 TABLET ORAL
Qty: 90 TABLET | Refills: 0 | Status: SHIPPED | OUTPATIENT
Start: 2024-02-02 | End: 2024-04-23 | Stop reason: SDUPTHER

## 2024-02-02 RX ORDER — BUSPIRONE HYDROCHLORIDE 10 MG/1
10 TABLET ORAL 3 TIMES DAILY
Qty: 270 TABLET | Refills: 3 | Status: SHIPPED | OUTPATIENT
Start: 2024-02-02 | End: 2024-04-23 | Stop reason: SDUPTHER

## 2024-02-02 RX ORDER — MELOXICAM 15 MG/1
15 TABLET ORAL
COMMUNITY

## 2024-02-02 RX ORDER — DIAZEPAM 10 MG/1
10 TABLET ORAL DAILY PRN
Qty: 30 TABLET | Refills: 2 | Status: SHIPPED | OUTPATIENT
Start: 2024-02-02 | End: 2024-04-23 | Stop reason: SDUPTHER

## 2024-02-02 NOTE — PROGRESS NOTES
Subjective     Patient ID: Jatinder Moreno III is a 43 y.o. male.    Chief Complaint: Establish Care and Annual Exam    HPI 43-year-old white male presents to clinic today to establish care.  He continues to see Cardiology secondary to hypertension, aortic root dilation, and PFO.  He remains stable on losartan 100 mg daily and hydrochlorothiazide 25 mg daily.  He has been followed by Psychiatry in the past for treatment of generalized anxiety disorder, major depressive disorder, and ADHD.  He has remained stable on BuSpar 10 mg t.i.d., Valium 10 mg as needed for anxiety, and Ritalin 20 mg daily.  His psychiatrist has currently left [facility] and he is in the process of finding a new psychiatrist.  Because of this he has been out of medication and requires refills.  He continues to be followed by Neurology for treatment of chronic migraines which remains stable with use of Ubrelvy.  He continues to be followed by Sleep Medicine for treatment of sleep apnea which remains stable with use of CPAP.  He reports a past surgical history of tonsillectomy, nasal septoplasty, and uvulopalatopharyngoplasty.  He reports a family history of anxiety, depression, diabetes, and hypertension.  He is up-to-date with all vaccinations.  Review of Systems   Constitutional:  Negative for appetite change, chills, fatigue and fever.   HENT:  Negative for nasal congestion, ear pain, hearing loss, postnasal drip, rhinorrhea, sinus pressure/congestion, sore throat and tinnitus.    Eyes:  Negative for redness, itching and visual disturbance.   Respiratory:  Negative for cough, chest tightness and shortness of breath.    Cardiovascular:  Negative for chest pain and palpitations.   Gastrointestinal:  Negative for abdominal pain, constipation, diarrhea, nausea and vomiting.   Genitourinary:  Negative for decreased urine volume, difficulty urinating, dysuria, frequency, hematuria and urgency.   Musculoskeletal:  Negative for back pain,  myalgias, neck pain and neck stiffness.   Integumentary:  Negative for rash.   Neurological:  Negative for dizziness, light-headedness and headaches.   Psychiatric/Behavioral: Negative.            Objective     Physical Exam  Vitals and nursing note reviewed.   Constitutional:       General: He is not in acute distress.     Appearance: Normal appearance. He is well-developed. He is not diaphoretic.   HENT:      Head: Normocephalic and atraumatic.      Right Ear: External ear normal.      Left Ear: External ear normal.      Nose: Nose normal.      Mouth/Throat:      Pharynx: No oropharyngeal exudate.   Eyes:      General: No scleral icterus.        Right eye: No discharge.         Left eye: No discharge.      Conjunctiva/sclera: Conjunctivae normal.      Pupils: Pupils are equal, round, and reactive to light.   Neck:      Thyroid: No thyromegaly.      Vascular: No JVD.      Trachea: No tracheal deviation.   Cardiovascular:      Rate and Rhythm: Normal rate and regular rhythm.      Heart sounds: Normal heart sounds. No murmur heard.     No friction rub. No gallop.   Pulmonary:      Effort: Pulmonary effort is normal. No respiratory distress.      Breath sounds: Normal breath sounds. No stridor. No wheezing, rhonchi or rales.   Chest:      Chest wall: No tenderness.   Abdominal:      General: Bowel sounds are normal. There is no distension.      Palpations: Abdomen is soft. There is no mass.      Tenderness: There is no abdominal tenderness. There is no guarding or rebound.   Musculoskeletal:         General: No tenderness. Normal range of motion.      Cervical back: Normal range of motion and neck supple.   Lymphadenopathy:      Cervical: No cervical adenopathy.   Skin:     General: Skin is warm and dry.      Coloration: Skin is not pale.      Findings: No erythema or rash.   Neurological:      Mental Status: He is alert and oriented to person, place, and time.   Psychiatric:         Mood and Affect: Mood normal.          Behavior: Behavior normal.         Thought Content: Thought content normal.         Judgment: Judgment normal.            Assessment and Plan     1. Well adult exam  -     CBC Auto Differential; Future; Expected date: 02/02/2024  -     Comprehensive Metabolic Panel; Future; Expected date: 02/02/2024  -     Lipid Panel; Future; Expected date: 02/02/2024  -     T4, Free; Future; Expected date: 02/02/2024  -     TSH; Future; Expected date: 02/02/2024  -     Urinalysis; Future; Expected date: 02/02/2024  -     Hemoglobin A1C; Future; Expected date: 02/02/2024  -     Vitamin D; Future; Expected date: 02/02/2024  -     TESTOSTERONE; Future; Expected date: 02/02/2024  -     PSA, Screening; Future; Expected date: 02/02/2024    2. Essential hypertension  -     Ambulatory referral/consult to Cardiology; Future; Expected date: 02/09/2024    3. Aortic root dilatation  -     Ambulatory referral/consult to Cardiology; Future; Expected date: 02/09/2024    4. PFO (patent foramen ovale)  -     Ambulatory referral/consult to Cardiology; Future; Expected date: 02/09/2024    5. Generalized anxiety disorder  -     busPIRone (BUSPAR) 10 MG tablet; Take 1 tablet (10 mg total) by mouth 3 (three) times daily.  Dispense: 270 tablet; Refill: 3  -     diazePAM (VALIUM) 10 MG Tab; Take 1 tablet (10 mg total) by mouth daily as needed (anxiety).  Dispense: 30 tablet; Refill: 2    6. Current mild episode of major depressive disorder without prior episode  -     busPIRone (BUSPAR) 10 MG tablet; Take 1 tablet (10 mg total) by mouth 3 (three) times daily.  Dispense: 270 tablet; Refill: 3    7. Attention deficit hyperactivity disorder (ADHD), predominantly inattentive type  -     methylphenidate HCl (RITALIN) 20 MG tablet; Take 1 tablet (20 mg total) by mouth 3 (three) times daily with meals.  Dispense: 90 tablet; Refill: 0    8. Intractable chronic migraine without aura and without status migrainosus    9. ALEXX (obstructive sleep apnea)    10.  Fatigue, unspecified type  -     TESTOSTERONE; Future; Expected date: 02/02/2024    11. Prostate cancer screening  -     PSA, Screening; Future; Expected date: 02/02/2024    12. Vasectomy evaluation  -     Ambulatory referral/consult to Urology; Future; Expected date: 02/09/2024        1. CBC, CMP, UA, TSH, free T4, fasting lipids, vitamin-D level, hemoglobin A1c, and testosterone panel.  2. Continue losartan 100 mg daily and hydrochlorothiazide 25 mg daily.  Hypertension is well controlled.    3. Continue follow-up with cardiology as scheduled for continued monitoring of stable aortic root dilation and PFO.  4. Continue BuSpar, Valium, and Ritalin as prescribed.  Refills have been given until the patient can establish care with a new psychiatrist.  5. Continue Ubrelvy as needed and continue follow-up with neurology as scheduled.  Migraine headaches remained stable.    6. Continue CPAP nightly.  Sleep apnea stable.    7. The patient is scheduled to see urology for vasectomy evaluation.  Referral has been ordered.    8. Return to clinic as needed or in 1 year for annual physical exam.               Follow up in about 1 year (around 2/2/2025), or if symptoms worsen or fail to improve, for Annual exam.

## 2024-02-04 DIAGNOSIS — L71.9 ROSACEA: ICD-10-CM

## 2024-02-04 DIAGNOSIS — L73.9 FOLLICULITIS: ICD-10-CM

## 2024-02-06 ENCOUNTER — OFFICE VISIT (OUTPATIENT)
Dept: CARDIOLOGY | Facility: CLINIC | Age: 44
End: 2024-02-06
Payer: COMMERCIAL

## 2024-02-06 VITALS
WEIGHT: 268.06 LBS | SYSTOLIC BLOOD PRESSURE: 140 MMHG | BODY MASS INDEX: 33.33 KG/M2 | DIASTOLIC BLOOD PRESSURE: 96 MMHG | HEIGHT: 75 IN | HEART RATE: 76 BPM

## 2024-02-06 DIAGNOSIS — E78.2 MIXED HYPERLIPIDEMIA: ICD-10-CM

## 2024-02-06 DIAGNOSIS — I10 ESSENTIAL HYPERTENSION: ICD-10-CM

## 2024-02-06 DIAGNOSIS — G47.33 OSA (OBSTRUCTIVE SLEEP APNEA): ICD-10-CM

## 2024-02-06 DIAGNOSIS — I77.810 AORTIC ROOT DILATATION: Primary | ICD-10-CM

## 2024-02-06 DIAGNOSIS — Q21.12 PFO (PATENT FORAMEN OVALE): ICD-10-CM

## 2024-02-06 PROCEDURE — 3077F SYST BP >= 140 MM HG: CPT | Mod: CPTII,S$GLB,, | Performed by: PHYSICIAN ASSISTANT

## 2024-02-06 PROCEDURE — 3008F BODY MASS INDEX DOCD: CPT | Mod: CPTII,S$GLB,, | Performed by: PHYSICIAN ASSISTANT

## 2024-02-06 PROCEDURE — 4010F ACE/ARB THERAPY RXD/TAKEN: CPT | Mod: CPTII,S$GLB,, | Performed by: PHYSICIAN ASSISTANT

## 2024-02-06 PROCEDURE — 3080F DIAST BP >= 90 MM HG: CPT | Mod: CPTII,S$GLB,, | Performed by: PHYSICIAN ASSISTANT

## 2024-02-06 PROCEDURE — 3044F HG A1C LEVEL LT 7.0%: CPT | Mod: CPTII,S$GLB,, | Performed by: PHYSICIAN ASSISTANT

## 2024-02-06 PROCEDURE — 99214 OFFICE O/P EST MOD 30 MIN: CPT | Mod: S$GLB,,, | Performed by: PHYSICIAN ASSISTANT

## 2024-02-06 PROCEDURE — 99999 PR PBB SHADOW E&M-EST. PATIENT-LVL V: CPT | Mod: PBBFAC,,, | Performed by: PHYSICIAN ASSISTANT

## 2024-02-06 RX ORDER — LOSARTAN POTASSIUM 100 MG/1
100 TABLET ORAL DAILY
Qty: 90 TABLET | Refills: 3 | Status: SHIPPED | OUTPATIENT
Start: 2024-02-06

## 2024-02-06 RX ORDER — HYDROCHLOROTHIAZIDE 25 MG/1
25 TABLET ORAL DAILY
Qty: 90 TABLET | Refills: 3 | Status: SHIPPED | OUTPATIENT
Start: 2024-02-06 | End: 2025-02-05

## 2024-02-06 NOTE — PROGRESS NOTES
Cardiology Clinic Note  Reason for Visit: Dilation of aortic root     HPI:     PMHx:  HTN  Elevated triglycerides   PFO  Dilation of aortic root (4.3 cm 5/2023)  ALEXX      Jatinder Moreno III is a 43 y.o. M, who presents for initial visit regarding enlarged aortic root and HTN. He is establishing with new Cardiology clinic. He is known to have a dilation of aortic root, and is due for annual monitoring. His BP is elevated in clinic. He is an RN working in Cardiology at the [employer] and very capable of monitoring his BP at home. Last home value 126/80. He is requesting medication refills, and he has been compliant. He denies any acute complaints today. He denies chest pain/pressure/tightness/discomfort, dyspnea on regular exertion, orthopnea, PND, peripheral edema, sustained palpitations, syncope or claudication symptoms.     ROS:    Pertinent ROS included in HPI and below.  PMH:     Past Medical History:   Diagnosis Date    ADD (attention deficit disorder with hyperactivity) 07/05/2012    ADHD (attention deficit hyperactivity disorder)     Anxiety     Asthma, well controlled     Bronchitis     Essential hypertension 12/09/2022    History of migraine headaches     History of psychiatric care     Seborrheic dermatitis 06/13/2018    Sleep apnea      Past Surgical History:   Procedure Laterality Date    ESOPHAGOGASTRODUODENOSCOPY      NASAL SEPTOPLASTY N/A 6/3/2021    Procedure: SEPTOPLASTY, NOSE;  Surgeon: Ahmet Bob III, MD;  [place] [facility], [place], [place] by [facility] OR;  Service: ENT;  Laterality: N/A;    TONSILLECTOMY Bilateral 6/3/2021    Procedure: TONSILLECTOMY;  Surgeon: Ahmet Bob III, MD;  [place] [facility], [place], [place] by [facility] OR;  Service: ENT;  Laterality: Bilateral;    UVULOPALATOPHARYNGOPLASTY N/A 6/3/2021    Procedure: UPPP (UVULOPALATOPHARYNGOPLASTY);  Surgeon: Ahmet Bob III, MD;  Location: [facility], [place], [place] by [facility] OR;  Service: ENT;  Laterality: N/A;     Allergies:     Review of patient's allergies indicates:   Allergen Reactions    Topamax  [topiramate]     Compazine [prochlorperazine edisylate] Other (See Comments)     Jittery    Prochlorperazine Other (See Comments)     Medications:     Current Outpatient Medications on File Prior to Visit   Medication Sig Dispense Refill    busPIRone (BUSPAR) 10 MG tablet Take 1 tablet (10 mg total) by mouth 3 (three) times daily. 270 tablet 3    ciclopirox 1 % shampoo Apply to scalp, let sit 5minutes, lather and rinse tiw 120 mL 3    clindamycin (CLEOCIN T) 1 % external solution aaa bid 60 mL 3    clobetasoL (TEMOVATE) 0.05 % external solution Mix in bottle of DHS zinc; apply to dry scalp, let sit 5 minutes, then shower, lather and rinse BIW.  Shake well before using. 50 mL 3    dapsone (ACZONE) 7.5 % GlwP Apply to affected area bid 60 g 2    diazePAM (VALIUM) 10 MG Tab Take 1 tablet (10 mg total) by mouth daily as needed (anxiety). 30 tablet 2    diclofenac potassium (CAMBIA) 50 mg PwPk TAKE 1 PACKET BY MOUTH EVERY 6 (SIX) HOURS. 30 each 3    fluticasone propionate (FLONASE) 50 mcg/actuation nasal spray 2 sprays (100 mcg total) by Each Nostril route once daily. 16 g 11    meloxicam (MOBIC) 15 MG tablet Take 15 mg by mouth as needed.      methylphenidate HCl (RITALIN) 20 MG tablet Take 1 tablet (20 mg total) by mouth 3 (three) times daily with meals. 90 tablet 0    minocycline (AMZEEQ) 4 % Foam Apply thin film to face and scalp qd prn flare 30 g 3    multivitamin (THERAGRAN) per tablet Take 1 tablet by mouth once daily.      ondansetron (ZOFRAN-ODT) 8 MG TbDL Take 1 tablet (8 mg total) by mouth 2 (two) times daily as needed (nausea). 30 tablet 3    ONZETRA XSAIL 11 mg AePB USE XSAIL DEVICE TO INHALE CONTENTS OF 1 NOSEPIECE (11MG) PER NOSTRIL (22MG/DOSE) -- MAY REPEAT DOSE IN 2 HOURS 1x/day, IF NO RELIEF 16 each 5    rimegepant (NURTEC) 75 mg odt Take 1 tablet (75 mg total) by mouth every other day. Place ODT tablet on the tongue 16 tablet 11    sulfacetamide sodium-sulfur (SULFACLEANSE 8-4) 8-4 % Susp Wash face qhs  "473 mL 3    traMADoL (ULTRAM) 50 mg tablet Take 1 tablet (50 mg total) by mouth every 6 (six) hours. 30 tablet 3    UBRELVY 100 mg tablet TAKE 1 TABLET (100 MG TOTAL) BY MOUTH EVERY 2 (TWO) HOURS AS NEEDED FOR MIGRAINE. AS NEEDED. MAXIMUM: 200 MG PER 24 HOURS 16 tablet 5    vit A and D3 in cod liver oiL 4,000 unit-400 unit/5 mL Liqd Take 5 mLs by mouth every evening.      doxycycline (PERIOSTAT) 20 MG tablet TAKE 2 BY MOUTH EVERY DAY WITH FOOD 180 tablet 3    [DISCONTINUED] mirtazapine (REMERON) 15 MG tablet Take 15 mg by mouth every evening. Takes 1/8 tab       No current facility-administered medications on file prior to visit.     Social History:     Social History     Tobacco Use    Smoking status: Never    Smokeless tobacco: Never   Substance Use Topics    Alcohol use: Yes     Alcohol/week: 0.0 standard drinks of alcohol     Types: 1 Standard drinks or equivalent per week     Comment: rarely     Family History:     Family History   Problem Relation Age of Onset    Anxiety disorder Mother     Hypertension Mother     Depression Mother     Bipolar disorder Brother     Schizophrenia Brother     Drug abuse Brother     Mental illness Brother         BPD    Bipolar disorder Cousin     Hypertension Father     Diabetes Father      Physical Exam:   BP (!) 140/96   Pulse 76   Ht 6' 3" (1.905 m)   Wt 121.6 kg (268 lb 1.3 oz)   BMI 33.51 kg/m²      Physical Exam  Vitals and nursing note reviewed.   Constitutional:       Appearance: Normal appearance.   HENT:      Head: Normocephalic and atraumatic.   Neck:      Vascular: Normal carotid pulses. No carotid bruit or hepatojugular reflux.   Cardiovascular:      Rate and Rhythm: Normal rate and regular rhythm.      Chest Wall: PMI is not displaced.      Pulses:           Radial pulses are 2+ on the right side and 2+ on the left side.        Dorsalis pedis pulses are 2+ on the right side and 2+ on the left side.        Posterior tibial pulses are 2+ on the right side and 2+ " on the left side.      Heart sounds: No murmur heard.  Pulmonary:      Effort: Pulmonary effort is normal.      Breath sounds: Normal breath sounds. No wheezing, rhonchi or rales.   Abdominal:      General: Bowel sounds are normal. There is no abdominal bruit.      Palpations: Abdomen is soft. There is no pulsatile mass.      Tenderness: There is no abdominal tenderness.   Feet:      Right foot:      Skin integrity: Skin integrity normal.      Left foot:      Skin integrity: Skin integrity normal.   Skin:     Capillary Refill: Capillary refill takes less than 2 seconds.   Neurological:      General: No focal deficit present.      Mental Status: He is alert.   Psychiatric:         Mood and Affect: Mood and affect normal.         Speech: Speech normal.         Behavior: Behavior is cooperative.         Thought Content: Thought content normal.          Labs:     Blood Tests:  Lab Results   Component Value Date     02/15/2024    K 3.8 02/15/2024     02/15/2024    CO2 26 02/15/2024    BUN 17 02/15/2024    CREATININE 1.2 02/15/2024    GLU 90 02/15/2024    HGBA1C 4.3 02/15/2024    AST 24 02/15/2024    ALT 51 (H) 02/15/2024    ALBUMIN 4.3 02/15/2024    ALBUMIN 4.6 08/04/2018    PROT 7.5 02/15/2024    BILITOT 1.2 (H) 02/15/2024    WBC 5.92 02/15/2024    HGB 13.6 (L) 02/15/2024    HCT 39.4 (L) 02/15/2024    MCV 90 02/15/2024     02/15/2024    TSH 1.508 02/15/2024       Lab Results   Component Value Date    CHOL 172 02/15/2024    HDL 25 (L) 02/15/2024    TRIG 634 (H) 02/15/2024       Lab Results   Component Value Date    LDLCALC Invalid, Trig>400.0 02/15/2024         Imaging:     Echocardiogram  TTE 5/23/2023  The left ventricle is mildly enlarged with normal systolic function.  The estimated ejection fraction is 55%.  Normal right ventricular size with normal right ventricular systolic function.  The aortic root is mildly-moderately dilated, 4.3cm (was 4.1cm on report 6/27/22).  The estimated PA systolic  pressure is 19 mmHg.    Stress testing  Exercise stress EKG 2022  - negative for ischemia     Cath Lab  None    Other  Event monitor 2023  Negative event monitor with no clinical arrhythmias.  Symptoms corresponding with sinus tachycardia.    [id number]  NSR    Assessment:     1. Aortic root dilatation    2. PFO (patent foramen ovale)    3. Essential hypertension    4. Mixed hyperlipidemia    5. ALEXX (obstructive sleep apnea)    6. BMI 33.0-33.9,adult        Plan:     Aortic root dilatation  PFO (patent foramen ovale)  -     Ambulatory referral/consult to Cardiology  Due to update monitoring echp   Recommend ASA 81 mg qD    Essential hypertension  Controlled at home, continue to monitor with goal of < 130/80  Recommend low sodium diet     Mixed hyperlipidemia  History of elevated TG, has upcoming labs planned with new PCP  Recommend fish oil supplement   Discussed the role of TG reduction through avoidance of excessive carbs, sugar and ETOH  Exercise is also very helpful in TG reduction     ALEXX (obstructive sleep apnea)  Encouraged CPAP compliance     BMI 33.0-33.9,adult  Weight loss through a combination of diet and exercise encouraged  Recommend 150 minutes a week (30 minutes per day, 5 days per week) of moderate intensity exercise        Signed:  Tara Dahl PA-C  Cardiology     2024 2:46 PM    Follow-up:     Future Appointments   Date Time Provider Department Center   2024  4:00 PM ECHO, DM[facility]RIIRI[facility] ECHOSTR [place]   3/7/2024  8:15 AM Cedric Lyon MD [facility] UROLOGY [facility]   2024  8:00 AM Deanne Kohler PA-C [facility]   4/15/2024  3:20 PM Cinthya Matthews MD [facility] DERM [place]

## 2024-02-07 NOTE — TELEPHONE ENCOUNTER
Please see the attached refill request.    Last seen 01/2024    Seborrheic dermatitis, unspecified  Folliculitis  Rosacea  -     doxycycline (PERIOSTAT) 20 MG tablet; Sig 2 po qd with food  Dispense: 60 tablet; Refill: 6  -     ciclopirox 1 % shampoo; Apply to scalp, let sit 5minutes, lather and rinse tiw  Dispense: 120 mL; Refill: 3     -     doxycycline (PERIOSTAT) 20 MG tablet; Sig 2 po qd with food  Dispense: 60 tablet; Refill: 6  PM:  Wash with cln face wash   Thin film of amzeeq to red/pimple areas; exelderm to scaly areas   Moisturize with cerave pm or vanicream daily moisturizer to minimize irritation or Neova Redness relief     AM:  Wash with mild cleanser like cerave hydrating or Cln wash  2. Spot treat with dapsone gel   3. Moisturizer with spf 30 +         Wash scalp with DHZ zinc/clobetasol at least 2x per week and ciclopirox 2x per week   Use dapsone to scalp for flares  Add po low dose doxy   Continue PO zinc  Exelderm for mustache area      Cln sports wash to scalp 2x per week

## 2024-02-08 RX ORDER — DOXYCYCLINE HYCLATE 20 MG
TABLET ORAL
Qty: 180 TABLET | Refills: 3 | Status: SHIPPED | OUTPATIENT
Start: 2024-02-08

## 2024-02-15 ENCOUNTER — OFFICE VISIT (OUTPATIENT)
Dept: UROLOGY | Facility: CLINIC | Age: 44
End: 2024-02-15
Attending: UROLOGY
Payer: COMMERCIAL

## 2024-02-15 ENCOUNTER — LAB VISIT (OUTPATIENT)
Dept: LAB | Facility: HOSPITAL | Age: 44
End: 2024-02-15
Attending: FAMILY MEDICINE
Payer: COMMERCIAL

## 2024-02-15 VITALS
SYSTOLIC BLOOD PRESSURE: 141 MMHG | HEART RATE: 91 BPM | WEIGHT: 264.56 LBS | OXYGEN SATURATION: 100 % | BODY MASS INDEX: 32.89 KG/M2 | HEIGHT: 75 IN | DIASTOLIC BLOOD PRESSURE: 100 MMHG | RESPIRATION RATE: 12 BRPM

## 2024-02-15 DIAGNOSIS — Z00.00 WELL ADULT EXAM: ICD-10-CM

## 2024-02-15 DIAGNOSIS — Z12.5 PROSTATE CANCER SCREENING: ICD-10-CM

## 2024-02-15 DIAGNOSIS — Z30.09 VASECTOMY EVALUATION: ICD-10-CM

## 2024-02-15 DIAGNOSIS — R53.83 FATIGUE, UNSPECIFIED TYPE: ICD-10-CM

## 2024-02-15 LAB
25(OH)D3+25(OH)D2 SERPL-MCNC: 32 NG/ML (ref 30–96)
ALBUMIN SERPL BCP-MCNC: 4.3 G/DL (ref 3.5–5.2)
ALP SERPL-CCNC: 67 U/L (ref 55–135)
ALT SERPL W/O P-5'-P-CCNC: 51 U/L (ref 10–44)
ANION GAP SERPL CALC-SCNC: 10 MMOL/L (ref 8–16)
AST SERPL-CCNC: 24 U/L (ref 10–40)
BASOPHILS # BLD AUTO: 0.01 K/UL (ref 0–0.2)
BASOPHILS NFR BLD: 0.2 % (ref 0–1.9)
BILIRUB SERPL-MCNC: 1.2 MG/DL (ref 0.1–1)
BUN SERPL-MCNC: 17 MG/DL (ref 6–20)
CALCIUM SERPL-MCNC: 9.5 MG/DL (ref 8.7–10.5)
CHLORIDE SERPL-SCNC: 102 MMOL/L (ref 95–110)
CHOLEST SERPL-MCNC: 172 MG/DL (ref 120–199)
CHOLEST/HDLC SERPL: 6.9 {RATIO} (ref 2–5)
CO2 SERPL-SCNC: 26 MMOL/L (ref 23–29)
COMPLEXED PSA SERPL-MCNC: 0.81 NG/ML (ref 0–4)
CREAT SERPL-MCNC: 1.2 MG/DL (ref 0.5–1.4)
DIFFERENTIAL METHOD BLD: ABNORMAL
EOSINOPHIL # BLD AUTO: 0.1 K/UL (ref 0–0.5)
EOSINOPHIL NFR BLD: 1.9 % (ref 0–8)
ERYTHROCYTE [DISTWIDTH] IN BLOOD BY AUTOMATED COUNT: 14.3 % (ref 11.5–14.5)
EST. GFR  (NO RACE VARIABLE): >60 ML/MIN/1.73 M^2
ESTIMATED AVG GLUCOSE: 77 MG/DL (ref 68–131)
GLUCOSE SERPL-MCNC: 90 MG/DL (ref 70–110)
HBA1C MFR BLD: 4.3 % (ref 4–5.6)
HCT VFR BLD AUTO: 39.4 % (ref 40–54)
HDLC SERPL-MCNC: 25 MG/DL (ref 40–75)
HDLC SERPL: 14.5 % (ref 20–50)
HGB BLD-MCNC: 13.6 G/DL (ref 14–18)
IMM GRANULOCYTES # BLD AUTO: 0.02 K/UL (ref 0–0.04)
IMM GRANULOCYTES NFR BLD AUTO: 0.3 % (ref 0–0.5)
LDLC SERPL CALC-MCNC: ABNORMAL MG/DL (ref 63–159)
LYMPHOCYTES # BLD AUTO: 1.5 K/UL (ref 1–4.8)
LYMPHOCYTES NFR BLD: 24.7 % (ref 18–48)
MCH RBC QN AUTO: 30.9 PG (ref 27–31)
MCHC RBC AUTO-ENTMCNC: 34.5 G/DL (ref 32–36)
MCV RBC AUTO: 90 FL (ref 82–98)
MONOCYTES # BLD AUTO: 0.4 K/UL (ref 0.3–1)
MONOCYTES NFR BLD: 7.3 % (ref 4–15)
NEUTROPHILS # BLD AUTO: 3.9 K/UL (ref 1.8–7.7)
NEUTROPHILS NFR BLD: 65.6 % (ref 38–73)
NONHDLC SERPL-MCNC: 147 MG/DL
NRBC BLD-RTO: 0 /100 WBC
PLATELET # BLD AUTO: 232 K/UL (ref 150–450)
PMV BLD AUTO: 9.9 FL (ref 9.2–12.9)
POTASSIUM SERPL-SCNC: 3.8 MMOL/L (ref 3.5–5.1)
PROT SERPL-MCNC: 7.5 G/DL (ref 6–8.4)
RBC # BLD AUTO: 4.4 M/UL (ref 4.6–6.2)
SODIUM SERPL-SCNC: 138 MMOL/L (ref 136–145)
T4 FREE SERPL-MCNC: 0.81 NG/DL (ref 0.71–1.51)
TESTOST SERPL-MCNC: 591 NG/DL (ref 304–1227)
TRIGL SERPL-MCNC: 634 MG/DL (ref 30–150)
TSH SERPL DL<=0.005 MIU/L-ACNC: 1.51 UIU/ML (ref 0.4–4)
WBC # BLD AUTO: 5.92 K/UL (ref 3.9–12.7)

## 2024-02-15 PROCEDURE — 84443 ASSAY THYROID STIM HORMONE: CPT | Performed by: FAMILY MEDICINE

## 2024-02-15 PROCEDURE — 84403 ASSAY OF TOTAL TESTOSTERONE: CPT | Performed by: FAMILY MEDICINE

## 2024-02-15 PROCEDURE — 84439 ASSAY OF FREE THYROXINE: CPT | Performed by: FAMILY MEDICINE

## 2024-02-15 PROCEDURE — 3080F DIAST BP >= 90 MM HG: CPT | Mod: CPTII,S$GLB,, | Performed by: UROLOGY

## 2024-02-15 PROCEDURE — 80053 COMPREHEN METABOLIC PANEL: CPT | Performed by: FAMILY MEDICINE

## 2024-02-15 PROCEDURE — 1160F RVW MEDS BY RX/DR IN RCRD: CPT | Mod: CPTII,S$GLB,, | Performed by: UROLOGY

## 2024-02-15 PROCEDURE — 85025 COMPLETE CBC W/AUTO DIFF WBC: CPT | Performed by: FAMILY MEDICINE

## 2024-02-15 PROCEDURE — 82306 VITAMIN D 25 HYDROXY: CPT | Performed by: FAMILY MEDICINE

## 2024-02-15 PROCEDURE — 4010F ACE/ARB THERAPY RXD/TAKEN: CPT | Mod: CPTII,S$GLB,, | Performed by: UROLOGY

## 2024-02-15 PROCEDURE — 36415 COLL VENOUS BLD VENIPUNCTURE: CPT | Mod: PO | Performed by: FAMILY MEDICINE

## 2024-02-15 PROCEDURE — 83036 HEMOGLOBIN GLYCOSYLATED A1C: CPT | Performed by: FAMILY MEDICINE

## 2024-02-15 PROCEDURE — 80061 LIPID PANEL: CPT | Performed by: FAMILY MEDICINE

## 2024-02-15 PROCEDURE — 3077F SYST BP >= 140 MM HG: CPT | Mod: CPTII,S$GLB,, | Performed by: UROLOGY

## 2024-02-15 PROCEDURE — 3008F BODY MASS INDEX DOCD: CPT | Mod: CPTII,S$GLB,, | Performed by: UROLOGY

## 2024-02-15 PROCEDURE — 1159F MED LIST DOCD IN RCRD: CPT | Mod: CPTII,S$GLB,, | Performed by: UROLOGY

## 2024-02-15 PROCEDURE — 99204 OFFICE O/P NEW MOD 45 MIN: CPT | Mod: S$GLB,,, | Performed by: UROLOGY

## 2024-02-15 PROCEDURE — 84153 ASSAY OF PSA TOTAL: CPT | Performed by: FAMILY MEDICINE

## 2024-02-17 ENCOUNTER — PATIENT MESSAGE (OUTPATIENT)
Dept: INTERNAL MEDICINE | Facility: CLINIC | Age: 44
End: 2024-02-17
Payer: COMMERCIAL

## 2024-02-17 DIAGNOSIS — E78.1 HYPERTRIGLYCERIDEMIA: Primary | ICD-10-CM

## 2024-02-17 RX ORDER — FENOFIBRATE 160 MG/1
160 TABLET ORAL DAILY
Qty: 90 TABLET | Refills: 3 | Status: SHIPPED | OUTPATIENT
Start: 2024-02-17 | End: 2025-02-16

## 2024-02-20 ENCOUNTER — PATIENT MESSAGE (OUTPATIENT)
Dept: SLEEP MEDICINE | Facility: CLINIC | Age: 44
End: 2024-02-20
Payer: COMMERCIAL

## 2024-02-20 ENCOUNTER — HOSPITAL ENCOUNTER (OUTPATIENT)
Dept: CARDIOLOGY | Facility: HOSPITAL | Age: 44
Discharge: HOME | End: 2024-02-20
Attending: PHYSICIAN ASSISTANT
Payer: COMMERCIAL

## 2024-02-20 VITALS
DIASTOLIC BLOOD PRESSURE: 83 MMHG | HEIGHT: 75 IN | HEART RATE: 74 BPM | BODY MASS INDEX: 33.32 KG/M2 | WEIGHT: 268 LBS | SYSTOLIC BLOOD PRESSURE: 131 MMHG

## 2024-02-20 DIAGNOSIS — I77.810 AORTIC ROOT DILATATION: ICD-10-CM

## 2024-02-20 LAB
ASCENDING AORTA: 3.6 CM
AV INDEX (PROSTH): 0.77
AV MEAN GRADIENT: 3 MMHG
AV PEAK GRADIENT: 5 MMHG
AV VALVE AREA BY VELOCITY RATIO: 4.65 CM²
AV VALVE AREA: 4.35 CM²
AV VELOCITY RATIO: 0.82
BSA FOR ECHO PROCEDURE: 2.54 M2
CV ECHO LV RWT: 0.3 CM
DOP CALC AO PEAK VEL: 1.14 M/S
DOP CALC AO VTI: 18.5 CM
DOP CALC LVOT AREA: 5.6 CM2
DOP CALC LVOT DIAMETER: 2.68 CM
DOP CALC LVOT PEAK VEL: 0.94 M/S
DOP CALC LVOT STROKE VOLUME: 80.4 CM3
DOP CALCLVOT PEAK VEL VTI: 14.26 CM
E WAVE DECELERATION TIME: 175.6 MSEC
E/A RATIO: 1.19
E/E' RATIO: 8.33 M/S
ECHO LV POSTERIOR WALL: 0.78 CM (ref 0.6–1.1)
EJECTION FRACTION: 60 %
FRACTIONAL SHORTENING: 35 % (ref 28–44)
INTERVENTRICULAR SEPTUM: 1.18 CM (ref 0.6–1.1)
IVRT: 97.05 MSEC
LA MAJOR: 5.13 CM
LA MINOR: 5.1 CM
LA WIDTH: 4.46 CM
LEFT ATRIUM SIZE: 4.02 CM
LEFT ATRIUM VOLUME INDEX MOD: 25.2 ML/M2
LEFT ATRIUM VOLUME INDEX: 31.3 ML/M2
LEFT ATRIUM VOLUME MOD: 62.77 CM3
LEFT ATRIUM VOLUME: 77.95 CM3
LEFT INTERNAL DIMENSION IN SYSTOLE: 3.41 CM (ref 2.1–4)
LEFT VENTRICLE DIASTOLIC VOLUME INDEX: 53.2 ML/M2
LEFT VENTRICLE DIASTOLIC VOLUME: 132.47 ML
LEFT VENTRICLE MASS INDEX: 77 G/M2
LEFT VENTRICLE SYSTOLIC VOLUME INDEX: 19.3 ML/M2
LEFT VENTRICLE SYSTOLIC VOLUME: 47.94 ML
LEFT VENTRICULAR INTERNAL DIMENSION IN DIASTOLE: 5.25 CM (ref 3.5–6)
LEFT VENTRICULAR MASS: 192.05 G
LV LATERAL E/E' RATIO: 7.5 M/S
LV SEPTAL E/E' RATIO: 9.38 M/S
MV PEAK A VEL: 0.63 M/S
MV PEAK E VEL: 0.75 M/S
MV STENOSIS PRESSURE HALF TIME: 50.92 MS
MV VALVE AREA P 1/2 METHOD: 4.32 CM2
RA MAJOR: 4.6 CM
RA PRESSURE ESTIMATED: 3 MMHG
RA WIDTH: 3.59 CM
RIGHT VENTRICULAR END-DIASTOLIC DIMENSION: 3.33 CM
SINUS: 4.2 CM
STJ: 3.3 CM
TDI LATERAL: 0.1 M/S
TDI SEPTAL: 0.08 M/S
TDI: 0.09 M/S
TRICUSPID ANNULAR PLANE SYSTOLIC EXCURSION: 2.81 CM
Z-SCORE OF LEFT VENTRICULAR DIMENSION IN END DIASTOLE: -9.5
Z-SCORE OF LEFT VENTRICULAR DIMENSION IN END SYSTOLE: -6.8

## 2024-02-20 PROCEDURE — 93306 TTE W/DOPPLER COMPLETE: CPT | Mod: PO

## 2024-02-20 PROCEDURE — 93306 TTE W/DOPPLER COMPLETE: CPT | Mod: 26,,, | Performed by: INTERNAL MEDICINE

## 2024-02-21 DIAGNOSIS — G43.719 INTRACTABLE CHRONIC MIGRAINE WITHOUT AURA AND WITHOUT STATUS MIGRAINOSUS: Primary | ICD-10-CM

## 2024-02-21 RX ORDER — METHYLPREDNISOLONE 4 MG/1
TABLET ORAL
Qty: 21 EACH | Refills: 0 | Status: SHIPPED | OUTPATIENT
Start: 2024-02-21 | End: 2024-03-13

## 2024-03-07 ENCOUNTER — PROCEDURE VISIT (OUTPATIENT)
Dept: UROLOGY | Facility: CLINIC | Age: 44
End: 2024-03-07
Attending: UROLOGY
Payer: COMMERCIAL

## 2024-03-07 VITALS
HEART RATE: 83 BPM | DIASTOLIC BLOOD PRESSURE: 83 MMHG | BODY MASS INDEX: 33.32 KG/M2 | HEIGHT: 75 IN | WEIGHT: 268 LBS | SYSTOLIC BLOOD PRESSURE: 120 MMHG

## 2024-03-07 DIAGNOSIS — Z30.09 VASECTOMY EVALUATION: ICD-10-CM

## 2024-03-07 PROCEDURE — 55250 REMOVAL OF SPERM DUCT(S): CPT | Mod: S$GLB,,, | Performed by: UROLOGY

## 2024-03-07 RX ORDER — LIDOCAINE HYDROCHLORIDE AND EPINEPHRINE 20; 10 MG/ML; UG/ML
6 INJECTION, SOLUTION INFILTRATION; PERINEURAL
Status: COMPLETED | OUTPATIENT
Start: 2024-03-07 | End: 2024-03-07

## 2024-03-07 RX ADMIN — LIDOCAINE HYDROCHLORIDE AND EPINEPHRINE 6 ML: 20; 10 INJECTION, SOLUTION INFILTRATION; PERINEURAL at 10:03

## 2024-03-07 NOTE — PROCEDURES
"Vasectomy    Date/Time: 3/7/2024 8:15 AM    Performed by: Cedric Lyon MD  Authorized by: Cedric Lyon MD    Time out: Immediately prior to procedure a "time out" was called to verify the correct patient, procedure, equipment, support staff and site/side marked as required.    Indications:  [place] male  Position:  Dorsal lithotomy  Anesthesia:  Other (5cc 1% lidocaine)  Patient sedated: No    Preparation: Patient was prepped and draped in usual sterile fashion    Incisions:  1  Length vas excised:  2.5 cm  Vas:  Fulgurated and Buried  Sutures:  3-0 chromic SH (for fascial interposition)  Skin closures:  3-0 chromic SH  Same procedure performed on both sides    Patient tolerance:  Patient tolerated the procedure well with no immediate complications     Post-Procedure Care:  -- No heavy lifting for 5-7 days  -- No intercourse for 3-4 days  -- OK to shower tomorrow; no soaking in water for 7 days  -- Call for severe pain, bleeding, concern for infection    FU in 3 months for semen analysis.    "

## 2024-03-14 ENCOUNTER — PATIENT MESSAGE (OUTPATIENT)
Dept: UROLOGY | Facility: CLINIC | Age: 44
End: 2024-03-14
Payer: COMMERCIAL

## 2024-03-27 ENCOUNTER — OFFICE VISIT (OUTPATIENT)
Dept: NEUROLOGY | Facility: CLINIC | Age: 44
End: 2024-03-27
Payer: COMMERCIAL

## 2024-03-27 DIAGNOSIS — G47.33 OSA (OBSTRUCTIVE SLEEP APNEA): ICD-10-CM

## 2024-03-27 DIAGNOSIS — G43.719 INTRACTABLE CHRONIC MIGRAINE WITHOUT AURA AND WITHOUT STATUS MIGRAINOSUS: Primary | ICD-10-CM

## 2024-03-27 PROCEDURE — 99999 PR PBB SHADOW E&M-EST. PATIENT-LVL I: CPT | Mod: PBBFAC,,, | Performed by: NURSE PRACTITIONER

## 2024-03-27 PROCEDURE — 99499 UNLISTED E&M SERVICE: CPT | Mod: S$GLB,,, | Performed by: NURSE PRACTITIONER

## 2024-03-27 PROCEDURE — 64615 CHEMODENERV MUSC MIGRAINE: CPT | Mod: S$GLB,,, | Performed by: NURSE PRACTITIONER

## 2024-03-27 NOTE — PROGRESS NOTES
PROCEDURE NOTE:     After informed consent signed and dated, and risks and benefits of procedure was discussed, BOTOX injection was performed     Frontalis- 15 units on each side   - 5 units on each side   Procerus- 10 units   Occipitalis- 15 units on each side   Temporalis - 20 units on each side   Trapezius- 10 units on each side   Cervical paraspinal muscles- 10 units on left side , 5 u R side      Patient tolerated procedure well w/o any complications.       Nurtec QOD preventive and prn onzetra (feels less lowsy after taking this vs oral).   Sleeping qhs with apap 8-12cm, continues to benefit from use. When got DS1 machine felt set pressure was too low, not happy with DME [facility], plan is to switch to [employer] for supplies, nose pillows  More headaches/migraines recent mos, using his prn meds more

## 2024-03-28 ENCOUNTER — PATIENT MESSAGE (OUTPATIENT)
Dept: NEUROLOGY | Facility: CLINIC | Age: 44
End: 2024-03-28
Payer: COMMERCIAL

## 2024-04-12 ENCOUNTER — PATIENT MESSAGE (OUTPATIENT)
Dept: PSYCHIATRY | Facility: CLINIC | Age: 44
End: 2024-04-12
Payer: COMMERCIAL

## 2024-04-15 ENCOUNTER — OFFICE VISIT (OUTPATIENT)
Dept: DERMATOLOGY | Facility: CLINIC | Age: 44
End: 2024-04-15
Payer: COMMERCIAL

## 2024-04-15 DIAGNOSIS — L73.9 FOLLICULITIS: Primary | ICD-10-CM

## 2024-04-15 DIAGNOSIS — L71.9 ROSACEA: ICD-10-CM

## 2024-04-15 PROCEDURE — 3044F HG A1C LEVEL LT 7.0%: CPT | Mod: CPTII,S$GLB,, | Performed by: DERMATOLOGY

## 2024-04-15 PROCEDURE — 4010F ACE/ARB THERAPY RXD/TAKEN: CPT | Mod: CPTII,S$GLB,, | Performed by: DERMATOLOGY

## 2024-04-15 PROCEDURE — 1160F RVW MEDS BY RX/DR IN RCRD: CPT | Mod: CPTII,S$GLB,, | Performed by: DERMATOLOGY

## 2024-04-15 PROCEDURE — 99999 PR PBB SHADOW E&M-EST. PATIENT-LVL IV: CPT | Mod: PBBFAC,,, | Performed by: DERMATOLOGY

## 2024-04-15 PROCEDURE — 1159F MED LIST DOCD IN RCRD: CPT | Mod: CPTII,S$GLB,, | Performed by: DERMATOLOGY

## 2024-04-15 PROCEDURE — 99214 OFFICE O/P EST MOD 30 MIN: CPT | Mod: S$GLB,,, | Performed by: DERMATOLOGY

## 2024-04-15 RX ORDER — FLUOCINONIDE TOPICAL SOLUTION USP, 0.05% 0.5 MG/ML
SOLUTION TOPICAL
Qty: 60 ML | Refills: 3 | Status: SHIPPED | OUTPATIENT
Start: 2024-04-15

## 2024-04-15 NOTE — PROGRESS NOTES
Subjective:      Patient ID:  Jatinder Moreno III is a 43 y.o. male who presents for   Chief Complaint   Patient presents with    Hair/Scalp Problem     Scalp      Pt is here for follow up: rosacea and scalp folliculitis  Pt last seen virtually on 01/11/2024    Pt feels his scalp is tender to touch  and has areas that are itching   ; flaring since he has been in the sun.  Using amzeeq and dapsone gel .  Tolerating well. Washing with ciclopirox and clobetasol/DHS zinc shampoo and Cln shampoo sport, zinc vitamins.    Pt feels his face is worse; worse if he is outside for a while.            Warts    Hair Loss      Review of Systems   Skin:  Positive for itching, rash, dry skin, daily sunscreen use, activity-related sunscreen use and wears hat. Negative for recent sunburn.   Hematologic/Lymphatic: Does not bruise/bleed easily.       Objective:   Physical Exam   Skin:   Areas Examined (abnormalities noted in diagram):   Scalp / Hair Palpated and Inspected  Head / Face Inspection Performed            Diagram Legend     Erythematous scaling macule/papule c/w actinic keratosis       Vascular papule c/w angioma      Pigmented verrucoid papule/plaque c/w seborrheic keratosis      Yellow umbilicated papule c/w sebaceous hyperplasia      Irregularly shaped tan macule c/w lentigo     1-2 mm smooth white papules consistent with Milia      Movable subcutaneous cyst with punctum c/w epidermal inclusion cyst      Subcutaneous movable cyst c/w pilar cyst      Firm pink to brown papule c/w dermatofibroma      Pedunculated fleshy papule(s) c/w skin tag(s)      Evenly pigmented macule c/w junctional nevus     Mildly variegated pigmented, slightly irregular-bordered macule c/w mildly atypical nevus      Flesh colored to evenly pigmented papule c/w intradermal nevus       Pink pearly papule/plaque c/w basal cell carcinoma      Erythematous hyperkeratotic cursted plaque c/w SCC      Surgical scar with no sign of skin cancer  recurrence      Open and closed comedones      Inflammatory papules and pustules      Verrucoid papule consistent consistent with wart     Erythematous eczematous patches and plaques     Dystrophic onycholytic nail with subungual debris c/w onychomycosis     Umbilicated papule    Erythematous-base heme-crusted tan verrucoid plaque consistent with inflamed seborrheic keratosis     Erythematous Silvery Scaling Plaque c/w Psoriasis     See annotation      Assessment / Plan:        Jatinder was seen today for hair/scalp problem.    Diagnoses and all orders for this visit:    Folliculitis- scalp , long term   Rosacea  -     fluocinonide (LIDEX) 0.05 % external solution; AAA scalp qday prn itching, scaling    Continue low dose doxy  and below plan for now  Use Cabtreo sample or sal acid for recalcitrant facial lesions  Use lidex to scalp for itching areas  Using amzeeq and dapsone gel .  Tolerating well. Washing with ciclopirox and clobetasol/DHS zinc shampoo and Cln shampoo sport, zinc vitamins.             Follow up in about 6 months (around 10/15/2024). Medication management

## 2024-04-23 ENCOUNTER — OFFICE VISIT (OUTPATIENT)
Dept: PSYCHIATRY | Facility: CLINIC | Age: 44
End: 2024-04-23
Payer: COMMERCIAL

## 2024-04-23 DIAGNOSIS — G47.00 INSOMNIA DISORDER WITH NON-SLEEP DISORDER MENTAL COMORBIDITY: ICD-10-CM

## 2024-04-23 DIAGNOSIS — F41.1 GENERALIZED ANXIETY DISORDER: ICD-10-CM

## 2024-04-23 DIAGNOSIS — F32.0 CURRENT MILD EPISODE OF MAJOR DEPRESSIVE DISORDER WITHOUT PRIOR EPISODE: ICD-10-CM

## 2024-04-23 DIAGNOSIS — F90.0 ATTENTION DEFICIT HYPERACTIVITY DISORDER (ADHD), PREDOMINANTLY INATTENTIVE TYPE: Primary | ICD-10-CM

## 2024-04-23 PROCEDURE — 4010F ACE/ARB THERAPY RXD/TAKEN: CPT | Mod: CPTII,S$GLB,, | Performed by: NURSE PRACTITIONER

## 2024-04-23 PROCEDURE — 3044F HG A1C LEVEL LT 7.0%: CPT | Mod: CPTII,S$GLB,, | Performed by: NURSE PRACTITIONER

## 2024-04-23 PROCEDURE — 99999 PR PBB SHADOW E&M-EST. PATIENT-LVL III: CPT | Mod: PBBFAC,,, | Performed by: NURSE PRACTITIONER

## 2024-04-23 PROCEDURE — 1160F RVW MEDS BY RX/DR IN RCRD: CPT | Mod: CPTII,S$GLB,, | Performed by: NURSE PRACTITIONER

## 2024-04-23 PROCEDURE — 99214 OFFICE O/P EST MOD 30 MIN: CPT | Mod: S$GLB,,, | Performed by: NURSE PRACTITIONER

## 2024-04-23 PROCEDURE — 1159F MED LIST DOCD IN RCRD: CPT | Mod: CPTII,S$GLB,, | Performed by: NURSE PRACTITIONER

## 2024-04-23 RX ORDER — BUSPIRONE HYDROCHLORIDE 15 MG/1
15 TABLET ORAL 3 TIMES DAILY
Qty: 90 TABLET | Refills: 11 | Status: SHIPPED | OUTPATIENT
Start: 2024-04-23

## 2024-04-23 RX ORDER — METHYLPHENIDATE HYDROCHLORIDE 20 MG/1
20 TABLET ORAL 3 TIMES DAILY
Qty: 90 TABLET | Refills: 0 | Status: SHIPPED | OUTPATIENT
Start: 2024-05-22

## 2024-04-23 RX ORDER — TRAZODONE HYDROCHLORIDE 150 MG/1
150 TABLET ORAL NIGHTLY
Qty: 90 TABLET | Refills: 3 | Status: SHIPPED | OUTPATIENT
Start: 2024-04-23

## 2024-04-23 RX ORDER — METHYLPHENIDATE HYDROCHLORIDE 20 MG/1
20 TABLET ORAL 3 TIMES DAILY
Qty: 90 TABLET | Refills: 0 | Status: SHIPPED | OUTPATIENT
Start: 2024-06-21

## 2024-04-23 RX ORDER — DIAZEPAM 10 MG/1
10 TABLET ORAL DAILY PRN
Qty: 30 TABLET | Refills: 2 | Status: SHIPPED | OUTPATIENT
Start: 2024-04-23

## 2024-04-23 RX ORDER — METHYLPHENIDATE HYDROCHLORIDE 20 MG/1
20 TABLET ORAL
Qty: 90 TABLET | Refills: 0 | Status: SHIPPED | OUTPATIENT
Start: 2024-04-23

## 2024-04-23 NOTE — PATIENT INSTRUCTIONS
Ritalin 20 mg 3 x daily (4/23, 5/22, 6/21)  Valium 10 mg daily as needed for anxiety  Increase to Buspar 15 mg 3 x daily  Try Trazodone 150 mg (may break tablet in 1/2 or 1/2 to start)  Follow-up with me every 3 months by office or virtual visit for Ritalin refills

## 2024-04-23 NOTE — PROGRESS NOTES
"Outpatient Psychiatry Follow-Up Visit (MD/NP)    4/23/2024    Clinical Status of Patient:  Outpatient (Ambulatory)    Chief Complaint:  Jatinder Moreno III is a 43 y.o. male who presents today for follow-up of anxiety and attention problems.  Met with patient.  Pt new to me.    Last visit was: 9/21/22 with Dr. Horta. Chart and  reviewed.    Interval History and Content of Current Session:  Current Psychiatric Medications/changes  1.  Continue buspirone 10mg TID targeting anxiety and depression.  Warned of risk of suicidality, roberth, and serotonin syndrome.  2.  Continue Ritalin 20 mg by mouth 3 times a day targeting focus and concentration.  Warned of risk of roberth, anxiety, addiction potential, HTN, withdrawals.  Also will need to be seen in office once every 3 months to continue prescriptions for this class of medications.  One prescription generally lasts him close to 6 months.  3.  Continue diazepam 10 mg by mouth daily as needed for anxiety.  Warned of risk of oversedation, falls, and not to drink or drive until the effects of the medication are known.  Warned of risk of addictive and withdrawal potential.     Established therapeutic rapport and transition of care from previous provider.  Changed jobs and has a new baby and has more stressors. Will increase Buspar. Pt presents bright affect and euthymic mood. Pt states, "I've been doing well" .Reports effective response to ADHD medications and denies side effects. Denies any unmanaged mood, anxiety, or insomnia symptoms.  Denies SI/HI/AVH. Will continue current medications    Psychotherapy:  Target symptoms: lack of focus, anxiety   Why chosen therapy is appropriate versus another modality: relevant to diagnosis  Outcome monitoring methods: self-report  Therapeutic intervention type: insight oriented psychotherapy  Topics discussed/themes: difficulty managing affect in interpersonal relationships, building skills sets for symptom management  The " "patient's response to the intervention is accepting. The patient's progress toward treatment goals is good.   Duration of intervention: 16 minutes.    Review of Systems   PSYCHIATRIC: Pertinant items are noted in the narrative.  CONSTITUTIONAL: No weight gain or loss.   MUSCULOSKELETAL: No pain or stiffness of the joints.  NEUROLOGIC: No weakness, sensory changes, seizures, confusion, memory loss, tremor or other abnormal movements.  ENDOCRINE: No polydipsia or polyuria.  INTEGUMENTARY: No rashes or lacerations.  EYES: No exophthalmos, jaundice or blindness.  ENT: No dizziness, tinnitus or hearing loss.  RESPIRATORY: No shortness of breath.  CARDIOVASCULAR: No tachycardia or chest pain.  GASTROINTESTINAL: No nausea, vomiting, pain, constipation or diarrhea.  GENITOURINARY: No frequency, dysuria or sexual dysfunction.  HEMATOLOGIC/LYMPHATIC: No excessive bleeding, prolonged or excessive bleeding after dental extraction/injury.  ALLERGIC/IMMUNOLOGIC: No allergic response to materials, foods or animals at this time.    Past Medical, Family and Social History: The patient's past medical, family and social history have been reviewed and updated as appropriate within the electronic medical record - see encounter notes.    Compliance: yes    Side effects: None    Risk Parameters:  Patient reports no suicidal ideation  Patient reports no homicidal ideation  Patient reports no self-injurious behavior  Patient reports no violent behavior    Exam (detailed: at least 9 elements; comprehensive: all 15 elements)   Constitutional  Vitals:  Most recent vital signs, dated greater than 90 days prior to this appointment, were reviewed.   There were no vitals filed for this visit.  : 6' 3" (1.905 m)  Last Wt: 121.6 kg>30 days  BP: 120/83>1 day  Pulse: 83>1 day   General:  unremarkable, age appropriate     Musculoskeletal  Muscle Strength/Tone:  not examined   Gait & Station:  non-ataxic     Psychiatric  Speech:  no latency; no press "   Mood & Affect:  steady  congruent and appropriate   Thought Process:  normal and logical   Associations:  intact   Thought Content:  normal, no suicidality, no homicidality, delusions, or paranoia   Insight:  intact   Judgement: behavior is adequate to circumstances   Orientation:  grossly intact   Memory: intact for content of interview   Language: grossly intact   Attention Span & Concentration:  able to focus   Fund of Knowledge:  intact and appropriate to age and level of education     Assessment and Diagnosis   Status/Progress: Based on the examination today, the patient's problem(s) is/are improved.  New problems have not been presented today.   Co-morbidities and Lack of compliance are not complicating management of the primary condition.  There are no active rule-out diagnoses for this patient at this time.     General Impression:       ICD-10-CM ICD-9-CM   1. Attention deficit hyperactivity disorder (ADHD), predominantly inattentive type  F90.0 314.00   2. Generalized anxiety disorder  F41.1 300.02   3. Current mild episode of major depressive disorder without prior episode  F32.0 296.21   4. Insomnia disorder with non-sleep disorder mental comorbidity  G47.00 780.52       Intervention/Counseling/Treatment Plan   Medication Management: The risks and benefits of medication were discussed with the patient.  Ritalin 20 mg 3 x daily (4/23, 5/22, 6/21)  Valium 10 mg daily as needed for anxiety  Increase to Buspar 15 mg 3 x daily  Try Trazodone 150 mg (may break tablet in 1/2 or 1/2 to start)  Follow-up with me every 3 months by office or virtual visit for Ritalin refills    Return to Clinic: 3 months    Risks, benefits, side effects and alternative treatments discussed with patient. Patient agrees with the current plan as documented.  Encouraged Patient to keep future appointments.  Take medications as prescribed and abstain from substance abuse.  Pt to present to ED for thoughts to harm himself or others

## 2024-05-09 ENCOUNTER — OFFICE VISIT (OUTPATIENT)
Dept: INTERNAL MEDICINE | Facility: CLINIC | Age: 44
End: 2024-05-09
Payer: COMMERCIAL

## 2024-05-09 DIAGNOSIS — S39.012A BACK STRAIN, INITIAL ENCOUNTER: Primary | ICD-10-CM

## 2024-05-09 PROCEDURE — 1160F RVW MEDS BY RX/DR IN RCRD: CPT | Mod: CPTII,95,, | Performed by: FAMILY MEDICINE

## 2024-05-09 PROCEDURE — 99214 OFFICE O/P EST MOD 30 MIN: CPT | Mod: 95,,, | Performed by: FAMILY MEDICINE

## 2024-05-09 PROCEDURE — 4010F ACE/ARB THERAPY RXD/TAKEN: CPT | Mod: CPTII,95,, | Performed by: FAMILY MEDICINE

## 2024-05-09 PROCEDURE — 3044F HG A1C LEVEL LT 7.0%: CPT | Mod: CPTII,95,, | Performed by: FAMILY MEDICINE

## 2024-05-09 PROCEDURE — 1159F MED LIST DOCD IN RCRD: CPT | Mod: CPTII,95,, | Performed by: FAMILY MEDICINE

## 2024-05-09 RX ORDER — METHOCARBAMOL 750 MG/1
750 TABLET, FILM COATED ORAL 4 TIMES DAILY PRN
Qty: 40 TABLET | Refills: 0 | Status: SHIPPED | OUTPATIENT
Start: 2024-05-09 | End: 2024-05-19

## 2024-05-09 NOTE — PROGRESS NOTES
Subjective     Patient ID: Jatinder Moreno III is a 43 y.o. male.    Chief Complaint: Back Pain  The patient location is:  [place]  The chief complaint leading to consultation is:  Back strain    Visit type: audiovisual    Face to Face time with patient:  4 minutes  9 minutes of total time spent on the encounter, which includes face to face time and non-face to face time preparing to see the patient (eg, review of tests), Obtaining and/or reviewing separately obtained history, Documenting clinical information in the electronic or other health record, Independently interpreting results (not separately reported) and communicating results to the patient/family/caregiver, or Care coordination (not separately reported).         Each patient to whom he or she provides medical services by telemedicine is:  (1) informed of the relationship between the physician and patient and the respective role of any other health care provider with respect to management of the patient; and (2) notified that he or she may decline to receive medical services by telemedicine and may withdraw from such care at any time.    Notes:  43-year-old white male is evaluated through telemedicine visit secondary to a lower back strain.  He reports that since Monday afternoon he has been noticing progressively worsening lower back spasms.  He reports that he does keep physically active by playing with his children and this past weekend he also help his father pain an apartment.  He has been using a TENS unit, cupping, heat, and meloxicam with minimal relief.    Back Pain  Pertinent negatives include no abdominal pain, chest pain, dysuria, fever or headaches.     Review of Systems   Constitutional:  Negative for appetite change, chills, fatigue and fever.   HENT:  Negative for nasal congestion, ear pain, hearing loss, postnasal drip, rhinorrhea, sinus pressure/congestion, sore throat and tinnitus.    Eyes:  Negative for redness, itching and  visual disturbance.   Respiratory:  Negative for cough, chest tightness and shortness of breath.    Cardiovascular:  Negative for chest pain and palpitations.   Gastrointestinal:  Negative for abdominal pain, constipation, diarrhea, nausea and vomiting.   Genitourinary:  Negative for decreased urine volume, difficulty urinating, dysuria, frequency, hematuria and urgency.   Musculoskeletal:  Positive for back pain. Negative for myalgias, neck pain and neck stiffness.   Integumentary:  Negative for rash.   Neurological:  Negative for dizziness, light-headedness and headaches.   Psychiatric/Behavioral: Negative.            Objective     Physical Exam  Constitutional:       Appearance: Normal appearance.   HENT:      Head: Normocephalic and atraumatic.   Pulmonary:      Effort: Pulmonary effort is normal.   Neurological:      Mental Status: He is oriented to person, place, and time.   Psychiatric:         Mood and Affect: Mood normal.         Behavior: Behavior normal.         Thought Content: Thought content normal.         Judgment: Judgment normal.            Assessment and Plan     1. Back strain, initial encounter  -     methocarbamoL (ROBAXIN) 750 MG Tab; Take 1 tablet (750 mg total) by mouth 4 (four) times daily as needed (Muscle strain).  Dispense: 40 tablet; Refill: 0        1. Heat, massage, and stretching as discussed.  Handout provided.  2. Continue meloxicam as prescribed.    3. Robaxin 750 mg q.i.d. p.r.n. muscle strain or pain.    4. Return to clinic as needed if symptoms persist or worsen.               Follow up if symptoms worsen or fail to improve.

## 2024-05-17 ENCOUNTER — LAB VISIT (OUTPATIENT)
Dept: LAB | Facility: HOSPITAL | Age: 44
End: 2024-05-17
Attending: FAMILY MEDICINE
Payer: COMMERCIAL

## 2024-05-17 DIAGNOSIS — G43.719 INTRACTABLE CHRONIC MIGRAINE WITHOUT AURA AND WITHOUT STATUS MIGRAINOSUS: ICD-10-CM

## 2024-05-17 DIAGNOSIS — E78.1 HYPERTRIGLYCERIDEMIA: ICD-10-CM

## 2024-05-17 LAB
ALBUMIN SERPL BCP-MCNC: 4.8 G/DL (ref 3.5–5.2)
ALP SERPL-CCNC: 48 U/L (ref 55–135)
ALT SERPL W/O P-5'-P-CCNC: 21 U/L (ref 10–44)
ANION GAP SERPL CALC-SCNC: 9 MMOL/L (ref 8–16)
AST SERPL-CCNC: 31 U/L (ref 10–40)
BILIRUB SERPL-MCNC: 1.6 MG/DL (ref 0.1–1)
BUN SERPL-MCNC: 14 MG/DL (ref 6–20)
CALCIUM SERPL-MCNC: 9.8 MG/DL (ref 8.7–10.5)
CHLORIDE SERPL-SCNC: 103 MMOL/L (ref 95–110)
CHOLEST SERPL-MCNC: 142 MG/DL (ref 120–199)
CHOLEST/HDLC SERPL: 3.9 {RATIO} (ref 2–5)
CO2 SERPL-SCNC: 25 MMOL/L (ref 23–29)
CREAT SERPL-MCNC: 1.5 MG/DL (ref 0.5–1.4)
EST. GFR  (NO RACE VARIABLE): 59.6 ML/MIN/1.73 M^2
GLUCOSE SERPL-MCNC: 88 MG/DL (ref 70–110)
HDLC SERPL-MCNC: 36 MG/DL (ref 40–75)
HDLC SERPL: 25.4 % (ref 20–50)
LDLC SERPL CALC-MCNC: 71.8 MG/DL (ref 63–159)
NONHDLC SERPL-MCNC: 106 MG/DL
POTASSIUM SERPL-SCNC: 3.5 MMOL/L (ref 3.5–5.1)
PROT SERPL-MCNC: 7.8 G/DL (ref 6–8.4)
SODIUM SERPL-SCNC: 137 MMOL/L (ref 136–145)
TRIGL SERPL-MCNC: 171 MG/DL (ref 30–150)

## 2024-05-17 PROCEDURE — 80061 LIPID PANEL: CPT | Performed by: FAMILY MEDICINE

## 2024-05-17 PROCEDURE — 80053 COMPREHEN METABOLIC PANEL: CPT | Performed by: FAMILY MEDICINE

## 2024-05-17 PROCEDURE — 36415 COLL VENOUS BLD VENIPUNCTURE: CPT | Mod: PO | Performed by: FAMILY MEDICINE

## 2024-05-17 RX ORDER — SUMATRIPTAN SUCCINATE 11 MG/1
CAPSULE NASAL
Qty: 16 EACH | Refills: 5 | Status: SHIPPED | OUTPATIENT
Start: 2024-05-17

## 2024-05-21 ENCOUNTER — PATIENT MESSAGE (OUTPATIENT)
Dept: UROLOGY | Facility: CLINIC | Age: 44
End: 2024-05-21
Payer: COMMERCIAL

## 2024-05-22 DIAGNOSIS — L73.9 FOLLICULITIS: ICD-10-CM

## 2024-05-22 DIAGNOSIS — L71.9 ROSACEA: ICD-10-CM

## 2024-05-28 NOTE — TELEPHONE ENCOUNTER
Please see the attached refill request.    Last seen 04/2024    Assessment / Plan:         Jatinder was seen today for hair/scalp problem.     Diagnoses and all orders for this visit:     Folliculitis- scalp , long term   Rosacea  -     fluocinonide (LIDEX) 0.05 % external solution; AAA scalp qday prn itching, scaling     Continue low dose doxy  and below plan for now  Use Cabtreo sample or sal acid for recalcitrant facial lesions  Use lidex to scalp for itching areas  Using amzeeq and dapsone gel .  Tolerating well. Washing with ciclopirox and clobetasol/DHS zinc shampoo and Cln shampoo sport, zinc vitamins.             Follow up in about 6 months (around 10/15/2024). Medication management

## 2024-05-31 ENCOUNTER — PATIENT MESSAGE (OUTPATIENT)
Dept: DERMATOLOGY | Facility: CLINIC | Age: 44
End: 2024-05-31
Payer: COMMERCIAL

## 2024-05-31 DIAGNOSIS — L73.9 FOLLICULITIS: ICD-10-CM

## 2024-05-31 DIAGNOSIS — L71.9 ROSACEA: ICD-10-CM

## 2024-06-03 DIAGNOSIS — L71.9 ROSACEA: ICD-10-CM

## 2024-06-03 DIAGNOSIS — L73.9 FOLLICULITIS: ICD-10-CM

## 2024-06-03 RX ORDER — DAPSONE 75 MG/G
GEL TOPICAL
Qty: 60 G | Refills: 2 | Status: SHIPPED | OUTPATIENT
Start: 2024-06-03 | End: 2024-06-04 | Stop reason: SDUPTHER

## 2024-06-03 RX ORDER — DAPSONE 75 MG/G
GEL TOPICAL
Qty: 60 G | Refills: 2 | Status: SHIPPED | OUTPATIENT
Start: 2024-06-03

## 2024-06-03 RX ORDER — MINOCYCLINE 40 MG/G
AEROSOL, FOAM TOPICAL
Qty: 30 G | Refills: 3 | Status: SHIPPED | OUTPATIENT
Start: 2024-06-03

## 2024-06-03 RX ORDER — MINOCYCLINE 40 MG/G
AEROSOL, FOAM TOPICAL
Qty: 30 G | Refills: 3 | Status: SHIPPED | OUTPATIENT
Start: 2024-06-03 | End: 2024-06-04 | Stop reason: SDUPTHER

## 2024-06-04 RX ORDER — DAPSONE 75 MG/G
GEL TOPICAL
Qty: 60 G | Refills: 2 | Status: SHIPPED | OUTPATIENT
Start: 2024-06-04

## 2024-06-04 RX ORDER — MINOCYCLINE 40 MG/G
AEROSOL, FOAM TOPICAL
Qty: 30 G | Refills: 3 | Status: SHIPPED | OUTPATIENT
Start: 2024-06-04

## 2024-06-10 ENCOUNTER — CLINICAL SUPPORT (OUTPATIENT)
Dept: UROLOGY | Facility: CLINIC | Age: 44
End: 2024-06-10
Payer: COMMERCIAL

## 2024-06-10 ENCOUNTER — PATIENT MESSAGE (OUTPATIENT)
Dept: UROLOGY | Facility: CLINIC | Age: 44
End: 2024-06-10

## 2024-06-10 DIAGNOSIS — Z30.09 VASECTOMY EVALUATION: Primary | ICD-10-CM

## 2024-06-10 NOTE — PROGRESS NOTES
[facility] 43 [facility].o. male status post vasectomy on 3/7/24 here for post-vasectomy SA.     Semen Microscopy   Uncentrifuged fresh semen sample examined under LP and HP microscopy.  No sperm noted in sample.    Patient notified in MyChart.

## 2024-06-19 ENCOUNTER — PROCEDURE VISIT (OUTPATIENT)
Dept: NEUROLOGY | Facility: CLINIC | Age: 44
End: 2024-06-19
Payer: COMMERCIAL

## 2024-06-19 DIAGNOSIS — G43.719 INTRACTABLE CHRONIC MIGRAINE WITHOUT AURA AND WITHOUT STATUS MIGRAINOSUS: ICD-10-CM

## 2024-06-19 DIAGNOSIS — G47.33 OSA (OBSTRUCTIVE SLEEP APNEA): ICD-10-CM

## 2024-06-19 DIAGNOSIS — I10 ESSENTIAL HYPERTENSION: Primary | ICD-10-CM

## 2024-06-19 RX ORDER — DICLOFENAC POTASSIUM 50 MG/1
50 POWDER, FOR SOLUTION ORAL EVERY 12 HOURS PRN
Qty: 60 EACH | Refills: 5 | Status: SHIPPED | OUTPATIENT
Start: 2024-06-19

## 2024-06-19 RX ORDER — DICLOFENAC POTASSIUM 50 MG/1
50 POWDER, FOR SOLUTION ORAL EVERY 6 HOURS PRN
Qty: 30 EACH | Refills: 5 | Status: SHIPPED | OUTPATIENT
Start: 2024-06-19 | End: 2024-06-19

## 2024-06-19 RX ORDER — TRAMADOL HYDROCHLORIDE 50 MG/1
50 TABLET ORAL EVERY 6 HOURS
Qty: 30 TABLET | Refills: 3 | Status: SHIPPED | OUTPATIENT
Start: 2024-06-19

## 2024-06-26 ENCOUNTER — PATIENT MESSAGE (OUTPATIENT)
Dept: NEUROLOGY | Facility: CLINIC | Age: 44
End: 2024-06-26
Payer: COMMERCIAL

## 2024-06-26 RX ORDER — METHYLPREDNISOLONE 4 MG/1
TABLET ORAL
Qty: 21 EACH | Refills: 0 | Status: SHIPPED | OUTPATIENT
Start: 2024-06-26 | End: 2024-07-17

## 2024-08-08 ENCOUNTER — OFFICE VISIT (OUTPATIENT)
Dept: INTERNAL MEDICINE | Facility: CLINIC | Age: 44
End: 2024-08-08
Payer: COMMERCIAL

## 2024-08-08 DIAGNOSIS — B96.89 BACTERIAL SINUSITIS: Primary | ICD-10-CM

## 2024-08-08 DIAGNOSIS — J32.9 BACTERIAL SINUSITIS: Primary | ICD-10-CM

## 2024-08-08 PROCEDURE — 4010F ACE/ARB THERAPY RXD/TAKEN: CPT | Mod: CPTII,95,, | Performed by: FAMILY MEDICINE

## 2024-08-08 PROCEDURE — 1159F MED LIST DOCD IN RCRD: CPT | Mod: CPTII,95,, | Performed by: FAMILY MEDICINE

## 2024-08-08 PROCEDURE — 3044F HG A1C LEVEL LT 7.0%: CPT | Mod: CPTII,95,, | Performed by: FAMILY MEDICINE

## 2024-08-08 PROCEDURE — 99214 OFFICE O/P EST MOD 30 MIN: CPT | Mod: 95,,, | Performed by: FAMILY MEDICINE

## 2024-08-08 PROCEDURE — 1160F RVW MEDS BY RX/DR IN RCRD: CPT | Mod: CPTII,95,, | Performed by: FAMILY MEDICINE

## 2024-08-08 RX ORDER — AMOXICILLIN AND CLAVULANATE POTASSIUM 875; 125 MG/1; MG/1
1 TABLET, FILM COATED ORAL 2 TIMES DAILY
Qty: 20 TABLET | Refills: 0 | Status: SHIPPED | OUTPATIENT
Start: 2024-08-08 | End: 2024-08-18

## 2024-08-22 ENCOUNTER — PATIENT OUTREACH (OUTPATIENT)
Dept: ADMINISTRATIVE | Facility: HOSPITAL | Age: 44
End: 2024-08-22
Payer: COMMERCIAL

## 2024-08-22 VITALS — SYSTOLIC BLOOD PRESSURE: 120 MMHG | DIASTOLIC BLOOD PRESSURE: 83 MMHG

## 2024-08-26 ENCOUNTER — PATIENT MESSAGE (OUTPATIENT)
Dept: DERMATOLOGY | Facility: CLINIC | Age: 44
End: 2024-08-26
Payer: COMMERCIAL

## 2024-08-27 ENCOUNTER — TELEPHONE (OUTPATIENT)
Dept: DERMATOLOGY | Facility: CLINIC | Age: 44
End: 2024-08-27
Payer: COMMERCIAL

## 2024-09-10 ENCOUNTER — OFFICE VISIT (OUTPATIENT)
Dept: CARDIOLOGY | Facility: CLINIC | Age: 44
End: 2024-09-10
Payer: COMMERCIAL

## 2024-09-10 VITALS
WEIGHT: 273.81 LBS | SYSTOLIC BLOOD PRESSURE: 121 MMHG | BODY MASS INDEX: 34.05 KG/M2 | HEIGHT: 75 IN | HEART RATE: 73 BPM | DIASTOLIC BLOOD PRESSURE: 75 MMHG

## 2024-09-10 DIAGNOSIS — I77.810 AORTIC ROOT DILATATION: Primary | ICD-10-CM

## 2024-09-10 DIAGNOSIS — I10 ESSENTIAL HYPERTENSION: ICD-10-CM

## 2024-09-10 DIAGNOSIS — Q21.12 PFO (PATENT FORAMEN OVALE): ICD-10-CM

## 2024-09-10 DIAGNOSIS — G47.33 OSA (OBSTRUCTIVE SLEEP APNEA): ICD-10-CM

## 2024-09-10 DIAGNOSIS — E78.2 MIXED HYPERLIPIDEMIA: ICD-10-CM

## 2024-09-10 PROCEDURE — 4010F ACE/ARB THERAPY RXD/TAKEN: CPT | Mod: CPTII,S$GLB,, | Performed by: PHYSICIAN ASSISTANT

## 2024-09-10 PROCEDURE — 1159F MED LIST DOCD IN RCRD: CPT | Mod: CPTII,S$GLB,, | Performed by: PHYSICIAN ASSISTANT

## 2024-09-10 PROCEDURE — 3078F DIAST BP <80 MM HG: CPT | Mod: CPTII,S$GLB,, | Performed by: PHYSICIAN ASSISTANT

## 2024-09-10 PROCEDURE — 3044F HG A1C LEVEL LT 7.0%: CPT | Mod: CPTII,S$GLB,, | Performed by: PHYSICIAN ASSISTANT

## 2024-09-10 PROCEDURE — 3008F BODY MASS INDEX DOCD: CPT | Mod: CPTII,S$GLB,, | Performed by: PHYSICIAN ASSISTANT

## 2024-09-10 PROCEDURE — 3074F SYST BP LT 130 MM HG: CPT | Mod: CPTII,S$GLB,, | Performed by: PHYSICIAN ASSISTANT

## 2024-09-10 PROCEDURE — 99214 OFFICE O/P EST MOD 30 MIN: CPT | Mod: S$GLB,,, | Performed by: PHYSICIAN ASSISTANT

## 2024-09-10 PROCEDURE — 99999 PR PBB SHADOW E&M-EST. PATIENT-LVL IV: CPT | Mod: PBBFAC,,, | Performed by: PHYSICIAN ASSISTANT

## 2024-09-10 RX ORDER — HYDROCHLOROTHIAZIDE 12.5 MG/1
12.5 TABLET ORAL DAILY
Qty: 90 TABLET | Refills: 3 | Status: SHIPPED | OUTPATIENT
Start: 2024-09-10 | End: 2025-09-10

## 2024-09-10 NOTE — PROGRESS NOTES
Cardiology Clinic Note  Reason for Visit: Dilation of aortic root, Mixed hyperlipidemia   General Cardiologist: Dr. De Jesus     HPI:     PMHx:  HTN  Elevated triglycerides   PFO  Dilation of aortic root (4.2 cm 2/2024)  ALEXX Moreno III is a 44 y.o. M, who presents for routine follow up. Over the past year he was started on fenofibrate, which has dramatically improved his triglycerides. His Cr was slightly elevated per last labs (1.5). His BP has been well controlled. He is known to have a PFO per echo in 2022. He also has chronic migraines. There is some evidence of correlation between the two. Discussed with Dr. De Jesus, and the evidence is not clear that PFO closure would be beneficial. Plan to consult further with Neurology. He does not currently take aspirin daily. He is doing well and has no cardiac complaints today. 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     JUSTIN Dahl HPI 2/6/2024  Presents for initial visit regarding enlarged aortic root and HTN. He is establishing with new Cardiology clinic. He is known to have a dilation of aortic root, and is due for annual monitoring. His BP is elevated in clinic. He is an RN working in Cardiology at the [employer] and very capable of monitoring his BP at home. Last home value 126/80. He is requesting medication refills, and he has been compliant. He denies any acute complaints today. He denies chest pain/pressure/tightness/discomfort, dyspnea on regular exertion, orthopnea, PND, peripheral edema, sustained palpitations, syncope or claudication symptoms.     ROS:    Pertinent ROS included in HPI and below.  PMH:     Past Medical History:   Diagnosis Date    ADD (attention deficit disorder with hyperactivity) 07/05/2012    ADHD (attention deficit hyperactivity disorder)     Anxiety     Asthma, well controlled     Bronchitis     Essential hypertension 12/09/2022     History of migraine headaches     History of psychiatric care     Seborrheic dermatitis 06/13/2018    Sleep apnea      Past Surgical History:   Procedure Laterality Date    ESOPHAGOGASTRODUODENOSCOPY      NASAL SEPTOPLASTY N/A 6/3/2021    Procedure: SEPTOPLASTY, NOSE;  Surgeon: Ahmet Bob III, MD;  Location: [facility];  Service: ENT;  Laterality: N/A;    TONSILLECTOMY Bilateral 6/3/2021    Procedure: TONSILLECTOMY;  Surgeon: Ahmet Bob III, MD;  Location: [facility] OR;  Service: ENT;  Laterality: Bilateral;    UVULOPALATOPHARYNGOPLASTY N/A 6/3/2021    Procedure: UPPP (UVULOPALATOPHARYNGOPLASTY);  Surgeon: Ahmet Bob III, MD;  Location: [facility] OR;  Service: ENT;  Laterality: N/A;     Allergies:     Review of patient's allergies indicates:   Allergen Reactions    Topamax [topiramate]     Compazine [prochlorperazine edisylate] Other (See Comments)     Jittery    Prochlorperazine Other (See Comments)     Medications:     Current Outpatient Medications on File Prior to Visit   Medication Sig Dispense Refill    busPIRone (BUSPAR) 15 MG tablet Take 1 tablet (15 mg total) by mouth 3 (three) times daily. 90 tablet 11    ciclopirox 1 % shampoo Apply to scalp, let sit 5minutes, lather and rinse tiw 120 mL 3    clindamycin (CLEOCIN T) 1 % external solution aaa bid 60 mL 3    clobetasoL (TEMOVATE) 0.05 % external solution Mix in bottle of DHS zinc; apply to dry scalp, let sit 5 minutes, then shower, lather and rinse BIW.  Shake well before using. 50 mL 3    dapsone (ACZONE) 7.5 % GlwP Apply to affected area bid 60 g 2    diazePAM (VALIUM) 10 MG Tab Take 1 tablet (10 mg total) by mouth daily as needed (anxiety). 30 tablet 2    diclofenac potassium (CAMBIA) 50 mg PwPk Take 50 mg by mouth every 12 (twelve) hours as needed. 60 each 5    doxycycline (PERIOSTAT) 20 MG tablet TAKE 2 BY MOUTH EVERY DAY WITH FOOD 180 tablet 3    fenofibrate 160 MG Tab Take 1 tablet (160 mg total) by mouth once daily. 90 tablet 3    fluocinonide  (LIDEX) 0.05 % external solution AAA scalp qday prn itching, scaling 60 mL 3    fluticasone propionate (FLONASE) 50 mcg/actuation nasal spray 2 sprays (100 mcg total) by Each Nostril route once daily. 16 g 11    losartan (COZAAR) 100 MG tablet Take 1 tablet (100 mg total) by mouth once daily. 90 tablet 3    methylphenidate HCl (RITALIN) 20 MG tablet Take 1 tablet (20 mg total) by mouth 3 (three) times daily. 90 tablet 0    multivitamin (THERAGRAN) per tablet Take 1 tablet by mouth once daily.      ondansetron (ZOFRAN-ODT) 8 MG TbDL Take 1 tablet (8 mg total) by mouth 2 (two) times daily as needed (nausea). 30 tablet 3    ONZETRA XSAIL 11 mg AePB USE XSAIL DEVICE TO INHALE CONTENTS OF 1 NOSEPIECE (11MG) PER NOSTRIL (22MG/DOSE) -- MAY REPEAT DOSE IN 2 HOURS 1x/day, IF NO RELIEF 16 each 5    rimegepant (NURTEC) 75 mg odt Take 1 tablet (75 mg total) by mouth every other day. Place ODT tablet on the tongue 16 tablet 11    sulfacetamide sodium-sulfur (SULFACLEANSE 8-4) 8-4 % Susp Wash face qhs 473 mL 3    traMADoL (ULTRAM) 50 mg tablet Take 1 tablet (50 mg total) by mouth every 6 (six) hours. 30 tablet 3    traZODone (DESYREL) 150 MG tablet Take 1 tablet (150 mg total) by mouth every evening. 90 tablet 3    UBRELVY 100 mg tablet TAKE 1 TABLET (100 MG TOTAL) BY MOUTH EVERY 2 (TWO) HOURS AS NEEDED FOR MIGRAINE. AS NEEDED. MAXIMUM: 200 MG PER 24 HOURS 16 tablet 5    vit A and D3 in cod liver oiL 4,000 unit-400 unit/5 mL Liqd Take 5 mLs by mouth every evening.      [DISCONTINUED] hydroCHLOROthiazide (HYDRODIURIL) 25 MG tablet Take 1 tablet (25 mg total) by mouth once daily. 90 tablet 3    [DISCONTINUED] dapsone (ACZONE) 7.5 % GlwP Apply to affected area bid (Patient not taking: Reported on 9/10/2024) 60 g 2    [DISCONTINUED] dapsone (ACZONE) 7.5 % GlwP Apply to affected area bid (Patient not taking: Reported on 9/10/2024) 60 g 2    [DISCONTINUED] meloxicam (MOBIC) 15 MG tablet Take 15 mg by mouth as needed. (Patient not  "taking: Reported on 9/10/2024)      [DISCONTINUED] methylphenidate HCl (RITALIN) 20 MG tablet Take 1 tablet (20 mg total) by mouth 3 (three) times daily with meals. (Patient not taking: Reported on 9/10/2024) 90 tablet 0    [DISCONTINUED] methylphenidate HCl (RITALIN) 20 MG tablet Take 1 tablet (20 mg total) by mouth 3 (three) times daily. (Patient not taking: Reported on 9/10/2024) 90 tablet 0    [DISCONTINUED] minocycline (AMZEEQ) 4 % Foam Apply thin film to face and scalp qd prn flare (Patient not taking: Reported on 9/10/2024) 30 g 3    [DISCONTINUED] minocycline (AMZEEQ) 4 % Foam Apply thin film to face and scalp qd prn flare (Patient not taking: Reported on 9/10/2024) 30 g 3    [DISCONTINUED] mirtazapine (REMERON) 15 MG tablet Take 15 mg by mouth every evening. Takes 1/8 tab       No current facility-administered medications on file prior to visit.     Social History:     Social History     Tobacco Use    Smoking status: Never    Smokeless tobacco: Never   Substance Use Topics    Alcohol use: Yes     Alcohol/week: 0.0 standard drinks of alcohol     Types: 1 Standard drinks or equivalent per week     Comment: rarely     Family History:     Family History   Problem Relation Name Age of Onset    Anxiety disorder Mother Melissa     Hypertension Mother Melissa     Depression Mother Melissa     Bipolar disorder Brother Bonilla     Schizophrenia Brother Bonilla     Drug abuse Brother Bonilla     Mental illness Brother Bonilla         BPD    Bipolar disorder Cousin      Hypertension Father Shahab     Diabetes Father Shahab      Physical Exam:   /75   Pulse 73   Ht 6' 3" (1.905 m)   Wt 124.2 kg (273 lb 13 oz)   BMI 34.22 kg/m²      Physical Exam  Vitals and nursing note reviewed.   Constitutional:       Appearance: Normal appearance.   HENT:      Head: Normocephalic and atraumatic.   Neck:      Vascular: Normal carotid pulses. No carotid bruit or hepatojugular reflux.   Cardiovascular:      Rate and Rhythm: Normal rate and " regular rhythm.      Chest Wall: PMI is not displaced.      Pulses:           Radial pulses are 2+ on the right side and 2+ on the left side.        Dorsalis pedis pulses are 2+ on the right side and 2+ on the left side.        Posterior tibial pulses are 2+ on the right side and 2+ on the left side.      Heart sounds: No murmur heard.  Pulmonary:      Effort: Pulmonary effort is normal.      Breath sounds: Normal breath sounds. No wheezing, rhonchi or rales.   Abdominal:      General: Bowel sounds are normal. There is no abdominal bruit.      Palpations: Abdomen is soft. There is no pulsatile mass.      Tenderness: There is no abdominal tenderness.   Feet:      Right foot:      Skin integrity: Skin integrity normal.      Left foot:      Skin integrity: Skin integrity normal.   Skin:     Capillary Refill: Capillary refill takes less than 2 seconds.   Neurological:      General: No focal deficit present.      Mental Status: He is alert.   Psychiatric:         Mood and Affect: Mood and affect normal.         Speech: Speech normal.         Behavior: Behavior is cooperative.         Thought Content: Thought content normal.          Labs:     Blood Tests:  Lab Results   Component Value Date     05/17/2024    K 3.5 05/17/2024     05/17/2024    CO2 25 05/17/2024    BUN 14 05/17/2024    CREATININE 1.5 (H) 05/17/2024    GLU 88 05/17/2024    HGBA1C 4.3 02/15/2024    AST 31 05/17/2024    ALT 21 05/17/2024    ALBUMIN 4.8 05/17/2024    ALBUMIN 4.6 08/04/2018    PROT 7.8 05/17/2024    BILITOT 1.6 (H) 05/17/2024    WBC 5.92 02/15/2024    HGB 13.6 (L) 02/15/2024    HCT 39.4 (L) 02/15/2024    MCV 90 02/15/2024     02/15/2024    TSH 1.508 02/15/2024       Lab Results   Component Value Date    CHOL 142 05/17/2024    HDL 36 (L) 05/17/2024    TRIG 171 (H) 05/17/2024       Lab Results   Component Value Date    LDLCALC 71.8 05/17/2024         Imaging:     Echocardiogram  TTE 2/20/2024    Left Ventricle: The left  ventricle is normal in size. Normal wall thickness. There is normal systolic function. Ejection fraction by visual approximation is 60%. There is normal diastolic function.    Right Ventricle: Normal right ventricular cavity size. Wall thickness is normal. Right ventricle wall motion  is normal. Systolic function is normal.    Left Atrium: Patent foramen ovale visualized by color flow Doppler.    Aortic Valve: The aortic valve is a trileaflet valve. There is no significant regurgitation.    Mitral Valve: There is mild regurgitation.    Aorta: Aortic root is mildly dilated measuring 4.2 cm. Ascending aorta is normal measuring 3.6 cm.    Pulmonary Artery: Pulmonary artery pressure could not be estimated.    IVC/SVC: Normal venous pressure at 3 mmHg.      TTE 2023  The left ventricle is mildly enlarged with normal systolic function.  The estimated ejection fraction is 55%.  Normal right ventricular size with normal right ventricular systolic function.  The aortic root is mildly-moderately dilated, 4.3cm (was 4.1cm on report 22).  The estimated PA systolic pressure is 19 mmHg.    Stress testing  Exercise stress EKG 2022  - negative for ischemia     Cath Lab  None    Other  Event monitor 2023  Negative event monitor with no clinical arrhythmias.  Symptoms corresponding with sinus tachycardia.    EK2022  NSR    Assessment:     1. Aortic root dilatation    2. PFO (patent foramen ovale)    3. Essential hypertension    4. Mixed hyperlipidemia    5. ALEXX (obstructive sleep apnea)    6. BMI 34.0-34.9,adult          Plan:     Aortic root dilatation  PFO (patent foramen ovale)  Message to DANIELE Roland regarding PFO  Discussed with patient possible correlation to migraines, but also emphasized that data in this regard is limited, and PFO closure may not positively impact his migraine condition. We will continue to have discussions regarding this topic.   Recommend ASA 81 mg qD  Echo ordered today with  bubble study, and for purposes of monitoring dilation of ascending aorta. Planned for Feb/March 2025.   If dimensions of the aorta remain unchanged per next echo we may reduce frequency of monitoring to every 2-3 years moving forward.     Essential hypertension  Controlled at home, continue to monitor with goal of < 130/80  Recommend low sodium diet   Reduce HCTZ to 12.5 mg qD in light of slight elevation of Cr    Mixed hyperlipidemia  TG improved on fenofibrate 160 mg qD  LDL is 71.8 without the use of statin   Discussed and recommended patient adhere to Mediterranean and DASH diets.     ALEXX (obstructive sleep apnea)  Encouraged CPAP compliance     BMI 34  Weight loss through a combination of diet and exercise encouraged  Recommend 150 minutes a week (30 minutes per day, 5 days per week) of moderate intensity exercise        Signed:  Tara Dahl PA-C  Cardiology     9/10/2024 2:46 PM    Follow-up:     Future Appointments   Date Time Provider Department Center   9/18/2024  4:40 PM Linsey Roland NP [facility] NEUR[facility]   10/10/2024  1:40 PM Cinthya Matthews MD [facility] DERM [place]

## 2024-09-18 ENCOUNTER — PROCEDURE VISIT (OUTPATIENT)
Dept: NEUROLOGY | Facility: CLINIC | Age: 44
End: 2024-09-18
Payer: COMMERCIAL

## 2024-09-18 DIAGNOSIS — G43.719 INTRACTABLE CHRONIC MIGRAINE WITHOUT AURA AND WITHOUT STATUS MIGRAINOSUS: Primary | ICD-10-CM

## 2024-09-18 NOTE — PROCEDURES
Frontalis- 15 units on each side   - 5 units on each side   Procerus- 10 units   Occipitalis- 15 units on each side   Temporalis - 20 units on each side   Trapezius- 10 units on each side   Cervical paraspinal muscles- 10 units on left side , 5 u R side  PROCEDURE NOTE:     After informed consent signed and dated, and risks and benefits of procedure was discussed, BOTOX injection was performed   Patient tolerated procedure well w/o any complications.     Headaches remain overall less intense but always something there/head pain. Keeps onzetra in several places to have handy. Remains on nurtec qod, prn cambia/ultram/ubrelvy help. Takes early to avoid progression. Continuing to use qhs apap access dme.   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

## 2024-10-02 ENCOUNTER — PATIENT MESSAGE (OUTPATIENT)
Dept: CARDIOLOGY | Facility: CLINIC | Age: 44
End: 2024-10-02
Payer: COMMERCIAL

## 2024-10-08 DIAGNOSIS — G43.719 INTRACTABLE CHRONIC MIGRAINE WITHOUT AURA AND WITHOUT STATUS MIGRAINOSUS: ICD-10-CM

## 2024-10-08 DIAGNOSIS — E78.1 HYPERTRIGLYCERIDEMIA: ICD-10-CM

## 2024-10-08 RX ORDER — RIMEGEPANT SULFATE 75 MG/75MG
75 TABLET, ORALLY DISINTEGRATING ORAL EVERY OTHER DAY
Qty: 16 TABLET | Refills: 11 | Status: SHIPPED | OUTPATIENT
Start: 2024-10-08

## 2024-10-08 RX ORDER — FENOFIBRATE 160 MG/1
160 TABLET ORAL
Qty: 90 TABLET | Refills: 3 | OUTPATIENT
Start: 2024-10-08

## 2024-10-08 RX ORDER — FENOFIBRATE 160 MG/1
160 TABLET ORAL DAILY
Qty: 90 TABLET | Refills: 2 | Status: CANCELLED | OUTPATIENT
Start: 2024-10-08 | End: 2025-10-08

## 2024-10-08 RX ORDER — UBROGEPANT 100 MG/1
100 TABLET ORAL
Qty: 16 TABLET | Refills: 5 | Status: SHIPPED | OUTPATIENT
Start: 2024-10-08

## 2024-10-08 NOTE — TELEPHONE ENCOUNTER
No care due was identified.  [facility] Embedded Care Due Messages. Reference number: [id number].   10/08/2024 9:22:52 AM CDT

## 2024-10-08 NOTE — TELEPHONE ENCOUNTER
Requested Prescriptions     Pending Prescriptions Disp Refills    NURTEC 75 mg odt [Pharmacy Med Name: NURTEC ODT 75 MG TABLET] 16 tablet 11     Sig: TAKE 1 TABLET (75 MG TOTAL) BY MOUTH EVERY OTHER DAY. PLACE ODT TABLET ON THE TONGUE     Lov 3/27/24

## 2024-10-08 NOTE — TELEPHONE ENCOUNTER
Refill Decision Note   Jatinder Moreno  is requesting a refill authorization.  Brief Assessment and Rationale for Refill:  Quick Discontinue     Medication Therapy Plan: DUPLICATE      Comments:     Note composed:10:13 AM 10/08/2024

## 2024-10-08 NOTE — TELEPHONE ENCOUNTER
No care due was identified.  [facility] Embedded Care Due Messages. Reference number: [id number].   10/08/2024 9:21:00 AM CDT

## 2024-10-08 NOTE — TELEPHONE ENCOUNTER
Refill Routing Note   Medication(s) are not appropriate for processing by [facility] for the following reason(s):        Required labs abnormal    [facility] action(s):  Defer           Pharmacist review requested: Yes     Appointments  past 12m or future 3m with PCP    Date Provider   Last Visit   8/8/2024 Kyle Rubio MD   Next Visit   Visit date not found Kyle Rubio MD   ED visits in past 90 days: 0        Note composed:10:20 AM 10/08/2024

## 2024-10-08 NOTE — TELEPHONE ENCOUNTER
Requested Prescriptions     Pending Prescriptions Disp Refills    rimegepant (NURTEC) 75 mg odt 16 tablet 11     Sig: Take 1 tablet (75 mg total) by mouth every other day. Place ODT tablet on the tongue    ubrogepant (UBRELVY) 100 mg tablet 16 tablet 5     Sig: If symptoms persist or return, may repeat dose after 2 hours. Maximum: 200 mg per 24 hours     Lov 3/27/24

## 2024-10-10 ENCOUNTER — PATIENT MESSAGE (OUTPATIENT)
Dept: INTERNAL MEDICINE | Facility: CLINIC | Age: 44
End: 2024-10-10
Payer: COMMERCIAL

## 2024-10-10 ENCOUNTER — OFFICE VISIT (OUTPATIENT)
Dept: DERMATOLOGY | Facility: CLINIC | Age: 44
End: 2024-10-10
Payer: COMMERCIAL

## 2024-10-10 DIAGNOSIS — L73.9 FOLLICULITIS: ICD-10-CM

## 2024-10-10 DIAGNOSIS — L71.9 ROSACEA: Primary | ICD-10-CM

## 2024-10-10 DIAGNOSIS — E78.1 HYPERTRIGLYCERIDEMIA: ICD-10-CM

## 2024-10-10 DIAGNOSIS — D48.5 NEOPLASM OF UNCERTAIN BEHAVIOR OF SKIN: ICD-10-CM

## 2024-10-10 PROCEDURE — 3044F HG A1C LEVEL LT 7.0%: CPT | Mod: CPTII,S$GLB,, | Performed by: DERMATOLOGY

## 2024-10-10 PROCEDURE — 99214 OFFICE O/P EST MOD 30 MIN: CPT | Mod: 25,S$GLB,, | Performed by: DERMATOLOGY

## 2024-10-10 PROCEDURE — 99999 PR PBB SHADOW E&M-EST. PATIENT-LVL IV: CPT | Mod: PBBFAC,,, | Performed by: DERMATOLOGY

## 2024-10-10 PROCEDURE — 1160F RVW MEDS BY RX/DR IN RCRD: CPT | Mod: CPTII,S$GLB,, | Performed by: DERMATOLOGY

## 2024-10-10 PROCEDURE — 11103 TANGNTL BX SKIN EA SEP/ADDL: CPT | Mod: S$GLB,,, | Performed by: DERMATOLOGY

## 2024-10-10 PROCEDURE — 11102 TANGNTL BX SKIN SINGLE LES: CPT | Mod: S$GLB,,, | Performed by: DERMATOLOGY

## 2024-10-10 PROCEDURE — 4010F ACE/ARB THERAPY RXD/TAKEN: CPT | Mod: CPTII,S$GLB,, | Performed by: DERMATOLOGY

## 2024-10-10 PROCEDURE — 1159F MED LIST DOCD IN RCRD: CPT | Mod: CPTII,S$GLB,, | Performed by: DERMATOLOGY

## 2024-10-10 RX ORDER — DOXYCYCLINE HYCLATE 20 MG
TABLET ORAL
Qty: 180 TABLET | Refills: 3 | Status: SHIPPED | OUTPATIENT
Start: 2024-10-10

## 2024-10-10 RX ORDER — CICLOPIROX 1 G/100ML
SHAMPOO TOPICAL
Qty: 120 ML | Refills: 3 | Status: SHIPPED | OUTPATIENT
Start: 2024-10-10

## 2024-10-10 RX ORDER — DAPSONE 75 MG/G
GEL TOPICAL
Qty: 60 G | Refills: 2 | Status: SHIPPED | OUTPATIENT
Start: 2024-10-10

## 2024-10-10 NOTE — PATIENT INSTRUCTIONS
Continue to rotate CLn wash, ciclopirox shampoo, DHS zinc shampoo, lidex solution, dapsone gel .  Can continue zinc vitamins  Continue low dose doxycycline     We would like to see you back in the clinic in 6 months.  You will be able to schedule this appointment by calling or by using your [facility] portal 3 months before this time. Because our schedule fills so quickly, please set a reminder in your phone or on your calendar to schedule 3 months before you are due to come in so that we can see you in a timely fashion.  You should also receive a reminder from us in the mail. This will help us ensure we can continue to provide excellent healthcare for you. Thank you.

## 2024-10-10 NOTE — PROGRESS NOTES
Subjective:      Patient ID:  Jatinder Moreno III is a 44 y.o. male who presents for   Chief Complaint   Patient presents with    Follow-up     Better per pt      Pt states scalp is overall better. Tx with dapsone. Lidex soln, DHS/zinc shampoo, ciclopirox, CLN wash; pt feels he had had way less flares, last severe flare was when he was on oral steroids for migraines.   Pt feels his skin is very dry overall.      Rosacea - f/u better, atomic age products. Pt feels it is helping.     Pt has lesion on right nose.  Present for years and bleeds. No tx     Pt has lesion on right abdomen.  Bleeds. Would like it removed    Follow-up - Follow-up  Symptom course: improving  Affected locations: scalp  Signs / symptoms: scaling (recent flare - overall better.)      Review of Systems   Skin:  Positive for itching, daily sunscreen use, activity-related sunscreen use and wears hat. Negative for recent sunburn.   Hematologic/Lymphatic: Does not bruise/bleed easily.       Objective:   Physical Exam   Constitutional: He appears well-developed and well-nourished. No distress.   Neurological: He is alert and oriented to person, place, and time. He is not disoriented.   Psychiatric: He has a normal mood and affect.   Skin:   Areas Examined (abnormalities noted in diagram):   Scalp / Hair Palpated and Inspected  Head / Face Inspection Performed                 Diagram Legend     Erythematous scaling macule/papule c/w actinic keratosis       Vascular papule c/w angioma      Pigmented verrucoid papule/plaque c/w seborrheic keratosis      Yellow umbilicated papule c/w sebaceous hyperplasia      Irregularly shaped tan macule c/w lentigo     1-2 mm smooth white papules consistent with Milia      Movable subcutaneous cyst with punctum c/w epidermal inclusion cyst      Subcutaneous movable cyst c/w pilar cyst      Firm pink to brown papule c/w dermatofibroma      Pedunculated fleshy papule(s) c/w skin tag(s)      Evenly pigmented macule  c/w junctional nevus     Mildly variegated pigmented, slightly irregular-bordered macule c/w mildly atypical nevus      Flesh colored to evenly pigmented papule c/w intradermal nevus       Pink pearly papule/plaque c/w basal cell carcinoma      Erythematous hyperkeratotic cursted plaque c/w SCC      Surgical scar with no sign of skin cancer recurrence      Open and closed comedones      Inflammatory papules and pustules      Verrucoid papule consistent consistent with wart     Erythematous eczematous patches and plaques     Dystrophic onycholytic nail with subungual debris c/w onychomycosis     Umbilicated papule    Erythematous-base heme-crusted tan verrucoid plaque consistent with inflamed seborrheic keratosis     Erythematous Silvery Scaling Plaque c/w Psoriasis     See annotation            Assessment / Plan:      Pathology Orders:       Normal Orders This Visit    Specimen to Pathology, Dermatology     Questions:    Procedure Type: Dermatology and skin neoplasms    Number of Specimens: 2    ------------------------: -------------------------    Spec 1 Procedure: Biopsy    Spec 1 Clinical Impression: r/o fibrous papule    Spec 1 Source: right superior ala    ------------------------: -------------------------    Spec 2 Procedure: Biopsy    Spec 2 Clinical Impression: r/o angioma    Spec 2 Source: right abdomen    Release to patient:           Rosacea  Folliculitis  -     ciclopirox 1 % shampoo; Apply to scalp, let sit 5minutes, lather and rinse tiw  Dispense: 120 mL; Refill: 3  -     doxycycline (PERIOSTAT) 20 MG tablet; TAKE 2 BY MOUTH EVERY DAY WITH FOOD  Dispense: 180 tablet; Refill: 3  -     dapsone (ACZONE) 7.5 % GlwP; Apply to affected area bid  Dispense: 60 g; Refill: 2  Continue to rotate CLn wash, ciclopirox shampoo, DHS zinc shampoo, lidex solution, dapsone gel .  Can continue zinc vitamins  Continue low dose doxycycline     Neoplasm of uncertain behavior of skin x 2   Shave biopsy procedure  note:    Shave biopsy performed after verbal consent including risk of infection, scar, recurrence, need for additional treatment of site. Area prepped with alcohol, anesthetized with approximately 1.0cc of 1% lidocaine with epinephrine. Lesional tissue shaved with razor blade. Hemostasis achieved with application of aluminum chloride followed by hyfrecation. No complications. Dressing applied. Wound care explained.    -     Specimen to Pathology, Dermatology             Follow up in about 6 months (around 4/10/2025) for Medication Management.

## 2024-10-21 ENCOUNTER — PATIENT MESSAGE (OUTPATIENT)
Dept: CARDIOLOGY | Facility: CLINIC | Age: 44
End: 2024-10-21
Payer: COMMERCIAL

## 2024-10-21 DIAGNOSIS — Q21.12 PFO (PATENT FORAMEN OVALE): Primary | ICD-10-CM

## 2024-10-22 ENCOUNTER — TELEPHONE (OUTPATIENT)
Dept: CARDIOLOGY | Facility: CLINIC | Age: 44
End: 2024-10-22
Payer: COMMERCIAL

## 2024-10-22 NOTE — TELEPHONE ENCOUNTER
Spoke with pt, pt verbalized understanding to getting scheduled to interventional per Tara Dahl. Please see appt desk.   Future Appointments   Date Time Provider Department Center   12/11/2024  4:20 PM Linsey Roland NP [facility]   12/17/2024  8:30 AM Mannie Neal MD [facility]             ----- Message from Tara Dahl PA-C sent at 10/21/2024  4:19 PM CDT -----  Please assist patient in getting scheduled to see Interventional Cardiology.     Thanks  ----- Message -----  From: Zackery Gonsales III, MD  Sent: 10/16/2024   5:03 PM CDT  To: Tara Dahl PA-C    The interventional docs will be more knowledgeable on this.     From what I have seen, there are people out there that have improvement in migraine symptoms with closing the PFO. However, PFOs are very common, and there are people that have a PFO coincidentally and completely unrelated to migraines. I would not consider closing a PFO as the standard of care in patients with migraines. So, may be some benefit, but not guaranteed.    If patients come in asking for this treatment, I refer them to interventional to discuss the risks/benefits of the procedure, so a joint decision can be made about whether or not to proceed.    Hope this helps.    RE  ----- Message -----  From: Tara Dahl PA-C  Sent: 10/15/2024   4:34 PM CDT  To: MD Toñito Quintero III, Dr.,          This patient used to see Dr. 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 Somehow I inherited him, and therefore he does not have a staff physician with us. At his last visit we discussed wether or not PFO closure is beneficial for patient's with migraines. I also messaged his Neurology NP for her opinion, and she more or less said that this indication for closure, although not dangerous, has questionable benefit.     Do you have any insight on the topic? If not, are any of your colleagues particularly  knowledgeable in this regard? I don't really know who to go to for advice. I was thinking of referring him to Interventional for a consult, but wanted to ask you first.     Thanks,   Tara

## 2024-10-30 DIAGNOSIS — L73.9 FOLLICULITIS: ICD-10-CM

## 2024-10-30 RX ORDER — CLOBETASOL PROPIONATE 0.5 MG/ML
SOLUTION TOPICAL
Qty: 50 ML | Refills: 3 | Status: SHIPPED | OUTPATIENT
Start: 2024-10-30

## 2024-11-16 DIAGNOSIS — G43.719 INTRACTABLE CHRONIC MIGRAINE WITHOUT AURA AND WITHOUT STATUS MIGRAINOSUS: ICD-10-CM

## 2024-11-18 ENCOUNTER — E-VISIT (OUTPATIENT)
Dept: FAMILY MEDICINE | Facility: CLINIC | Age: 44
End: 2024-11-18
Payer: COMMERCIAL

## 2024-11-18 DIAGNOSIS — M54.9 BACK PAIN, UNSPECIFIED BACK LOCATION, UNSPECIFIED BACK PAIN LATERALITY, UNSPECIFIED CHRONICITY: Primary | ICD-10-CM

## 2024-11-18 DIAGNOSIS — I10 ESSENTIAL HYPERTENSION: ICD-10-CM

## 2024-11-18 RX ORDER — METHYLPREDNISOLONE 4 MG/1
TABLET ORAL
Qty: 21 TABLET | Refills: 0 | Status: SHIPPED | OUTPATIENT
Start: 2024-11-18

## 2024-11-18 RX ORDER — METHOCARBAMOL 750 MG/1
750 TABLET, FILM COATED ORAL 4 TIMES DAILY PRN
Qty: 90 TABLET | Refills: 1 | Status: SHIPPED | OUTPATIENT
Start: 2024-11-18 | End: 2024-12-18

## 2024-11-18 NOTE — TELEPHONE ENCOUNTER
Requested Prescriptions     Pending Prescriptions Disp Refills    ONZETRA XSAIL 11 mg AePB [Pharmacy Med Name: ONZETRA XSAIL 11 MG/NOSEPIECE]  0     Sig: use xsail device to inhale contents of 1 nosepiece (11mg) per nostril (22mg/dose) -- may repeat dose in 2 hours 1x/day, if no relief     Lov 3/27/24

## 2024-11-18 NOTE — PROGRESS NOTES
Patient ID: Jatinder Moreno III is a 44 y.o. male.    Chief Complaint: General Illness (Entered automatically based on patient selection in [employer].)          274}  The patient initiated a request through [employer] on 11/18/2024 for evaluation and management with a chief complaint of General Illness (Entered automatically based on patient selection in [employer].)     I evaluated the questionnaire submission on 11/18/2024 .    Total Time (in minutes): 12     Ohs Peq Evisit Supergroup-Muscle,Back,Joint    11/18/2024  7:54 AM CST - Filed by Patient   What do you need help with? Back Problem   Do you agree to participate in an E-Visit? Yes   If you have any of the following symptoms, please present to your local emergency room or call 911: I acknowledge   What is the main issue you would like addressed today? Right mid/lower back and right hip pain   Where are you having pain? Lower Back   Does the pain extend into your legs? Yes, into my left leg   How bad is the pain? The pain is severe   Did you have an injury that caused the pain? No, I cannot remember an injury   How long has the pain been present? More than 2 days but less than 1 week   Have you had back pain in the past? Yes, I have many times had pain similiar to this before   Please list any medications or treatments you have used for back pain and indicate if it was effective or not. Heat, ibuprofen, mobic, robaxin. All are helpful, but its still painful. I know longer have any robaxin for the spasms.   Do you have a fever? I do not know   Do you have any of the following? None of the above   What makes the pain worse? Any movement   What makes the pain better? Hot or cold compress;  Lying in bed;  Prescription pain medicine   Have you ever been diagnosed with cancer? No   Have you ever been diagnosed with degenerative disc disease (arthritis of the spine)? No   Have you ever been diagnosed with osteoporosis or any other bone weakness? No   Have you ever had  surgery on your back or spine? No   What is your usual health status? I am active and can move normally   Provide any additional information you feel is important.    Please attach any relevant images or files    Are you able to take your vital signs? No          Active Problem List with Overview Notes    Diagnosis Date Noted    Hypertriglyceridemia 02/17/2024    PFO (patent foramen ovale) 12/09/2022    Aortic root dilatation 12/09/2022    Essential hypertension 12/09/2022    Wears contact lenses 06/30/2021    ALEXX (obstructive sleep apnea) 06/03/2021    Folliculitis 07/03/2019     Most recent took clindamycin 1 week ago June 26th and did not help. And using ciclopirox  Tx with lidex solution and mometasone solution. Topical clindamycin solution has helped as well.  T gel shampoo and ketoconazole shampoo exacerbated the condition.  Does not recall using sulfur based shampoos.  No selenium sulfide based products as well.  Has also taken keflex and rifampin orally but recurred.  Also has been on bactrim and mupirocen, doxycyline, minocycline, clindamycin- which caused GI distress.  Keeping hair short helps.  Rarely gets lesions on eyebrows, facial hair, chest.  Rarely gets scaly.    Pt has seen several dermatologists and PCP in the past but they come and go and that frustrates him.     Took accutane 2005 with dr funez for this as well.   Started dapsone 7/2019 and doing great       Intractable chronic migraine without aura and without status migrainosus 06/07/2018    Attention deficit hyperactivity disorder (ADHD), predominantly inattentive type 07/05/2012    Depression 07/05/2012    Generalized anxiety disorder 07/05/2012      Recent Labs Obtained:  Lab Results   Component Value Date    WBC 5.92 02/15/2024    HGB 13.6 (L) 02/15/2024    HCT 39.4 (L) 02/15/2024    MCV 90 02/15/2024     02/15/2024     05/17/2024    K 3.5 05/17/2024    GLU 88 05/17/2024    CREATININE 1.5 (H) 05/17/2024    EGFRNORACEVR 59.6  (A) 05/17/2024    HGBA1C 4.3 02/15/2024    TSH 1.508 02/15/2024      Review of patient's allergies indicates:   Allergen Reactions    Topamax [topiramate]     Compazine [prochlorperazine edisylate] Other (See Comments)     Jittery    Prochlorperazine Other (See Comments)       Encounter Diagnoses   Name Primary?    Back pain, unspecified back location, unspecified back pain laterality, unspecified chronicity Yes    Essential hypertension         No orders of the defined types were placed in this encounter.     Medications Ordered This Encounter   Medications    methocarbamoL (ROBAXIN) 750 MG Tab     Sig: Take 1 tablet (750 mg total) by mouth 4 (four) times daily as needed (back pain).     Dispense:  90 tablet     Refill:  1    methylPREDNISolone (MEDROL DOSEPACK) 4 mg tablet     Sig: follow package directions     Dispense:  21 tablet     Refill:  0        E-Visit Time Tracking:    Day 1 Time (in minutes): 12    Total Time (in minutes): 12      274}

## 2024-11-19 RX ORDER — SUMATRIPTAN SUCCINATE 11 MG/1
CAPSULE NASAL
Qty: 16 EACH | Refills: 3 | Status: SHIPPED | OUTPATIENT
Start: 2024-11-19 | End: 2024-11-20

## 2024-11-20 DIAGNOSIS — G43.719 INTRACTABLE CHRONIC MIGRAINE WITHOUT AURA AND WITHOUT STATUS MIGRAINOSUS: ICD-10-CM

## 2024-11-20 RX ORDER — SUMATRIPTAN SUCCINATE 11 MG/1
CAPSULE NASAL
Qty: 16 EACH | Refills: 3 | Status: SHIPPED | OUTPATIENT
Start: 2024-11-20

## 2024-12-11 ENCOUNTER — PROCEDURE VISIT (OUTPATIENT)
Dept: NEUROLOGY | Facility: CLINIC | Age: 44
End: 2024-12-11
Payer: COMMERCIAL

## 2024-12-11 DIAGNOSIS — G43.719 INTRACTABLE CHRONIC MIGRAINE WITHOUT AURA AND WITHOUT STATUS MIGRAINOSUS: Primary | ICD-10-CM

## 2024-12-11 NOTE — PROCEDURES
.  Frontalis- 15 units on each side   - 5 units on each side   Procerus- 10 units   Occipitalis- 15 units on each side   Temporalis - 20 units on each side   Trapezius- 10 units on each side   Cervical paraspinal muscles- 10 units on left side , 5 u R side  PROCEDURE NOTE:     After informed consent signed and dated, and risks and benefits of procedure was discussed, BOTOX injection was performed   Patient tolerated procedure well w/o any complications.      Headaches remain overall less intense but always something there/head pain. Keeps onzetra in several places to have handy. Remains on nurtec qod, prn cambia/ultram/ubrelvy help. Takes early to avoid progression. Continuing to use qhs apap access dme.   Picked up shifts infusion [facility] and another place in addition to F/T work [facility]     PFO dx'd 2012

## 2024-12-11 NOTE — PROCEDURES
.  Frontalis- 15 units on each side   - 5 units on each side   Procerus- 10 units   Occipitalis- 15 units on each side   Temporalis - 20 units on each side   Trapezius- 10 units on each side   Cervical paraspinal muscles- 10 units on left side , 5 u R side  PROCEDURE NOTE:     After informed consent signed and dated, and risks and benefits of procedure was discussed, BOTOX injection was performed   Patient tolerated procedure well w/o any complications.      Remains on nurtec qod, prn cambia/ultram/ubrelvy help. Ins [facility]. Botox costly. Takes meds early to avoid progression. Continuing to use qhs apap reports AHI<5, supplies via access dme.     PFO dx'd 2012--upcoming appt with Dr Neal

## 2024-12-16 RX ORDER — ONDANSETRON 8 MG/1
8 TABLET, ORALLY DISINTEGRATING ORAL 2 TIMES DAILY PRN
Qty: 30 TABLET | Refills: 3 | Status: SHIPPED | OUTPATIENT
Start: 2024-12-16

## 2024-12-16 NOTE — TELEPHONE ENCOUNTER
Requested Prescriptions     Pending Prescriptions Disp Refills    ondansetron (ZOFRAN-ODT) 8 MG TbDL [Pharmacy Med Name: ONDANSETRON ODT 8 MG TABLET] 30 tablet 3     Sig: TAKE 1 TABLET (8 MG TOTAL) BY MOUTH 2 (TWO) TIMES DAILY AS NEEDED (NAUSEA).     Lov 3/27/24

## 2024-12-17 ENCOUNTER — OFFICE VISIT (OUTPATIENT)
Dept: CARDIOLOGY | Facility: CLINIC | Age: 44
End: 2024-12-17
Payer: COMMERCIAL

## 2024-12-17 VITALS
HEIGHT: 75 IN | DIASTOLIC BLOOD PRESSURE: 74 MMHG | BODY MASS INDEX: 34.92 KG/M2 | OXYGEN SATURATION: 99 % | WEIGHT: 280.88 LBS | HEART RATE: 94 BPM | SYSTOLIC BLOOD PRESSURE: 134 MMHG

## 2024-12-17 DIAGNOSIS — Q21.12 PFO (PATENT FORAMEN OVALE): ICD-10-CM

## 2024-12-17 DIAGNOSIS — I77.810 AORTIC ROOT DILATATION: ICD-10-CM

## 2024-12-17 DIAGNOSIS — E78.2 MIXED HYPERLIPIDEMIA: Primary | ICD-10-CM

## 2024-12-17 DIAGNOSIS — G43.719 INTRACTABLE CHRONIC MIGRAINE WITHOUT AURA AND WITHOUT STATUS MIGRAINOSUS: ICD-10-CM

## 2024-12-17 DIAGNOSIS — I10 ESSENTIAL HYPERTENSION: ICD-10-CM

## 2024-12-17 DIAGNOSIS — G47.33 OSA (OBSTRUCTIVE SLEEP APNEA): ICD-10-CM

## 2024-12-17 PROCEDURE — 1160F RVW MEDS BY RX/DR IN RCRD: CPT | Mod: CPTII,S$GLB,, | Performed by: INTERNAL MEDICINE

## 2024-12-17 PROCEDURE — 3075F SYST BP GE 130 - 139MM HG: CPT | Mod: CPTII,S$GLB,, | Performed by: INTERNAL MEDICINE

## 2024-12-17 PROCEDURE — 1159F MED LIST DOCD IN RCRD: CPT | Mod: CPTII,S$GLB,, | Performed by: INTERNAL MEDICINE

## 2024-12-17 PROCEDURE — 99999 PR PBB SHADOW E&M-EST. PATIENT-LVL V: CPT | Mod: PBBFAC,,, | Performed by: INTERNAL MEDICINE

## 2024-12-17 PROCEDURE — 3044F HG A1C LEVEL LT 7.0%: CPT | Mod: CPTII,S$GLB,, | Performed by: INTERNAL MEDICINE

## 2024-12-17 PROCEDURE — 99205 OFFICE O/P NEW HI 60 MIN: CPT | Mod: S$GLB,,, | Performed by: INTERNAL MEDICINE

## 2024-12-17 PROCEDURE — 3078F DIAST BP <80 MM HG: CPT | Mod: CPTII,S$GLB,, | Performed by: INTERNAL MEDICINE

## 2024-12-17 PROCEDURE — 3008F BODY MASS INDEX DOCD: CPT | Mod: CPTII,S$GLB,, | Performed by: INTERNAL MEDICINE

## 2024-12-17 PROCEDURE — 4010F ACE/ARB THERAPY RXD/TAKEN: CPT | Mod: CPTII,S$GLB,, | Performed by: INTERNAL MEDICINE

## 2024-12-17 NOTE — PROGRESS NOTES
Interventional Cardiology Clinic Note  Reason for Visit: PFO closure  Referring: Tara ANDERSON    HPI:   Jatinder Moreno III is a 44 y.o. M, who presents for initial visit regarding PFO    PMHx:  HTN  Elevated triglycerides   PFO  Dilation of aortic root (4.3 cm 5/2023)  ALEXX    Pt was diagnosed with migraines since childhood and were somewhat controlled.  He was complaining of fatigue to his PCP who ordered an echo and in 2012 was diagnosed with a PFO.  Over the past few months he has had worsening migraines.  [place] [facility] [place] 3 anti migraine medications which work PRN when he uses all 3.  His migraines are debilitating.  He sees Tara Dahl and Dr. De Jesus in clinic and discussed migraines and PFO association.  It was told that closing the PFO is with improvement in migraines is anecdotal however pt wanted to be evaluated by Dr. Neal.  He denies any h/o stroke, TIA, stroke likes symptoms, amaurosis, chest pain, syncope.      ROS:    Constitution: Negative for fever, chills, weight loss or gain.   HENT: Negative for sore throat, rhinorrhea, or headache.  Eyes: Negative for blurred or double vision.   Cardiovascular: See above  Pulmonary: Negative for SOB   Gastrointestinal: Negative for abdominal pain, nausea, vomiting, or diarrhea.   : Negative for dysuria.   Neurological: Negative for focal weakness or sensory changes.  PMH:     Past Medical History:   Diagnosis Date    ADD (attention deficit disorder with hyperactivity) 07/05/2012    ADHD (attention deficit hyperactivity disorder)     Anxiety     Asthma, well controlled     Bronchitis     Essential hypertension 12/09/2022    History of migraine headaches     History of psychiatric care     Seborrheic dermatitis 06/13/2018    Sleep apnea      Past Surgical History:   Procedure Laterality Date    ESOPHAGOGASTRODUODENOSCOPY      NASAL SEPTOPLASTY N/A 6/3/2021    Procedure: SEPTOPLASTY, NOSE;  Surgeon: Ahmet Bob III, MD;   Location: [facility], [place], operated by [facility] OR;  Service: ENT;  Laterality: N/A;    TONSILLECTOMY Bilateral 6/3/2021    Procedure: TONSILLECTOMY;  Surgeon: Ahmet Bob III, MD;  Location: [facility], [place], operated by [facility] OR;  Service: ENT;  Laterality: Bilateral;    UVULOPALATOPHARYNGOPLASTY N/A 6/3/2021    Procedure: UPPP (UVULOPALATOPHARYNGOPLASTY);  Surgeon: Ahmet Bob III, MD;  Location: [facility], [place], operated by [facility] OR;  Service: ENT;  Laterality: N/A;     Allergies:     Review of patient's allergies indicates:   Allergen Reactions    Topamax [topiramate]     Compazine [prochlorperazine edisylate] Other (See Comments)     Jittery    Prochlorperazine Other (See Comments)     Medications:     Current Outpatient Medications on File Prior to Visit   Medication Sig Dispense Refill    busPIRone (BUSPAR) 15 MG tablet Take 1 tablet (15 mg total) by mouth 3 (three) times daily. 90 tablet 11    ciclopirox 1 % shampoo Apply to scalp, let sit 5minutes, lather and rinse tiw 120 mL 3    clindamycin (CLEOCIN T) 1 % external solution aaa bid 60 mL 3    clobetasoL (TEMOVATE) 0.05 % external solution Mix in bottle of DHS zinc; apply to dry scalp, let sit 5 minutes, then shower, lather and rinse BIW.  Shake well before using. 50 mL 3    dapsone (ACZONE) 7.5 % GlwP Apply to affected area bid 60 g 2    diazePAM (VALIUM) 10 MG Tab Take 1 tablet (10 mg total) by mouth daily as needed (anxiety). 30 tablet 2    diclofenac potassium (CAMBIA) 50 mg PwPk Take 50 mg by mouth every 12 (twelve) hours as needed. 60 each 5    doxycycline (PERIOSTAT) 20 MG tablet TAKE 2 BY MOUTH EVERY DAY WITH FOOD 180 tablet 3    fenofibrate 160 MG Tab Take 1 tablet (160 mg total) by mouth once daily. 90 tablet 3    fluocinonide (LIDEX) 0.05 % external solution AAA scalp qday prn itching, scaling 60 mL 3    fluticasone propionate (FLONASE) 50 mcg/actuation nasal spray 2 sprays (100 mcg total) by Each Nostril route once daily. 16 g 11    hydroCHLOROthiazide (HYDRODIURIL) 12.5 MG Tab Take 1 tablet (12.5 mg total) by mouth once daily.  90 tablet 3    losartan (COZAAR) 100 MG tablet Take 1 tablet (100 mg total) by mouth once daily. 90 tablet 3    methocarbamoL (ROBAXIN) 750 MG Tab Take 1 tablet (750 mg total) by mouth 4 (four) times daily as needed (back pain). 90 tablet 1    methylphenidate HCl (RITALIN) 20 MG tablet Take 1 tablet (20 mg total) by mouth 3 (three) times daily. 90 tablet 0    methylPREDNISolone (MEDROL DOSEPACK) 4 mg tablet follow package directions 21 tablet 0    multivitamin (THERAGRAN) per tablet Take 1 tablet by mouth once daily.      NURTEC 75 mg odt TAKE 1 TABLET (75 MG TOTAL) BY MOUTH EVERY OTHER DAY. PLACE ODT TABLET ON THE TONGUE 16 tablet 11    ondansetron (ZOFRAN-ODT) 8 MG TbDL TAKE 1 TABLET (8 MG TOTAL) BY MOUTH 2 (TWO) TIMES DAILY AS NEEDED (NAUSEA). 30 tablet 3    rimegepant (NURTEC) 75 mg odt Take 1 tablet (75 mg total) by mouth every other day. Place ODT tablet on the tongue 16 tablet 11    sulfacetamide sodium-sulfur (SULFACLEANSE 8-4) 8-4 % Susp Wash face qhs 473 mL 3    SUMAtriptan succinate (ONZETRA XSAIL) 11 mg AePB use xsail device to inhale contents of 1 nosepiece (11mg) per nostril (22mg/dose) -- may repeat dose in 2 hours 1x/day, if no relief 16 each 3    traMADoL (ULTRAM) 50 mg tablet Take 1 tablet (50 mg total) by mouth every 6 (six) hours. 30 tablet 3    traZODone (DESYREL) 150 MG tablet Take 1 tablet (150 mg total) by mouth every evening. 90 tablet 3    ubrogepant (UBRELVY) 100 mg tablet Take 1 tablet (100 mg total) by mouth every 2 (two) hours as needed for Migraine. If symptoms persist or return, may repeat dose after 2 hours. Maximum: 200 mg per 24 hours 16 tablet 5    vit A and D3 in cod liver oiL 4,000 unit-400 unit/5 mL Liqd Take 5 mLs by mouth every evening.      [DISCONTINUED] mirtazapine (REMERON) 15 MG tablet Take 15 mg by mouth every evening. Takes 1/8 tab       No current facility-administered medications on file prior to visit.     Social History:     Social History     Tobacco Use     "Smoking status: Never    Smokeless tobacco: Never   Substance Use Topics    Alcohol use: Yes     Alcohol/week: 0.0 standard drinks of alcohol     Types: 1 Standard drinks or equivalent per week     Comment: rarely     Family History:     Family History   Problem Relation Name Age of Onset    Anxiety disorder Mother Melissa     Hypertension Mother Melissa     Depression Mother Melissa     Bipolar disorder Brother Bonilla     Schizophrenia Brother Bonilla     Drug abuse Brother Bonilla     Mental illness Brother Bonilla         BPD    Bipolar disorder Cousin      Hypertension Father Shahab     Diabetes Father Shahab      Physical Exam:   There were no vitals taken for this visit.   Wt Readings from Last 4 Encounters:   09/10/24 124.2 kg (273 lb 13 oz)   03/07/24 121.6 kg (268 lb)   02/20/24 121.6 kg (268 lb)   02/15/24 120 kg (264 lb 8.8 oz)         Constitutional: No distress, obese, conversant  HEENT: Sclera anicteric, PERRLA, EOMI  Neck: No JVD, no masses, good movement  CV: RRR, S1 and S2 normal, no additional heart sounds or murmurs. Pulses 2+ and equal bilaterally in radial arteries, Ron's normal on right. Distal pulses are 2+ and equal in the femoral, DP and PT areas bilaterally  Pulm: Clear to auscultation bilaterally with symmetrical expansion. Chest wall palpated for reproduction of pain symptoms, and no pain was able to be produced on palpation or resistance exercises  GI: Abdomen soft, non-tender, good bowel sounds  Extremities: Both extremities intact and grossly normal, skin is warm, no edema noted  Skin: No ecchymosis, erythema, or ulcers  Psych: AOx3, appropriate affect  Neuro: CNII-XII intact, no focal deficits      Labs:     Lab Results   Component Value Date     05/17/2024    K 3.5 05/17/2024     05/17/2024    CO2 25 05/17/2024    BUN 14 05/17/2024    CREATININE 1.5 (H) 05/17/2024    ANIONGAP 9 05/17/2024     Lab Results   Component Value Date    HGBA1C 4.3 02/15/2024     No results found for: "BNP", " ""BNPTRIAGEBLO" Lab Results   Component Value Date    WBC 5.92 02/15/2024    HGB 13.6 (L) 02/15/2024    HCT 39.4 (L) 02/15/2024     02/15/2024    GRAN 3.9 02/15/2024    GRAN 65.6 02/15/2024     Lab Results   Component Value Date    CHOL 142 05/17/2024    HDL 36 (L) 05/17/2024    LDLCALC 71.8 05/17/2024    TRIG 171 (H) 05/17/2024          Imaging:         EF   Date Value Ref Range Status   02/20/2024 60 % Final   05/23/2023 55 % Final   07/06/2022 65 % Final       TTE:   Echo    Result Date: 2/20/2024  Images from the original result were not included.      Left Ventricle: The left ventricle is normal in size. Normal wall   thickness. There is normal systolic function. Ejection fraction by visual   approximation is 60%. There is normal diastolic function.    Right Ventricle: Normal right ventricular cavity size. Wall thickness   is normal. Right ventricle wall motion  is normal. Systolic function is   normal.    Left Atrium: Patent foramen ovale visualized by color flow Doppler.    Aortic Valve: The aortic valve is a trileaflet valve. There is no   significant regurgitation.    Mitral Valve: There is mild regurgitation.    Aorta: Aortic root is mildly dilated measuring 4.2 cm. Ascending aorta   is normal measuring 3.6 cm.    Pulmonary Artery: Pulmonary artery pressure could not be estimated.    IVC/SVC: Normal venous pressure at 3 mmHg.         Assessment:   Jatinder Glasgow Treuting III is a 44 y.o. M, who presents for initial visit regarding PFO     Plan:     #PFO:  Dx in 2012, no strokes/TIA  - discussed at length with pt regarding PFO closure and migraines.  There is only anecdotal evidence that PFO closure improves migraines and the risks highly outweigh possible benefits  - pt understands and would like to monitor for now    #Migraines  Dx in childhood and follows with neuro  - stable however does have frequent breakthrough migraines    #HTN:  BP controlled on losartan, continue    #HLD:  LDL at goal, " continue anti lipid medications    #ALEXX:  #Obesity:  - stable, on CPAP  - discussed exercise    Signed:  Ganga Carson MD  Cardiology Fellow  Pager - [phone]  [facility]  12/17/2024 8:45 AM       IC STAFF  I have seen the patient, reviewed the Fellow's history and physical, assessment and plan. I have personally interviewed and examined the patient and agree with the findings.  Dilated ascending aorta and incidental PFO referred for evaluation.  Recommend follow-up with stroke neurology and medical therapy with antiplatelet therapy for PFO.    BOWEN Neal MD

## 2025-01-28 ENCOUNTER — PATIENT MESSAGE (OUTPATIENT)
Dept: DERMATOLOGY | Facility: CLINIC | Age: 45
End: 2025-01-28
Payer: COMMERCIAL

## 2025-01-29 ENCOUNTER — OFFICE VISIT (OUTPATIENT)
Dept: INTERNAL MEDICINE | Facility: CLINIC | Age: 45
End: 2025-01-29
Payer: COMMERCIAL

## 2025-01-29 VITALS
DIASTOLIC BLOOD PRESSURE: 84 MMHG | TEMPERATURE: 98 F | HEIGHT: 75 IN | WEIGHT: 273.38 LBS | HEART RATE: 88 BPM | BODY MASS INDEX: 33.99 KG/M2 | RESPIRATION RATE: 16 BRPM | SYSTOLIC BLOOD PRESSURE: 134 MMHG | OXYGEN SATURATION: 98 %

## 2025-01-29 DIAGNOSIS — I10 ESSENTIAL HYPERTENSION: ICD-10-CM

## 2025-01-29 DIAGNOSIS — Z00.00 WELL ADULT EXAM: Primary | ICD-10-CM

## 2025-01-29 DIAGNOSIS — I77.810 AORTIC ROOT DILATATION: ICD-10-CM

## 2025-01-29 DIAGNOSIS — F41.1 GENERALIZED ANXIETY DISORDER: ICD-10-CM

## 2025-01-29 DIAGNOSIS — Q21.12 PFO (PATENT FORAMEN OVALE): ICD-10-CM

## 2025-01-29 DIAGNOSIS — Z12.5 PROSTATE CANCER SCREENING: ICD-10-CM

## 2025-01-29 DIAGNOSIS — F32.0 CURRENT MILD EPISODE OF MAJOR DEPRESSIVE DISORDER WITHOUT PRIOR EPISODE: ICD-10-CM

## 2025-01-29 DIAGNOSIS — G47.33 OSA (OBSTRUCTIVE SLEEP APNEA): ICD-10-CM

## 2025-01-29 DIAGNOSIS — F90.0 ATTENTION DEFICIT HYPERACTIVITY DISORDER (ADHD), PREDOMINANTLY INATTENTIVE TYPE: ICD-10-CM

## 2025-01-29 DIAGNOSIS — E78.1 HYPERTRIGLYCERIDEMIA: ICD-10-CM

## 2025-01-29 PROCEDURE — 99396 PREV VISIT EST AGE 40-64: CPT | Mod: S$GLB,,, | Performed by: FAMILY MEDICINE

## 2025-01-29 PROCEDURE — 4010F ACE/ARB THERAPY RXD/TAKEN: CPT | Mod: CPTII,S$GLB,, | Performed by: FAMILY MEDICINE

## 2025-01-29 PROCEDURE — 3008F BODY MASS INDEX DOCD: CPT | Mod: CPTII,S$GLB,, | Performed by: FAMILY MEDICINE

## 2025-01-29 PROCEDURE — 1160F RVW MEDS BY RX/DR IN RCRD: CPT | Mod: CPTII,S$GLB,, | Performed by: FAMILY MEDICINE

## 2025-01-29 PROCEDURE — 1159F MED LIST DOCD IN RCRD: CPT | Mod: CPTII,S$GLB,, | Performed by: FAMILY MEDICINE

## 2025-01-29 PROCEDURE — 99999 PR PBB SHADOW E&M-EST. PATIENT-LVL V: CPT | Mod: PBBFAC,,, | Performed by: FAMILY MEDICINE

## 2025-01-29 PROCEDURE — 3075F SYST BP GE 130 - 139MM HG: CPT | Mod: CPTII,S$GLB,, | Performed by: FAMILY MEDICINE

## 2025-01-29 PROCEDURE — 3079F DIAST BP 80-89 MM HG: CPT | Mod: CPTII,S$GLB,, | Performed by: FAMILY MEDICINE

## 2025-01-29 RX ORDER — FENOFIBRATE 160 MG/1
160 TABLET ORAL DAILY
Qty: 90 TABLET | Refills: 3 | Status: SHIPPED | OUTPATIENT
Start: 2025-01-29 | End: 2026-01-29

## 2025-01-29 NOTE — PROGRESS NOTES
Subjective     Patient ID: Jatinder Moreno III is a 44 y.o. male.    Chief Complaint: Annual Exam (Has headache right now. Head pain at 4  )    HPI 44-year-old white male presents to clinic today for annual physical exam.  He continues to see Cardiology secondary to hypertension, aortic root dilation, and PFO.  He remains stable on losartan 100 mg daily and hydrochlorothiazide 25 mg daily.  He was started on fenofibrate 160 mg daily for treatment of hypertriglyceridemia with improvement of triglycerides.  He continues to be followed by Psychiatry secondary to generalized anxiety disorder, major depressive disorder, and ADHD.  He has remained stable on BuSpar 10 mg t.i.d., Valium 10 mg as needed for anxiety, and Ritalin 20 mg daily.  He continues to be followed by Neurology for treatment of chronic migraines.  He obtains Botox for his migraines but recently has noted more frequent migraines despite treatment he continues to be followed by Sleep Medicine for treatment of sleep apnea which remains stable with use of CPAP nightly.  He reports a past surgical history of tonsillectomy, nasal septoplasty, and  uvulopalatopharyngoplasty. He reports a family history of anxiety, depression, diabetes, and hypertension. He is up-to-date with all vaccinations.   Review of Systems   Constitutional:  Negative for appetite change, chills, fatigue and fever.   HENT:  Negative for nasal congestion, ear pain, hearing loss, postnasal drip, rhinorrhea, sinus pressure/congestion, sore throat and tinnitus.    Eyes:  Negative for redness, itching and visual disturbance.   Respiratory:  Negative for cough, chest tightness and shortness of breath.    Cardiovascular:  Negative for chest pain and palpitations.   Gastrointestinal:  Positive for constipation. Negative for abdominal pain, diarrhea, nausea and vomiting.   Genitourinary:  Negative for decreased urine volume, difficulty urinating, dysuria, frequency, hematuria and urgency.    Musculoskeletal:  Negative for back pain, myalgias, neck pain and neck stiffness.   Integumentary:  Negative for rash.   Neurological:  Positive for headaches. Negative for dizziness and light-headedness.   Psychiatric/Behavioral: Negative.            Objective     Physical Exam  Vitals and nursing note reviewed.   Constitutional:       General: He is not in acute distress.     Appearance: Normal appearance. He is well-developed. He is not diaphoretic.   HENT:      Head: Normocephalic and atraumatic.      Right Ear: External ear normal.      Left Ear: External ear normal.      Nose: Nose normal.      Mouth/Throat:      Pharynx: No oropharyngeal exudate.   Eyes:      General: No scleral icterus.        Right eye: No discharge.         Left eye: No discharge.      Conjunctiva/sclera: Conjunctivae normal.      Pupils: Pupils are equal, round, and reactive to light.   Neck:      Thyroid: No thyromegaly.      Vascular: No JVD.      Trachea: No tracheal deviation.   Cardiovascular:      Rate and Rhythm: Normal rate and regular rhythm.      Heart sounds: Normal heart sounds. No murmur heard.     No friction rub. No gallop.   Pulmonary:      Effort: Pulmonary effort is normal. No respiratory distress.      Breath sounds: Normal breath sounds. No stridor. No wheezing, rhonchi or rales.   Chest:      Chest wall: No tenderness.   Abdominal:      General: Bowel sounds are normal. There is no distension.      Palpations: Abdomen is soft. There is no mass.      Tenderness: There is no abdominal tenderness. There is no guarding or rebound.   Musculoskeletal:         General: No tenderness. Normal range of motion.      Cervical back: Normal range of motion and neck supple.   Lymphadenopathy:      Cervical: No cervical adenopathy.   Skin:     General: Skin is warm and dry.      Coloration: Skin is not pale.      Findings: No erythema or rash.   Neurological:      Mental Status: He is alert and oriented to person, place, and time.    Psychiatric:         Mood and Affect: Mood normal.         Behavior: Behavior normal.         Thought Content: Thought content normal.         Judgment: Judgment normal.            Assessment and Plan     1. Well adult exam  -     CBC Auto Differential; Future; Expected date: 01/29/2025  -     Comprehensive Metabolic Panel; Future; Expected date: 01/29/2025  -     Lipid Panel; Future; Expected date: 01/29/2025  -     T4, Free; Future; Expected date: 01/29/2025  -     TSH; Future; Expected date: 01/29/2025  -     Hemoglobin A1C; Future; Expected date: 01/29/2025  -     PSA, Screening; Future; Expected date: 01/29/2025    2. Essential hypertension    3. Hypertriglyceridemia  -     Lipid Panel; Future; Expected date: 01/29/2025  -     fenofibrate 160 MG Tab; Take 1 tablet (160 mg total) by mouth once daily.  Dispense: 90 tablet; Refill: 3    4. Aortic root dilatation    5. PFO (patent foramen ovale)    6. Generalized anxiety disorder    7. Current mild episode of major depressive disorder without prior episode    8. Attention deficit hyperactivity disorder (ADHD), predominantly inattentive type    9. ALEXX (obstructive sleep apnea)    10. Prostate cancer screening  -     PSA, Screening; Future; Expected date: 01/29/2025        1. CBC, CMP, TSH, free T4, fasting lipids, hemoglobin A1c, and PSA.    2. Continue losartan 100 mg daily and hydrochlorothiazide 12.5 mg daily.  Hypertension is well controlled.  Continue follow-up with cardiology as scheduled.    3. Continue fenofibrate 160 mg daily.  Hypertriglyceridemia stable.  4. Continue follow-up with cardiology as scheduled.  Aortic root dilation and PFO remains stable.  5. Continue follow-up with psychiatry as scheduled and continue BuSpar, Valium, and Ritalin as prescribed.  Generalized anxiety disorder, depression, and ADHD remained stable.    6. Continue use of CPAP nightly.  Sleep apnea stable.    7. Return to clinic as needed or in 1 year for annual physical  exam.             Follow up in about 1 year (around 1/29/2026), or if symptoms worsen or fail to improve, for Annual exam.

## 2025-02-07 ENCOUNTER — PATIENT MESSAGE (OUTPATIENT)
Dept: NEUROLOGY | Facility: CLINIC | Age: 45
End: 2025-02-07
Payer: COMMERCIAL

## 2025-02-07 DIAGNOSIS — G43.719 INTRACTABLE CHRONIC MIGRAINE WITHOUT AURA AND WITHOUT STATUS MIGRAINOSUS: Primary | ICD-10-CM

## 2025-02-07 RX ORDER — PREDNISONE 20 MG/1
40 TABLET ORAL DAILY
Qty: 10 TABLET | Refills: 0 | Status: SHIPPED | OUTPATIENT
Start: 2025-02-07 | End: 2025-02-12

## 2025-02-08 ENCOUNTER — LAB VISIT (OUTPATIENT)
Dept: LAB | Facility: HOSPITAL | Age: 45
End: 2025-02-08
Attending: FAMILY MEDICINE
Payer: COMMERCIAL

## 2025-02-08 DIAGNOSIS — E78.1 HYPERTRIGLYCERIDEMIA: ICD-10-CM

## 2025-02-08 DIAGNOSIS — Z12.5 PROSTATE CANCER SCREENING: ICD-10-CM

## 2025-02-08 DIAGNOSIS — Z00.00 WELL ADULT EXAM: ICD-10-CM

## 2025-02-08 LAB
ALBUMIN SERPL BCP-MCNC: 4.5 G/DL (ref 3.5–5.2)
ALP SERPL-CCNC: 47 U/L (ref 40–150)
ALT SERPL W/O P-5'-P-CCNC: 27 U/L (ref 10–44)
ANION GAP SERPL CALC-SCNC: 11 MMOL/L (ref 8–16)
AST SERPL-CCNC: 23 U/L (ref 10–40)
BASOPHILS # BLD AUTO: 0.03 K/UL (ref 0–0.2)
BASOPHILS NFR BLD: 0.6 % (ref 0–1.9)
BILIRUB SERPL-MCNC: 0.9 MG/DL (ref 0.1–1)
BUN SERPL-MCNC: 19 MG/DL (ref 6–20)
CALCIUM SERPL-MCNC: 9.4 MG/DL (ref 8.7–10.5)
CHLORIDE SERPL-SCNC: 105 MMOL/L (ref 95–110)
CHOLEST SERPL-MCNC: 150 MG/DL (ref 120–199)
CHOLEST/HDLC SERPL: 4.3 {RATIO} (ref 2–5)
CO2 SERPL-SCNC: 24 MMOL/L (ref 23–29)
COMPLEXED PSA SERPL-MCNC: 0.91 NG/ML (ref 0–4)
CREAT SERPL-MCNC: 1.6 MG/DL (ref 0.5–1.4)
DIFFERENTIAL METHOD BLD: ABNORMAL
EOSINOPHIL # BLD AUTO: 0.1 K/UL (ref 0–0.5)
EOSINOPHIL NFR BLD: 1.8 % (ref 0–8)
ERYTHROCYTE [DISTWIDTH] IN BLOOD BY AUTOMATED COUNT: 13.5 % (ref 11.5–14.5)
EST. GFR  (NO RACE VARIABLE): 54.8 ML/MIN/1.73 M^2
ESTIMATED AVG GLUCOSE: 85 MG/DL (ref 68–131)
GLUCOSE SERPL-MCNC: 97 MG/DL (ref 70–110)
HBA1C MFR BLD: 4.6 % (ref 4–5.6)
HCT VFR BLD AUTO: 35.3 % (ref 40–54)
HDLC SERPL-MCNC: 35 MG/DL (ref 40–75)
HDLC SERPL: 23.3 % (ref 20–50)
HGB BLD-MCNC: 12.3 G/DL (ref 14–18)
IMM GRANULOCYTES # BLD AUTO: 0.04 K/UL (ref 0–0.04)
IMM GRANULOCYTES NFR BLD AUTO: 0.7 % (ref 0–0.5)
LDLC SERPL CALC-MCNC: 67 MG/DL (ref 63–159)
LYMPHOCYTES # BLD AUTO: 1.4 K/UL (ref 1–4.8)
LYMPHOCYTES NFR BLD: 25.1 % (ref 18–48)
MCH RBC QN AUTO: 30.1 PG (ref 27–31)
MCHC RBC AUTO-ENTMCNC: 34.8 G/DL (ref 32–36)
MCV RBC AUTO: 86 FL (ref 82–98)
MONOCYTES # BLD AUTO: 0.4 K/UL (ref 0.3–1)
MONOCYTES NFR BLD: 7.4 % (ref 4–15)
NEUTROPHILS # BLD AUTO: 3.5 K/UL (ref 1.8–7.7)
NEUTROPHILS NFR BLD: 64.4 % (ref 38–73)
NONHDLC SERPL-MCNC: 115 MG/DL
NRBC BLD-RTO: 0 /100 WBC
PLATELET # BLD AUTO: 202 K/UL (ref 150–450)
PMV BLD AUTO: 9.3 FL (ref 9.2–12.9)
POTASSIUM SERPL-SCNC: 4.2 MMOL/L (ref 3.5–5.1)
PROT SERPL-MCNC: 7.6 G/DL (ref 6–8.4)
RBC # BLD AUTO: 4.09 M/UL (ref 4.6–6.2)
SODIUM SERPL-SCNC: 140 MMOL/L (ref 136–145)
T4 FREE SERPL-MCNC: 0.86 NG/DL (ref 0.71–1.51)
TRIGL SERPL-MCNC: 240 MG/DL (ref 30–150)
TSH SERPL DL<=0.005 MIU/L-ACNC: 1.75 UIU/ML (ref 0.4–4)
WBC # BLD AUTO: 5.42 K/UL (ref 3.9–12.7)

## 2025-02-08 PROCEDURE — 80061 LIPID PANEL: CPT | Performed by: FAMILY MEDICINE

## 2025-02-08 PROCEDURE — 80053 COMPREHEN METABOLIC PANEL: CPT | Performed by: FAMILY MEDICINE

## 2025-02-08 PROCEDURE — 84439 ASSAY OF FREE THYROXINE: CPT | Performed by: FAMILY MEDICINE

## 2025-02-08 PROCEDURE — 84443 ASSAY THYROID STIM HORMONE: CPT | Performed by: FAMILY MEDICINE

## 2025-02-08 PROCEDURE — 85025 COMPLETE CBC W/AUTO DIFF WBC: CPT | Performed by: FAMILY MEDICINE

## 2025-02-08 PROCEDURE — 83036 HEMOGLOBIN GLYCOSYLATED A1C: CPT | Performed by: FAMILY MEDICINE

## 2025-02-08 PROCEDURE — 84153 ASSAY OF PSA TOTAL: CPT | Performed by: FAMILY MEDICINE

## 2025-02-12 ENCOUNTER — PATIENT MESSAGE (OUTPATIENT)
Dept: NEUROLOGY | Facility: CLINIC | Age: 45
End: 2025-02-12
Payer: COMMERCIAL

## 2025-02-12 ENCOUNTER — PATIENT MESSAGE (OUTPATIENT)
Dept: DERMATOLOGY | Facility: CLINIC | Age: 45
End: 2025-02-12
Payer: COMMERCIAL

## 2025-02-13 DIAGNOSIS — G43.719 INTRACTABLE CHRONIC MIGRAINE WITHOUT AURA AND WITHOUT STATUS MIGRAINOSUS: ICD-10-CM

## 2025-02-13 RX ORDER — RIMEGEPANT SULFATE 75 MG/75MG
75 TABLET, ORALLY DISINTEGRATING ORAL EVERY OTHER DAY
Qty: 16 TABLET | Refills: 11 | Status: SHIPPED | OUTPATIENT
Start: 2025-02-13

## 2025-02-19 ENCOUNTER — PROCEDURE VISIT (OUTPATIENT)
Dept: NEUROLOGY | Facility: CLINIC | Age: 45
End: 2025-02-19
Payer: COMMERCIAL

## 2025-02-19 DIAGNOSIS — G43.719 INTRACTABLE CHRONIC MIGRAINE WITHOUT AURA AND WITHOUT STATUS MIGRAINOSUS: Primary | ICD-10-CM

## 2025-02-19 NOTE — PROCEDURES
Frontalis- 15 units on each side   - 5 units on each side   Procerus- 10 units   Occipitalis- 15 units on each side   Temporalis - 20 units on each side   Trapezius- 10 units on each side   Cervical paraspinal muscles- 10 units on left side , 5 u R side  PROCEDURE NOTE:     After informed consent signed and dated, and risks and benefits of procedure was discussed, BOTOX injection was performed   Patient tolerated procedure well w/o any complications.      Headaches worsened since seen, steroid x5 helped more than previous medrol dose paks. They have been more stable since but is out of Nurtec QOD. Been using more prn uybrelvy. Using prn onzentra .Takes early to avoid progression. Continuing to use qhs apap access dme.        PFO dx'd 2012

## 2025-02-25 ENCOUNTER — PATIENT MESSAGE (OUTPATIENT)
Dept: DERMATOLOGY | Facility: CLINIC | Age: 45
End: 2025-02-25
Payer: COMMERCIAL

## 2025-02-25 DIAGNOSIS — L73.9 FOLLICULITIS: Primary | ICD-10-CM

## 2025-02-25 DIAGNOSIS — L71.9 ROSACEA: ICD-10-CM

## 2025-02-26 RX ORDER — DAPSONE 75 MG/G
GEL TOPICAL
Qty: 60 G | Refills: 2 | Status: ACTIVE | OUTPATIENT
Start: 2025-02-26

## 2025-03-03 ENCOUNTER — E-VISIT (OUTPATIENT)
Dept: INTERNAL MEDICINE | Facility: CLINIC | Age: 45
End: 2025-03-03
Payer: COMMERCIAL

## 2025-03-03 DIAGNOSIS — B96.89 BACTERIAL SINUSITIS: Primary | ICD-10-CM

## 2025-03-03 DIAGNOSIS — G43.719 INTRACTABLE CHRONIC MIGRAINE WITHOUT AURA AND WITHOUT STATUS MIGRAINOSUS: ICD-10-CM

## 2025-03-03 DIAGNOSIS — H10.9 CONJUNCTIVITIS, UNSPECIFIED CONJUNCTIVITIS TYPE, UNSPECIFIED LATERALITY: ICD-10-CM

## 2025-03-03 DIAGNOSIS — I10 ESSENTIAL HYPERTENSION: ICD-10-CM

## 2025-03-03 DIAGNOSIS — J32.9 BACTERIAL SINUSITIS: Primary | ICD-10-CM

## 2025-03-03 RX ORDER — ERYTHROMYCIN 5 MG/G
OINTMENT OPHTHALMIC 4 TIMES DAILY
Qty: 3.5 G | Refills: 0 | Status: SHIPPED | OUTPATIENT
Start: 2025-03-03

## 2025-03-03 RX ORDER — AMOXICILLIN AND CLAVULANATE POTASSIUM 875; 125 MG/1; MG/1
1 TABLET, FILM COATED ORAL 2 TIMES DAILY
Qty: 20 TABLET | Refills: 0 | Status: SHIPPED | OUTPATIENT
Start: 2025-03-03 | End: 2025-03-13

## 2025-03-03 RX ORDER — BENZONATATE 200 MG/1
200 CAPSULE ORAL 3 TIMES DAILY PRN
Qty: 30 CAPSULE | Refills: 0 | Status: SHIPPED | OUTPATIENT
Start: 2025-03-03 | End: 2025-03-13

## 2025-03-03 RX ORDER — TRAMADOL HYDROCHLORIDE 50 MG/1
50 TABLET ORAL EVERY 6 HOURS
Qty: 30 TABLET | Refills: 3 | Status: SHIPPED | OUTPATIENT
Start: 2025-03-03

## 2025-03-03 NOTE — PROGRESS NOTES
Patient ID: Jatinder Moreno III is a 44 y.o. male.    Chief Complaint: Sinusitis    The patient initiated a request through [employer] on 3/3/2025 for evaluation and management with a chief complaint of Sinusitis     I evaluated the questionnaire submission on 3/3/2025.    [facility] P[facility] Evisit [facility]-Common Problems    3/3/2025  8:56 AM CST - Filed by Patient   What do you need help with? Sinus Infection   Do you agree to participate in an E-Visit? Yes   If you have any of the following symptoms, go to your local emergency room or call 911: I acknowledge   What is the main issue you would like addressed today? Possible sinus infection. Stuffy/runny nose for roughly three weeks. Draining from eyes and crust in mornjng. Burning ination above an below eyes. Intermittent, and slight, nose bleeds.   Do you think you might have COVID-19 or the Flu? No   Have you tested positive for COVID-19 or Flu? No   What symptoms do you currently have?  Stuffy nose;  Runny nose;  Face and nose pain   Have you ever smoked? Never smoked   Have you had a fever? No   When did your symptoms first appear? 2/7/2025   In the last two weeks, have you been in close contact with someone who has COVID-19, the Flu, or strep throat? Not sure   List what you have done or taken to help your symptoms. Saline rinsing. Intermittent xyzal. Flonase.   On a scale of 1-10, where 10 is the worst you can imagine, how severe are your symptoms? (range: 1 - 10) 7   Have your symptoms changed since they first started? Worsened   Do you have transportation to an [facility] location to get tested for COVID-19? No   Provide any additional information you feel is important. Nothing else.   Please attach any relevant images or files    Are you able to take your vital signs? No         Encounter Diagnoses   Name Primary?    Bacterial sinusitis Yes    Conjunctivitis, unspecified conjunctivitis type, unspecified laterality         No orders of the defined types were placed  in this encounter.     Medications Ordered This Encounter   Medications    amoxicillin-clavulanate 875-125mg (AUGMENTIN) 875-125 mg per tablet     Sig: Take 1 tablet by mouth 2 (two) times daily. for 10 days     Dispense:  20 tablet     Refill:  0    benzonatate (TESSALON) 200 MG capsule     Sig: Take 1 capsule (200 mg total) by mouth 3 (three) times daily as needed for Cough.     Dispense:  30 capsule     Refill:  0    erythromycin (ROMYCIN) ophthalmic ointment     Sig: Place into both eyes 4 (four) times daily.     Dispense:  3.5 g     Refill:  0        No follow-ups on file.      E-Visit Time Tracking:    Day 1 Time (in minutes): 5    Total Time (in minutes): 5

## 2025-03-03 NOTE — TELEPHONE ENCOUNTER
Requested Prescriptions     Pending Prescriptions Disp Refills    traMADoL (ULTRAM) 50 mg tablet 30 tablet 3     Sig: Take 1 tablet (50 mg total) by mouth every 6 (six) hours.    10/05/23

## 2025-03-05 RX ORDER — LOSARTAN POTASSIUM 100 MG/1
100 TABLET ORAL DAILY
Qty: 90 TABLET | Refills: 3 | Status: SHIPPED | OUTPATIENT
Start: 2025-03-05

## 2025-03-10 ENCOUNTER — HOSPITAL ENCOUNTER (OUTPATIENT)
Dept: CARDIOLOGY | Facility: HOSPITAL | Age: 45
Discharge: HOME | End: 2025-03-10
Attending: PHYSICIAN ASSISTANT
Payer: COMMERCIAL

## 2025-03-10 VITALS
SYSTOLIC BLOOD PRESSURE: 120 MMHG | BODY MASS INDEX: 33.94 KG/M2 | WEIGHT: 273 LBS | DIASTOLIC BLOOD PRESSURE: 80 MMHG | HEIGHT: 75 IN | HEART RATE: 78 BPM

## 2025-03-10 DIAGNOSIS — I77.810 AORTIC ROOT DILATATION: ICD-10-CM

## 2025-03-10 DIAGNOSIS — Q21.12 PFO (PATENT FORAMEN OVALE): ICD-10-CM

## 2025-03-10 LAB
ASCENDING AORTA: 3.6 CM
AV AREA BY CONTINUOUS VTI: 2.2 CM2
AV INDEX (PROSTH): 0.71
AV LVOT MEAN GRADIENT: 2 MMHG
AV LVOT PEAK GRADIENT: 4 MMHG
AV MEAN GRADIENT: 4 MMHG
AV PEAK GRADIENT: 7 MMHG
AV VALVE AREA BY VELOCITY RATIO: 2.4 CM²
AV VALVE AREA: 2.2 CM²
AV VELOCITY RATIO: 0.77
BSA FOR ECHO PROCEDURE: 2.56 M2
CV ECHO LV RWT: 0.49 CM
DOP CALC AO PEAK VEL: 1.3 M/S
DOP CALC AO VTI: 22.9 CM
DOP CALC LVOT AREA: 3.1 CM2
DOP CALC LVOT DIAMETER: 2 CM
DOP CALC LVOT PEAK VEL: 1 M/S
DOP CALC LVOT STROKE VOLUME: 51.2 CM3
DOP CALCLVOT PEAK VEL VTI: 16.3 CM
E WAVE DECELERATION TIME: 153 MSEC
E/A RATIO: 1.47
E/E' RATIO: 13 M/S
ECHO EF ESTIMATED: 66 %
ECHO LV POSTERIOR WALL: 1.2 CM (ref 0.6–1.1)
FRACTIONAL SHORTENING: 36.7 % (ref 28–44)
INTERVENTRICULAR SEPTUM: 1.1 CM (ref 0.6–1.1)
LA MAJOR: 5.5 CM
LA MINOR: 5.1 CM
LA WIDTH: 4.3 CM
LEFT ATRIUM SIZE: 4.5 CM
LEFT ATRIUM VOLUME INDEX MOD: 20 ML/M2
LEFT ATRIUM VOLUME INDEX: 35 ML/M2
LEFT ATRIUM VOLUME MOD: 50 ML
LEFT ATRIUM VOLUME: 87 CM3
LEFT INTERNAL DIMENSION IN SYSTOLE: 3.1 CM (ref 2.1–4)
LEFT VENTRICLE DIASTOLIC VOLUME INDEX: 45.2 ML/M2
LEFT VENTRICLE DIASTOLIC VOLUME: 113 ML
LEFT VENTRICLE MASS INDEX: 85.3 G/M2
LEFT VENTRICLE SYSTOLIC VOLUME INDEX: 15.2 ML/M2
LEFT VENTRICLE SYSTOLIC VOLUME: 38 ML
LEFT VENTRICULAR INTERNAL DIMENSION IN DIASTOLE: 4.9 CM (ref 3.5–6)
LEFT VENTRICULAR MASS: 213.3 G
LV LATERAL E/E' RATIO: 12 M/S
LV SEPTAL E/E' RATIO: 14 M/S
MV A" WAVE DURATION": 11.42 MSEC
MV PEAK A VEL: 0.57 M/S
MV PEAK E VEL: 0.84 M/S
MV STENOSIS PRESSURE HALF TIME: 44.28 MS
MV VALVE AREA P 1/2 METHOD: 4.97 CM2
OHS CV RV/LV RATIO: 0.61 CM
PISA TR MAX VEL: 2.8 M/S
PULM VEIN A" WAVE DURATION": 114.18 MS
PULM VEIN S/D RATIO: 1.42
PULMONIC VEIN PEAK A VELOCITY: 0.2 M/S
PV PEAK D VEL: 0.43 M/S
PV PEAK S VEL: 0.61 M/S
RA MAJOR: 4.88 CM
RA PRESSURE ESTIMATED: 3 MMHG
RA WIDTH: 2.87 CM
RIGHT VENTRICLE DIASTOLIC BASEL DIMENSION: 3 CM
RV TB RVSP: 6 MMHG
SINUS: 4 CM
STJ: 3.37 CM
TDI LATERAL: 0.07 M/S
TDI SEPTAL: 0.06 M/S
TDI: 0.07 M/S
TRICUSPID ANNULAR PLANE SYSTOLIC EXCURSION: 3.94 CM
TV REST PULMONARY ARTERY PRESSURE: 34 MMHG
Z-SCORE OF LEFT VENTRICULAR DIMENSION IN END DIASTOLE: -10.45
Z-SCORE OF LEFT VENTRICULAR DIMENSION IN END SYSTOLE: -7.75

## 2025-03-10 PROCEDURE — 93306 TTE W/DOPPLER COMPLETE: CPT | Mod: 26,,, | Performed by: INTERNAL MEDICINE

## 2025-03-10 PROCEDURE — 93306 TTE W/DOPPLER COMPLETE: CPT | Mod: PO

## 2025-03-11 ENCOUNTER — RESULTS FOLLOW-UP (OUTPATIENT)
Dept: CARDIOLOGY | Facility: CLINIC | Age: 45
End: 2025-03-11

## 2025-04-23 DIAGNOSIS — G47.00 INSOMNIA DISORDER WITH NON-SLEEP DISORDER MENTAL COMORBIDITY: ICD-10-CM

## 2025-04-24 DIAGNOSIS — F32.0 CURRENT MILD EPISODE OF MAJOR DEPRESSIVE DISORDER WITHOUT PRIOR EPISODE: ICD-10-CM

## 2025-04-24 DIAGNOSIS — F41.1 GENERALIZED ANXIETY DISORDER: ICD-10-CM

## 2025-04-24 RX ORDER — TRAZODONE HYDROCHLORIDE 150 MG/1
150 TABLET ORAL NIGHTLY
Qty: 90 TABLET | Refills: 1 | Status: SHIPPED | OUTPATIENT
Start: 2025-04-24

## 2025-04-24 RX ORDER — BUSPIRONE HYDROCHLORIDE 15 MG/1
15 TABLET ORAL 3 TIMES DAILY
Qty: 270 TABLET | Refills: 1 | Status: SHIPPED | OUTPATIENT
Start: 2025-04-24

## 2025-04-28 DIAGNOSIS — L73.9 FOLLICULITIS: ICD-10-CM

## 2025-04-29 ENCOUNTER — OFFICE VISIT (OUTPATIENT)
Dept: DERMATOLOGY | Facility: CLINIC | Age: 45
End: 2025-04-29
Payer: COMMERCIAL

## 2025-04-29 DIAGNOSIS — D22.9 NEVUS: ICD-10-CM

## 2025-04-29 DIAGNOSIS — L21.9 SEBORRHEIC DERMATITIS, UNSPECIFIED: ICD-10-CM

## 2025-04-29 DIAGNOSIS — L73.9 FOLLICULITIS: Primary | ICD-10-CM

## 2025-04-29 DIAGNOSIS — D18.01 CHERRY ANGIOMA: ICD-10-CM

## 2025-04-29 DIAGNOSIS — L29.9 PRURITUS: ICD-10-CM

## 2025-04-29 DIAGNOSIS — L71.9 ROSACEA: ICD-10-CM

## 2025-04-29 PROCEDURE — 99999 PR PBB SHADOW E&M-EST. PATIENT-LVL IV: CPT | Mod: PBBFAC,,, | Performed by: DERMATOLOGY

## 2025-04-29 RX ORDER — CLINDAMYCIN PHOSPHATE 11.9 MG/ML
SOLUTION TOPICAL
Qty: 60 ML | Refills: 3 | Status: SHIPPED | OUTPATIENT
Start: 2025-04-29

## 2025-04-29 RX ORDER — MOMETASONE FUROATE 1 MG/ML
LOTION TOPICAL
Qty: 60 ML | Refills: 6 | Status: SHIPPED | OUTPATIENT
Start: 2025-04-29

## 2025-04-29 RX ORDER — HYDROCORTISONE AND ACETIC ACID 20.75; 10.375 MG/ML; MG/ML
SOLUTION AURICULAR (OTIC)
Qty: 10 ML | Refills: 1 | Status: SHIPPED | OUTPATIENT
Start: 2025-04-29

## 2025-04-29 NOTE — PROGRESS NOTES
Subjective:      Patient ID:  Jatinder Moreno III is a 44 y.o. male who presents for   Chief Complaint   Patient presents with    Skin Check     ubse     pT present today for Ubse.     Pt has no new concerns today.     Pt has no h/o NMSC or MM     Also here for chronic scalp folliculitis. Was on dapsone in the past.  still has folliculitis of scalp but also feels scalp is dry .   Face rosacea still flares and feels face is dry and itchy, oils help     C/o itching deep in ears.  No tx.  Can be severe.         Review of Systems   Skin:  Positive for itching, rash, activity-related sunscreen use, recent sunburn and wears hat (Sometimes). Negative for daily sunscreen use.   Hematologic/Lymphatic: Does not bruise/bleed easily.       Objective:   Physical Exam   Constitutional: He appears well-developed and well-nourished. No distress.   Neurological: He is alert and oriented to person, place, and time. He is not disoriented.   Psychiatric: He has a normal mood and affect.   Skin:   Areas Examined (abnormalities noted in diagram):   Scalp / Hair Palpated and Inspected  Head / Face Inspection Performed  Neck Inspection Performed  Chest / Axilla Inspection Performed  Abdomen Inspection Performed  Back Inspection Performed  RUE Inspected  LUE Inspection Performed  Nails and Digits Inspection Performed                 Diagram Legend     Erythematous scaling macule/papule c/w actinic keratosis       Vascular papule c/w angioma      Pigmented verrucoid papule/plaque c/w seborrheic keratosis      Yellow umbilicated papule c/w sebaceous hyperplasia      Irregularly shaped tan macule c/w lentigo     1-2 mm smooth white papules consistent with Milia      Movable subcutaneous cyst with punctum c/w epidermal inclusion cyst      Subcutaneous movable cyst c/w pilar cyst      Firm pink to brown papule c/w dermatofibroma      Pedunculated fleshy papule(s) c/w skin tag(s)      Evenly pigmented macule c/w junctional nevus      Mildly variegated pigmented, slightly irregular-bordered macule c/w mildly atypical nevus      Flesh colored to evenly pigmented papule c/w intradermal nevus       Pink pearly papule/plaque c/w basal cell carcinoma      Erythematous hyperkeratotic cursted plaque c/w SCC      Surgical scar with no sign of skin cancer recurrence      Open and closed comedones      Inflammatory papules and pustules      Verrucoid papule consistent consistent with wart     Erythematous eczematous patches and plaques     Dystrophic onycholytic nail with subungual debris c/w onychomycosis     Umbilicated papule    Erythematous-base heme-crusted tan verrucoid plaque consistent with inflamed seborrheic keratosis     Erythematous Silvery Scaling Plaque c/w Psoriasis     See annotation      Assessment / Plan:        Folliculitis  -     clindamycin (CLEOCIN T) 1 % external solution; Aaa qd- bid prn flare  Dispense: 60 mL; Refill: 3  -     mometasone (ELOCON) 0.1 % solution; Mix in bottle of DHS zinc shampoo apply to scalp, lather, let sit 5 minutes then rinse biw  Dispense: 60 mL; Refill: 6  Continue dermasmooth oil ( solutions in the past have burned)  Can try Vichy SSS shampoo     Rosacea  Continue cln wash and moisturizer to face     Nevus  Discussed ABCDE's of nevi.  Monitor for new mole or moles that are becoming bigger, darker, irritated, or developing irregular borders. Brochure provided. Instructed patient to observe lesion(s) for changes and follow up in clinic if changes are noted. Patient to monitor skin at home for new or changing lesions.     Cherry angioma  These are benign vascular lesions that are inherited.  Treatment is not necessary.    Seborrheic dermatitis, unspecified/pruritus - ears   -     acetic acid-hydrocortisone (VOSOL-HC) otic solution; Apply 3-4 drops to ear prn itching  Dispense: 10 mL; Refill: 1             Follow up in about 6 months (around 10/29/2025) for Medication Management, recheck nevus left shin .

## 2025-04-30 ENCOUNTER — OFFICE VISIT (OUTPATIENT)
Dept: PSYCHIATRY | Facility: CLINIC | Age: 45
End: 2025-04-30
Payer: COMMERCIAL

## 2025-04-30 DIAGNOSIS — F90.0 ATTENTION DEFICIT HYPERACTIVITY DISORDER (ADHD), PREDOMINANTLY INATTENTIVE TYPE: ICD-10-CM

## 2025-04-30 DIAGNOSIS — F41.1 GENERALIZED ANXIETY DISORDER: ICD-10-CM

## 2025-04-30 DIAGNOSIS — F32.0 CURRENT MILD EPISODE OF MAJOR DEPRESSIVE DISORDER WITHOUT PRIOR EPISODE: ICD-10-CM

## 2025-04-30 DIAGNOSIS — G47.00 INSOMNIA DISORDER WITH NON-SLEEP DISORDER MENTAL COMORBIDITY: ICD-10-CM

## 2025-04-30 PROCEDURE — 98006 SYNCH AUDIO-VIDEO EST MOD 30: CPT | Mod: 95,,, | Performed by: NURSE PRACTITIONER

## 2025-04-30 PROCEDURE — 3044F HG A1C LEVEL LT 7.0%: CPT | Mod: CPTII,95,, | Performed by: NURSE PRACTITIONER

## 2025-04-30 PROCEDURE — 4010F ACE/ARB THERAPY RXD/TAKEN: CPT | Mod: CPTII,95,, | Performed by: NURSE PRACTITIONER

## 2025-04-30 RX ORDER — METHYLPHENIDATE HYDROCHLORIDE 20 MG/1
20 TABLET ORAL 3 TIMES DAILY
Qty: 90 TABLET | Refills: 0 | Status: SHIPPED | OUTPATIENT
Start: 2025-04-30

## 2025-04-30 RX ORDER — DIAZEPAM 10 MG/1
10 TABLET ORAL DAILY PRN
Qty: 30 TABLET | Refills: 2 | Status: SHIPPED | OUTPATIENT
Start: 2025-04-30

## 2025-04-30 RX ORDER — METHYLPHENIDATE HYDROCHLORIDE 20 MG/1
20 TABLET ORAL 3 TIMES DAILY
Qty: 90 TABLET | Refills: 0 | Status: SHIPPED | OUTPATIENT
Start: 2025-05-29

## 2025-04-30 RX ORDER — METHYLPHENIDATE HYDROCHLORIDE 20 MG/1
20 TABLET ORAL 3 TIMES DAILY
Qty: 90 TABLET | Refills: 0 | Status: SHIPPED | OUTPATIENT
Start: 2025-06-27

## 2025-04-30 RX ORDER — TRAZODONE HYDROCHLORIDE 150 MG/1
150 TABLET ORAL NIGHTLY
Qty: 90 TABLET | Refills: 1 | Status: SHIPPED | OUTPATIENT
Start: 2025-04-30

## 2025-04-30 RX ORDER — BUSPIRONE HYDROCHLORIDE 15 MG/1
15 TABLET ORAL 3 TIMES DAILY
Qty: 270 TABLET | Refills: 1 | Status: SHIPPED | OUTPATIENT
Start: 2025-04-30

## 2025-04-30 NOTE — PROGRESS NOTES
"Outpatient Psychiatry Follow-Up Visit (MD/NP)    4/30/2025    Clinical Status of Patient:  Outpatient (Ambulatory)    Chief Complaint:  Jatinder Moreno III is a 44 y.o. male who presents today for follow-up of anxiety and attention problems.  Met with patient.      Lastd visit was:  4/23/24.  Chart and  reviewed.  The patient location is: home  The chief complaint leading to consultation is: ADHD and anxiety    Visit type: audiovisual    Face to Face time with patient: 30 minutes  35 minutes of total time spent on the encounter, which includes face to face time and non-face to face time preparing to see the patient (eg, review of tests), Obtaining and/or reviewing separately obtained history, Documenting clinical information in the electronic or other health record, Independently interpreting results (not separately reported) and communicating results to the patient/family/caregiver, or Care coordination (not separately reported).       Each patient to whom he or she provides medical services by telemedicine is:  (1) informed of the relationship between the physician and patient and the respective role of any other health care provider with respect to management of the patient; and (2) notified that he or she may decline to receive medical services by telemedicine and may withdraw from such care at any time.    Interval History and Content of Current Session:  Current Psychiatric Medications/changes  Ritalin 20 mg 3 x daily (4/23, 5/22, 6/21)  Valium 10 mg daily as needed for anxiety  Increase to Buspar 15 mg 3 x daily  Try Trazodone 150 mg (may break tablet in 1/2 or 1/2 to start)  Follow-up with me every 3 months by office or virtual visit for Ritalin refills      Virtual Visit: Reports he recently quit job at [employer] because he felt too isolated.  Sleeping better with Trazodone  . Pt presents bright affect and euthymic mood. Pt states, "I've been doing well" .Reports effective response to ADHD medications and " denies side effects. Denies any unmanaged mood, anxiety, or insomnia symptoms.  Denies SI/HI/AVH. Will continue current medications    Psychotherapy:  Target symptoms: lack of focus, anxiety   Why chosen therapy is appropriate versus another modality: relevant to diagnosis  Outcome monitoring methods: self-report  Therapeutic intervention type: insight oriented psychotherapy  Topics discussed/themes: difficulty managing affect in interpersonal relationships, building skills sets for symptom management  The patient's response to the intervention is accepting. The patient's progress toward treatment goals is good.   Duration of intervention: 16 minutes.    Review of Systems   PSYCHIATRIC: Pertinant items are noted in the narrative.  CONSTITUTIONAL: No weight gain or loss.   MUSCULOSKELETAL: No pain or stiffness of the joints.  NEUROLOGIC: No weakness, sensory changes, seizures, confusion, memory loss, tremor or other abnormal movements.  ENDOCRINE: No polydipsia or polyuria.  INTEGUMENTARY: No rashes or lacerations.  EYES: No exophthalmos, jaundice or blindness.  ENT: No dizziness, tinnitus or hearing loss.  RESPIRATORY: No shortness of breath.  CARDIOVASCULAR: No tachycardia or chest pain.  GASTROINTESTINAL: No nausea, vomiting, pain, constipation or diarrhea.  GENITOURINARY: No frequency, dysuria or sexual dysfunction.  HEMATOLOGIC/LYMPHATIC: No excessive bleeding, prolonged or excessive bleeding after dental extraction/injury.  ALLERGIC/IMMUNOLOGIC: No allergic response to materials, foods or animals at this time.    Past Medical, Family and Social History: The patient's past medical, family and social history have been reviewed and updated as appropriate within the electronic medical record - see encounter notes.    Compliance: yes    Side effects: None    Risk Parameters:  Patient reports no suicidal ideation  Patient reports no homicidal ideation  Patient reports no self-injurious behavior  Patient reports no  "violent behavior    Exam (detailed: at least 9 elements; comprehensive: all 15 elements)   Constitutional  Vitals:  Most recent vital signs, dated greater than 90 days prior to this appointment, were reviewed.   There were no vitals filed for this visit.  : 6' 3" (1.905 m)  Last Wt: 121.6 kg>30 days  BP: 120/83>1 day  Pulse: 83>1 day   General:  unremarkable, age appropriate     Musculoskeletal  Muscle Strength/Tone:  not examined   Gait & Station:  non-ataxic     Psychiatric  Speech:  no latency; no press   Mood & Affect:  steady  congruent and appropriate   Thought Process:  normal and logical   Associations:  intact   Thought Content:  normal, no suicidality, no homicidality, delusions, or paranoia   Insight:  intact   Judgement: behavior is adequate to circumstances   Orientation:  grossly intact   Memory: intact for content of interview   Language: grossly intact   Attention Span & Concentration:  able to focus   Fund of Knowledge:  intact and appropriate to age and level of education     Assessment and Diagnosis   Status/Progress: Based on the examination today, the patient's problem(s) is/are improved.  New problems have not been presented today.   Co-morbidities and Lack of compliance are not complicating management of the primary condition.  There are no active rule-out diagnoses for this patient at this time.     General Impression:       ICD-10-CM ICD-9-CM   1. Attention deficit hyperactivity disorder (ADHD), predominantly inattentive type  F90.0 314.00   2. Generalized anxiety disorder  F41.1 300.02   3. Current mild episode of major depressive disorder without prior episode  F32.0 296.21   4. Insomnia disorder with non-sleep disorder mental comorbidity  G47.00 780.52         Intervention/Counseling/Treatment Plan   Medication Management: The risks and benefits of medication were discussed with the patient.  Ritalin 20 mg 3 x daily (4/23, 5/22, 6/21)  Valium 10 mg daily as needed for anxiety   Buspar 15 " mg 3 x daily  Trazodone 150 mg (may break tablet in 1/2 or 1/2 to start)  Follow-up with me every 3 months by office or virtual visit for Ritalin refills    Return to Clinic: 3 months    Risks, benefits, side effects and alternative treatments discussed with patient. Patient agrees with the current plan as documented.  Encouraged Patient to keep future appointments.  Take medications as prescribed and abstain from substance abuse.  Pt to present to ED for thoughts to harm himself or others

## 2025-05-06 ENCOUNTER — OFFICE VISIT (OUTPATIENT)
Dept: UROLOGY | Facility: CLINIC | Age: 45
End: 2025-05-06
Payer: COMMERCIAL

## 2025-05-06 ENCOUNTER — PATIENT MESSAGE (OUTPATIENT)
Dept: UROLOGY | Facility: CLINIC | Age: 45
End: 2025-05-06

## 2025-05-06 VITALS
BODY MASS INDEX: 34.84 KG/M2 | DIASTOLIC BLOOD PRESSURE: 98 MMHG | SYSTOLIC BLOOD PRESSURE: 150 MMHG | HEART RATE: 84 BPM | WEIGHT: 280.19 LBS | HEIGHT: 75 IN

## 2025-05-06 DIAGNOSIS — N50.812 PAIN IN BOTH TESTICLES: Primary | ICD-10-CM

## 2025-05-06 DIAGNOSIS — Z98.52 HISTORY OF VASECTOMY: ICD-10-CM

## 2025-05-06 DIAGNOSIS — N45.1 BILATERAL EPIDIDYMITIS: ICD-10-CM

## 2025-05-06 DIAGNOSIS — N50.811 PAIN IN BOTH TESTICLES: Primary | ICD-10-CM

## 2025-05-06 LAB
BILIRUBIN, UA POC OHS: NEGATIVE
BLOOD, UA POC OHS: NEGATIVE
CLARITY, UA POC OHS: CLEAR
COLOR, UA POC OHS: YELLOW
GLUCOSE, UA POC OHS: NEGATIVE
KETONES, UA POC OHS: NEGATIVE
LEUKOCYTES, UA POC OHS: NEGATIVE
NITRITE, UA POC OHS: NEGATIVE
PH, UA POC OHS: 7
PROTEIN, UA POC OHS: NEGATIVE
SPECIFIC GRAVITY, UA POC OHS: 1.01
UROBILINOGEN, UA POC OHS: 0.2

## 2025-05-06 PROCEDURE — 3008F BODY MASS INDEX DOCD: CPT | Mod: CPTII,S$GLB,, | Performed by: NURSE PRACTITIONER

## 2025-05-06 PROCEDURE — 99999 PR PBB SHADOW E&M-EST. PATIENT-LVL V: CPT | Mod: PBBFAC,,, | Performed by: NURSE PRACTITIONER

## 2025-05-06 PROCEDURE — 3080F DIAST BP >= 90 MM HG: CPT | Mod: CPTII,S$GLB,, | Performed by: NURSE PRACTITIONER

## 2025-05-06 PROCEDURE — 4010F ACE/ARB THERAPY RXD/TAKEN: CPT | Mod: CPTII,S$GLB,, | Performed by: NURSE PRACTITIONER

## 2025-05-06 PROCEDURE — 1160F RVW MEDS BY RX/DR IN RCRD: CPT | Mod: CPTII,S$GLB,, | Performed by: NURSE PRACTITIONER

## 2025-05-06 PROCEDURE — 3077F SYST BP >= 140 MM HG: CPT | Mod: CPTII,S$GLB,, | Performed by: NURSE PRACTITIONER

## 2025-05-06 PROCEDURE — 81003 URINALYSIS AUTO W/O SCOPE: CPT | Mod: QW,S$GLB,, | Performed by: NURSE PRACTITIONER

## 2025-05-06 PROCEDURE — 3044F HG A1C LEVEL LT 7.0%: CPT | Mod: CPTII,S$GLB,, | Performed by: NURSE PRACTITIONER

## 2025-05-06 PROCEDURE — 99214 OFFICE O/P EST MOD 30 MIN: CPT | Mod: S$GLB,,, | Performed by: NURSE PRACTITIONER

## 2025-05-06 PROCEDURE — 1159F MED LIST DOCD IN RCRD: CPT | Mod: CPTII,S$GLB,, | Performed by: NURSE PRACTITIONER

## 2025-05-06 RX ORDER — FLUOCINONIDE TOPICAL SOLUTION USP, 0.05% 0.5 MG/ML
SOLUTION TOPICAL
Qty: 60 ML | Refills: 3 | Status: SHIPPED | OUTPATIENT
Start: 2025-05-06

## 2025-05-06 RX ORDER — OXAPROZIN 600 MG/1
600 TABLET, FILM COATED ORAL EVERY 12 HOURS
Qty: 30 TABLET | Refills: 1 | Status: SHIPPED | OUTPATIENT
Start: 2025-05-06 | End: 2025-06-05
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
## 2025-05-14 ENCOUNTER — PROCEDURE VISIT (OUTPATIENT)
Dept: NEUROLOGY | Facility: CLINIC | Age: 45
End: 2025-05-14
Payer: COMMERCIAL

## 2025-05-14 DIAGNOSIS — G43.719 INTRACTABLE CHRONIC MIGRAINE WITHOUT AURA AND WITHOUT STATUS MIGRAINOSUS: Primary | ICD-10-CM

## 2025-05-14 DIAGNOSIS — G47.33 OSA (OBSTRUCTIVE SLEEP APNEA): Primary | ICD-10-CM

## 2025-05-14 NOTE — PROCEDURES
Frontalis- 15 units on each side   - 5 units on each side   Procerus- 10 units   Occipitalis- 15 units on each side   Temporalis - 20 units on each side   Trapezius- 10 units on each side   Cervical paraspinal muscles- 10 units on left side , 5 u R side  PROCEDURE NOTE:     After informed consent signed and dated, and risks and benefits of procedure was discussed, BOTOX injection was performed   Patient tolerated procedure well w/o any complications.     Continues to take Nurtec QOD, using more prn uybrelvy. Using prn onzentra .Takes early to avoid progression. Continuing to use qhs apap access dme.     DS1 replacement 30d avg 7h/n AHI 1.2, 90% tile 10.3cm. 100% mask fit, 0% PB  Access dme supplies/wants different mask    Remains adherent/benefiting from CPAP therapy. AHI<5       PFO dx'd 2012

## 2025-05-19 ENCOUNTER — PATIENT MESSAGE (OUTPATIENT)
Dept: UROLOGY | Facility: CLINIC | Age: 45
End: 2025-05-19
Payer: COMMERCIAL

## 2025-06-25 ENCOUNTER — PATIENT MESSAGE (OUTPATIENT)
Dept: SLEEP MEDICINE | Facility: CLINIC | Age: 45
End: 2025-06-25
Payer: COMMERCIAL

## 2025-07-14 ENCOUNTER — PATIENT MESSAGE (OUTPATIENT)
Dept: NEUROLOGY | Facility: CLINIC | Age: 45
End: 2025-07-14
Payer: COMMERCIAL

## 2025-07-15 DIAGNOSIS — G43.719 INTRACTABLE CHRONIC MIGRAINE WITHOUT AURA AND WITHOUT STATUS MIGRAINOSUS: ICD-10-CM

## 2025-07-15 DIAGNOSIS — G43.711 INTRACTABLE CHRONIC MIGRAINE WITHOUT AURA AND WITH STATUS MIGRAINOSUS: Primary | ICD-10-CM

## 2025-07-15 RX ORDER — PREDNISONE 20 MG/1
40 TABLET ORAL DAILY
Qty: 10 TABLET | Refills: 0 | Status: SHIPPED | OUTPATIENT
Start: 2025-07-15 | End: 2025-07-20

## 2025-07-15 RX ORDER — SUMATRIPTAN SUCCINATE 100 MG/1
100 TABLET ORAL
Qty: 9 TABLET | Refills: 0 | Status: SHIPPED | OUTPATIENT
Start: 2025-07-15 | End: 2025-08-14

## 2025-07-15 RX ORDER — SUMATRIPTAN SUCCINATE 11 MG/1
22 CAPSULE NASAL
Qty: 16 EACH | Refills: 3 | Status: SHIPPED | OUTPATIENT
Start: 2025-07-15

## 2025-07-15 RX ORDER — SUMATRIPTAN 5 MG/1
5 SPRAY NASAL
Qty: 6 EACH | Refills: 0 | Status: SHIPPED | OUTPATIENT
Start: 2025-07-15 | End: 2025-08-14

## 2025-07-18 DIAGNOSIS — G43.719 INTRACTABLE CHRONIC MIGRAINE WITHOUT AURA AND WITHOUT STATUS MIGRAINOSUS: Primary | ICD-10-CM

## 2025-07-18 RX ORDER — RIMEGEPANT SULFATE 75 MG/75MG
75 TABLET, ORALLY DISINTEGRATING ORAL EVERY OTHER DAY
Qty: 16 TABLET | Refills: 11 | Status: SHIPPED | OUTPATIENT
Start: 2025-07-18

## 2025-07-22 ENCOUNTER — PATIENT MESSAGE (OUTPATIENT)
Dept: UROLOGY | Facility: CLINIC | Age: 45
End: 2025-07-22
Payer: COMMERCIAL

## 2025-07-26 DIAGNOSIS — G43.711 INTRACTABLE CHRONIC MIGRAINE WITHOUT AURA AND WITH STATUS MIGRAINOSUS: ICD-10-CM

## 2025-07-28 RX ORDER — SUMATRIPTAN SUCCINATE 100 MG/1
100 TABLET ORAL
Qty: 9 TABLET | Refills: 5 | Status: SHIPPED | OUTPATIENT
Start: 2025-07-28 | End: 2025-08-27

## 2025-07-28 NOTE — TELEPHONE ENCOUNTER
Requested Prescriptions     Pending Prescriptions Disp Refills    sumatriptan (IMITREX) 100 MG tablet 9 tablet 0     Sig: Take 1 tablet (100 mg total) by mouth every 2 (two) hours as needed for Migraine.     Lov 3/27/24

## 2025-08-12 ENCOUNTER — PATIENT MESSAGE (OUTPATIENT)
Dept: CARDIOLOGY | Facility: CLINIC | Age: 45
End: 2025-08-12
Payer: COMMERCIAL

## 2025-08-13 ENCOUNTER — OFFICE VISIT (OUTPATIENT)
Dept: NEUROLOGY | Facility: CLINIC | Age: 45
End: 2025-08-13
Payer: COMMERCIAL

## 2025-08-13 DIAGNOSIS — G43.719 INTRACTABLE CHRONIC MIGRAINE WITHOUT AURA AND WITHOUT STATUS MIGRAINOSUS: Primary | ICD-10-CM

## 2025-08-13 DIAGNOSIS — G47.33 OSA (OBSTRUCTIVE SLEEP APNEA): ICD-10-CM

## 2025-08-13 PROCEDURE — 99999 PR PBB SHADOW E&M-EST. PATIENT-LVL I: CPT | Mod: PBBFAC,,, | Performed by: NURSE PRACTITIONER

## 2025-08-19 ENCOUNTER — OFFICE VISIT (OUTPATIENT)
Dept: UROLOGY | Facility: CLINIC | Age: 45
End: 2025-08-19
Payer: COMMERCIAL

## 2025-08-19 VITALS
HEIGHT: 75 IN | SYSTOLIC BLOOD PRESSURE: 134 MMHG | DIASTOLIC BLOOD PRESSURE: 80 MMHG | WEIGHT: 278.13 LBS | HEART RATE: 103 BPM | BODY MASS INDEX: 34.58 KG/M2

## 2025-08-19 DIAGNOSIS — Z98.52 HISTORY OF VASECTOMY: ICD-10-CM

## 2025-08-19 DIAGNOSIS — G89.29 CHRONIC PAIN IN TESTICLE: ICD-10-CM

## 2025-08-19 DIAGNOSIS — N50.819 CHRONIC PAIN IN TESTICLE: ICD-10-CM

## 2025-08-19 DIAGNOSIS — N50.812 PAIN IN BOTH TESTICLES: Primary | ICD-10-CM

## 2025-08-19 DIAGNOSIS — N50.811 PAIN IN BOTH TESTICLES: Primary | ICD-10-CM

## 2025-08-19 LAB
BILIRUBIN, UA POC OHS: NEGATIVE
BLOOD, UA POC OHS: NEGATIVE
CLARITY, UA POC OHS: CLEAR
COLOR, UA POC OHS: YELLOW
GLUCOSE, UA POC OHS: NEGATIVE
KETONES, UA POC OHS: NEGATIVE
LEUKOCYTES, UA POC OHS: NEGATIVE
NITRITE, UA POC OHS: NEGATIVE
PH, UA POC OHS: 5.5
PROTEIN, UA POC OHS: NEGATIVE
SPECIFIC GRAVITY, UA POC OHS: 1.01
UROBILINOGEN, UA POC OHS: 0.2

## 2025-08-19 PROCEDURE — 3079F DIAST BP 80-89 MM HG: CPT | Mod: CPTII,S$GLB,, | Performed by: NURSE PRACTITIONER

## 2025-08-19 PROCEDURE — 3008F BODY MASS INDEX DOCD: CPT | Mod: CPTII,S$GLB,, | Performed by: NURSE PRACTITIONER

## 2025-08-19 PROCEDURE — 4010F ACE/ARB THERAPY RXD/TAKEN: CPT | Mod: CPTII,S$GLB,, | Performed by: NURSE PRACTITIONER

## 2025-08-19 PROCEDURE — 99999 PR PBB SHADOW E&M-EST. PATIENT-LVL V: CPT | Mod: PBBFAC,,, | Performed by: NURSE PRACTITIONER

## 2025-08-19 PROCEDURE — 81003 URINALYSIS AUTO W/O SCOPE: CPT | Mod: QW,S$GLB,, | Performed by: NURSE PRACTITIONER

## 2025-08-19 PROCEDURE — 1160F RVW MEDS BY RX/DR IN RCRD: CPT | Mod: CPTII,S$GLB,, | Performed by: NURSE PRACTITIONER

## 2025-08-19 PROCEDURE — 3075F SYST BP GE 130 - 139MM HG: CPT | Mod: CPTII,S$GLB,, | Performed by: NURSE PRACTITIONER

## 2025-08-19 PROCEDURE — 3044F HG A1C LEVEL LT 7.0%: CPT | Mod: CPTII,S$GLB,, | Performed by: NURSE PRACTITIONER

## 2025-08-19 PROCEDURE — 99214 OFFICE O/P EST MOD 30 MIN: CPT | Mod: S$GLB,,, | Performed by: NURSE PRACTITIONER

## 2025-08-19 PROCEDURE — 1159F MED LIST DOCD IN RCRD: CPT | Mod: CPTII,S$GLB,, | Performed by: NURSE PRACTITIONER

## 2025-08-19 RX ORDER — NAPROXEN 500 MG/1
500 TABLET ORAL 2 TIMES DAILY WITH MEALS
Qty: 30 TABLET | Refills: 1 | Status: SHIPPED | OUTPATIENT
Start: 2025-08-19 | End: 2025-09-18

## 2025-08-19 RX ORDER — OMEPRAZOLE 20 MG/1
20 CAPSULE, DELAYED RELEASE ORAL DAILY
Qty: 30 CAPSULE | Refills: 1 | Status: SHIPPED | OUTPATIENT
Start: 2025-08-19 | End: 2025-09-18

## 2025-08-21 ENCOUNTER — PATIENT MESSAGE (OUTPATIENT)
Dept: INTERNAL MEDICINE | Facility: CLINIC | Age: 45
End: 2025-08-21
Payer: COMMERCIAL

## 2025-08-21 DIAGNOSIS — R09.81 SINUS CONGESTION: Primary | ICD-10-CM

## 2025-08-22 ENCOUNTER — E-VISIT (OUTPATIENT)
Dept: INTERNAL MEDICINE | Facility: CLINIC | Age: 45
End: 2025-08-22
Payer: COMMERCIAL

## 2025-08-22 DIAGNOSIS — B96.89 BACTERIAL SINUSITIS: Primary | ICD-10-CM

## 2025-08-22 DIAGNOSIS — J32.9 BACTERIAL SINUSITIS: Primary | ICD-10-CM

## 2025-08-24 RX ORDER — AMOXICILLIN AND CLAVULANATE POTASSIUM 875; 125 MG/1; MG/1
1 TABLET, FILM COATED ORAL 2 TIMES DAILY
Qty: 20 TABLET | Refills: 0 | Status: SHIPPED | OUTPATIENT
Start: 2025-08-24 | End: 2025-09-03

## 2025-08-29 ENCOUNTER — PATIENT MESSAGE (OUTPATIENT)
Dept: UROLOGY | Facility: CLINIC | Age: 45
End: 2025-08-29
Payer: COMMERCIAL

## 2025-09-01 DIAGNOSIS — L73.9 FOLLICULITIS: ICD-10-CM

## 2025-09-02 RX ORDER — CLINDAMYCIN PHOSPHATE 11.9 MG/ML
SOLUTION TOPICAL
Qty: 60 ML | Refills: 3 | Status: SHIPPED | OUTPATIENT
Start: 2025-09-02

## (undated) DEVICE — DRAPE STERI INSTRUMENT 1018

## (undated) DEVICE — ADHESIVE MASTISOL VIAL 48/BX

## (undated) DEVICE — STAPLER SEPTAL ENTACT

## (undated) DEVICE — SUCTION COAGULATOR 10FR 6IN

## (undated) DEVICE — SKINMARKER & RULER REGULAR X-F

## (undated) DEVICE — CAUTERY BOVIE PENCIL

## (undated) DEVICE — BLADE INFERIOR TURBINATE 5/PK

## (undated) DEVICE — APPLICATOR Q-TIPS BULK 3 INCH

## (undated) DEVICE — BLADE 4IN EDGE INSULATED

## (undated) DEVICE — GLOVE BIOGEL ECLIPSE SZ 7.5

## (undated) DEVICE — SUT VICRYL 3-0 27 SH

## (undated) DEVICE — SEE MEDLINE ITEM 146417

## (undated) DEVICE — SEE MEDLINE ITEM 152487

## (undated) DEVICE — SPONGE COTTON TRAY 4X4IN

## (undated) DEVICE — ELECTRODE NEEDLE 1IN

## (undated) DEVICE — DRESSING TELFA PAD N ADH 2X3

## (undated) DEVICE — SPONGE GELFOAM 12-7MM

## (undated) DEVICE — CATH ALL PUR URTHL RR 10FR

## (undated) DEVICE — Device

## (undated) DEVICE — SEE MEDLINE ITEM 156934

## (undated) DEVICE — SPONGE PATTY SURGICAL .5X3IN

## (undated) DEVICE — PENCIL ROCKER SWITCH 10FT CORD

## (undated) DEVICE — DRAPE STERI-DRAPE 1000 17X11IN

## (undated) DEVICE — SEE MEDLINE ITEM 157194

## (undated) DEVICE — BLADE SURG STAINLESS STEEL #15

## (undated) DEVICE — SEE MEDLINE ITEM 146313

## (undated) DEVICE — ELECTRODE REM PLYHSV RETURN 9

## (undated) DEVICE — SEE MEDLINE ITEM 157117

## (undated) DEVICE — SEE MEDLINE ITEM 152622

## (undated) DEVICE — DRESSING TELFA STRL 4X3 LF